# Patient Record
Sex: MALE | Race: WHITE | Employment: OTHER | URBAN - NONMETROPOLITAN AREA
[De-identification: names, ages, dates, MRNs, and addresses within clinical notes are randomized per-mention and may not be internally consistent; named-entity substitution may affect disease eponyms.]

---

## 2017-01-12 ENCOUNTER — OFFICE VISIT (OUTPATIENT)
Dept: FAMILY MEDICINE CLINIC | Age: 52
End: 2017-01-12

## 2017-01-12 VITALS
OXYGEN SATURATION: 98 % | RESPIRATION RATE: 16 BRPM | HEIGHT: 72 IN | SYSTOLIC BLOOD PRESSURE: 118 MMHG | DIASTOLIC BLOOD PRESSURE: 84 MMHG | BODY MASS INDEX: 37.46 KG/M2 | WEIGHT: 276.6 LBS | TEMPERATURE: 98.6 F | HEART RATE: 90 BPM

## 2017-01-12 DIAGNOSIS — K21.00 GASTROESOPHAGEAL REFLUX DISEASE WITH ESOPHAGITIS: ICD-10-CM

## 2017-01-12 DIAGNOSIS — F10.11 HISTORY OF ALCOHOL ABUSE: ICD-10-CM

## 2017-01-12 DIAGNOSIS — Z72.0 TOBACCO ABUSE: ICD-10-CM

## 2017-01-12 DIAGNOSIS — F41.9 ANXIETY: ICD-10-CM

## 2017-01-12 DIAGNOSIS — F33.1 MODERATE EPISODE OF RECURRENT MAJOR DEPRESSIVE DISORDER (HCC): Primary | ICD-10-CM

## 2017-01-12 DIAGNOSIS — Z12.11 COLON CANCER SCREENING: ICD-10-CM

## 2017-01-12 PROCEDURE — 96160 PT-FOCUSED HLTH RISK ASSMT: CPT | Performed by: NURSE PRACTITIONER

## 2017-01-12 PROCEDURE — 99214 OFFICE O/P EST MOD 30 MIN: CPT | Performed by: NURSE PRACTITIONER

## 2017-01-12 RX ORDER — VENLAFAXINE 37.5 MG/1
37.5 TABLET ORAL 2 TIMES DAILY
Qty: 60 TABLET | Refills: 1 | Status: SHIPPED | OUTPATIENT
Start: 2017-01-12 | End: 2017-03-06 | Stop reason: SDUPTHER

## 2017-01-12 RX ORDER — BUSPIRONE HYDROCHLORIDE 5 MG/1
5 TABLET ORAL 3 TIMES DAILY PRN
Qty: 90 TABLET | Refills: 1 | Status: SHIPPED | OUTPATIENT
Start: 2017-01-12 | End: 2017-03-06 | Stop reason: SDUPTHER

## 2017-01-18 ASSESSMENT — PATIENT HEALTH QUESTIONNAIRE - PHQ9
1. LITTLE INTEREST OR PLEASURE IN DOING THINGS: 3
5. POOR APPETITE OR OVEREATING: 1
2. FEELING DOWN, DEPRESSED OR HOPELESS: 3
9. THOUGHTS THAT YOU WOULD BE BETTER OFF DEAD, OR OF HURTING YOURSELF: 3
6. FEELING BAD ABOUT YOURSELF - OR THAT YOU ARE A FAILURE OR HAVE LET YOURSELF OR YOUR FAMILY DOWN: 3
SUM OF ALL RESPONSES TO PHQ9 QUESTIONS 1 & 2: 6
10. IF YOU CHECKED OFF ANY PROBLEMS, HOW DIFFICULT HAVE THESE PROBLEMS MADE IT FOR YOU TO DO YOUR WORK, TAKE CARE OF THINGS AT HOME, OR GET ALONG WITH OTHER PEOPLE: 3
4. FEELING TIRED OR HAVING LITTLE ENERGY: 3
8. MOVING OR SPEAKING SO SLOWLY THAT OTHER PEOPLE COULD HAVE NOTICED. OR THE OPPOSITE, BEING SO FIGETY OR RESTLESS THAT YOU HAVE BEEN MOVING AROUND A LOT MORE THAN USUAL: 2
7. TROUBLE CONCENTRATING ON THINGS, SUCH AS READING THE NEWSPAPER OR WATCHING TELEVISION: 3
SUM OF ALL RESPONSES TO PHQ QUESTIONS 1-9: 24
3. TROUBLE FALLING OR STAYING ASLEEP: 3

## 2017-01-18 ASSESSMENT — ENCOUNTER SYMPTOMS
EYES NEGATIVE: 1
GASTROINTESTINAL NEGATIVE: 1
RESPIRATORY NEGATIVE: 1

## 2017-02-03 RX ORDER — ATORVASTATIN CALCIUM 40 MG/1
TABLET, FILM COATED ORAL
Qty: 30 TABLET | Refills: 3 | Status: SHIPPED | OUTPATIENT
Start: 2017-02-03 | End: 2017-04-10 | Stop reason: SDUPTHER

## 2017-02-28 DIAGNOSIS — F41.9 ANXIETY: ICD-10-CM

## 2017-02-28 RX ORDER — FLUOXETINE HYDROCHLORIDE 20 MG/1
CAPSULE ORAL
Qty: 30 CAPSULE | Refills: 1 | Status: SHIPPED | OUTPATIENT
Start: 2017-02-28 | End: 2017-05-08 | Stop reason: SDUPTHER

## 2017-03-06 DIAGNOSIS — F41.9 ANXIETY: ICD-10-CM

## 2017-03-07 RX ORDER — VENLAFAXINE 37.5 MG/1
TABLET ORAL
Qty: 60 TABLET | Refills: 1 | Status: SHIPPED | OUTPATIENT
Start: 2017-03-07 | End: 2017-04-05 | Stop reason: ALTCHOICE

## 2017-03-07 RX ORDER — BUSPIRONE HYDROCHLORIDE 5 MG/1
TABLET ORAL
Qty: 90 TABLET | Refills: 1 | Status: SHIPPED | OUTPATIENT
Start: 2017-03-07 | End: 2017-05-15 | Stop reason: SDUPTHER

## 2017-03-08 ENCOUNTER — OFFICE VISIT (OUTPATIENT)
Dept: ORTHOPEDIC SURGERY | Age: 52
End: 2017-03-08

## 2017-03-08 VITALS — BODY MASS INDEX: 38.07 KG/M2 | HEIGHT: 72 IN | WEIGHT: 281.09 LBS

## 2017-03-08 DIAGNOSIS — R22.32 MASS OF HAND, LEFT: ICD-10-CM

## 2017-03-08 DIAGNOSIS — S63.602A THUMB SPRAIN, LEFT, INITIAL ENCOUNTER: ICD-10-CM

## 2017-03-08 DIAGNOSIS — M79.642 LEFT HAND PAIN: Primary | ICD-10-CM

## 2017-03-08 PROBLEM — S63.609A THUMB SPRAIN: Status: ACTIVE | Noted: 2017-03-08

## 2017-03-08 PROBLEM — R22.30 MASS OF HAND: Status: ACTIVE | Noted: 2017-03-08

## 2017-03-08 PROCEDURE — L3917 METACARP FX ORTHOSIS PRE CST: HCPCS | Performed by: ORTHOPAEDIC SURGERY

## 2017-03-08 PROCEDURE — 73130 X-RAY EXAM OF HAND: CPT | Performed by: ORTHOPAEDIC SURGERY

## 2017-03-08 PROCEDURE — 99203 OFFICE O/P NEW LOW 30 MIN: CPT | Performed by: ORTHOPAEDIC SURGERY

## 2017-03-19 DIAGNOSIS — G89.29 CHRONIC BILATERAL LOW BACK PAIN WITHOUT SCIATICA: ICD-10-CM

## 2017-03-19 DIAGNOSIS — M54.50 CHRONIC BILATERAL LOW BACK PAIN WITHOUT SCIATICA: ICD-10-CM

## 2017-03-19 DIAGNOSIS — G57.93 NEUROPATHY INVOLVING BOTH LOWER EXTREMITIES: ICD-10-CM

## 2017-03-20 RX ORDER — GABAPENTIN 100 MG/1
CAPSULE ORAL
Qty: 90 CAPSULE | Refills: 3 | Status: SHIPPED | OUTPATIENT
Start: 2017-03-20 | End: 2017-04-05 | Stop reason: ALTCHOICE

## 2017-04-10 ENCOUNTER — OFFICE VISIT (OUTPATIENT)
Dept: FAMILY MEDICINE CLINIC | Age: 52
End: 2017-04-10

## 2017-04-10 VITALS
HEART RATE: 84 BPM | HEIGHT: 72 IN | BODY MASS INDEX: 36.57 KG/M2 | OXYGEN SATURATION: 97 % | DIASTOLIC BLOOD PRESSURE: 80 MMHG | WEIGHT: 270 LBS | SYSTOLIC BLOOD PRESSURE: 124 MMHG

## 2017-04-10 DIAGNOSIS — F41.9 ANXIETY: ICD-10-CM

## 2017-04-10 DIAGNOSIS — E78.00 PURE HYPERCHOLESTEROLEMIA: ICD-10-CM

## 2017-04-10 DIAGNOSIS — I10 ESSENTIAL HYPERTENSION: ICD-10-CM

## 2017-04-10 DIAGNOSIS — R42 DIZZINESS: Primary | ICD-10-CM

## 2017-04-10 DIAGNOSIS — R10.32 LEFT LOWER QUADRANT PAIN: ICD-10-CM

## 2017-04-10 DIAGNOSIS — R63.4 WEIGHT LOSS: ICD-10-CM

## 2017-04-10 DIAGNOSIS — I25.10 CORONARY ARTERY DISEASE INVOLVING NATIVE CORONARY ARTERY OF NATIVE HEART WITHOUT ANGINA PECTORIS: ICD-10-CM

## 2017-04-10 PROCEDURE — 99214 OFFICE O/P EST MOD 30 MIN: CPT | Performed by: NURSE PRACTITIONER

## 2017-04-10 RX ORDER — LISINOPRIL AND HYDROCHLOROTHIAZIDE 20; 12.5 MG/1; MG/1
1 TABLET ORAL DAILY
Qty: 30 TABLET | Refills: 5 | Status: SHIPPED | OUTPATIENT
Start: 2017-04-10 | End: 2018-02-14 | Stop reason: ALTCHOICE

## 2017-04-10 RX ORDER — ASPIRIN 81 MG/1
81 TABLET ORAL DAILY
Qty: 30 TABLET | Refills: 5 | Status: SHIPPED | OUTPATIENT
Start: 2017-04-10 | End: 2021-07-20

## 2017-04-10 RX ORDER — ATORVASTATIN CALCIUM 40 MG/1
40 TABLET, FILM COATED ORAL DAILY
Qty: 30 TABLET | Refills: 5 | Status: SHIPPED | OUTPATIENT
Start: 2017-04-10 | End: 2017-09-29 | Stop reason: SDUPTHER

## 2017-04-10 ASSESSMENT — ENCOUNTER SYMPTOMS
SWOLLEN GLANDS: 0
ABDOMINAL PAIN: 1
NAUSEA: 0
VISUAL CHANGE: 0

## 2017-04-26 DIAGNOSIS — I25.10 CORONARY ARTERY DISEASE INVOLVING NATIVE HEART WITHOUT ANGINA PECTORIS, UNSPECIFIED VESSEL OR LESION TYPE: ICD-10-CM

## 2017-04-26 RX ORDER — SENNOSIDES 8.6 MG
1300 CAPSULE ORAL 2 TIMES DAILY
Qty: 180 TABLET | Refills: 3 | Status: ON HOLD | OUTPATIENT
Start: 2017-04-26 | End: 2018-12-18

## 2017-04-26 RX ORDER — CLOPIDOGREL BISULFATE 75 MG/1
75 TABLET ORAL DAILY
Qty: 90 TABLET | Refills: 3 | Status: SHIPPED | OUTPATIENT
Start: 2017-04-26 | End: 2020-09-29 | Stop reason: SDUPTHER

## 2017-05-04 ENCOUNTER — HOSPITAL ENCOUNTER (OUTPATIENT)
Dept: CT IMAGING | Age: 52
Discharge: HOME | End: 2017-05-04
Attending: NURSE PRACTITIONER | Admitting: NURSE PRACTITIONER

## 2017-05-04 DIAGNOSIS — R10.32 LEFT LOWER QUADRANT PAIN: ICD-10-CM

## 2017-05-04 DIAGNOSIS — R10.32 ABDOMINAL PAIN, LEFT LOWER QUADRANT: ICD-10-CM

## 2017-05-05 ENCOUNTER — OFFICE VISIT (OUTPATIENT)
Dept: CARDIOLOGY CLINIC | Age: 52
End: 2017-05-05

## 2017-05-05 VITALS
DIASTOLIC BLOOD PRESSURE: 70 MMHG | OXYGEN SATURATION: 95 % | WEIGHT: 271 LBS | SYSTOLIC BLOOD PRESSURE: 100 MMHG | HEIGHT: 72 IN | BODY MASS INDEX: 36.7 KG/M2 | HEART RATE: 73 BPM

## 2017-05-05 DIAGNOSIS — I10 ESSENTIAL HYPERTENSION: ICD-10-CM

## 2017-05-05 DIAGNOSIS — I25.110 CORONARY ARTERY DISEASE INVOLVING NATIVE CORONARY ARTERY OF NATIVE HEART WITH UNSTABLE ANGINA PECTORIS (HCC): Primary | ICD-10-CM

## 2017-05-05 PROCEDURE — 99215 OFFICE O/P EST HI 40 MIN: CPT | Performed by: INTERNAL MEDICINE

## 2017-05-15 DIAGNOSIS — F41.9 ANXIETY: ICD-10-CM

## 2017-05-15 RX ORDER — BUSPIRONE HYDROCHLORIDE 5 MG/1
TABLET ORAL
Qty: 90 TABLET | Refills: 1 | Status: SHIPPED | OUTPATIENT
Start: 2017-05-15 | End: 2018-01-29 | Stop reason: ALTCHOICE

## 2017-06-15 ENCOUNTER — TELEPHONE (OUTPATIENT)
Dept: FAMILY MEDICINE CLINIC | Age: 52
End: 2017-06-15

## 2017-06-15 DIAGNOSIS — M25.512 LEFT SHOULDER PAIN, UNSPECIFIED CHRONICITY: Primary | ICD-10-CM

## 2017-06-15 DIAGNOSIS — M25.522 LEFT ELBOW PAIN: ICD-10-CM

## 2017-07-11 ENCOUNTER — TELEPHONE (OUTPATIENT)
Dept: FAMILY MEDICINE CLINIC | Age: 52
End: 2017-07-11

## 2017-07-26 DIAGNOSIS — J44.9 CHRONIC OBSTRUCTIVE PULMONARY DISEASE, UNSPECIFIED COPD TYPE (HCC): ICD-10-CM

## 2017-07-26 RX ORDER — BUDESONIDE AND FORMOTEROL FUMARATE DIHYDRATE 160; 4.5 UG/1; UG/1
2 AEROSOL RESPIRATORY (INHALATION) 2 TIMES DAILY
Qty: 1 INHALER | Refills: 5 | Status: SHIPPED | OUTPATIENT
Start: 2017-07-26 | End: 2017-12-11

## 2017-07-31 DIAGNOSIS — J44.9 CHRONIC OBSTRUCTIVE PULMONARY DISEASE, UNSPECIFIED COPD TYPE (HCC): ICD-10-CM

## 2017-07-31 RX ORDER — ALBUTEROL SULFATE 90 UG/1
2 AEROSOL, METERED RESPIRATORY (INHALATION) EVERY 6 HOURS PRN
Qty: 1 INHALER | Refills: 5 | Status: SHIPPED | OUTPATIENT
Start: 2017-07-31 | End: 2018-03-29

## 2017-08-01 ENCOUNTER — TELEPHONE (OUTPATIENT)
Dept: FAMILY MEDICINE CLINIC | Age: 52
End: 2017-08-01

## 2017-08-16 ENCOUNTER — TELEPHONE (OUTPATIENT)
Dept: FAMILY MEDICINE CLINIC | Age: 52
End: 2017-08-16

## 2017-08-23 ENCOUNTER — TELEPHONE (OUTPATIENT)
Dept: FAMILY MEDICINE CLINIC | Age: 52
End: 2017-08-23

## 2017-09-29 DIAGNOSIS — E78.00 PURE HYPERCHOLESTEROLEMIA: ICD-10-CM

## 2017-09-29 RX ORDER — ATORVASTATIN CALCIUM 40 MG/1
40 TABLET, FILM COATED ORAL DAILY
Qty: 30 TABLET | Refills: 5 | Status: SHIPPED | OUTPATIENT
Start: 2017-09-29 | End: 2022-02-02 | Stop reason: SDUPTHER

## 2017-12-08 ENCOUNTER — TELEPHONE (OUTPATIENT)
Dept: FAMILY MEDICINE CLINIC | Age: 52
End: 2017-12-08

## 2017-12-08 NOTE — TELEPHONE ENCOUNTER
Denial for Symbicort 12/07/17. Please see denial letter attached. There are lower cost drugs such as Breo Elipta or Dulera that would be approved. Please advise.

## 2017-12-11 NOTE — TELEPHONE ENCOUNTER
Rx for [facility] sent to [facility] in [place] for the pt. LM to inform him of the medication change and to let him know that it was sent to the pharm.

## 2018-01-03 ENCOUNTER — OFFICE VISIT (OUTPATIENT)
Dept: PULMONOLOGY | Age: 53
End: 2018-01-03

## 2018-01-03 VITALS
SYSTOLIC BLOOD PRESSURE: 124 MMHG | HEIGHT: 72 IN | WEIGHT: 281 LBS | HEART RATE: 86 BPM | TEMPERATURE: 97.6 F | BODY MASS INDEX: 38.06 KG/M2 | DIASTOLIC BLOOD PRESSURE: 88 MMHG | OXYGEN SATURATION: 92 % | RESPIRATION RATE: 18 BRPM

## 2018-01-03 DIAGNOSIS — G47.00 INSOMNIA, UNSPECIFIED TYPE: ICD-10-CM

## 2018-01-03 DIAGNOSIS — R06.81 WITNESSED EPISODE OF APNEA: ICD-10-CM

## 2018-01-03 DIAGNOSIS — R06.83 SNORING: Primary | ICD-10-CM

## 2018-01-03 DIAGNOSIS — G47.10 HYPERSOMNIA: ICD-10-CM

## 2018-01-03 PROCEDURE — 4004F PT TOBACCO SCREEN RCVD TLK: CPT | Performed by: NURSE PRACTITIONER

## 2018-01-03 PROCEDURE — 3017F COLORECTAL CA SCREEN DOC REV: CPT | Performed by: NURSE PRACTITIONER

## 2018-01-03 PROCEDURE — G8598 ASA/ANTIPLAT THER USED: HCPCS | Performed by: NURSE PRACTITIONER

## 2018-01-03 PROCEDURE — 99203 OFFICE O/P NEW LOW 30 MIN: CPT | Performed by: NURSE PRACTITIONER

## 2018-01-03 PROCEDURE — G8482 FLU IMMUNIZE ORDER/ADMIN: HCPCS | Performed by: NURSE PRACTITIONER

## 2018-01-03 PROCEDURE — G8417 CALC BMI ABV UP PARAM F/U: HCPCS | Performed by: NURSE PRACTITIONER

## 2018-01-03 PROCEDURE — G8427 DOCREV CUR MEDS BY ELIG CLIN: HCPCS | Performed by: NURSE PRACTITIONER

## 2018-01-03 RX ORDER — GABAPENTIN 300 MG/1
CAPSULE ORAL
Refills: 0 | COMMUNITY
Start: 2017-12-14 | End: 2021-03-06

## 2018-01-03 RX ORDER — HYDROCODONE BITARTRATE AND ACETAMINOPHEN 5; 325 MG/1; MG/1
TABLET ORAL
Refills: 0 | COMMUNITY
Start: 2017-12-28 | End: 2018-06-08

## 2018-01-03 ASSESSMENT — SLEEP AND FATIGUE QUESTIONNAIRES
HOW LIKELY ARE YOU TO NOD OFF OR FALL ASLEEP WHILE LYING DOWN TO REST IN THE AFTERNOON WHEN CIRCUMSTANCES PERMIT: 1
HOW LIKELY ARE YOU TO NOD OFF OR FALL ASLEEP WHILE WATCHING TV: 1
HOW LIKELY ARE YOU TO NOD OFF OR FALL ASLEEP WHILE SITTING AND TALKING TO SOMEONE: 1
HOW LIKELY ARE YOU TO NOD OFF OR FALL ASLEEP WHILE SITTING INACTIVE IN A PUBLIC PLACE: 1
ESS TOTAL SCORE: 9
HOW LIKELY ARE YOU TO NOD OFF OR FALL ASLEEP WHILE SITTING AND READING: 1
HOW LIKELY ARE YOU TO NOD OFF OR FALL ASLEEP WHEN YOU ARE A PASSENGER IN A CAR FOR AN HOUR WITHOUT A BREAK: 2
NECK CIRCUMFERENCE (INCHES): 19.5
HOW LIKELY ARE YOU TO NOD OFF OR FALL ASLEEP WHILE SITTING QUIETLY AFTER LUNCH WITHOUT ALCOHOL: 1
HOW LIKELY ARE YOU TO NOD OFF OR FALL ASLEEP IN A CAR, WHILE STOPPED FOR A FEW MINUTES IN TRAFFIC: 1

## 2018-01-03 NOTE — PROGRESS NOTES
(gastroesophageal reflux disease)     Hyperlipidemia     Hypertension     Pancreatitis     Recovering alcoholic ([address]     Tobacco abuse 12/16/2016       Past Surgical History:        Procedure Laterality Date    CORONARY ANGIOPLASTY WITH STENT PLACEMENT  2006, 2012, 2013    [facility]       Allergies:  is allergic to pcn [penicillins]. Social History:    TOBACCO:   reports that he has been smoking Cigarettes. He has a 45.00 pack-year smoking history. He has never used smokeless tobacco.  ETOH:   reports that he does not drink alcohol.       Family History:       Problem Relation Age of Onset    Other Mother     Arthritis Mother     Mental Illness Mother     Depression Mother     Diabetes Father     Heart Disease Father     Substance Abuse Father     Heart Disease Sister     High Blood Pressure Sister     Mental Retardation Brother     Other Brother     Learning Disabilities Brother     Early Death Sister     Cancer Sister     Substance Abuse Sister     Cancer Sister     Diabetes Brother     High Blood Pressure Brother        Current Medications:    Current Outpatient Prescriptions:     atorvastatin (LIPITOR) 40 MG tablet, Take 1 tablet by mouth daily, Disp: 30 tablet, Rfl: 5    albuterol sulfate  (90 Base) MCG/ACT inhaler, Inhale 2 puffs into the lungs every 6 hours as needed for Wheezing, Disp: 1 Inhaler, Rfl: 5    clopidogrel (PLAVIX) 75 MG tablet, Take 1 tablet by mouth daily, Disp: 90 tablet, Rfl: 3    lisinopril-hydrochlorothiazide (PRINZIDE;ZESTORETIC) 20-12.5 MG per tablet, Take 1 tablet by mouth daily, Disp: 30 tablet, Rfl: 5    aspirin 81 MG EC tablet, Take 1 tablet by mouth daily, Disp: 30 tablet, Rfl: 5    pantoprazole (PROTONIX) 20 MG tablet, TAKE 2 TABLETS BY MOUTH DAILY (Patient taking differently: TAKE 1 TABLET BY MOUTH BID), Disp: 30 tablet, Rfl: 0    metoprolol (LOPRESSOR) 50 MG tablet, Take 1 tablet by mouth daily, Disp: 30 tablet, Rfl: 0    nitroGLYCERIN (NITROSTAT) 0.4 MG SL tablet, Place 1 tablet under the tongue every 5 minutes as needed. , Disp: 25 tablet, Rfl: 0    BREO ELLIPTA 200-25 MCG/INH AEPB, INHALE 1 PUFF INTO THE LUNGS AT THE SAME TIME EACH DAY, Disp: , Rfl: 2    gabapentin (NEURONTIN) 300 MG capsule, TAKE ONE CAPSULE BY MOUTH EVERY 8 HOURS, Disp: , Rfl: 0    HYDROcodone-acetaminophen (NORCO) 5-325 MG per tablet, TAKE 1 TABLET EVERY 12 HOURS AS NEEDED FOR PAIN, Disp: , Rfl: 0    mometasone-formoterol (DULERA) 200-5 MCG/ACT inhaler, Inhale 2 puffs into the lungs every 12 hours, Disp: 1 Inhaler, Rfl: 11    Benzocaine-Menthol (CHLORASEPTIC) 6-10 MG LOZG lozenge, Take 1 lozenge by mouth every 2 hours as needed for Sore Throat, Disp: 30 lozenge, Rfl: 0    busPIRone (BUSPAR) 5 MG tablet, TAKE 1 TABLET BY MOUTH 3 TIMES A DAY AS NEEDED FOR ANXIETY, Disp: 90 tablet, Rfl: 1    FLUoxetine (PROZAC) 20 MG capsule, TAKE 1 CAPSULE BY MOUTH DAILY, Disp: 30 capsule, Rfl: 5    acetaminophen (TYLENOL) 650 MG extended release tablet, Take 2 tablets by mouth 2 times daily, Disp: 180 tablet, Rfl: 3    loratadine (CLARITIN) 10 MG tablet, Take 1 tablet by mouth daily, Disp: 30 tablet, Rfl: 11      REVIEW OF SYSTEMS:  Constitutional: Negative for fever  HENT: Negative for sore throat  Eyes: Negative for redness   Respiratory: +for dyspnea, +cough  Cardiovascular: Negative for chest pain  Gastrointestinal: Negative for vomiting, diarrhea   Genitourinary: Negative for hematuria   Musculoskeletal: Negative for arthralgias   Skin: Negative for rash  Neurological: Negative for syncope  Hematological: Negative for adenopathy  Psychiatric/Behavorial: +for anxiety      Objective:   PHYSICAL EXAM:    Blood pressure 124/88, pulse 86, temperature 97.6 °F (36.4 °C), temperature source Oral, resp. rate 18, height 6' (1.829 m), weight 281 lb (127.5 kg), SpO2 92 %.' on RA  Gen: No distress. Obese. BMI of 38.11  Eyes: PERRL. No sclera icterus. No conjunctival injection.    ENT: No discharge. Pharynx clear. Mallampati class III. Poor dentition   Neck: Trachea midline. No obvious mass. Neck circumference 19.5\"  Resp: No accessory muscle use. No crackles. No wheezes. No rhonchi. No dullness on percussion. Good air entry. CV: Regular rate. Regular rhythm. No murmur or rub. No edema. GI: Non-tender. Non-distended. No hernia. Skin: Warm and dry. No nodule on exposed extremities. Lymph: No cervical LAD. No supraclavicular LAD. M/S: No cyanosis. No joint deformity. No clubbing. Neuro: Awake. Alert. Moves all four extremities. Psych: Oriented x 3. No anxiety. DATA reviewed by me:   CXR: 8/13/17: No evidence of acute cardiopulmonary disease. Assessment:       · Snoring  · Witnessed apnea   · Hypersomnia   · Insomnia. Abnormal sleep pattern may persists for years given history of severe alcohol consumption despite abstinence currently. Even after 1-2 years of abstinence, sleep tends to be shortened, shallow, and fragmented. · Psychiatric - Biploar disorder/manic depression, Anxiety   · Comorbid conditions: CAD, COPD, hypertension, GERD  · Tobacco abuse - average 1 ppd for 45 years (up to 5 ppd at age 16)      Plan:       · PSG evaluate for sleep related breathing disorder. · Treatment options were discussed with patient if PSG reveals JULIANN, including CPAP therapy, oral appliances, upper airway surgery and hypoglossal nerve stimulation. · Discussed with patient the pathophysiology of apnea. · Cognitive behavioral therapy was discussed with patient including stimulus control and sleep restriction  · Sleep hygiene discussed and provided written education in AVS  · Avoid sedatives, alcohol and caffeinated drinks at bed time. · No driving motorized vehicles or operating heavy machinery while fatigue, drowsy or sleepy. · Weight loss is also recommended as a long-term intervention.     · Complications of JULIANN if not treated were discussed with patient patient to include systemic hypertension, pulmonary hypertension, cardiovascular morbidities, car accidents and all cause mortality. · Advised tobacco cessation.   Patient plans to discuss trial of Chantix with psychiatrist   · Continue to f/u with psychiatry and counseling

## 2018-01-29 ENCOUNTER — OFFICE VISIT (OUTPATIENT)
Dept: CARDIOLOGY CLINIC | Age: 53
End: 2018-01-29

## 2018-01-29 VITALS
HEART RATE: 86 BPM | OXYGEN SATURATION: 96 % | HEIGHT: 72 IN | BODY MASS INDEX: 37.79 KG/M2 | WEIGHT: 279 LBS | SYSTOLIC BLOOD PRESSURE: 94 MMHG | DIASTOLIC BLOOD PRESSURE: 70 MMHG

## 2018-01-29 DIAGNOSIS — Z72.0 TOBACCO ABUSE: ICD-10-CM

## 2018-01-29 DIAGNOSIS — R07.9 ACUTE CHEST PAIN: ICD-10-CM

## 2018-01-29 DIAGNOSIS — I10 ESSENTIAL HYPERTENSION: ICD-10-CM

## 2018-01-29 DIAGNOSIS — I25.110 CORONARY ARTERY DISEASE INVOLVING NATIVE CORONARY ARTERY OF NATIVE HEART WITH UNSTABLE ANGINA PECTORIS (HCC): Primary | ICD-10-CM

## 2018-01-29 PROBLEM — I20.0 UNSTABLE ANGINA (HCC): Status: ACTIVE | Noted: 2018-01-29

## 2018-01-29 PROCEDURE — 99214 OFFICE O/P EST MOD 30 MIN: CPT | Performed by: INTERNAL MEDICINE

## 2018-01-29 PROCEDURE — 4004F PT TOBACCO SCREEN RCVD TLK: CPT | Performed by: INTERNAL MEDICINE

## 2018-01-29 PROCEDURE — G8427 DOCREV CUR MEDS BY ELIG CLIN: HCPCS | Performed by: INTERNAL MEDICINE

## 2018-01-29 PROCEDURE — G8417 CALC BMI ABV UP PARAM F/U: HCPCS | Performed by: INTERNAL MEDICINE

## 2018-01-29 PROCEDURE — G8482 FLU IMMUNIZE ORDER/ADMIN: HCPCS | Performed by: INTERNAL MEDICINE

## 2018-01-29 PROCEDURE — G8598 ASA/ANTIPLAT THER USED: HCPCS | Performed by: INTERNAL MEDICINE

## 2018-01-29 PROCEDURE — 3017F COLORECTAL CA SCREEN DOC REV: CPT | Performed by: INTERNAL MEDICINE

## 2018-01-29 NOTE — PROGRESS NOTES
procedure, including myocardial infarction, stroke, death, vascular complications, and the possible need for emergent surgery. 2. The patient was seen for >25 minutes. >50% of the time was devoted to giving the patient detailed instructions instructions on addressing diet, regular exercise, weight control, smoking abstention, medication compliance, and stress minimization. The patient was provided written and verbal instructions regarding risk factor modification. 3. I recommend that the patient continue their currently prescribed medications. Their drug modifiable risk factors appear to be well controlled. I will continue to address the need/dosing of medications in future visits. 4. Follow up with me after procedure    All questions and concerns were addressed to the patient/family. Alternatives to my treatment were discussed. The note was completed using EMR. Every effort was made to ensure accuracy; however, inadvertent computerized transcription errors may be present. I, Dr. Renuka Greenfield personally performed the services described in this documentation as scribed by Rasheeda Bernardo RN in my presence, and it is both accurate and complete.       Renuka Greenfield MD, [address], [place], [place]  [phone] [facility] Organ office  [phone] Main central  1/29/2018  8:47 AM

## 2018-01-29 NOTE — LETTER
[facility] Cardiology - [address] [address]  Phone: [phone]  Fax: [phone]    Sid Cavazos MD        January 29, 2018     [address] [address]    Patient: Bienvenido Day  MR Number: [id number]  YOB: 1965  Date of Visit: 1/29/2018    Dear Dr. Sandi Askew: Thank you for allowing me to participate in the care of Bienvenido Day. Below are the relevant portions of my assessment and plan of care. History of present illness on initial date of evaluation:             Bienvenido Day is a 47 y.o. patient who presents for cardiac follow-up with a history of CAD and 3 previous stents. He states he has been having chest pressure and pain. The pain is located on the left side of his chest and has worsened over the past few days. The pain radiates to the upper chest and back area. He states it worsens with exertion. The pain is different from his previous spells. He has some associated SOB. The pain resolves with rest.             He states he is currently living in a shelter and states he is unable to eat healthy. Assessment:  47 y.o. patient with:  1. Chest pain consistent with unstable angina with class 2-3 symptoms  2. Coronary artery disease             ~stents in 2006, 2012, 2013  3. Hypertension             BP: (94)/(70)   4. Current smoker             ~smoking cessation discussed  5. Obesity     Plan:  1. I had the opportunity to review the Bienvenido Day clinical presentation and the available pertinent data. With the patient's clinical risk factors and symptoms, there is a high pretest likelihood for CAD. I have asked Bienvenido Day to undergo cardiac angiography for further diagnostic testing. The procedure was explained in depth. All questions and alternate treatment strategies were discussed.  The patient agrees to proceed. They understand all the risks associated with the procedure, including myocardial infarction, stroke, death, vascular complications, and the possible need for emergent surgery. 2. The patient was seen for >25 minutes. >50% of the time was devoted to giving the patient detailed instructions instructions on addressing diet, regular exercise, weight control, smoking abstention, medication compliance, and stress minimization. The patient was provided written and verbal instructions regarding risk factor modification. 3. I recommend that the patient continue their currently prescribed medications. Their drug modifiable risk factors appear to be well controlled. I will continue to address the need/dosing of medications in future visits. 4. Follow up with me after procedure    If you have questions, please do not hesitate to call me. I look forward to following Kelley Nair along with you.     Sincerely,        Alvarez Okeefe MD

## 2018-01-29 NOTE — COMMUNICATION BODY
History of present illness on initial date of evaluation:             Fred Le is a 47 y.o. patient who presents for cardiac follow-up with a history of CAD and 3 previous stents. He states he has been having chest pressure and pain. The pain is located on the left side of his chest and has worsened over the past few days. The pain radiates to the upper chest and back area. He states it worsens with exertion. The pain is different from his previous spells. He has some associated SOB. The pain resolves with rest.             He states he is currently living in a shelter and states he is unable to eat healthy. Assessment:  47 y.o. patient with:  1. Chest pain consistent with unstable angina with class 2-3 symptoms  2. Coronary artery disease             ~stents in 2006, 2012, 2013  3. Hypertension             BP: (94)/(70)   4. Current smoker             ~smoking cessation discussed  5. Obesity     Plan:  1. I had the opportunity to review the Fred Le clinical presentation and the available pertinent data. With the patient's clinical risk factors and symptoms, there is a high pretest likelihood for CAD. I have asked Fred Le to undergo cardiac angiography for further diagnostic testing. The procedure was explained in depth. All questions and alternate treatment strategies were discussed. The patient agrees to proceed. They understand all the risks associated with the procedure, including myocardial infarction, stroke, death, vascular complications, and the possible need for emergent surgery. 2. The patient was seen for >25 minutes. >50% of the time was devoted to giving the patient detailed instructions instructions on addressing diet, regular exercise, weight control, smoking abstention, medication compliance, and stress minimization. The patient was provided written and verbal instructions regarding risk factor modification.    3. I recommend that the patient continue their currently prescribed medications. Their drug modifiable risk factors appear to be well controlled. I will continue to address the need/dosing of medications in future visits.   4. Follow up with me after procedure

## 2018-01-30 ENCOUNTER — TELEPHONE (OUTPATIENT)
Dept: CARDIOLOGY CLINIC | Age: 53
End: 2018-01-30

## 2018-01-31 ENCOUNTER — TELEPHONE (OUTPATIENT)
Dept: CARDIOLOGY CLINIC | Age: 53
End: 2018-01-31

## 2018-01-31 NOTE — TELEPHONE ENCOUNTER
Spoke to pt. He has had one dose of ranexa. Informed him the symptoms may not be from the Ranexa. Informed him medication side effects typically subside within 5-7 days of initial dose. Suggested if he could not tolerate medication, to stop medication and keep his appt with Kellie Juarez. Pt states he will try to take it today and if the symptoms return, he will stop. Informed him to let Cesar Solis know the outcome at the appt.  ARUNA

## 2018-01-31 NOTE — TELEPHONE ENCOUNTER
Patient called stating since starting Ranexa he has been nauseated, dizzy, dry mouth and stomach discomfort.

## 2018-01-31 NOTE — TELEPHONE ENCOUNTER
Patient called stating he had a cardiac cath through his wrist on Monday. He was inquiring as to when he could use his wrist/hand for self pleasure. Spoke with Dr. Cadence Harvey. He stated patient should wait at least 1 week before engaging in vigorous activity. Patient voiced understanding.

## 2018-02-01 ENCOUNTER — TELEPHONE (OUTPATIENT)
Dept: CARDIOLOGY CLINIC | Age: 53
End: 2018-02-01

## 2018-02-02 ENCOUNTER — TELEPHONE (OUTPATIENT)
Dept: CARDIOLOGY CLINIC | Age: 53
End: 2018-02-02

## 2018-02-14 ENCOUNTER — OFFICE VISIT (OUTPATIENT)
Dept: CARDIOLOGY CLINIC | Age: 53
End: 2018-02-14

## 2018-02-14 VITALS
BODY MASS INDEX: 37.65 KG/M2 | SYSTOLIC BLOOD PRESSURE: 114 MMHG | OXYGEN SATURATION: 97 % | HEIGHT: 72 IN | HEART RATE: 96 BPM | WEIGHT: 278 LBS | DIASTOLIC BLOOD PRESSURE: 64 MMHG

## 2018-02-14 DIAGNOSIS — Z72.0 TOBACCO ABUSE: ICD-10-CM

## 2018-02-14 DIAGNOSIS — I10 ESSENTIAL HYPERTENSION: ICD-10-CM

## 2018-02-14 DIAGNOSIS — I25.118 CORONARY ARTERY DISEASE OF NATIVE ARTERY OF NATIVE HEART WITH STABLE ANGINA PECTORIS (HCC): Primary | ICD-10-CM

## 2018-02-14 DIAGNOSIS — E78.00 PURE HYPERCHOLESTEROLEMIA: ICD-10-CM

## 2018-02-14 PROCEDURE — G8482 FLU IMMUNIZE ORDER/ADMIN: HCPCS | Performed by: NURSE PRACTITIONER

## 2018-02-14 PROCEDURE — 99214 OFFICE O/P EST MOD 30 MIN: CPT | Performed by: NURSE PRACTITIONER

## 2018-02-14 PROCEDURE — G8598 ASA/ANTIPLAT THER USED: HCPCS | Performed by: NURSE PRACTITIONER

## 2018-02-14 PROCEDURE — G8427 DOCREV CUR MEDS BY ELIG CLIN: HCPCS | Performed by: NURSE PRACTITIONER

## 2018-02-14 PROCEDURE — G8417 CALC BMI ABV UP PARAM F/U: HCPCS | Performed by: NURSE PRACTITIONER

## 2018-02-14 PROCEDURE — 4004F PT TOBACCO SCREEN RCVD TLK: CPT | Performed by: NURSE PRACTITIONER

## 2018-02-14 PROCEDURE — 3017F COLORECTAL CA SCREEN DOC REV: CPT | Performed by: NURSE PRACTITIONER

## 2018-02-14 RX ORDER — METOPROLOL SUCCINATE 50 MG/1
50 TABLET, EXTENDED RELEASE ORAL DAILY
Refills: 2 | COMMUNITY
Start: 2018-02-07 | End: 2020-04-29 | Stop reason: SDUPTHER

## 2018-02-14 RX ORDER — LISINOPRIL 20 MG/1
20 TABLET ORAL DAILY
Qty: 30 TABLET | Refills: 3 | Status: SHIPPED | OUTPATIENT
Start: 2018-02-14 | End: 2018-03-21 | Stop reason: DRUGHIGH

## 2018-02-14 RX ORDER — ISOSORBIDE MONONITRATE 30 MG/1
30 TABLET, EXTENDED RELEASE ORAL DAILY
Qty: 30 TABLET | Refills: 3 | Status: SHIPPED | OUTPATIENT
Start: 2018-02-14 | End: 2018-06-15 | Stop reason: SDUPTHER

## 2018-02-14 NOTE — PATIENT INSTRUCTIONS
1. Stop the lisinopril - hct combination  2. Start instead lisinopril 20 mg once daily - take in PM  3. Stop the Ranexa completely  4. Continue the metoprolol (Toprol XL) 50 mg daily in AM  5. Add isosorbide (Imdur) 30 mg daily in AM  6.  Follow up with me in 3-4 weeks

## 2018-02-14 NOTE — PROGRESS NOTES
RCA  2. Normal LVEF  3. Normal hemodynamics   Conclusion/plan of care:  1. Medical management  2. Add Ranexa  3. Referral to pulmonary      CARDIAC CATH APR 2013 (BN):   1. Severe single-vessel coronary artery disease involving the distal right coronary artery. This has been successfully treated with a Promus drug-eluting stent. He has mild to moderate diffuse disease in the LAD approaching moderate to mid LAD;  this will be monitored clinically. 2. Normal LV chamber size and function with normal LVEDP. 3. Hypertension. 4. Hyperlipidemia. RECOMMENDATIONS:  1. If clinically stable post radial access PCI, he may be a candidate for discharge home later today with outpatient followup. 2. Continue dual anti-platelet therapy for no less than one year. 3. Continue aggressive secondary risk factor modification and discontinue smoking if active at this point. 4. Follow up with me in [facility] in one to two weeks post discharge and with Dr. César Freitas as directed. Assessment:    1. Coronary artery disease of native artery of native heart with stable angina pectoris ([address]    2. Essential hypertension    3. Pure hypercholesterolemia    4. Tobacco abuse          Plan:   1. Stop the lisinopril - hct combination  2. Start instead lisinopril 20 mg once daily - take in PM  3. Stop the Ranexa completely  4. Continue the metoprolol (Toprol XL) 50 mg daily in AM  5. Add isosorbide (Imdur) 30 mg daily in AM  6. Follow up with me in 3-4 weeks      I appreciate the opportunity of cooperating in the care of this individual.    Jean Pierre Pierson CNP, 2/14/2018, 9:26 AM    QUALITY MEASURES  1. Tobacco Cessation Counseling: Yes  2. Retake of BP if >140/90:   NA  3. Documentation to PCP/referring for new patient:  Sent to PCP at close of office visit  4. CAD patient on anti-platelet: Yes  5. CAD patient on STATIN therapy:  Yes  6.  Patient with CHF and aFib on anticoagulation:  NA

## 2018-03-01 ENCOUNTER — TELEPHONE (OUTPATIENT)
Dept: PULMONOLOGY | Age: 53
End: 2018-03-01

## 2018-03-01 NOTE — LETTER
[facility] Pulmonary Critical Care and Sleep  [address] [place] [address]  Phone: [phone]  Fax: [phone]    Lyn Rizo MD        March 1, 2018    [place] [address]      Dear Isabelle Cortez:    In reviewing your chart, it has come to my attention that you did not keep your new patient appointment on 3/1/18. Please be advised that [facility] Pulmonary, Sleep and Critical Care has a policy that if a new patient fails to show up for two appointments that the practice does not have to reschedule the patient's appointment. Please contact the office to reschedule this appointment at your earliest convenience. If you have any questions or concerns, please don't hesitate to call.     Sincerely,        Lyn Rizo MD

## 2018-03-01 NOTE — TELEPHONE ENCOUNTER
Patient did not show for New Pt Appointment for chest pain  with Dr. Isela Titus on 3/1/18    Patient was also no show on: N/A    LOV N/A

## 2018-03-05 ENCOUNTER — TELEPHONE (OUTPATIENT)
Dept: CARDIOLOGY CLINIC | Age: 53
End: 2018-03-05

## 2018-03-05 DIAGNOSIS — R06.02 SOB (SHORTNESS OF BREATH): Primary | ICD-10-CM

## 2018-03-05 NOTE — TELEPHONE ENCOUNTER
Pulmonology called regarding referral in system, they think it was an accident that the pt was referred to pulmonology. I do not see that pt was referred per [facility] last office note of telephone encounters. [facility] would you like pt referred to pulmonology? If [facility] wants pt seen by pulmonology place new referral in epic.

## 2018-03-17 DIAGNOSIS — E78.00 PURE HYPERCHOLESTEROLEMIA: ICD-10-CM

## 2018-03-19 RX ORDER — ATORVASTATIN CALCIUM 40 MG/1
40 TABLET, FILM COATED ORAL DAILY
Qty: 30 TABLET | Refills: 0 | OUTPATIENT
Start: 2018-03-19

## 2018-03-21 ENCOUNTER — OFFICE VISIT (OUTPATIENT)
Dept: CARDIOLOGY CLINIC | Age: 53
End: 2018-03-21

## 2018-03-21 VITALS
HEART RATE: 80 BPM | DIASTOLIC BLOOD PRESSURE: 70 MMHG | SYSTOLIC BLOOD PRESSURE: 126 MMHG | HEIGHT: 72 IN | WEIGHT: 282 LBS | BODY MASS INDEX: 38.19 KG/M2 | OXYGEN SATURATION: 96 %

## 2018-03-21 DIAGNOSIS — E78.00 PURE HYPERCHOLESTEROLEMIA: ICD-10-CM

## 2018-03-21 DIAGNOSIS — I25.89 CARDIAC MICROVASCULAR DISEASE: ICD-10-CM

## 2018-03-21 DIAGNOSIS — I25.118 CORONARY ARTERY DISEASE OF NATIVE ARTERY OF NATIVE HEART WITH STABLE ANGINA PECTORIS (HCC): Primary | ICD-10-CM

## 2018-03-21 DIAGNOSIS — I10 ESSENTIAL HYPERTENSION: ICD-10-CM

## 2018-03-21 DIAGNOSIS — Z72.0 TOBACCO ABUSE: ICD-10-CM

## 2018-03-21 PROBLEM — I25.85 CARDIAC MICROVASCULAR DISEASE: Status: ACTIVE | Noted: 2018-01-29

## 2018-03-21 PROCEDURE — 4004F PT TOBACCO SCREEN RCVD TLK: CPT | Performed by: NURSE PRACTITIONER

## 2018-03-21 PROCEDURE — 99214 OFFICE O/P EST MOD 30 MIN: CPT | Performed by: NURSE PRACTITIONER

## 2018-03-21 PROCEDURE — G8598 ASA/ANTIPLAT THER USED: HCPCS | Performed by: NURSE PRACTITIONER

## 2018-03-21 PROCEDURE — G8427 DOCREV CUR MEDS BY ELIG CLIN: HCPCS | Performed by: NURSE PRACTITIONER

## 2018-03-21 PROCEDURE — G8417 CALC BMI ABV UP PARAM F/U: HCPCS | Performed by: NURSE PRACTITIONER

## 2018-03-21 PROCEDURE — 3017F COLORECTAL CA SCREEN DOC REV: CPT | Performed by: NURSE PRACTITIONER

## 2018-03-21 PROCEDURE — G8482 FLU IMMUNIZE ORDER/ADMIN: HCPCS | Performed by: NURSE PRACTITIONER

## 2018-03-21 RX ORDER — TRAZODONE HYDROCHLORIDE 100 MG/1
100 TABLET ORAL NIGHTLY
Refills: 2 | COMMUNITY
Start: 2018-03-02 | End: 2018-03-29

## 2018-03-21 RX ORDER — DESVENLAFAXINE 50 MG/1
TABLET, EXTENDED RELEASE ORAL
Refills: 2 | Status: ON HOLD | COMMUNITY
Start: 2018-03-02 | End: 2018-12-18

## 2018-03-21 RX ORDER — LISINOPRIL 20 MG/1
20 TABLET ORAL NIGHTLY
Qty: 30 TABLET | Refills: 3 | Status: SHIPPED
Start: 2018-03-21 | End: 2018-06-04 | Stop reason: SDUPTHER

## 2018-03-21 NOTE — PROGRESS NOTES
[address]   Cardiac Evaluation    Primary Care Doctor:  Balwinder Tapia    Chief Complaint   Patient presents with    1 Month Follow-Up     ER 03/12/2018 for ch p & ekg    Coronary Artery Disease    Hyperlipidemia    Hypertension    Medication Adjustment    Results     cxr 03/12/2018    Discuss Labs     03/12/2018    Chest Pain     on & off    Shortness of Breath     at times    Dizziness     at times    Fatigue     energy low most of the time        History of Present Illness:   I had the pleasure of seeing Shani Lennon in follow up for CAD with hx of PCI's (2006, 2012, 2013), microvascular angina, HTN, HLD as well as smoker. At last visit we changed the lisinopril/ hct to lisinopril alone (lightheadedness), stopped the Ranexa (he had stopped due to dizziness), and added Imdur. He was seen in the ED on 3/12/18 with chest pain that was atypical for angina. Work up was negative for ischemia including ECG's and troponin X2. There was concern that he was having brief episodes of PAF vs PAT. The ECG's were read as SR with PAC's and wandering atrial pacemaker respectively. Today he continues to have dizziness at times with first rising. He also has chest pain that is right lower chest to RUQ abdomen, and left sided chest.  He makes note that it is not mid-sternal as prior chest pain had been. His breathing is okay but admits to awakening with shortness of breath needing to use inhaler with improvement. He is scheduled for sleep study tomorrow. He has been under increased stress recently with living situation (half way house of addiction). He admits he would almost rather be living under a bridge, homeless. He was started on new medications per psychiatrist (Dr. Isatu Barcenas) at [facility]. He saw his PCP last week after ED visit.       Shani Lennon describes symptoms including chest pain, dyspnea, fatigue, dizziness but denies palpitations, orthopnea, PND, early TAKE ONE CAPSULE BY MOUTH EVERY 8 HOURS 12/14/17  Yes Historical Provider, MD   HYDROcodone-acetaminophen (NORCO) 5-325 MG per tablet TAKE 1 TABLET EVERY 12 HOURS AS NEEDED FOR PAIN 12/28/17  Yes Historical Provider, MD   atorvastatin (LIPITOR) 40 MG tablet Take 1 tablet by mouth daily 9/29/17  Yes Pretty Traore CNP   albuterol sulfate  (90 Base) MCG/ACT inhaler Inhale 2 puffs into the lungs every 6 hours as needed for Wheezing 7/31/17  Yes Suyapa Patrick MD   clopidogrel (PLAVIX) 75 MG tablet Take 1 tablet by mouth daily 4/26/17  Yes Antonia Queen CNP   acetaminophen (TYLENOL) 650 MG extended release tablet Take 2 tablets by mouth 2 times daily 4/26/17  Yes Antonia Queen CNP   aspirin 81 MG EC tablet Take 1 tablet by mouth daily 4/10/17  Yes Antonia Queen CNP   pantoprazole (PROTONIX) 20 MG tablet TAKE 2 TABLETS BY MOUTH DAILY  Patient taking differently: TAKE 1 TABLET BY MOUTH BID 9/29/16  Yes Antonia Queen CNP   nitroGLYCERIN (NITROSTAT) 0.4 MG SL tablet Place 1 tablet under the tongue every 5 minutes as needed. 10/1/14  Yes Coby Castillo MD   FLUoxetine (PROZAC) 20 MG capsule TAKE 1 CAPSULE BY MOUTH DAILY 5/8/17   Antonia Queen CNP        Allergies:  Pcn [penicillins]     Review of Systems:   · Constitutional: there has been no unanticipated weight loss. There's been no change in energy level, sleep pattern, or activity level. · Eyes: No visual changes or diplopia. No scleral icterus. · ENT: No Headaches, hearing loss or vertigo. No mouth sores or sore throat. · Cardiovascular: Reviewed in HPI  · Respiratory: No cough or wheezing, no sputum production. No hematemesis. · Gastrointestinal: No abdominal pain, appetite loss, blood in stools. No change in bowel or bladder habits. · Genitourinary: No dysuria, trouble voiding, or hematuria. · Musculoskeletal:  No gait disturbance, weakness or joint complaints. · Integumentary: No rash or pruritis.   · Neurological: No headache, 16.1 11/24/2017     03/12/2018     01/29/2018     11/24/2017     BMP:  Lab Results   Component Value Date     03/12/2018     01/29/2018     11/24/2017    K 3.6 03/12/2018    K 3.9 01/29/2018    K 3.7 11/24/2017     03/12/2018    CL 93 01/29/2018    CL 97 11/24/2017    CO2 26 03/12/2018    CO2 26 01/29/2018    CO2 26 11/24/2017    BUN 10 03/12/2018    BUN 10 01/29/2018    BUN 13 11/24/2017    CREATININE 0.7 03/12/2018    CREATININE 0.7 01/29/2018    CREATININE 0.8 11/24/2017     BNP:   Lab Results   Component Value Date    PROBNP 44 01/10/2017        Cardiac Imaging:  Procedure Performed: 1/29/18:   1. Coronary angiogram  2. Left heart cath  3. Left ventriculogram     Findings:  1. Mild to moderate diffuse CAD              ~mild in-stent of the distal RCA  2. Normal LVEF  3. Normal hemodynamics     Conclusion/plan of care:  1. Medical management  2. Add Ranexa  3. Referral to pulmonary      Echo Aug 2017:    Left ventricular systolic function is normal with an ejection fraction of 55-60%.    No wall motion abnormalities noted.   Normal left ventricular diastolic filling pressure.   Trivial mitral regurgitation.       Assessment:    1. Coronary artery disease involving native coronary artery of native heart without angina pectoris    2. Cardiac microvascular disease ([address]    3. Essential hypertension    4. Pure hypercholesterolemia      Offered to retrial Ranexa or trial calcium channel blocker but at this time he would like to see how his symptoms progress once his living situation and stress improve. Plan:   1. Stay the course with current medicine regimen (heart medicines)  2. Follow up with me in 3 months (or sooner if needed)  3. Will plan for you to follow up with Dr. Billie Cuevas in 6 moths or so      I appreciate the opportunity of cooperating in the care of this individual.    Ariana Nj, PK, 3/21/2018, 3:08 PM    QUALITY MEASURES  1.  Tobacco Cessation

## 2018-05-14 ENCOUNTER — TELEPHONE (OUTPATIENT)
Dept: PULMONOLOGY | Age: 53
End: 2018-05-14

## 2018-06-04 RX ORDER — LISINOPRIL 20 MG/1
20 TABLET ORAL DAILY
Qty: 90 TABLET | Refills: 3 | Status: SHIPPED | OUTPATIENT
Start: 2018-06-04 | End: 2018-08-15 | Stop reason: SDUPTHER

## 2018-06-04 RX ORDER — RANOLAZINE 500 MG/1
TABLET, FILM COATED, EXTENDED RELEASE ORAL
Qty: 60 TABLET | Refills: 5 | Status: SHIPPED | OUTPATIENT
Start: 2018-06-04 | End: 2018-08-15 | Stop reason: SINTOL

## 2018-06-18 RX ORDER — ISOSORBIDE MONONITRATE 30 MG/1
30 TABLET, EXTENDED RELEASE ORAL DAILY
Qty: 30 TABLET | Refills: 0 | Status: SHIPPED | OUTPATIENT
Start: 2018-06-18 | End: 2018-07-14 | Stop reason: SDUPTHER

## 2018-07-14 ENCOUNTER — HOSPITAL ENCOUNTER (EMERGENCY)
Age: 53
Discharge: HOME | End: 2018-07-14
Attending: EMERGENCY MEDICINE
Payer: COMMERCIAL

## 2018-07-14 ENCOUNTER — HOSPITAL ENCOUNTER (OUTPATIENT)
Dept: GENERAL RADIOLOGY | Age: 53
Discharge: HOME | End: 2018-07-14
Payer: COMMERCIAL

## 2018-07-14 DIAGNOSIS — F41.9 ANXIETY: Primary | ICD-10-CM

## 2018-07-14 LAB
A/G RATIO: 1.3 (ref 1.1–2.2)
ALBUMIN SERPL-MCNC: 4 G/DL (ref 3.4–5)
ALP BLD-CCNC: 88 U/L (ref 40–129)
ALT SERPL-CCNC: 28 U/L (ref 10–40)
AMPHETAMINE SCREEN, URINE: NORMAL
ANION GAP SERPL CALCULATED.3IONS-SCNC: 13 MMOL/L (ref 3–16)
AST SERPL-CCNC: 16 U/L (ref 15–37)
BARBITURATE SCREEN URINE: NORMAL
BASOPHILS ABSOLUTE: 0.1 K/UL (ref 0–0.2)
BASOPHILS RELATIVE PERCENT: 0.5 %
BENZODIAZEPINE SCREEN, URINE: NORMAL
BILIRUB SERPL-MCNC: <0.2 MG/DL (ref 0–1)
BUN BLDV-MCNC: 9 MG/DL (ref 7–20)
CALCIUM SERPL-MCNC: 9.6 MG/DL (ref 8.3–10.6)
CANNABINOID SCREEN URINE: NORMAL
CHLORIDE BLD-SCNC: 96 MMOL/L (ref 99–110)
CO2: 25 MMOL/L (ref 21–32)
COCAINE METABOLITE SCREEN URINE: NORMAL
CREAT SERPL-MCNC: 0.7 MG/DL (ref 0.9–1.3)
EOSINOPHILS ABSOLUTE: 0.3 K/UL (ref 0–0.6)
EOSINOPHILS RELATIVE PERCENT: 2.5 %
GFR AFRICAN AMERICAN: >60
GFR NON-AFRICAN AMERICAN: >60
GLOBULIN: 3.2 G/DL
GLUCOSE BLD-MCNC: 118 MG/DL (ref 70–99)
HCT VFR BLD CALC: 42 % (ref 40.5–52.5)
HEMOGLOBIN: 13.8 G/DL (ref 13.5–17.5)
LYMPHOCYTES ABSOLUTE: 3.3 K/UL (ref 1–5.1)
LYMPHOCYTES RELATIVE PERCENT: 25.6 %
Lab: NORMAL
MCH RBC QN AUTO: 25.3 PG (ref 26–34)
MCHC RBC AUTO-ENTMCNC: 32.8 G/DL (ref 31–36)
MCV RBC AUTO: 77.2 FL (ref 80–100)
METHADONE SCREEN, URINE: NORMAL
MONOCYTES ABSOLUTE: 0.8 K/UL (ref 0–1.3)
MONOCYTES RELATIVE PERCENT: 6 %
NEUTROPHILS ABSOLUTE: 8.5 K/UL (ref 1.7–7.7)
NEUTROPHILS RELATIVE PERCENT: 65.4 %
OPIATE SCREEN URINE: NORMAL
OXYCODONE URINE: NORMAL
PDW BLD-RTO: 17.5 % (ref 12.4–15.4)
PH UA: 5
PHENCYCLIDINE SCREEN URINE: NORMAL
PLATELET # BLD: 172 K/UL (ref 135–450)
PMV BLD AUTO: 8.9 FL (ref 5–10.5)
POTASSIUM SERPL-SCNC: 3.5 MMOL/L (ref 3.5–5.1)
PROPOXYPHENE SCREEN: NORMAL
RBC # BLD: 5.43 M/UL (ref 4.2–5.9)
SODIUM BLD-SCNC: 134 MMOL/L (ref 136–145)
TOTAL PROTEIN: 7.2 G/DL (ref 6.4–8.2)
TROPONIN: <0.01 NG/ML
WBC # BLD: 13 K/UL (ref 4–11)

## 2018-07-14 PROCEDURE — 93005 ELECTROCARDIOGRAM TRACING: CPT | Performed by: EMERGENCY MEDICINE

## 2018-07-14 PROCEDURE — 80307 DRUG TEST PRSMV CHEM ANLYZR: CPT

## 2018-07-14 PROCEDURE — 99284 EMERGENCY DEPT VISIT MOD MDM: CPT

## 2018-07-14 PROCEDURE — 84484 ASSAY OF TROPONIN QUANT: CPT

## 2018-07-14 PROCEDURE — 85025 COMPLETE CBC W/AUTO DIFF WBC: CPT

## 2018-07-14 PROCEDURE — 80053 COMPREHEN METABOLIC PANEL: CPT

## 2018-07-14 PROCEDURE — 36415 COLL VENOUS BLD VENIPUNCTURE: CPT

## 2018-07-14 NOTE — ED PROVIDER NOTES
Emergency Department Encounter  [facility] [address] [facility]    Patient: Gillian Chilel  MRN: [id number]  : 1965  Date of Evaluation: 2018  ED Provider: Raphael Rodriguez MD    Chief Complaint     No chief complaint on file. Ignacio Okeefe is a 47 y.o. male who presents to the emergency department with report that he was at a meeting today and very stressed out about the topic. He said he has anxiety at least this is anxiety. The patient stated he's been Chase increased anxiety and has taken his lips are gray and started to apply it to the bilateral forearms to ease distress and pain. The patient stated he does have a therapist cc weekly and he does not Mrs. time with the therapist.  The patient denies any shortness of breath, nausea vomiting, diaphoresis, or any other symptoms. ROS:     At least 10 systems reviewed and otherwise acutely negative except as in the [address]     Past History     Past Medical History:   Diagnosis Date    Anxiety     Bipolar affective ([address]     CAD (coronary artery disease)     Chronic back pain     COPD (chronic obstructive pulmonary disease) (HCC)     DDD (degenerative disc disease), cervical     Depression     GERD (gastroesophageal reflux disease)     Hyperlipidemia     Hypertension     Pancreatitis     Recovering alcoholic ([address]     Tobacco abuse 2016     Past Surgical History:   Procedure Laterality Date    CORONARY ANGIOPLASTY WITH STENT PLACEMENT  , ,     [place] [facility]     Social History     Social History    Marital status: Single     Spouse name: N/A    Number of children: N/A    Years of education: N/A     Social History Main Topics    Smoking status: Current Every Day Smoker     Packs/day: 1.00     Years: 45.00     Types: Cigarettes    Smokeless tobacco: Never Used    Alcohol use 0.0 oz/week      Comment: not often    Drug use: No    Sexual activity: No     Other Topics Concern    Not on file     Social

## 2018-07-15 LAB
EKG ATRIAL RATE: 84 BPM
EKG DIAGNOSIS: NORMAL
EKG P AXIS: 42 DEGREES
EKG P-R INTERVAL: 164 MS
EKG Q-T INTERVAL: 398 MS
EKG QRS DURATION: 92 MS
EKG QTC CALCULATION (BAZETT): 470 MS
EKG R AXIS: 13 DEGREES
EKG T AXIS: 42 DEGREES
EKG VENTRICULAR RATE: 84 BPM

## 2018-07-16 RX ORDER — ISOSORBIDE MONONITRATE 30 MG/1
30 TABLET, EXTENDED RELEASE ORAL DAILY
Qty: 30 TABLET | Refills: 0 | Status: SHIPPED | OUTPATIENT
Start: 2018-07-16 | End: 2018-08-19 | Stop reason: SDUPTHER

## 2018-08-15 ENCOUNTER — OFFICE VISIT (OUTPATIENT)
Dept: CARDIOLOGY CLINIC | Age: 53
End: 2018-08-15

## 2018-08-15 VITALS
DIASTOLIC BLOOD PRESSURE: 68 MMHG | BODY MASS INDEX: 37.79 KG/M2 | WEIGHT: 279 LBS | OXYGEN SATURATION: 97 % | HEART RATE: 96 BPM | SYSTOLIC BLOOD PRESSURE: 112 MMHG | HEIGHT: 72 IN

## 2018-08-15 DIAGNOSIS — Z72.0 TOBACCO ABUSE: ICD-10-CM

## 2018-08-15 DIAGNOSIS — I10 ESSENTIAL HYPERTENSION: ICD-10-CM

## 2018-08-15 DIAGNOSIS — I25.89 CARDIAC MICROVASCULAR DISEASE: ICD-10-CM

## 2018-08-15 DIAGNOSIS — I25.118 CORONARY ARTERY DISEASE OF NATIVE ARTERY OF NATIVE HEART WITH STABLE ANGINA PECTORIS (HCC): Primary | ICD-10-CM

## 2018-08-15 DIAGNOSIS — E78.00 PURE HYPERCHOLESTEROLEMIA: ICD-10-CM

## 2018-08-15 PROCEDURE — 3017F COLORECTAL CA SCREEN DOC REV: CPT | Performed by: NURSE PRACTITIONER

## 2018-08-15 PROCEDURE — 4004F PT TOBACCO SCREEN RCVD TLK: CPT | Performed by: NURSE PRACTITIONER

## 2018-08-15 PROCEDURE — G8417 CALC BMI ABV UP PARAM F/U: HCPCS | Performed by: NURSE PRACTITIONER

## 2018-08-15 PROCEDURE — G8427 DOCREV CUR MEDS BY ELIG CLIN: HCPCS | Performed by: NURSE PRACTITIONER

## 2018-08-15 PROCEDURE — G8598 ASA/ANTIPLAT THER USED: HCPCS | Performed by: NURSE PRACTITIONER

## 2018-08-15 PROCEDURE — 99214 OFFICE O/P EST MOD 30 MIN: CPT | Performed by: NURSE PRACTITIONER

## 2018-08-15 RX ORDER — AMLODIPINE BESYLATE 2.5 MG/1
2.5 TABLET ORAL DAILY
Qty: 30 TABLET | Refills: 1 | Status: SHIPPED | OUTPATIENT
Start: 2018-08-15 | End: 2018-10-05 | Stop reason: SDUPTHER

## 2018-08-15 RX ORDER — HYDROCODONE BITARTRATE AND ACETAMINOPHEN 5; 325 MG/1; MG/1
1 TABLET ORAL
COMMUNITY
End: 2021-03-06

## 2018-08-15 RX ORDER — LISINOPRIL 20 MG/1
10 TABLET ORAL NIGHTLY
Qty: 90 TABLET | Refills: 3
Start: 2018-08-15 | End: 2018-11-12 | Stop reason: SDUPTHER

## 2018-08-15 RX ORDER — BLOOD PRESSURE TEST KIT-LARGE
1 KIT MISCELLANEOUS ONCE
Qty: 1 DEVICE | Refills: 0 | Status: SHIPPED | OUTPATIENT
Start: 2018-08-15 | End: 2019-05-28

## 2018-08-15 NOTE — PROGRESS NOTES
pruritis. · Neurological: No headache, diplopia, change in muscle strength, numbness or tingling. No change in gait, balance, coordination, mood, affect, memory, mentation, behavior. · Psychiatric: No anxiety, no depression. · Endocrine: No malaise, fatigue or temperature intolerance. No excessive thirst, fluid intake, or urination. No tremor. · Hematologic/Lymphatic: No abnormal bruising or bleeding, blood clots or swollen lymph nodes. · Allergic/Immunologic: No nasal congestion or hives. Physical Examination:    Vitals:    08/15/18 1346   BP: 112/68   Pulse: 96   SpO2: 97%   Weight: 279 lb (126.6 kg)   Height: 6' (1.829 m)        Constitutional and General Appearance: Warm and dry, no apparent distress, normal coloration  HEENT:  Normocephalic, atraumatic  Respiratory:  · Normal excursion and expansion without use of accessory muscles  · Resp Auscultation: Normal breath sounds without dullness  Cardiovascular:  · The apical impulses not displaced  · Heart tones are crisp and normal  · JVP 8 cm H2O  · Regular rate and rhythm, normal S1S2, no m/g/r  · Peripheral pulses are symmetrical and full  · There is no clubbing, cyanosis of the extremities.   · No edema  · Pedal Pulses: 2+ and equal   Abdomen:  · No masses or tenderness  · Liver/Spleen: No Abnormalities Noted  Neurological/Psychiatric:  · Alert and oriented in all spheres  · Moves all extremities well  · Exhibits normal gait balance and coordination  · No abnormalities of mood, affect, memory, mentation, or behavior are noted    Lab Data:    CBC:   Lab Results   Component Value Date    WBC 13.0 07/14/2018    WBC 13.0 07/10/2018    WBC 13.2 07/09/2018    RBC 5.43 07/14/2018    RBC 5.39 07/10/2018    RBC 5.42 07/09/2018    HGB 13.8 07/14/2018    HGB 13.9 07/10/2018    HGB 13.8 07/09/2018    HCT 42.0 07/14/2018    HCT 41.5 07/10/2018    HCT 41.7 07/09/2018    MCV 77.2 07/14/2018    MCV 77.1 07/10/2018    MCV 76.9 07/09/2018    RDW 17.5 07/14/2018    RDW 18.2 07/10/2018    RDW 18.3 07/09/2018     07/14/2018     07/10/2018     07/09/2018     BMP:  Lab Results   Component Value Date     07/14/2018     07/10/2018     07/09/2018    K 3.5 07/14/2018    K 3.7 07/10/2018    K 4.3 07/09/2018    CL 96 07/14/2018    CL 98 07/10/2018    CL 97 07/09/2018    CO2 25 07/14/2018    CO2 24 07/10/2018    CO2 25 07/09/2018    BUN 9 07/14/2018    BUN 9 07/10/2018    BUN 11 07/09/2018    CREATININE 0.7 07/14/2018    CREATININE 0.9 07/10/2018    CREATININE 0.7 07/09/2018     BNP:   Lab Results   Component Value Date    PROBNP 44 01/10/2017        Cardiac Imaging:  Procedure Performed: 1/29/18:   1. Coronary angiogram  2. Left heart cath  3. Left ventriculogram     Findings:  1. Mild to moderate diffuse CAD              ~mild in-stent of the distal RCA  2. Normal LVEF  3. Normal hemodynamics     Conclusion/plan of care:  1. Medical management  2. Add Ranexa  3. Referral to pulmonary      Echo Aug 2017:    Left ventricular systolic function is normal with an ejection fraction of 55-60%. No wall motion abnormalities noted. Normal left ventricular diastolic filling pressure. Trivial mitral regurgitation. Assessment:    1. Coronary artery disease of native artery of native heart with stable angina pectoris ([address]    2. Cardiac microvascular disease    3. Essential hypertension    4. Pure hypercholesterolemia    5. Tobacco abuse          Plan:   1. Decrease the lisinopril 20 mg to half tablet daily at night  2. Start amlodipine 2.5 mg once daily in AM (for chest pain)  3. No change in other heart medicines  4. Follow up with me in 1 month      I appreciate the opportunity of cooperating in the care of this individual.    Mazin Britt CNP, 8/15/2018, 1:52 PM    QUALITY MEASURES  1. Tobacco Cessation Counseling: Yes  2. Retake of BP if >140/90:   NA  3. Documentation to PCP/referring for new patient:  Sent to PCP at close of office visit  4. CAD patient on anti-platelet: Yes  5. CAD patient on STATIN therapy:  Yes  6.  Patient with CHF and aFib on anticoagulation:  NA

## 2018-08-20 ENCOUNTER — TELEPHONE (OUTPATIENT)
Dept: CARDIOLOGY CLINIC | Age: 53
End: 2018-08-20

## 2018-08-20 RX ORDER — ISOSORBIDE MONONITRATE 30 MG/1
30 TABLET, EXTENDED RELEASE ORAL DAILY
Qty: 90 TABLET | Refills: 1 | Status: SHIPPED | OUTPATIENT
Start: 2018-08-20 | End: 2018-08-21 | Stop reason: SDUPTHER

## 2018-08-20 NOTE — TELEPHONE ENCOUNTER
This does not sound like angina to me, more like musculoskeletal.  Continue current medicine. Keep appointment with me next month.    Thanks, [employer]

## 2018-08-20 NOTE — TELEPHONE ENCOUNTER
Last night- sat up in bec  Today-picking up bags of groceries. Had this type of pain a long time ago  Does have some throat issues. Did get over pneumonia over a week ago. No changes in meds. From last week.

## 2018-08-21 RX ORDER — ISOSORBIDE MONONITRATE 30 MG/1
30 TABLET, EXTENDED RELEASE ORAL DAILY
Qty: 30 TABLET | Refills: 3 | Status: SHIPPED | OUTPATIENT
Start: 2018-08-21 | End: 2020-04-06

## 2018-09-08 ENCOUNTER — APPOINTMENT (OUTPATIENT)
Dept: GENERAL RADIOLOGY | Age: 53
End: 2018-09-08
Payer: COMMERCIAL

## 2018-09-08 ENCOUNTER — HOSPITAL ENCOUNTER (EMERGENCY)
Age: 53
Discharge: HOME | End: 2018-09-08
Attending: EMERGENCY MEDICINE
Payer: COMMERCIAL

## 2018-09-08 VITALS
DIASTOLIC BLOOD PRESSURE: 85 MMHG | BODY MASS INDEX: 39.34 KG/M2 | HEART RATE: 94 BPM | RESPIRATION RATE: 20 BRPM | OXYGEN SATURATION: 96 % | WEIGHT: 281 LBS | HEIGHT: 71 IN | TEMPERATURE: 96.7 F | SYSTOLIC BLOOD PRESSURE: 140 MMHG

## 2018-09-08 DIAGNOSIS — J44.1 COPD EXACERBATION (HCC): Primary | ICD-10-CM

## 2018-09-08 DIAGNOSIS — R07.9 CHEST PAIN, UNSPECIFIED TYPE: ICD-10-CM

## 2018-09-08 LAB
A/G RATIO: 1.4 (ref 1.1–2.2)
ALBUMIN SERPL-MCNC: 4.2 G/DL (ref 3.4–5)
ALP BLD-CCNC: 89 U/L (ref 40–129)
ALT SERPL-CCNC: 34 U/L (ref 10–40)
ANION GAP SERPL CALCULATED.3IONS-SCNC: 12 MMOL/L (ref 3–16)
AST SERPL-CCNC: 22 U/L (ref 15–37)
BASOPHILS ABSOLUTE: 0.1 K/UL (ref 0–0.2)
BASOPHILS RELATIVE PERCENT: 1.3 %
BILIRUB SERPL-MCNC: 0.4 MG/DL (ref 0–1)
BUN BLDV-MCNC: 9 MG/DL (ref 7–20)
CALCIUM SERPL-MCNC: 9.3 MG/DL (ref 8.3–10.6)
CHLORIDE BLD-SCNC: 96 MMOL/L (ref 99–110)
CO2: 24 MMOL/L (ref 21–32)
CREAT SERPL-MCNC: 0.7 MG/DL (ref 0.9–1.3)
EOSINOPHILS ABSOLUTE: 0.3 K/UL (ref 0–0.6)
EOSINOPHILS RELATIVE PERCENT: 2.9 %
GFR AFRICAN AMERICAN: >60
GFR NON-AFRICAN AMERICAN: >60
GLOBULIN: 3 G/DL
GLUCOSE BLD-MCNC: 118 MG/DL (ref 70–99)
HCT VFR BLD CALC: 44.4 % (ref 40.5–52.5)
HEMOGLOBIN: 14.8 G/DL (ref 13.5–17.5)
LYMPHOCYTES ABSOLUTE: 2.3 K/UL (ref 1–5.1)
LYMPHOCYTES RELATIVE PERCENT: 21.2 %
MCH RBC QN AUTO: 26.5 PG (ref 26–34)
MCHC RBC AUTO-ENTMCNC: 33.3 G/DL (ref 31–36)
MCV RBC AUTO: 79.7 FL (ref 80–100)
MONOCYTES ABSOLUTE: 0.8 K/UL (ref 0–1.3)
MONOCYTES RELATIVE PERCENT: 7.2 %
NEUTROPHILS ABSOLUTE: 7.4 K/UL (ref 1.7–7.7)
NEUTROPHILS RELATIVE PERCENT: 67.4 %
PDW BLD-RTO: 16.8 % (ref 12.4–15.4)
PLATELET # BLD: 162 K/UL (ref 135–450)
PMV BLD AUTO: 8.8 FL (ref 5–10.5)
POTASSIUM SERPL-SCNC: 4.1 MMOL/L (ref 3.5–5.1)
RBC # BLD: 5.57 M/UL (ref 4.2–5.9)
SODIUM BLD-SCNC: 132 MMOL/L (ref 136–145)
TOTAL PROTEIN: 7.2 G/DL (ref 6.4–8.2)
TROPONIN: <0.01 NG/ML
WBC # BLD: 11 K/UL (ref 4–11)

## 2018-09-08 PROCEDURE — 85025 COMPLETE CBC W/AUTO DIFF WBC: CPT

## 2018-09-08 PROCEDURE — 6370000000 HC RX 637 (ALT 250 FOR IP): Performed by: EMERGENCY MEDICINE

## 2018-09-08 PROCEDURE — 93010 ELECTROCARDIOGRAM REPORT: CPT | Performed by: INTERNAL MEDICINE

## 2018-09-08 PROCEDURE — 80053 COMPREHEN METABOLIC PANEL: CPT

## 2018-09-08 PROCEDURE — 71045 X-RAY EXAM CHEST 1 VIEW: CPT

## 2018-09-08 PROCEDURE — 84484 ASSAY OF TROPONIN QUANT: CPT

## 2018-09-08 PROCEDURE — 93005 ELECTROCARDIOGRAM TRACING: CPT | Performed by: EMERGENCY MEDICINE

## 2018-09-08 PROCEDURE — 99285 EMERGENCY DEPT VISIT HI MDM: CPT

## 2018-09-08 RX ORDER — PREDNISONE 20 MG/1
20 TABLET ORAL ONCE
Status: COMPLETED | OUTPATIENT
Start: 2018-09-08 | End: 2018-09-08

## 2018-09-08 RX ORDER — IPRATROPIUM BROMIDE AND ALBUTEROL SULFATE 2.5; .5 MG/3ML; MG/3ML
1 SOLUTION RESPIRATORY (INHALATION) ONCE
Status: COMPLETED | OUTPATIENT
Start: 2018-09-08 | End: 2018-09-08

## 2018-09-08 RX ORDER — PREDNISONE 20 MG/1
20 TABLET ORAL DAILY
Qty: 5 TABLET | Refills: 0 | Status: SHIPPED | OUTPATIENT
Start: 2018-09-08 | End: 2018-09-13

## 2018-09-08 RX ADMIN — IPRATROPIUM BROMIDE AND ALBUTEROL SULFATE 1 AMPULE: .5; 3 SOLUTION RESPIRATORY (INHALATION) at 14:31

## 2018-09-08 RX ADMIN — PREDNISONE 20 MG: 20 TABLET ORAL at 15:37

## 2018-09-08 ASSESSMENT — ENCOUNTER SYMPTOMS
COUGH: 0
NAUSEA: 0
BACK PAIN: 0
DIARRHEA: 0
ABDOMINAL PAIN: 0
SORE THROAT: 0
EYE DISCHARGE: 0
SHORTNESS OF BREATH: 1
VOMITING: 0

## 2018-09-08 ASSESSMENT — PAIN SCALES - GENERAL: PAINLEVEL_OUTOF10: 0

## 2018-09-08 NOTE — ED PROVIDER NOTES
[address] ED  eMERGENCY dEPARTMENT eNCOUnter        Pt Name: Ariella Rausch  MRN: [id number]  [place] 1965  Date of evaluation: 9/8/2018  Provider: Sixto Hernandez MD  PCP: Richard Le  ED Attending: Sixto Hernandez MD    CHIEF COMPLAINT       Chief Complaint   Patient presents with    Anxiety     Pt presents with c/o anxiety. States he has had increased shortness of breath that has gotten increasingly worse. States the sob has made his anxiety worse. Notes intermittent chest pain. Denies n/v.    Chest Pain       HISTORY OF PRESENT ILLNESS   (Location/Symptom, Timing/Onset, Context/Setting, Quality, Duration, Modifying Factors, Severity)  Note limiting factors. Ariella Raucsh is a 47 y.o. male who presents to the emergency department complaining of anxiety as well as shortness of breath. Patient states that when he uses house he becomes increasingly anxious especially the further away from his house he gets. He states that times it feels as if he cannot catch his breath. His also seems to be worse when it is hot and humid outside. He will use his inhaler with some relief of his symptoms. He has been seeing his counselor more frequently secondary to his increasing anxiety. Patient has had an occasional electrical shock like sensation across his chest.  Nothing seems to bring these on. Nursing Notes were all reviewed and agreed with or any disagreements were addressed  in the HPI. REVIEW OF SYSTEMS    (2-9 systems for level 4, 10 or more for level 5)     Review of Systems   Constitutional: Negative for chills and fever. HENT: Negative for congestion and sore throat. Eyes: Negative for discharge. Respiratory: Positive for shortness of breath. Negative for cough. Cardiovascular: Positive for chest pain. Gastrointestinal: Negative for abdominal pain, diarrhea, nausea and vomiting. Endocrine: Negative for polydipsia.    Genitourinary: Negative CAPSULE    TAKE ONE CAPSULE BY MOUTH EVERY 8 HOURS    HYDROCODONE-ACETAMINOPHEN (NORCO) 5-325 MG PER TABLET    Take 1 tablet by mouth every 8 hours as needed for Pain. Willis Brennan HYDROXYZINE PAMOATE (VISTARIL PO)    Take 40 mg by mouth 4 times daily    ISOSORBIDE MONONITRATE (IMDUR) 30 MG EXTENDED RELEASE TABLET    TAKE 1 TABLET BY MOUTH DAILY    LISINOPRIL (PRINIVIL;ZESTRIL) 20 MG TABLET    Take 0.5 tablets by mouth nightly    METOPROLOL SUCCINATE (TOPROL XL) 50 MG EXTENDED RELEASE TABLET    Take 50 mg by mouth daily    NITROGLYCERIN (NITROSTAT) 0.4 MG SL TABLET    Place 1 tablet under the tongue every 5 minutes as needed.     PANTOPRAZOLE (PROTONIX) 20 MG TABLET    TAKE 2 TABLETS BY MOUTH DAILY    TRAZODONE (DESYREL) 100 MG TABLET    Take 100 mg by mouth 3 times daily         ALLERGIES     Pcn [penicillins]    FAMILY HISTORY       Family History   Problem Relation Age of Onset    Other Mother     Arthritis Mother     Mental Illness Mother     Depression Mother     Diabetes Father     Heart Disease Father     Substance Abuse Father     Heart Disease Sister     High Blood Pressure Sister     Mental Retardation Brother     Other Brother     Learning Disabilities Brother     Early Death Sister    Talia Petersen Cancer Sister     Substance Abuse Sister     Cancer Sister     Diabetes Brother     High Blood Pressure Brother           SOCIAL HISTORY       Social History     Social History    Marital status: Single     Spouse name: N/A    Number of children: N/A    Years of education: N/A     Social History Main Topics    Smoking status: Current Every Day Smoker     Packs/day: 2.00     Years: 45.00     Types: Cigarettes    Smokeless tobacco: Never Used    Alcohol use 0.0 oz/week      Comment: not often    Drug use: No    Sexual activity: No     Other Topics Concern    None     Social History Narrative    None       SCREENINGS             PHYSICAL EXAM    (up to 7 for level 4, 8 or more for level 5)     ED Triage K/uL    MPV 8.8 5.0 - 10.5 fL    Neutrophils % 67.4 %    Lymphocytes % 21.2 %    Monocytes % 7.2 %    Eosinophils % 2.9 %    Basophils % 1.3 %    Neutrophils # 7.4 1.7 - 7.7 K/uL    Lymphocytes # 2.3 1.0 - 5.1 K/uL    Monocytes # 0.8 0.0 - 1.3 K/uL    Eosinophils # 0.3 0.0 - 0.6 K/uL    Basophils # 0.1 0.0 - 0.2 K/uL   Comprehensive Metabolic Panel   Result Value Ref Range    Sodium 132 (L) 136 - 145 mmol/L    Potassium 4.1 3.5 - 5.1 mmol/L    Chloride 96 (L) 99 - 110 mmol/L    CO2 24 21 - 32 mmol/L    Anion Gap 12 3 - 16    Glucose 118 (H) 70 - 99 mg/dL    BUN 9 7 - 20 mg/dL    CREATININE 0.7 (L) 0.9 - 1.3 mg/dL    GFR Non-African American >60 >60    GFR African American >60 >60    Calcium 9.3 8.3 - 10.6 mg/dL    Total Protein 7.2 6.4 - 8.2 g/dL    Alb 4.2 3.4 - 5.0 g/dL    Albumin/Globulin Ratio 1.4 1.1 - 2.2    Total Bilirubin 0.4 0.0 - 1.0 mg/dL    Alkaline Phosphatase 89 40 - 129 U/L    ALT 34 10 - 40 U/L    AST 22 15 - 37 U/L    Globulin 3.0 g/dL   Troponin   Result Value Ref Range    Troponin <0.01 <0.01 ng/mL   EKG 12 lead   Result Value Ref Range    Ventricular Rate 83 BPM    Atrial Rate 83 BPM    P-R Interval 158 ms    QRS Duration 90 ms    Q-T Interval 374 ms    QTc Calculation (Bazett) 439 ms    R Axis 193 degrees    T Axis 171 degrees    Diagnosis       Normal sinus rhythmRight superior axis deviationAbnormal ECGNo previous ECGs available       All other labs were within normal range or not returned as of this dictation. EKG: All EKG's are interpreted by the Emergency Department Physician who either signs or Co-signs this chart in the absence of a cardiologist.  Please see their note for interpretation of EKG.     EKG Interpretation    Interpreted by emergency department physician    Rhythm: normal sinus   Rate: normal  Axis: right  Ectopy: none  Conduction: normal  ST Segments: normal  T Waves: normal  Q Waves: none    Clinical Impression: Normal sinus rhythm, right axis deviation      RADIOLOGY: Non-plain film images such as CT, Ultrasound and MRI are read by the radiologist. Plain radiographic images are visualized and preliminarily interpreted by the  ED Provider with the below findings:    Interpretation per the Radiologist below, if available at the time of this note:    XR CHEST PORTABLE   Final Result   No acute cardiopulmonary disease. PROCEDURES   Unless otherwise noted below, none     Procedures    CRITICAL CARE TIME   N/A    CONSULTS:  None      EMERGENCY DEPARTMENT COURSE and DIFFERENTIAL DIAGNOSIS/MDM:   Vitals:    Vitals:    09/08/18 1457 09/08/18 1500 09/08/18 1501 09/08/18 1514   BP:   120/72    Pulse: 75 80 85 88   Resp: 19 12 8 26   Temp:       TempSrc:       SpO2: (!) 88% 97% 97% 99%   Weight:       Height:           Patient was given the following medications:  Medications   ipratropium-albuterol (DUONEB) nebulizer solution 1 ampule (1 ampule Inhalation Given 9/8/18 1431)   predniSONE (DELTASONE) tablet 20 mg (20 mg Oral Given 9/8/18 1537)     Patient's symptoms sound most consistent with acute COPD exacerbation. Cardiac workup however was still undertaken and does not show acute abnormalities. Patient remains essentially asymptomatic in the emergency department. He does feel better after a breathing treatment. At this point I am going to discharge the patient home with a burst of steroids. I want him to follow up with his normal treatment team including his counselor for further outpatient management. Patient is agreeable with this plan. I estimate there is LOW risk for ACUTE CORONARY SYNDROME, CHRONIC OBSTRUCTIVE PULMONARY DISEASE, CONGESTIVE HEART FAILURE, PERICARDIAL TAMPONADE, PNEUMONIA, PNEUMOTHORAX, PULMONARY EMBOLISM, SEPSIS, and THORACIC DISSECTION,  thus I consider the discharge disposition reasonable. Teresita Ramos and I have discussed the diagnosis and risks, and we agree with discharging home to follow-up with their primary doctor.  We also discussed

## 2018-09-08 NOTE — ED TRIAGE NOTES
Chief Complaint   Patient presents with    Anxiety     Pt presents with c/o anxiety. States he has had increased shortness of breath that has gotten increasingly worse. States the sob has made his anxiety worse. Notes intermittent chest pain.   Denies n/v.    Chest Pain

## 2018-09-11 LAB
EKG ATRIAL RATE: 83 BPM
EKG DIAGNOSIS: NORMAL
EKG P-R INTERVAL: 158 MS
EKG Q-T INTERVAL: 374 MS
EKG QRS DURATION: 90 MS
EKG QTC CALCULATION (BAZETT): 439 MS
EKG R AXIS: 193 DEGREES
EKG T AXIS: 171 DEGREES
EKG VENTRICULAR RATE: 83 BPM

## 2018-09-12 ENCOUNTER — OFFICE VISIT (OUTPATIENT)
Dept: CARDIOLOGY CLINIC | Age: 53
End: 2018-09-12

## 2018-09-12 VITALS
HEART RATE: 82 BPM | OXYGEN SATURATION: 95 % | DIASTOLIC BLOOD PRESSURE: 70 MMHG | BODY MASS INDEX: 37.93 KG/M2 | HEIGHT: 72 IN | WEIGHT: 280 LBS | SYSTOLIC BLOOD PRESSURE: 124 MMHG

## 2018-09-12 DIAGNOSIS — I25.89 CARDIAC MICROVASCULAR DISEASE: ICD-10-CM

## 2018-09-12 DIAGNOSIS — Z72.0 TOBACCO ABUSE: ICD-10-CM

## 2018-09-12 DIAGNOSIS — I10 ESSENTIAL HYPERTENSION: ICD-10-CM

## 2018-09-12 DIAGNOSIS — I25.118 CORONARY ARTERY DISEASE OF NATIVE ARTERY OF NATIVE HEART WITH STABLE ANGINA PECTORIS (HCC): Primary | ICD-10-CM

## 2018-09-12 DIAGNOSIS — E78.00 PURE HYPERCHOLESTEROLEMIA: ICD-10-CM

## 2018-09-12 PROCEDURE — 99213 OFFICE O/P EST LOW 20 MIN: CPT | Performed by: NURSE PRACTITIONER

## 2018-09-12 PROCEDURE — G8417 CALC BMI ABV UP PARAM F/U: HCPCS | Performed by: NURSE PRACTITIONER

## 2018-09-12 PROCEDURE — G8598 ASA/ANTIPLAT THER USED: HCPCS | Performed by: NURSE PRACTITIONER

## 2018-09-12 PROCEDURE — 4004F PT TOBACCO SCREEN RCVD TLK: CPT | Performed by: NURSE PRACTITIONER

## 2018-09-12 PROCEDURE — G8427 DOCREV CUR MEDS BY ELIG CLIN: HCPCS | Performed by: NURSE PRACTITIONER

## 2018-09-12 PROCEDURE — 3017F COLORECTAL CA SCREEN DOC REV: CPT | Performed by: NURSE PRACTITIONER

## 2018-09-12 NOTE — PATIENT INSTRUCTIONS
1. No change in heart medicines  2. Have fasting blood work in about 2 weeks  3. Follow up with me in 3 months  4. Will plan for you to see Dr. Nahum Lyman after that       Ref.  Range 1/29/2018 11:10   Cholesterol, Total Latest Ref Range: 0 - 199 mg/dL 152   HDL Cholesterol Latest Ref Range: 40 - 60 mg/dL 33 (L)   LDL Direct Latest Ref Range: <70 mg/dL 77   Triglycerides Latest Ref Range: 0 - 150 mg/dL 350 (H)

## 2018-09-12 NOTE — PROGRESS NOTES
[address]   Cardiac Evaluation    Primary Care Doctor:  Kadi Russell    Chief Complaint   Patient presents with    1 Month Follow-Up     ER 09/08/2018 SOB    Coronary Artery Disease    Hyperlipidemia    Hypertension    Results     cxr & ekg 09/08/2018    Discuss Labs     trop, cmp & cbc 09/08/2018    Medication Adjustment    Anxiety    Shortness of Breath     same    Dizziness     little worse    Fatigue     same        History of Present Illness:   I had the pleasure of seeing Anne Winkler in follow up for CAD with hx of PCI's (2006, 2012, 2013), microvascular angina, HTN, HLD as well as smoker. We decreased the lisinopril and added low dose amlodipine for chest pain. He was seen in the ED for anxiety and shortness of breath, treated for AE COPD. He feels this was related to anxiety during a walk in the heat and humidity. The chest pain continues on and off, not deep, worse with lying on left side. He thinks the chest pain has improved with medicine changes. His weight has been staying 278-284 lbs at home. He admits to drinking lots of fluids (coffee and water), estimated about 360 oz per day. He continues to smoke, ~ 1.5 ppd. He is still living in group home. Has reconsidered moving to own place due to health concerns. Anne Winkler describes symptoms including chest pain, dyspnea, fatigue but denies palpitations, orthopnea, PND, early saiety, edema, syncope. NYHA:   III  ACC/ AHA Stage:    C    Past Medical History:   has a past medical history of Anxiety; Bipolar affective ([address] CAD (coronary artery disease); Chronic back pain; COPD (chronic obstructive pulmonary disease) (HCC); DDD (degenerative disc disease), cervical; Depression; GERD (gastroesophageal reflux disease); Hyperlipidemia; Hypertension; Pancreatitis; Recovering alcoholic ([address] and Tobacco abuse.   Surgical History:   has a past surgical history that includes Coronary angioplasty with stent (2006, 2012, 2013). Social History:   reports that he has been smoking Cigarettes. He has a 67.50 pack-year smoking history. He has never used smokeless tobacco. He reports that he drinks alcohol. He reports that he does not use drugs. Family History:   Family History   Problem Relation Age of Onset    Other Mother     Arthritis Mother     Mental Illness Mother     Depression Mother     Diabetes Father     Heart Disease Father     Substance Abuse Father     Heart Disease Sister     High Blood Pressure Sister     Mental Retardation Brother     Other Brother     Learning Disabilities Brother     Early Death Sister    [facility] Cancer Sister     Substance Abuse Sister     Cancer Sister     Diabetes Brother     High Blood Pressure Brother        Home Medications:  Prior to Admission medications    Medication Sig Start Date End Date Taking? Authorizing Provider   predniSONE (DELTASONE) 20 MG tablet Take 1 tablet by mouth daily for 5 days 9/8/18 9/13/18 Yes Andrey Arias MD   isosorbide mononitrate (IMDUR) 30 MG extended release tablet TAKE 1 TABLET BY MOUTH DAILY 8/21/18  Yes JOSE Becker CNP   HydrOXYzine Pamoate (VISTARIL PO) Take 40 mg by mouth 4 times daily   Yes Historical Provider, MD   HYDROcodone-acetaminophen (NORCO) 5-325 MG per tablet Take 1 tablet by mouth every 8 hours as needed for Pain. Sonia Andersen Historical Provider, MD   amLODIPine (NORVASC) 2.5 MG tablet Take 1 tablet by mouth daily 8/15/18  Yes JOSE Becker CNP   lisinopril (PRINIVIL;ZESTRIL) 20 MG tablet Take 0.5 tablets by mouth nightly 8/15/18  Yes JOSE Becker CNP   traZODone (DESYREL) 100 MG tablet Take 100 mg by mouth 3 times daily   Yes Historical Provider, MD   dicyclomine (BENTYL) 10 MG capsule Take 1 capsule by mouth 4 times daily (before meals and nightly) 3/29/18 3/29/19 Yes Baldev Greenberg PA-C   desvenlafaxine succinate (PRISTIQ) 50 MG TB24 extended release tablet TAKE 1 TABLET BY MOUTH PM      QUALITY MEASURES  1. Tobacco Cessation Counseling: Yes  2. Retake of BP if >140/90:   NA  3. Documentation to PCP/referring for new patient:  Sent to PCP at close of office visit  4. CAD patient on anti-platelet: Yes  5. CAD patient on STATIN therapy:  Yes  6.  Patient with CHF and aFib on anticoagulation:  NA

## 2018-09-18 ENCOUNTER — TELEPHONE (OUTPATIENT)
Dept: CARDIOLOGY CLINIC | Age: 53
End: 2018-09-18

## 2018-09-18 NOTE — TELEPHONE ENCOUNTER
I spoke with patient. He has no other symptoms. He states he didn't notice this swelling until taking off his shoes and stepping into the shower. He is able to walk. No redness. He is wondering if this needs to be checked?   TY

## 2018-09-19 NOTE — TELEPHONE ENCOUNTER
The swelling could be due to the amlodipine or to his fluid intake. The most important thing for him at this time is to cut back on his fluids as discussed in the office. He does not need to go to ED for this. Follow up as scheduled.    Jade Fields, APRN - CNP

## 2018-09-19 NOTE — TELEPHONE ENCOUNTER
Pt informed of CNP DD's message about fluid intake. Pt will continue to monitor fluid intake as discussed in ov.

## 2018-09-27 ENCOUNTER — HOSPITAL ENCOUNTER (OUTPATIENT)
Age: 53
Discharge: HOME | End: 2018-09-27
Payer: COMMERCIAL

## 2018-09-27 DIAGNOSIS — I10 ESSENTIAL HYPERTENSION: ICD-10-CM

## 2018-09-27 DIAGNOSIS — I25.89 CARDIAC MICROVASCULAR DISEASE: ICD-10-CM

## 2018-09-27 DIAGNOSIS — I25.118 CORONARY ARTERY DISEASE OF NATIVE ARTERY OF NATIVE HEART WITH STABLE ANGINA PECTORIS (HCC): ICD-10-CM

## 2018-09-27 DIAGNOSIS — Z72.0 TOBACCO ABUSE: ICD-10-CM

## 2018-09-27 DIAGNOSIS — E78.00 PURE HYPERCHOLESTEROLEMIA: ICD-10-CM

## 2018-09-27 LAB
A/G RATIO: 1.4 (ref 1.1–2.2)
ALBUMIN SERPL-MCNC: 4.2 G/DL (ref 3.4–5)
ALP BLD-CCNC: 87 U/L (ref 40–129)
ALT SERPL-CCNC: 34 U/L (ref 10–40)
ANION GAP SERPL CALCULATED.3IONS-SCNC: 14 MMOL/L (ref 3–16)
AST SERPL-CCNC: 21 U/L (ref 15–37)
BILIRUB SERPL-MCNC: 0.4 MG/DL (ref 0–1)
BUN BLDV-MCNC: 6 MG/DL (ref 7–20)
CALCIUM SERPL-MCNC: 9.3 MG/DL (ref 8.3–10.6)
CHLORIDE BLD-SCNC: 100 MMOL/L (ref 99–110)
CHOLESTEROL, TOTAL: 130 MG/DL (ref 0–199)
CO2: 24 MMOL/L (ref 21–32)
CREAT SERPL-MCNC: 0.8 MG/DL (ref 0.9–1.3)
GFR AFRICAN AMERICAN: >60
GFR NON-AFRICAN AMERICAN: >60
GLOBULIN: 3.1 G/DL
GLUCOSE BLD-MCNC: 102 MG/DL (ref 70–99)
HDLC SERPL-MCNC: 36 MG/DL (ref 40–60)
LDL CHOLESTEROL CALCULATED: 72 MG/DL
MAGNESIUM: 2 MG/DL (ref 1.8–2.4)
POTASSIUM SERPL-SCNC: 4.5 MMOL/L (ref 3.5–5.1)
SODIUM BLD-SCNC: 138 MMOL/L (ref 136–145)
TOTAL PROTEIN: 7.3 G/DL (ref 6.4–8.2)
TRIGL SERPL-MCNC: 110 MG/DL (ref 0–150)
VLDLC SERPL CALC-MCNC: 22 MG/DL

## 2018-09-27 PROCEDURE — 80053 COMPREHEN METABOLIC PANEL: CPT

## 2018-09-27 PROCEDURE — 83735 ASSAY OF MAGNESIUM: CPT

## 2018-09-27 PROCEDURE — 80061 LIPID PANEL: CPT

## 2018-09-27 PROCEDURE — 36415 COLL VENOUS BLD VENIPUNCTURE: CPT

## 2018-09-28 ENCOUNTER — TELEPHONE (OUTPATIENT)
Dept: CARDIOLOGY CLINIC | Age: 53
End: 2018-09-28

## 2018-10-12 RX ORDER — AMLODIPINE BESYLATE 2.5 MG/1
TABLET ORAL
Qty: 30 TABLET | Refills: 3 | Status: SHIPPED | OUTPATIENT
Start: 2018-10-12 | End: 2019-02-06 | Stop reason: SDUPTHER

## 2018-11-12 RX ORDER — LISINOPRIL 20 MG/1
TABLET ORAL
Qty: 90 TABLET | Refills: 2 | Status: SHIPPED | OUTPATIENT
Start: 2018-11-12 | End: 2019-01-09 | Stop reason: SDUPTHER

## 2018-12-06 ENCOUNTER — HOSPITAL ENCOUNTER (EMERGENCY)
Age: 53
Discharge: HOME | End: 2018-12-06
Attending: EMERGENCY MEDICINE
Payer: COMMERCIAL

## 2018-12-06 ENCOUNTER — APPOINTMENT (OUTPATIENT)
Dept: GENERAL RADIOLOGY | Age: 53
End: 2018-12-06
Payer: COMMERCIAL

## 2018-12-06 VITALS
WEIGHT: 284 LBS | SYSTOLIC BLOOD PRESSURE: 112 MMHG | TEMPERATURE: 97.5 F | DIASTOLIC BLOOD PRESSURE: 76 MMHG | HEIGHT: 72 IN | BODY MASS INDEX: 38.47 KG/M2 | OXYGEN SATURATION: 96 % | RESPIRATION RATE: 19 BRPM | HEART RATE: 81 BPM

## 2018-12-06 DIAGNOSIS — R07.9 CHEST PAIN, UNSPECIFIED TYPE: Primary | ICD-10-CM

## 2018-12-06 DIAGNOSIS — J18.9 PNEUMONIA OF LEFT LOWER LOBE DUE TO INFECTIOUS ORGANISM: ICD-10-CM

## 2018-12-06 LAB
A/G RATIO: 1.1 (ref 1.1–2.2)
ALBUMIN SERPL-MCNC: 3.8 G/DL (ref 3.4–5)
ALP BLD-CCNC: 74 U/L (ref 40–129)
ALT SERPL-CCNC: 27 U/L (ref 10–40)
ANION GAP SERPL CALCULATED.3IONS-SCNC: 11 MMOL/L (ref 3–16)
AST SERPL-CCNC: 19 U/L (ref 15–37)
BASOPHILS ABSOLUTE: 0.2 K/UL (ref 0–0.2)
BASOPHILS RELATIVE PERCENT: 1.4 %
BILIRUB SERPL-MCNC: <0.2 MG/DL (ref 0–1)
BUN BLDV-MCNC: 10 MG/DL (ref 7–20)
CALCIUM SERPL-MCNC: 8.7 MG/DL (ref 8.3–10.6)
CHLORIDE BLD-SCNC: 100 MMOL/L (ref 99–110)
CO2: 24 MMOL/L (ref 21–32)
CREAT SERPL-MCNC: 0.8 MG/DL (ref 0.9–1.3)
EKG ATRIAL RATE: 82 BPM
EKG DIAGNOSIS: NORMAL
EKG P-R INTERVAL: 208 MS
EKG Q-T INTERVAL: 376 MS
EKG QRS DURATION: 88 MS
EKG QTC CALCULATION (BAZETT): 439 MS
EKG R AXIS: 10 DEGREES
EKG T AXIS: 40 DEGREES
EKG VENTRICULAR RATE: 82 BPM
EOSINOPHILS ABSOLUTE: 0.4 K/UL (ref 0–0.6)
EOSINOPHILS RELATIVE PERCENT: 3.4 %
GFR AFRICAN AMERICAN: >60
GFR NON-AFRICAN AMERICAN: >60
GLOBULIN: 3.4 G/DL
GLUCOSE BLD-MCNC: 115 MG/DL (ref 70–99)
HCT VFR BLD CALC: 40.7 % (ref 40.5–52.5)
HEMOGLOBIN: 13.4 G/DL (ref 13.5–17.5)
LYMPHOCYTES ABSOLUTE: 3.9 K/UL (ref 1–5.1)
LYMPHOCYTES RELATIVE PERCENT: 32 %
MCH RBC QN AUTO: 26.7 PG (ref 26–34)
MCHC RBC AUTO-ENTMCNC: 33 G/DL (ref 31–36)
MCV RBC AUTO: 80.9 FL (ref 80–100)
MONOCYTES ABSOLUTE: 1 K/UL (ref 0–1.3)
MONOCYTES RELATIVE PERCENT: 8 %
NEUTROPHILS ABSOLUTE: 6.8 K/UL (ref 1.7–7.7)
NEUTROPHILS RELATIVE PERCENT: 55.2 %
PDW BLD-RTO: 16.6 % (ref 12.4–15.4)
PLATELET # BLD: 145 K/UL (ref 135–450)
PMV BLD AUTO: 8.9 FL (ref 5–10.5)
POTASSIUM SERPL-SCNC: 3.3 MMOL/L (ref 3.5–5.1)
RBC # BLD: 5.02 M/UL (ref 4.2–5.9)
SODIUM BLD-SCNC: 135 MMOL/L (ref 136–145)
TOTAL PROTEIN: 7.2 G/DL (ref 6.4–8.2)
TROPONIN: <0.01 NG/ML
TROPONIN: <0.01 NG/ML
WBC # BLD: 12.2 K/UL (ref 4–11)

## 2018-12-06 PROCEDURE — 93010 ELECTROCARDIOGRAM REPORT: CPT | Performed by: INTERNAL MEDICINE

## 2018-12-06 PROCEDURE — 71045 X-RAY EXAM CHEST 1 VIEW: CPT

## 2018-12-06 PROCEDURE — 80053 COMPREHEN METABOLIC PANEL: CPT

## 2018-12-06 PROCEDURE — 93005 ELECTROCARDIOGRAM TRACING: CPT | Performed by: EMERGENCY MEDICINE

## 2018-12-06 PROCEDURE — 6370000000 HC RX 637 (ALT 250 FOR IP): Performed by: EMERGENCY MEDICINE

## 2018-12-06 PROCEDURE — 99285 EMERGENCY DEPT VISIT HI MDM: CPT

## 2018-12-06 PROCEDURE — 84484 ASSAY OF TROPONIN QUANT: CPT

## 2018-12-06 PROCEDURE — 36415 COLL VENOUS BLD VENIPUNCTURE: CPT

## 2018-12-06 PROCEDURE — 85025 COMPLETE CBC W/AUTO DIFF WBC: CPT

## 2018-12-06 RX ORDER — LEVOFLOXACIN 750 MG/1
750 TABLET ORAL DAILY
Qty: 5 TABLET | Refills: 0 | Status: SHIPPED | OUTPATIENT
Start: 2018-12-06 | End: 2018-12-11

## 2018-12-06 RX ORDER — LEVOFLOXACIN 500 MG/1
750 TABLET, FILM COATED ORAL ONCE
Status: COMPLETED | OUTPATIENT
Start: 2018-12-06 | End: 2018-12-06

## 2018-12-06 RX ADMIN — LEVOFLOXACIN 750 MG: 500 TABLET, FILM COATED ORAL at 06:30

## 2018-12-06 NOTE — ED PROVIDER NOTES
Emergency Physician Note    Chief Complaint  Chest Pain (C/O chest pain and pressure starting at 0330. Pt took 4 81 mg ASA and 1 Nitro 0.4 mg SL at home which relieved chest pain. Pt has been moving)       History of Present Illness  Obdulia López is a 47 y.o. male who presents to the ED for chest pain. Patient reports that starting around 3:30 AM this morning, he began having chest pain. Pain is characterized as a pressure type sensation in the midepigastric area. Pain did not radiate. Pain did not change with p.o. intake, bowel movements, urination, deep inspection, exertion or positional changes. Patient felt otherwise well prior to onset of current symptoms. Patient does have a history of known coronary artery disease. Patient took. This aspirin this morning as well as 1 sublingual nitro at home which completely resolved reported symptoms. Patient denies any weakness, numbness, tingling. No lightheadedness, dizziness, diaphoresis, nausea, vomiting. 10 systems reviewed, pertinent positives per HPI otherwise noted to be negative    I have reviewed the following from the nursing documentation:      Prior to Admission medications    Medication Sig Start Date End Date Taking? Authorizing Provider   lisinopril (PRINIVIL;ZESTRIL) 20 MG tablet TAKE 1 TABLET BY MOUTH EVERY DAY 11/12/18  Yes JOSE Sahu CNP   amLODIPine (NORVASC) 2.5 MG tablet TAKE 1 TABLET BY MOUTH EVERY DAY 10/12/18  Yes JOSE Sahu CNP   isosorbide mononitrate (IMDUR) 30 MG extended release tablet TAKE 1 TABLET BY MOUTH DAILY 8/21/18  Yes JOSE Sahu CNP   HydrOXYzine Pamoate (VISTARIL PO) Take 40 mg by mouth 4 times daily   Yes Historical Provider, MD   HYDROcodone-acetaminophen (NORCO) 5-325 MG per tablet Take 1 tablet by mouth every 8 hours as needed for Pain. .   Yes Historical Provider, MD   traZODone (DESYREL) 100 MG tablet Take 100 mg by mouth 3 times daily   Yes Historical Provider, MD reactive to light, Conjunctiva normal, extraocular movements normal.  NECK:  No meningeal signs, Supple. CHEST:  Regular rate and rhythm, chest wall non-tender. LUNGS:  Clear to auscultation bilaterally, no respiratory distress. ABDOMEN:  Soft, non-tender, non-distended, normal bowel sounds. No costovertebral angle tenderness to palpation. EXTREMITIES:  Normal range of motion, no edema, no tenderness, no deformity, distal pulses present. BACK:  No tenderness. SKIN: Warm, dry and intact. NEUROLOGIC: Normal mental status. Moving all extremities to command. LABS and DIAGNOSTIC RESULTS  EKG  The Ekg interpreted by me shows  normal sinus rhythm with a rate of 82  Axis is   Normal  QTc is  normal  Intervals and Durations are unremarkable. ST Segments: no acute change  Delta waves, Brugada Syndrome, and Short DE  present. No significant change from prior EKG dated 12/6/18    RADIOLOGY  X-RAYS:  I have reviewed radiologic plain film image(s). ALL OTHER NON-PLAIN FILM IMAGES SUCH AS CT, ULTRASOUND AND MRI HAVE BEEN READ BY THE RADIOLOGIST. XR CHEST PORTABLE   Final Result   Left lower lobe atelectasis or pneumonia.               LABS  Results for orders placed or performed during the hospital encounter of 12/06/18   CBC auto differential   Result Value Ref Range    WBC 12.2 (H) 4.0 - 11.0 K/uL    RBC 5.02 4.20 - 5.90 M/uL    Hemoglobin 13.4 (L) 13.5 - 17.5 g/dL    Hematocrit 40.7 40.5 - 52.5 %    MCV 80.9 80.0 - 100.0 fL    MCH 26.7 26.0 - 34.0 pg    MCHC 33.0 31.0 - 36.0 g/dL    RDW 16.6 (H) 12.4 - 15.4 %    Platelets 425 122 - 733 K/uL    MPV 8.9 5.0 - 10.5 fL    Neutrophils % 55.2 %    Lymphocytes % 32.0 %    Monocytes % 8.0 %    Eosinophils % 3.4 %    Basophils % 1.4 %    Neutrophils # 6.8 1.7 - 7.7 K/uL    Lymphocytes # 3.9 1.0 - 5.1 K/uL    Monocytes # 1.0 0.0 - 1.3 K/uL    Eosinophils # 0.4 0.0 - 0.6 K/uL    Basophils # 0.2 0.0 - 0.2 K/uL   Comprehensive metabolic panel   Result Value Ref Range    Sodium 135 (L) 136 - 145 mmol/L    Potassium 3.3 (L) 3.5 - 5.1 mmol/L    Chloride 100 99 - 110 mmol/L    CO2 24 21 - 32 mmol/L    Anion Gap 11 3 - 16    Glucose 115 (H) 70 - 99 mg/dL    BUN 10 7 - 20 mg/dL    CREATININE 0.8 (L) 0.9 - 1.3 mg/dL    GFR Non-African American >60 >60    GFR African American >60 >60    Calcium 8.7 8.3 - 10.6 mg/dL    Total Protein 7.2 6.4 - 8.2 g/dL    Alb 3.8 3.4 - 5.0 g/dL    Albumin/Globulin Ratio 1.1 1.1 - 2.2    Total Bilirubin <0.2 0.0 - 1.0 mg/dL    Alkaline Phosphatase 74 40 - 129 U/L    ALT 27 10 - 40 U/L    AST 19 15 - 37 U/L    Globulin 3.4 g/dL   Troponin   Result Value Ref Range    Troponin <0.01 <0.01 ng/mL   Troponin   Result Value Ref Range    Troponin <0.01 <0.01 ng/mL   EKG 12 Lead   Result Value Ref Range    Ventricular Rate 82 BPM    Atrial Rate 82 BPM    P-R Interval 208 ms    QRS Duration 88 ms    Q-T Interval 376 ms    QTc Calculation (Bazett) 439 ms    R Axis 10 degrees    T Axis 40 degrees    Diagnosis       Normal sinus rhythmNonspecific ST abnormalityAbnormal ECGWhen compared with ECG of 08-SEP-2018 13:28,QRS axis Shifted rightNonspecific T wave abnormality no longer evident in Inferior leadsT wave inversion no longer evident in Lateral leads         PROCEDURES      MEDICAL DECISION MAKING  On arrival to the emergency department, patient afebrile with otherwise normal vital signs. EKG obtained and showing no signs acute ischemic changes. Initial troponin negative. CBC, CMP obtained and showing mild leukocytosis but otherwise no other concerning acute abnormality. Chest x-ray obtained and showing possible pneumonia in the left lower lobe. Patient does report a nonproductive cough at this time. Patient tolerating p.o. intake without any difficulty. Patient chest pain-free upon initial evaluation here in the emergency department. Second troponin negative. Patient will be treated for pneumonia.   Patient given initial dose of Levaquin here in the emergency department and will continue for the next 5 days. Patient will follow up with PCP for reevaluation further management of current symptoms. Low likelihood for ACS, PE, pneumothorax or other concerning acute abnormalities at this time. Final diagnoses:   Chest pain, unspecified type   Pneumonia of left lower lobe due to infectious organism [facility])         Patient was given scripts for the following medications. I counseled patient how to take these medications. New Prescriptions    LEVOFLOXACIN (LEVAQUIN) 750 MG TABLET    Take 1 tablet by mouth daily for 5 days       Disposition  Pt is in stable condition upon Discharge to home. This chart was generated using the [address] [place] [address] dictation system. I created this record but it may contain dictation errors.            Vandana Jaimes MD  12/06/18 3687

## 2018-12-17 ENCOUNTER — HOSPITAL ENCOUNTER (OUTPATIENT)
Age: 53
Setting detail: OBSERVATION
Discharge: HOME | End: 2018-12-18
Attending: INTERNAL MEDICINE | Admitting: INTERNAL MEDICINE
Payer: COMMERCIAL

## 2018-12-17 ENCOUNTER — APPOINTMENT (OUTPATIENT)
Dept: GENERAL RADIOLOGY | Age: 53
End: 2018-12-17
Payer: COMMERCIAL

## 2018-12-17 ENCOUNTER — HOSPITAL ENCOUNTER (EMERGENCY)
Age: 53
Discharge: ANOTHER HOSPITAL | End: 2018-12-17
Attending: EMERGENCY MEDICINE
Payer: COMMERCIAL

## 2018-12-17 VITALS
OXYGEN SATURATION: 94 % | WEIGHT: 288 LBS | DIASTOLIC BLOOD PRESSURE: 78 MMHG | TEMPERATURE: 98.6 F | BODY MASS INDEX: 40.32 KG/M2 | HEIGHT: 71 IN | HEART RATE: 100 BPM | RESPIRATION RATE: 20 BRPM | SYSTOLIC BLOOD PRESSURE: 135 MMHG

## 2018-12-17 DIAGNOSIS — R07.9 CHEST PAIN, UNSPECIFIED TYPE: Primary | ICD-10-CM

## 2018-12-17 LAB
A/G RATIO: 1.4 (ref 1.1–2.2)
ALBUMIN SERPL-MCNC: 4.2 G/DL (ref 3.4–5)
ALP BLD-CCNC: 78 U/L (ref 40–129)
ALT SERPL-CCNC: 33 U/L (ref 10–40)
ANION GAP SERPL CALCULATED.3IONS-SCNC: 13 MMOL/L (ref 3–16)
AST SERPL-CCNC: 24 U/L (ref 15–37)
BASOPHILS ABSOLUTE: 0.1 K/UL (ref 0–0.2)
BASOPHILS RELATIVE PERCENT: 1.1 %
BILIRUB SERPL-MCNC: 0.3 MG/DL (ref 0–1)
BUN BLDV-MCNC: 8 MG/DL (ref 7–20)
CALCIUM SERPL-MCNC: 9.2 MG/DL (ref 8.3–10.6)
CHLORIDE BLD-SCNC: 100 MMOL/L (ref 99–110)
CO2: 24 MMOL/L (ref 21–32)
CREAT SERPL-MCNC: 0.7 MG/DL (ref 0.9–1.3)
EKG ATRIAL RATE: 86 BPM
EKG DIAGNOSIS: NORMAL
EKG P AXIS: 31 DEGREES
EKG P-R INTERVAL: 158 MS
EKG Q-T INTERVAL: 382 MS
EKG QRS DURATION: 90 MS
EKG QTC CALCULATION (BAZETT): 457 MS
EKG R AXIS: 14 DEGREES
EKG T AXIS: 35 DEGREES
EKG VENTRICULAR RATE: 86 BPM
EOSINOPHILS ABSOLUTE: 0.3 K/UL (ref 0–0.6)
EOSINOPHILS RELATIVE PERCENT: 3 %
GFR AFRICAN AMERICAN: >60
GFR NON-AFRICAN AMERICAN: >60
GLOBULIN: 2.9 G/DL
GLUCOSE BLD-MCNC: 91 MG/DL (ref 70–99)
HCT VFR BLD CALC: 43.1 % (ref 40.5–52.5)
HEMOGLOBIN: 14.3 G/DL (ref 13.5–17.5)
LYMPHOCYTES ABSOLUTE: 2.4 K/UL (ref 1–5.1)
LYMPHOCYTES RELATIVE PERCENT: 23.2 %
MCH RBC QN AUTO: 27.2 PG (ref 26–34)
MCHC RBC AUTO-ENTMCNC: 33.3 G/DL (ref 31–36)
MCV RBC AUTO: 81.8 FL (ref 80–100)
MONOCYTES ABSOLUTE: 0.7 K/UL (ref 0–1.3)
MONOCYTES RELATIVE PERCENT: 7.1 %
NEUTROPHILS ABSOLUTE: 6.8 K/UL (ref 1.7–7.7)
NEUTROPHILS RELATIVE PERCENT: 65.6 %
PDW BLD-RTO: 16.3 % (ref 12.4–15.4)
PLATELET # BLD: 158 K/UL (ref 135–450)
PMV BLD AUTO: 9 FL (ref 5–10.5)
POTASSIUM SERPL-SCNC: 3.6 MMOL/L (ref 3.5–5.1)
RBC # BLD: 5.26 M/UL (ref 4.2–5.9)
SODIUM BLD-SCNC: 137 MMOL/L (ref 136–145)
TOTAL PROTEIN: 7.1 G/DL (ref 6.4–8.2)
TROPONIN: <0.01 NG/ML
WBC # BLD: 10.4 K/UL (ref 4–11)

## 2018-12-17 PROCEDURE — 6370000000 HC RX 637 (ALT 250 FOR IP): Performed by: EMERGENCY MEDICINE

## 2018-12-17 PROCEDURE — 36415 COLL VENOUS BLD VENIPUNCTURE: CPT

## 2018-12-17 PROCEDURE — 84484 ASSAY OF TROPONIN QUANT: CPT

## 2018-12-17 PROCEDURE — 71045 X-RAY EXAM CHEST 1 VIEW: CPT

## 2018-12-17 PROCEDURE — 80053 COMPREHEN METABOLIC PANEL: CPT

## 2018-12-17 PROCEDURE — G0378 HOSPITAL OBSERVATION PER HR: HCPCS

## 2018-12-17 PROCEDURE — 85025 COMPLETE CBC W/AUTO DIFF WBC: CPT

## 2018-12-17 PROCEDURE — 93005 ELECTROCARDIOGRAM TRACING: CPT | Performed by: EMERGENCY MEDICINE

## 2018-12-17 PROCEDURE — G0379 DIRECT REFER HOSPITAL OBSERV: HCPCS

## 2018-12-17 PROCEDURE — 93010 ELECTROCARDIOGRAM REPORT: CPT | Performed by: INTERNAL MEDICINE

## 2018-12-17 PROCEDURE — 99285 EMERGENCY DEPT VISIT HI MDM: CPT

## 2018-12-17 RX ORDER — HYDROCODONE BITARTRATE AND ACETAMINOPHEN 5; 325 MG/1; MG/1
1 TABLET ORAL ONCE
Status: COMPLETED | OUTPATIENT
Start: 2018-12-17 | End: 2018-12-17

## 2018-12-17 RX ADMIN — HYDROCODONE BITARTRATE AND ACETAMINOPHEN 1 TABLET: 5; 325 TABLET ORAL at 21:30

## 2018-12-17 ASSESSMENT — PAIN DESCRIPTION - LOCATION
LOCATION: CHEST
LOCATION: NECK

## 2018-12-17 ASSESSMENT — PAIN DESCRIPTION - DESCRIPTORS: DESCRIPTORS: ACHING

## 2018-12-17 ASSESSMENT — PAIN DESCRIPTION - ORIENTATION
ORIENTATION: LEFT
ORIENTATION: LEFT

## 2018-12-17 ASSESSMENT — PAIN SCALES - GENERAL
PAINLEVEL_OUTOF10: 3
PAINLEVEL_OUTOF10: 3
PAINLEVEL_OUTOF10: 2

## 2018-12-17 ASSESSMENT — PAIN DESCRIPTION - PAIN TYPE
TYPE: ACUTE PAIN
TYPE: ACUTE PAIN

## 2018-12-17 ASSESSMENT — PAIN DESCRIPTION - FREQUENCY: FREQUENCY: CONTINUOUS

## 2018-12-17 NOTE — ED NOTES
RN rounded on pt. Pt resting in bed with eyes closed in room, Pt remains on monitor, VSS and updated and pt updated on POC. Pt has no further needs at this time. Pt resting in bed, call light within reach. Pt denies any questions. Will cont to monitor.       Magui Ballesteros RN  12/17/18 3007

## 2018-12-17 NOTE — ED PROVIDER NOTES
palpitations or swelling   GI:  Denies abdominal pain, vomiting, or diarrhea   Musculoskeletal:  Denies back pain   Skin:  Denies rash   Neurologic:  Denies headache, focal weakness or sensory changes   Endocrine:  Denies polyuria or polydypsia   Lymphatic:  Denies swollen glands   Psychiatric:  Denies depression, suicidal ideation or homicidal ideation   All systems negative except as marked. Positives and Pertinent negatives as per HPI. Except as noted above in the ROS, all other systems were reviewed and negative.        PAST MEDICAL HISTORY     Past Medical History:   Diagnosis Date    Anxiety     [facility]ipolar affective [facility]     CAD (coronary artery disease)     Chronic back pain     COPD [facility]ronic obstructive pulmonary disease) [facility]     DDD (degenerative disc disease), cervical     Depression     GERD (gastroesophageal reflux disease)     Hyperlipidemia     Hypertension     Pancreatitis     Recovering alcoholic ([facility]     Tobacco abuse 12/16/2016         SURGICAL HISTORY       Past Surgical History:   Procedure Laterality Date    CORONARY ANGIOPLASTY WITH STENT PLACEMENT  2006, 2012, 2013    [facility]         CURRENT MEDICATIONS       Previous Medications    ACETAMINOPHEN (TYLENOL) 650 MG EXTENDED RELEASE TABLET    Take 2 tablets by mouth 2 times daily    AMLODIPINE (NORVASC) 2.5 MG TABLET    TAKE 1 TABLET BY MOUTH EVERY DAY    ASPIRIN 81 MG EC TABLET    Take 1 tablet by mouth daily    ATORVASTATIN (LIPITOR) 40 MG TABLET    Take 1 tablet by mouth daily    BLOOD PRESSURE MONITORING (CARETOUCH BP ARM MONITOR) SO    1 each by Does not apply route once for 1 dose    BREO ELLIPTA 200-25 MCG/INH AEPB    INHALE 1 PUFF INTO THE LUNGS AT THE SAME TIME EACH DAY    CLOPIDOGREL (PLAVIX) 75 MG TABLET    Take 1 tablet by mouth daily    DESVENLAFAXINE SUCCINATE (PRISTIQ) 50 MG TB24 EXTENDED RELEASE TABLET    TAKE 1 TABLET BY MOUTH EVERY DAY    GABAPENTIN (NEURONTIN) 300 MG CAPSULE    TAKE ONE CAPSULE BY Coma Scale Score: 15         HEART SCORE:    History: +2 for high suspicion  EKG: +0 for normal EKG   Age: +1 for age 44-72 years  Risk factors (includes HLD, HTN, DM, tobacco use, obesity, and +FHx): +2 for known CAD or 3+ risk factors  Initial troponin: +0 for negative troponin    Heart score: 5. This falls under the following category: Score of 4-6, which indicates low/moderate risk for major adverse cardiac event and supports observation with repeated troponins and/or non-invasive testing    PHYSICAL EXAM    (up to 7 for level 4, 8 or more for level 5)     ED Triage Vitals [12/17/18 1551]   BP Temp Temp Source Pulse Resp SpO2 Height Weight   (!) 161/101 98.7 °F (37.1 °C) Oral 92 18 99 % 5' 11\" (1.803 m) (!) 400 lb (181.4 kg)           PHYSICAL EXAM    VITAL SIGNS: BP (!) 132/99   Pulse 90   Temp 98.7 °F (37.1 °C) (Oral)   Resp 18   Ht 5' 11\" (1.803 m)   Wt (!) 400 lb (181.4 kg)   SpO2 96%   BMI 55.79 kg/m²    Constitutional:  Well developed, Well nourished, No acute distress, Non-toxic appearance. obese  HENT:  Normocephalic, Atraumatic, Bilateral external ears normal, Oropharynx moist, No oral exudates, Nose normal.   Neck: Normal range of motion, No tenderness, Supple, No stridor. Eyes:   Conjunctiva normal, No discharge. Respiratory:  Normal breath sounds, No respiratory distress, No wheezing, No chest tenderness. Cardiovascular:  Normal heart rate, Normal rhythm, No murmurs, No rubs, No gallops. GI:  Bowel sounds normal, Soft, No tenderness, No masses, No pulsatile masses. :     Musculoskeletal:  Intact distal pulses, No edema, No tenderness, No cyanosis, No clubbing. Good range of motion in all major joints. No tenderness to palpation or major deformities noted. Back: No tenderness. Integument:  Warm, Dry, No erythema, No rash. Lymphatic:  No lymphadenopathy noted. Neurologic:  Alert & oriented x 3, Normal motor function, Normal sensory function, No focal deficits noted. granulomas are again seen. The costophrenic angles are sharp. The cardiomediastinal silhouette is within normal limits. There is no discernible pneumothorax. Left lower lobe atelectasis or pneumonia. PROCEDURES   Unless otherwise noted below, none     Procedures    CRITICAL CARE TIME   N/A    CONSULTS:  None    EMERGENCY DEPARTMENT COURSE and DIFFERENTIAL DIAGNOSIS/MDM:   Vitals:    Vitals:    12/17/18 1551 12/17/18 1629   BP: (!) 161/101 (!) 132/99   Pulse: 92 90   Resp: 18 18   Temp: 98.7 °F (37.1 °C)    TempSrc: Oral    SpO2: 99% 96%   Weight: (!) 400 lb (181.4 kg)    Height: 5' 11\" (1.803 m)        Michael Shaver is a 47 y.o. male who presented to the Emergency Department with chest pressure and R shoulder discomfort concerning for ACS. Pt has known CAD, no intervenable lesions approx 1 year ago. Heart Score 5. Initial troponin neg, pt chest pain free after nitro. Will transfer for further diagnostics and management and inpatient cardiology eval/testing. Pt took ASA 324mg prior to arrival.    Smoking cessation discussed for 5 min    Patient was given the following medications:  Medications   nitroglycerin (NITRO-BID) 2 % ointment 1 inch (not administered)       The patient tolerated their visit well. The patient and / or the family were informed of the results of any tests, a time was given to answer questions. FINAL IMPRESSION      1. Chest pain, unspecified type          DISPOSITION/PLAN   DISPOSITION Decision To Transfer 12/17/2018 04:38:45 PM      PATIENT REFERRED TO:  No follow-up provider specified.     DISCHARGE MEDICATIONS:  New Prescriptions    No medications on file       DISCONTINUED MEDICATIONS:  Discontinued Medications    DICYCLOMINE (BENTYL) 10 MG CAPSULE    Take 1 capsule by mouth 4 times daily (before meals and nightly)              (Please note that portions of this note were completed with a voice recognition program.  Efforts were made to edit the dictations but occasionally words are mis-transcribed.)    Adele Moritz, DO (electronically signed)            Adele Moritz, DO  12/17/18 8446

## 2018-12-18 ENCOUNTER — APPOINTMENT (OUTPATIENT)
Dept: NUCLEAR MEDICINE | Age: 53
End: 2018-12-18
Attending: INTERNAL MEDICINE
Payer: COMMERCIAL

## 2018-12-18 VITALS
OXYGEN SATURATION: 95 % | TEMPERATURE: 97.9 F | RESPIRATION RATE: 16 BRPM | WEIGHT: 276.3 LBS | BODY MASS INDEX: 38.68 KG/M2 | DIASTOLIC BLOOD PRESSURE: 94 MMHG | HEIGHT: 71 IN | HEART RATE: 88 BPM | SYSTOLIC BLOOD PRESSURE: 170 MMHG

## 2018-12-18 LAB
ANION GAP SERPL CALCULATED.3IONS-SCNC: 5 MMOL/L (ref 3–16)
BUN BLDV-MCNC: 8 MG/DL (ref 7–20)
CALCIUM SERPL-MCNC: 8.9 MG/DL (ref 8.3–10.6)
CHLORIDE BLD-SCNC: 102 MMOL/L (ref 99–110)
CHOLESTEROL, TOTAL: 161 MG/DL (ref 0–199)
CO2: 26 MMOL/L (ref 21–32)
CREAT SERPL-MCNC: 0.7 MG/DL (ref 0.9–1.3)
EKG ATRIAL RATE: 98 BPM
EKG DIAGNOSIS: NORMAL
EKG P AXIS: 41 DEGREES
EKG P-R INTERVAL: 166 MS
EKG Q-T INTERVAL: 366 MS
EKG QRS DURATION: 86 MS
EKG QTC CALCULATION (BAZETT): 467 MS
EKG R AXIS: 5 DEGREES
EKG T AXIS: 41 DEGREES
EKG VENTRICULAR RATE: 98 BPM
GFR AFRICAN AMERICAN: >60
GFR NON-AFRICAN AMERICAN: >60
GLUCOSE BLD-MCNC: 96 MG/DL (ref 70–99)
HCT VFR BLD CALC: 41.3 % (ref 40.5–52.5)
HDLC SERPL-MCNC: 36 MG/DL (ref 40–60)
HEMOGLOBIN: 13.5 G/DL (ref 13.5–17.5)
LDL CHOLESTEROL CALCULATED: 95 MG/DL
LV EF: 67 %
LVEF MODALITY: NORMAL
MCH RBC QN AUTO: 27.1 PG (ref 26–34)
MCHC RBC AUTO-ENTMCNC: 32.7 G/DL (ref 31–36)
MCV RBC AUTO: 82.9 FL (ref 80–100)
PDW BLD-RTO: 16.9 % (ref 12.4–15.4)
PLATELET # BLD: 153 K/UL (ref 135–450)
PMV BLD AUTO: 9.3 FL (ref 5–10.5)
POTASSIUM REFLEX MAGNESIUM: 3.9 MMOL/L (ref 3.5–5.1)
RBC # BLD: 4.99 M/UL (ref 4.2–5.9)
SODIUM BLD-SCNC: 133 MMOL/L (ref 136–145)
TRIGL SERPL-MCNC: 150 MG/DL (ref 0–150)
TROPONIN: <0.01 NG/ML
TROPONIN: <0.01 NG/ML
VLDLC SERPL CALC-MCNC: 30 MG/DL
WBC # BLD: 8.9 K/UL (ref 4–11)

## 2018-12-18 PROCEDURE — 84484 ASSAY OF TROPONIN QUANT: CPT

## 2018-12-18 PROCEDURE — 80061 LIPID PANEL: CPT

## 2018-12-18 PROCEDURE — 80048 BASIC METABOLIC PNL TOTAL CA: CPT

## 2018-12-18 PROCEDURE — A9502 TC99M TETROFOSMIN: HCPCS | Performed by: INTERNAL MEDICINE

## 2018-12-18 PROCEDURE — G0378 HOSPITAL OBSERVATION PER HR: HCPCS

## 2018-12-18 PROCEDURE — 94664 DEMO&/EVAL PT USE INHALER: CPT

## 2018-12-18 PROCEDURE — 3430000000 HC RX DIAGNOSTIC RADIOPHARMACEUTICAL: Performed by: INTERNAL MEDICINE

## 2018-12-18 PROCEDURE — 6370000000 HC RX 637 (ALT 250 FOR IP): Performed by: INTERNAL MEDICINE

## 2018-12-18 PROCEDURE — 85027 COMPLETE CBC AUTOMATED: CPT

## 2018-12-18 PROCEDURE — 2580000003 HC RX 258: Performed by: NURSE PRACTITIONER

## 2018-12-18 PROCEDURE — 99406 BEHAV CHNG SMOKING 3-10 MIN: CPT

## 2018-12-18 PROCEDURE — 93010 ELECTROCARDIOGRAM REPORT: CPT | Performed by: INTERNAL MEDICINE

## 2018-12-18 PROCEDURE — 93017 CV STRESS TEST TRACING ONLY: CPT

## 2018-12-18 PROCEDURE — 36415 COLL VENOUS BLD VENIPUNCTURE: CPT

## 2018-12-18 PROCEDURE — 78452 HT MUSCLE IMAGE SPECT MULT: CPT

## 2018-12-18 PROCEDURE — 6370000000 HC RX 637 (ALT 250 FOR IP): Performed by: NURSE PRACTITIONER

## 2018-12-18 PROCEDURE — 6360000002 HC RX W HCPCS: Performed by: INTERNAL MEDICINE

## 2018-12-18 PROCEDURE — 99244 OFF/OP CNSLTJ NEW/EST MOD 40: CPT | Performed by: INTERNAL MEDICINE

## 2018-12-18 RX ORDER — ATORVASTATIN CALCIUM 40 MG/1
40 TABLET, FILM COATED ORAL DAILY
Status: DISCONTINUED | OUTPATIENT
Start: 2018-12-18 | End: 2018-12-18 | Stop reason: HOSPADM

## 2018-12-18 RX ORDER — ONDANSETRON 2 MG/ML
4 INJECTION INTRAMUSCULAR; INTRAVENOUS EVERY 6 HOURS PRN
Status: DISCONTINUED | OUTPATIENT
Start: 2018-12-18 | End: 2018-12-18 | Stop reason: HOSPADM

## 2018-12-18 RX ORDER — NITROGLYCERIN 0.4 MG/1
0.4 TABLET SUBLINGUAL EVERY 5 MIN PRN
Status: DISCONTINUED | OUTPATIENT
Start: 2018-12-18 | End: 2018-12-18 | Stop reason: HOSPADM

## 2018-12-18 RX ORDER — HYDROXYZINE PAMOATE 25 MG/1
50 CAPSULE ORAL 4 TIMES DAILY
Status: DISCONTINUED | OUTPATIENT
Start: 2018-12-18 | End: 2018-12-18 | Stop reason: HOSPADM

## 2018-12-18 RX ORDER — SODIUM CHLORIDE 0.9 % (FLUSH) 0.9 %
10 SYRINGE (ML) INJECTION EVERY 12 HOURS SCHEDULED
Status: DISCONTINUED | OUTPATIENT
Start: 2018-12-18 | End: 2018-12-18 | Stop reason: HOSPADM

## 2018-12-18 RX ORDER — SODIUM CHLORIDE 0.9 % (FLUSH) 0.9 %
10 SYRINGE (ML) INJECTION PRN
Status: DISCONTINUED | OUTPATIENT
Start: 2018-12-18 | End: 2018-12-18 | Stop reason: HOSPADM

## 2018-12-18 RX ORDER — TRAZODONE HYDROCHLORIDE 50 MG/1
100 TABLET ORAL 3 TIMES DAILY
Status: DISCONTINUED | OUTPATIENT
Start: 2018-12-18 | End: 2018-12-18

## 2018-12-18 RX ORDER — ISOSORBIDE MONONITRATE 30 MG/1
30 TABLET, EXTENDED RELEASE ORAL DAILY
Status: DISCONTINUED | OUTPATIENT
Start: 2018-12-18 | End: 2018-12-18 | Stop reason: HOSPADM

## 2018-12-18 RX ORDER — ASPIRIN 81 MG/1
81 TABLET, CHEWABLE ORAL DAILY
Status: DISCONTINUED | OUTPATIENT
Start: 2018-12-19 | End: 2018-12-18

## 2018-12-18 RX ORDER — GABAPENTIN 300 MG/1
300 CAPSULE ORAL 3 TIMES DAILY
Status: DISCONTINUED | OUTPATIENT
Start: 2018-12-18 | End: 2018-12-18

## 2018-12-18 RX ORDER — HYDROCODONE BITARTRATE AND ACETAMINOPHEN 5; 325 MG/1; MG/1
1 TABLET ORAL EVERY 6 HOURS PRN
Status: DISCONTINUED | OUTPATIENT
Start: 2018-12-18 | End: 2018-12-18 | Stop reason: HOSPADM

## 2018-12-18 RX ORDER — HYDROXYZINE PAMOATE 50 MG/1
40 CAPSULE ORAL 4 TIMES DAILY
Status: DISCONTINUED | OUTPATIENT
Start: 2018-12-18 | End: 2018-12-18

## 2018-12-18 RX ORDER — DIPHENHYDRAMINE HCL 25 MG
25 CAPSULE ORAL NIGHTLY PRN
COMMUNITY
End: 2019-06-29

## 2018-12-18 RX ORDER — GABAPENTIN 300 MG/1
300 CAPSULE ORAL 3 TIMES DAILY
Status: DISCONTINUED | OUTPATIENT
Start: 2018-12-18 | End: 2018-12-18 | Stop reason: HOSPADM

## 2018-12-18 RX ORDER — LISINOPRIL 20 MG/1
20 TABLET ORAL DAILY
Status: DISCONTINUED | OUTPATIENT
Start: 2018-12-18 | End: 2018-12-18 | Stop reason: HOSPADM

## 2018-12-18 RX ORDER — ASPIRIN 81 MG/1
81 TABLET ORAL DAILY
Status: DISCONTINUED | OUTPATIENT
Start: 2018-12-18 | End: 2018-12-18 | Stop reason: HOSPADM

## 2018-12-18 RX ORDER — PANTOPRAZOLE SODIUM 20 MG/1
20 TABLET, DELAYED RELEASE ORAL
Status: DISCONTINUED | OUTPATIENT
Start: 2018-12-18 | End: 2018-12-18 | Stop reason: HOSPADM

## 2018-12-18 RX ORDER — METOPROLOL SUCCINATE 50 MG/1
50 TABLET, EXTENDED RELEASE ORAL DAILY
Status: DISCONTINUED | OUTPATIENT
Start: 2018-12-18 | End: 2018-12-18 | Stop reason: HOSPADM

## 2018-12-18 RX ORDER — AMLODIPINE BESYLATE 2.5 MG/1
2.5 TABLET ORAL DAILY
Status: DISCONTINUED | OUTPATIENT
Start: 2018-12-18 | End: 2018-12-18 | Stop reason: HOSPADM

## 2018-12-18 RX ORDER — CLOPIDOGREL BISULFATE 75 MG/1
75 TABLET ORAL DAILY
Status: DISCONTINUED | OUTPATIENT
Start: 2018-12-18 | End: 2018-12-18 | Stop reason: HOSPADM

## 2018-12-18 RX ORDER — TRAZODONE HYDROCHLORIDE 50 MG/1
100 TABLET ORAL 3 TIMES DAILY
Status: DISCONTINUED | OUTPATIENT
Start: 2018-12-18 | End: 2018-12-18 | Stop reason: HOSPADM

## 2018-12-18 RX ADMIN — LISINOPRIL 20 MG: 20 TABLET ORAL at 13:01

## 2018-12-18 RX ADMIN — TETROFOSMIN 34.6 MILLICURIE: 0.23 INJECTION, POWDER, LYOPHILIZED, FOR SOLUTION INTRAVENOUS at 11:10

## 2018-12-18 RX ADMIN — TRAZODONE HYDROCHLORIDE 100 MG: 50 TABLET ORAL at 02:44

## 2018-12-18 RX ADMIN — CLOPIDOGREL BISULFATE 75 MG: 75 TABLET ORAL at 13:00

## 2018-12-18 RX ADMIN — REGADENOSON 0.4 MG: 0.08 INJECTION, SOLUTION INTRAVENOUS at 11:10

## 2018-12-18 RX ADMIN — ASPIRIN 81 MG: 81 TABLET, COATED ORAL at 12:59

## 2018-12-18 RX ADMIN — GABAPENTIN 300 MG: 300 CAPSULE ORAL at 14:41

## 2018-12-18 RX ADMIN — SODIUM CHLORIDE, PRESERVATIVE FREE 10 ML: 5 INJECTION INTRAVENOUS at 09:31

## 2018-12-18 RX ADMIN — GABAPENTIN 300 MG: 300 CAPSULE ORAL at 02:44

## 2018-12-18 RX ADMIN — AMLODIPINE BESYLATE 2.5 MG: 2.5 TABLET ORAL at 12:58

## 2018-12-18 RX ADMIN — ISOSORBIDE MONONITRATE 30 MG: 30 TABLET, EXTENDED RELEASE ORAL at 09:32

## 2018-12-18 RX ADMIN — TETROFOSMIN 11.2 MILLICURIE: 0.23 INJECTION, POWDER, LYOPHILIZED, FOR SOLUTION INTRAVENOUS at 10:00

## 2018-12-18 RX ADMIN — HYDROCODONE BITARTRATE AND ACETAMINOPHEN 1 TABLET: 5; 325 TABLET ORAL at 13:01

## 2018-12-18 RX ADMIN — ATORVASTATIN CALCIUM 40 MG: 40 TABLET, FILM COATED ORAL at 09:31

## 2018-12-18 RX ADMIN — METOPROLOL SUCCINATE 50 MG: 50 TABLET, EXTENDED RELEASE ORAL at 13:01

## 2018-12-18 RX ADMIN — GABAPENTIN 300 MG: 300 CAPSULE ORAL at 09:31

## 2018-12-18 RX ADMIN — HYDROXYZINE PAMOATE 50 MG: 25 CAPSULE ORAL at 13:00

## 2018-12-18 ASSESSMENT — PAIN SCALES - GENERAL: PAINLEVEL_OUTOF10: 4

## 2018-12-18 NOTE — ED NOTES
RN rounded on pt. VSS and updated and pt remains on monitor. Pt updated on POC. Pt given warm blanket and something to eat and drink. Pt has no further needs at this time. Pt resting in bed, call light within reach. Pt denies any questions. Will cont to monitor.       Sapphire Frederick RN  12/17/18 2039

## 2018-12-18 NOTE — ED NOTES
Verbal order from MD to this RN for 5mg vicodin per pt request.      Sudha Salomon RN  12/17/18 1550

## 2018-12-20 ENCOUNTER — HOSPITAL ENCOUNTER (OUTPATIENT)
Dept: GENERAL RADIOLOGY | Age: 53
Discharge: HOME | End: 2018-12-20
Payer: COMMERCIAL

## 2018-12-20 ENCOUNTER — HOSPITAL ENCOUNTER (OUTPATIENT)
Age: 53
Discharge: HOME | End: 2018-12-20
Payer: COMMERCIAL

## 2018-12-20 DIAGNOSIS — R05.9 COUGH: ICD-10-CM

## 2019-01-06 NOTE — DISCHARGE SUMMARY
Hospital Medicine Discharge Summary    Patient: Alecia Valdez     Age: 47 y.o. Gender: male  : 1965   MRN: [id number]  Code Status: Full     Admit Date: 2018   Discharge Date: 2018    Disposition:  Home     Condition at Discharge: Stable    Primary Care Provider: Sara Spicer    Admitting Physician: Yulissa Soares MD  Discharge Physician: Swapna Amos MD       Discharge Diagnoses: Active Hospital Problems    Diagnosis Date Noted    Chest pain [R07.9] 2018    Tobacco abuse [Z72.0] 2016    CAD (coronary artery disease) [I25.10] 2011    HTN (hypertension) [I10] 2011    Hyperlipidemia [E78.5] 2011       Hospital Course: 46 yo M with hx of CAD/Stent who is followed by Dr. Erik Gorman, presented with atypical chest pain.      Assessment/Plan:     CAD with atypical chest pain: EKG/Troponin were negative for ACS. Given cardiac risk factors, recommended Cardiology evaluation and further risk stratification by Nuclear stress test. Testing was found to be negative for ischemia. Pain resolved. Suspect non-cardiac etiology. Recommend follow-up with PCP for further evaluation if symptoms persist.     Exam:   BP (!) 170/94   Pulse 88   Temp 97.9 °F (36.6 °C) (Oral)   Resp 16   Ht 5' 11\" (1.803 m)   Wt 276 lb 4.8 oz (125.3 kg)   SpO2 95%   BMI 38.54 kg/m²   General appearance: No apparent distress, appears stated age and cooperative. HEENT: Pupils equal, round, and reactive to light. Conjunctivae/corneas clear. Neck: Supple, with full range of motion. No jugular venous distention. Trachea midline. Respiratory:  Normal respiratory effort. Clear to auscultation, bilaterally without Rales/Wheezes/Rhonchi. Cardiovascular: Regular rate and rhythm with normal S1/S2 without murmurs, rubs or gallops. Abdomen: Soft, non-tender, non-distended with normal bowel sounds. Musculoskeletal: No clubbing, cyanosis or edema bilaterally.   Full range of motion without Lexiscan I.V. 0.4 mg.10 sec  Imaging Protocols   - One Day   Rest                          Stress   Isotope:Myoview/Tetrofosmin   Isotope: Myoview/Tetrofosmin  Isotope dose:11.2 mCi         Isotope dose:34.6 mCi  Administration Route:I.V. Administration Route:I.V.  Date:12/18/2018 10:00         Date:12/18/2018 11:20                                 Technique:      Gated  Imaging Results     Study artifacts     1) Bowel  Medical History   Additional Medical History   Anxiety, DDD, ETOH abuse, GERD   Signatures   ------------------------------------------------------------------  Electronically signed by Leta Salas MD, [facility]  (Interpreting physician) on 12/18/2018 at 13:50  ------------------------------------------------------------------          Consults:     None    Labs: For convenience and continuity at follow-up the following most recent labs are provided:    Lab Results   Component Value Date    WBC 8.9 12/18/2018    HGB 13.5 12/18/2018    HCT 41.3 12/18/2018    MCV 82.9 12/18/2018     12/18/2018     12/18/2018    K 3.9 12/18/2018     12/18/2018    CO2 26 12/18/2018    BUN 8 12/18/2018    CREATININE 0.7 12/18/2018    CALCIUM 8.9 12/18/2018    TROPONINI <0.01 12/18/2018    ALKPHOS 78 12/17/2018    ALT 33 12/17/2018    AST 24 12/17/2018    BILITOT 0.3 12/17/2018    BILIDIR <0.2 03/12/2018    LABALBU 4.2 12/17/2018    LDLCALC 95 12/18/2018    TRIG 150 12/18/2018     Lab Results   Component Value Date    INR 1.01 01/29/2018    INR 1.03 01/10/2017    INR 0.97 04/02/2016         The patient was seen and examined on day of discharge and this discharge summary is in conjunction with any daily progress note from day of discharge. Time spent on discharge is more than 30 minutes in the examination, evaluation, counseling and review of medications and discharge plan.       Signed:    Swapna Amos MD   1/6/2019    Thank you Sara Spicer for the opportunity to be involved in this patient's care. If you have any questions or concerns please feel free to contact my office [phone].

## 2019-01-09 ENCOUNTER — OFFICE VISIT (OUTPATIENT)
Dept: CARDIOLOGY CLINIC | Age: 54
End: 2019-01-09
Payer: COMMERCIAL

## 2019-01-09 VITALS
HEART RATE: 78 BPM | BODY MASS INDEX: 38.74 KG/M2 | OXYGEN SATURATION: 94 % | SYSTOLIC BLOOD PRESSURE: 116 MMHG | WEIGHT: 286 LBS | HEIGHT: 72 IN | DIASTOLIC BLOOD PRESSURE: 72 MMHG

## 2019-01-09 DIAGNOSIS — I10 ESSENTIAL HYPERTENSION: ICD-10-CM

## 2019-01-09 DIAGNOSIS — Z72.0 TOBACCO ABUSE: ICD-10-CM

## 2019-01-09 DIAGNOSIS — E78.00 PURE HYPERCHOLESTEROLEMIA: ICD-10-CM

## 2019-01-09 DIAGNOSIS — I25.118 CORONARY ARTERY DISEASE OF NATIVE ARTERY OF NATIVE HEART WITH STABLE ANGINA PECTORIS (HCC): Primary | ICD-10-CM

## 2019-01-09 DIAGNOSIS — I25.89 CARDIAC MICROVASCULAR DISEASE: ICD-10-CM

## 2019-01-09 PROCEDURE — G8599 NO ASA/ANTIPLAT THER USE RNG: HCPCS | Performed by: NURSE PRACTITIONER

## 2019-01-09 PROCEDURE — 99213 OFFICE O/P EST LOW 20 MIN: CPT | Performed by: NURSE PRACTITIONER

## 2019-01-09 PROCEDURE — G8484 FLU IMMUNIZE NO ADMIN: HCPCS | Performed by: NURSE PRACTITIONER

## 2019-01-09 PROCEDURE — G8417 CALC BMI ABV UP PARAM F/U: HCPCS | Performed by: NURSE PRACTITIONER

## 2019-01-09 PROCEDURE — 3017F COLORECTAL CA SCREEN DOC REV: CPT | Performed by: NURSE PRACTITIONER

## 2019-01-09 PROCEDURE — G8427 DOCREV CUR MEDS BY ELIG CLIN: HCPCS | Performed by: NURSE PRACTITIONER

## 2019-01-09 PROCEDURE — 4004F PT TOBACCO SCREEN RCVD TLK: CPT | Performed by: NURSE PRACTITIONER

## 2019-01-09 RX ORDER — LISINOPRIL 10 MG/1
10 TABLET ORAL DAILY
Qty: 30 TABLET | Refills: 5 | Status: SHIPPED | OUTPATIENT
Start: 2019-01-09 | End: 2019-07-11 | Stop reason: SDUPTHER

## 2019-01-09 ASSESSMENT — ENCOUNTER SYMPTOMS
SHORTNESS OF BREATH: 1
WHEEZING: 0

## 2019-02-07 RX ORDER — AMLODIPINE BESYLATE 2.5 MG/1
TABLET ORAL
Qty: 90 TABLET | Refills: 3 | Status: SHIPPED | OUTPATIENT
Start: 2019-02-07 | End: 2020-02-03

## 2019-03-21 ENCOUNTER — TELEPHONE (OUTPATIENT)
Dept: CARDIOLOGY CLINIC | Age: 54
End: 2019-03-21

## 2019-03-27 ENCOUNTER — HOSPITAL ENCOUNTER (EMERGENCY)
Age: 54
Discharge: HOME | End: 2019-03-27
Attending: EMERGENCY MEDICINE
Payer: COMMERCIAL

## 2019-03-27 ENCOUNTER — APPOINTMENT (OUTPATIENT)
Dept: GENERAL RADIOLOGY | Age: 54
End: 2019-03-27
Payer: COMMERCIAL

## 2019-03-27 VITALS
TEMPERATURE: 98.2 F | SYSTOLIC BLOOD PRESSURE: 131 MMHG | OXYGEN SATURATION: 97 % | DIASTOLIC BLOOD PRESSURE: 81 MMHG | RESPIRATION RATE: 16 BRPM | HEART RATE: 78 BPM

## 2019-03-27 DIAGNOSIS — Z86.79 HX OF CORONARY ARTERY DISEASE: ICD-10-CM

## 2019-03-27 DIAGNOSIS — F41.1 ANXIETY STATE: ICD-10-CM

## 2019-03-27 DIAGNOSIS — R07.9 CHEST PAIN, UNSPECIFIED TYPE: Primary | ICD-10-CM

## 2019-03-27 LAB
A/G RATIO: 1.3 (ref 1.1–2.2)
ALBUMIN SERPL-MCNC: 4 G/DL (ref 3.4–5)
ALP BLD-CCNC: 81 U/L (ref 40–129)
ALT SERPL-CCNC: 29 U/L (ref 10–40)
ANION GAP SERPL CALCULATED.3IONS-SCNC: 12 MMOL/L (ref 3–16)
APTT: 30.8 SEC (ref 26–36)
AST SERPL-CCNC: 15 U/L (ref 15–37)
BASOPHILS ABSOLUTE: 0.2 K/UL (ref 0–0.2)
BASOPHILS RELATIVE PERCENT: 1.2 %
BILIRUB SERPL-MCNC: <0.2 MG/DL (ref 0–1)
BUN BLDV-MCNC: 13 MG/DL (ref 7–20)
CALCIUM SERPL-MCNC: 9.5 MG/DL (ref 8.3–10.6)
CHLORIDE BLD-SCNC: 99 MMOL/L (ref 99–110)
CO2: 25 MMOL/L (ref 21–32)
CREAT SERPL-MCNC: 0.8 MG/DL (ref 0.9–1.3)
EOSINOPHILS ABSOLUTE: 0.4 K/UL (ref 0–0.6)
EOSINOPHILS RELATIVE PERCENT: 2.7 %
GFR AFRICAN AMERICAN: >60
GFR NON-AFRICAN AMERICAN: >60
GLOBULIN: 3 G/DL
GLUCOSE BLD-MCNC: 123 MG/DL (ref 70–99)
HCT VFR BLD CALC: 43.5 % (ref 40.5–52.5)
HEMOGLOBIN: 14.1 G/DL (ref 13.5–17.5)
INR BLD: 1.07 (ref 0.86–1.14)
LYMPHOCYTES ABSOLUTE: 3.6 K/UL (ref 1–5.1)
LYMPHOCYTES RELATIVE PERCENT: 26.8 %
MCH RBC QN AUTO: 26.6 PG (ref 26–34)
MCHC RBC AUTO-ENTMCNC: 32.4 G/DL (ref 31–36)
MCV RBC AUTO: 82.2 FL (ref 80–100)
MONOCYTES ABSOLUTE: 1.1 K/UL (ref 0–1.3)
MONOCYTES RELATIVE PERCENT: 8.1 %
NEUTROPHILS ABSOLUTE: 8.2 K/UL (ref 1.7–7.7)
NEUTROPHILS RELATIVE PERCENT: 61.2 %
PDW BLD-RTO: 15.9 % (ref 12.4–15.4)
PLATELET # BLD: 176 K/UL (ref 135–450)
PMV BLD AUTO: 8.9 FL (ref 5–10.5)
POTASSIUM REFLEX MAGNESIUM: 3.8 MMOL/L (ref 3.5–5.1)
PROTHROMBIN TIME: 12.2 SEC (ref 9.8–13)
RBC # BLD: 5.29 M/UL (ref 4.2–5.9)
SODIUM BLD-SCNC: 136 MMOL/L (ref 136–145)
TOTAL PROTEIN: 7 G/DL (ref 6.4–8.2)
TROPONIN: <0.01 NG/ML
TROPONIN: <0.01 NG/ML
WBC # BLD: 13.3 K/UL (ref 4–11)

## 2019-03-27 PROCEDURE — 99285 EMERGENCY DEPT VISIT HI MDM: CPT

## 2019-03-27 PROCEDURE — 80053 COMPREHEN METABOLIC PANEL: CPT

## 2019-03-27 PROCEDURE — 84484 ASSAY OF TROPONIN QUANT: CPT

## 2019-03-27 PROCEDURE — 85025 COMPLETE CBC W/AUTO DIFF WBC: CPT

## 2019-03-27 PROCEDURE — 36415 COLL VENOUS BLD VENIPUNCTURE: CPT

## 2019-03-27 PROCEDURE — 85730 THROMBOPLASTIN TIME PARTIAL: CPT

## 2019-03-27 PROCEDURE — 71045 X-RAY EXAM CHEST 1 VIEW: CPT

## 2019-03-27 PROCEDURE — 93005 ELECTROCARDIOGRAM TRACING: CPT | Performed by: PHYSICIAN ASSISTANT

## 2019-03-27 PROCEDURE — 85610 PROTHROMBIN TIME: CPT

## 2019-03-27 PROCEDURE — 6370000000 HC RX 637 (ALT 250 FOR IP): Performed by: EMERGENCY MEDICINE

## 2019-03-27 RX ORDER — ALPRAZOLAM 0.25 MG/1
0.25 TABLET ORAL ONCE
Status: COMPLETED | OUTPATIENT
Start: 2019-03-27 | End: 2019-03-27

## 2019-03-27 RX ORDER — LORAZEPAM 1 MG/1
1 TABLET ORAL ONCE
Status: DISCONTINUED | OUTPATIENT
Start: 2019-03-27 | End: 2019-03-27

## 2019-03-27 RX ADMIN — ALPRAZOLAM 0.25 MG: 0.25 TABLET ORAL at 20:37

## 2019-03-27 ASSESSMENT — PAIN DESCRIPTION - DESCRIPTORS: DESCRIPTORS: HEAVINESS

## 2019-03-27 ASSESSMENT — PAIN SCALES - GENERAL: PAINLEVEL_OUTOF10: 3

## 2019-03-27 ASSESSMENT — PAIN DESCRIPTION - PAIN TYPE: TYPE: ACUTE PAIN

## 2019-03-27 ASSESSMENT — PAIN DESCRIPTION - ORIENTATION: ORIENTATION: MID

## 2019-03-27 ASSESSMENT — PAIN DESCRIPTION - LOCATION: LOCATION: CHEST

## 2019-03-28 LAB
EKG ATRIAL RATE: 82 BPM
EKG DIAGNOSIS: NORMAL
EKG P AXIS: 22 DEGREES
EKG P-R INTERVAL: 168 MS
EKG Q-T INTERVAL: 362 MS
EKG QRS DURATION: 92 MS
EKG QTC CALCULATION (BAZETT): 422 MS
EKG R AXIS: 9 DEGREES
EKG T AXIS: 33 DEGREES
EKG VENTRICULAR RATE: 82 BPM

## 2019-03-28 PROCEDURE — 93010 ELECTROCARDIOGRAM REPORT: CPT | Performed by: INTERNAL MEDICINE

## 2019-04-01 ENCOUNTER — APPOINTMENT (OUTPATIENT)
Dept: GENERAL RADIOLOGY | Age: 54
End: 2019-04-01
Payer: COMMERCIAL

## 2019-04-01 ENCOUNTER — HOSPITAL ENCOUNTER (EMERGENCY)
Age: 54
Discharge: HOME | End: 2019-04-01
Attending: EMERGENCY MEDICINE
Payer: COMMERCIAL

## 2019-04-01 VITALS
HEART RATE: 91 BPM | OXYGEN SATURATION: 96 % | RESPIRATION RATE: 15 BRPM | WEIGHT: 284 LBS | BODY MASS INDEX: 39.76 KG/M2 | HEIGHT: 71 IN | SYSTOLIC BLOOD PRESSURE: 141 MMHG | DIASTOLIC BLOOD PRESSURE: 90 MMHG | TEMPERATURE: 98.2 F

## 2019-04-01 DIAGNOSIS — J02.9 ACUTE PHARYNGITIS, UNSPECIFIED ETIOLOGY: Primary | ICD-10-CM

## 2019-04-01 DIAGNOSIS — R09.1 PLEURISY: ICD-10-CM

## 2019-04-01 LAB
A/G RATIO: 1.4 (ref 1.1–2.2)
ALBUMIN SERPL-MCNC: 4.2 G/DL (ref 3.4–5)
ALP BLD-CCNC: 88 U/L (ref 40–129)
ALT SERPL-CCNC: 27 U/L (ref 10–40)
ANION GAP SERPL CALCULATED.3IONS-SCNC: 12 MMOL/L (ref 3–16)
AST SERPL-CCNC: 17 U/L (ref 15–37)
BASOPHILS ABSOLUTE: 0.1 K/UL (ref 0–0.2)
BASOPHILS RELATIVE PERCENT: 1.2 %
BILIRUB SERPL-MCNC: 0.3 MG/DL (ref 0–1)
BUN BLDV-MCNC: 12 MG/DL (ref 7–20)
CALCIUM SERPL-MCNC: 9.4 MG/DL (ref 8.3–10.6)
CHLORIDE BLD-SCNC: 99 MMOL/L (ref 99–110)
CO2: 24 MMOL/L (ref 21–32)
CREAT SERPL-MCNC: 0.8 MG/DL (ref 0.9–1.3)
EKG ATRIAL RATE: 83 BPM
EKG DIAGNOSIS: NORMAL
EKG P AXIS: 33 DEGREES
EKG P-R INTERVAL: 158 MS
EKG Q-T INTERVAL: 394 MS
EKG QRS DURATION: 88 MS
EKG QTC CALCULATION (BAZETT): 462 MS
EKG R AXIS: 23 DEGREES
EKG T AXIS: 44 DEGREES
EKG VENTRICULAR RATE: 83 BPM
EOSINOPHILS ABSOLUTE: 0.4 K/UL (ref 0–0.6)
EOSINOPHILS RELATIVE PERCENT: 3.9 %
GFR AFRICAN AMERICAN: >60
GFR NON-AFRICAN AMERICAN: >60
GLOBULIN: 3.1 G/DL
GLUCOSE BLD-MCNC: 118 MG/DL (ref 70–99)
HCT VFR BLD CALC: 43.1 % (ref 40.5–52.5)
HEMOGLOBIN: 14.6 G/DL (ref 13.5–17.5)
LYMPHOCYTES ABSOLUTE: 2.3 K/UL (ref 1–5.1)
LYMPHOCYTES RELATIVE PERCENT: 20.3 %
MCH RBC QN AUTO: 27.5 PG (ref 26–34)
MCHC RBC AUTO-ENTMCNC: 33.9 G/DL (ref 31–36)
MCV RBC AUTO: 81.1 FL (ref 80–100)
MONOCYTES ABSOLUTE: 0.9 K/UL (ref 0–1.3)
MONOCYTES RELATIVE PERCENT: 7.9 %
NEUTROPHILS ABSOLUTE: 7.7 K/UL (ref 1.7–7.7)
NEUTROPHILS RELATIVE PERCENT: 66.7 %
PDW BLD-RTO: 15.9 % (ref 12.4–15.4)
PLATELET # BLD: 146 K/UL (ref 135–450)
PMV BLD AUTO: 9.3 FL (ref 5–10.5)
POTASSIUM SERPL-SCNC: 3.8 MMOL/L (ref 3.5–5.1)
RAPID INFLUENZA  B AGN: NEGATIVE
RAPID INFLUENZA A AGN: NEGATIVE
RBC # BLD: 5.32 M/UL (ref 4.2–5.9)
SODIUM BLD-SCNC: 135 MMOL/L (ref 136–145)
TOTAL PROTEIN: 7.3 G/DL (ref 6.4–8.2)
TROPONIN: <0.01 NG/ML
WBC # BLD: 11.6 K/UL (ref 4–11)

## 2019-04-01 PROCEDURE — 84484 ASSAY OF TROPONIN QUANT: CPT

## 2019-04-01 PROCEDURE — 71046 X-RAY EXAM CHEST 2 VIEWS: CPT

## 2019-04-01 PROCEDURE — 99284 EMERGENCY DEPT VISIT MOD MDM: CPT

## 2019-04-01 PROCEDURE — 36415 COLL VENOUS BLD VENIPUNCTURE: CPT

## 2019-04-01 PROCEDURE — 80053 COMPREHEN METABOLIC PANEL: CPT

## 2019-04-01 PROCEDURE — 85025 COMPLETE CBC W/AUTO DIFF WBC: CPT

## 2019-04-01 PROCEDURE — 93010 ELECTROCARDIOGRAM REPORT: CPT | Performed by: INTERNAL MEDICINE

## 2019-04-01 PROCEDURE — 87804 INFLUENZA ASSAY W/OPTIC: CPT

## 2019-04-01 PROCEDURE — 93005 ELECTROCARDIOGRAM TRACING: CPT | Performed by: EMERGENCY MEDICINE

## 2019-04-01 ASSESSMENT — ENCOUNTER SYMPTOMS
DIARRHEA: 0
EYE DISCHARGE: 0
SORE THROAT: 1
COUGH: 1
VOMITING: 0
BACK PAIN: 1
RHINORRHEA: 0
ABDOMINAL PAIN: 0
NAUSEA: 0
SHORTNESS OF BREATH: 0

## 2019-04-01 ASSESSMENT — PAIN SCALES - GENERAL: PAINLEVEL_OUTOF10: 5

## 2019-04-01 NOTE — ED NOTES
Pt ambulated out with steady gait. VSS. Discharge instructions reviewed. No further needs at this time.         Jayant Lee, RN  04/01/19 3748

## 2019-04-01 NOTE — ED NOTES
Pt to ER via EMS; pt states he has had constant back pain and sore throat that started when he woke up this morning. Pt states he believes the back pain could be \"cardiac related\" and wanted to be checked out. Pt denies SOB, nausea. Pt alert and oriented, NSR on monitor, EKG complete, IV started and blood in lab. Pt without current distress, resp even/unlabored. Call light within reach, will continue to monitor.       Fletcher De Anda, ARLEY  04/01/19 3055

## 2019-04-01 NOTE — ED PROVIDER NOTES
[facility]  eMERGENCY dEPARTMENT eNCOUnter        Pt Name: Lyudmila Parkinson  MRN: [id number]  [place] 1965  Date of evaluation: 4/1/2019  Provider: Simone Hsieh MD  PCP: Júnior Dutta  ED Attending: Simone Hsieh MD    [address] [place] [address]       Chief Complaint   Patient presents with    Back Pain     Pt brought in via EMS for back pain x1 day; states he thought it could be cardiac related.  Pharyngitis     Pt c/o sore throat x1 day. HISTORY OF PRESENT ILLNESS   (Location/Symptom, Timing/Onset, Context/Setting, Quality, Duration, Modifying Factors, Severity)  Note limiting factors. Lyudmila Parkinson is a 47 y.o. male who presents with sore throat, cough, as well as back pain for the last several hours. Patient states it began in the morning. The sore throat feels scratchy at the area underneath his Pascual's apple. Swallowing makes the pain slightly worse. He is also had a bit of a dry cough. Patient also complains of pain to his left mid back area that is a sharp and stabbing pain, moderate, worse with deep breathing and movement. Patient states he checked his blood pressure several times today and it was elevated so he got scared and called 911. EMS subsequently brought the patient to the ED. History is obtained from the patient. REVIEW OF SYSTEMS    (2-9 systems for level 4, 10 or more for level 5)     Review of Systems   Constitutional: Negative for chills and fever. HENT: Positive for sore throat. Negative for congestion and rhinorrhea. Eyes: Negative for discharge. Respiratory: Positive for cough. Negative for shortness of breath. Cardiovascular: Negative for chest pain. Gastrointestinal: Negative for abdominal pain, diarrhea, nausea and vomiting. Endocrine: Negative for polydipsia. Genitourinary: Negative for dysuria and urgency. Musculoskeletal: Positive for back pain. Negative for myalgias. Skin: Negative for rash. Neurological: Negative for weakness and headaches. Hematological: Does not bruise/bleed easily. Psychiatric/Behavioral: Negative for confusion. Positives and Pertinent negatives as per HPI. Except as noted abovein the ROS, all other systems were reviewed and negative. PAST MEDICAL HISTORY     Past Medical History:   Diagnosis Date    Anxiety     Bipolar affective [facility]     CAD (coronary artery disease)     Chronic back pain     COPD (chronic obstructive pulmonary disease) ([facility]     DDD (degenerative disc disease), cervical     Depression     GERD (gastroesophageal reflux disease)     Hyperlipidemia     Hypertension     Pancreatitis     Recovering alcoholic ([email or web address]     Tobacco abuse 12/16/2016         SURGICAL HISTORY     Past Surgical History:   Procedure Laterality Date    CORONARY ANGIOPLASTY WITH STENT PLACEMENT  2006, 2012, 2013    [address]       Discharge Medication List as of 4/1/2019  2:32 AM      CONTINUE these medications which have NOT CHANGED    Details   amLODIPine (NORVASC) 2.5 MG tablet TAKE 1 TABLET BY MOUTH EVERY DAY, Disp-90 tablet, R-3Normal      lisinopril (PRINIVIL;ZESTRIL) 10 MG tablet Take 1 tablet by mouth daily, Disp-30 tablet, R-5Normal      diphenhydrAMINE (BENADRYL) 25 MG capsule Take 25 mg by mouth nightly as needed for Allergies or SleepHistorical Med      isosorbide mononitrate (IMDUR) 30 MG extended release tablet TAKE 1 TABLET BY MOUTH DAILY, Disp-30 tablet, R-3Normal      HydrOXYzine Pamoate (VISTARIL PO) Take 50 mg by mouth 4 times daily Historical Med      HYDROcodone-acetaminophen (NORCO) 5-325 MG per tablet Take 1 tablet by mouth every 6-8 hours as needed for Pain. Tone Clark Historical Med      Blood Pressure Monitoring (CARETOUCH BP ARM MONITOR) SO 1 each by Does not apply route once for 1 dose, Disp-1 Device, R-0Normal      traZODone (DESYREL) 100 MG tablet Take 100 mg by mouth 3 times dailyHistorical Med      metoprolol succinate (TOPROL XL) 50 MG extended release tablet Take 50 mg by mouth daily, R-2Historical Med      BREO ELLIPTA 200-25 MCG/INH AEPB INHALE 1 PUFF INTO THE LUNGS AT THE SAME TIME EACH DAY, R-2, DAWHistorical Med      gabapentin (NEURONTIN) 300 MG capsule TAKE ONE CAPSULE BY MOUTH EVERY 8 HOURS, R-0Historical Med      atorvastatin (LIPITOR) 40 MG tablet Take 1 tablet by mouth daily, Disp-30 tablet, R-5Normal      clopidogrel (PLAVIX) 75 MG tablet Take 1 tablet by mouth daily, Disp-90 tablet, R-3Normal      aspirin 81 MG EC tablet Take 1 tablet by mouth daily, Disp-30 tablet, R-5Normal      pantoprazole (PROTONIX) 20 MG tablet TAKE 2 TABLETS BY MOUTH DAILY, Disp-30 tablet, R-0      nitroGLYCERIN (NITROSTAT) 0.4 MG SL tablet Place 1 tablet under the tongue every 5 minutes as needed. , Disp-25 tablet, R-0Pt needs ov               ALLERGIES     Pcn [penicillins]    FAMILYHISTORY       Family History   Problem Relation Age of Onset   [facility] Other Mother     Arthritis Mother     Mental Illness Mother     Depression Mother     Diabetes Father     Heart Disease Father     Substance Abuse Father     Heart Disease Sister     High Blood Pressure Sister     Mental Retardation Brother     Other Brother     Learning Disabilities Brother     Early Death Sister    [facility] Cancer Sister     Substance Abuse Sister     Cancer Sister     Diabetes Brother     High Blood Pressure Brother           SOCIAL HISTORY       Social History     Socioeconomic History    Marital status: Single     Spouse name: None    Number of children: None    Years of education: None    Highest education level: None   Occupational History    None   Social Needs    Financial resource strain: None    Food insecurity:     Worry: None     Inability: None    Transportation needs:     Medical: None     Non-medical: None   Tobacco Use    Smoking status: Current Every Day Smoker     Packs/day: 1.50     Years: 45.00     Pack years: 67.50     Types: Cigarettes    Smokeless tobacco: Never Used   Substance and Sexual Activity    Alcohol use: Yes     Alcohol/week: 0.0 oz     Comment: not often    Drug use: No    Sexual activity: Never   Lifestyle    Physical activity:     Days per week: None     Minutes per session: None    Stress: None   Relationships    Social connections:     Talks on phone: None     Gets together: None     Attends Yarsani service: None     Active member of club or organization: None     Attends meetings of clubs or organizations: None     Relationship status: None    Intimate partner violence:     Fear of current or ex partner: None     Emotionally abused: None     Physically abused: None     Forced sexual activity: None   Other Topics Concern    None   Social History Narrative    None       SCREENINGS    [place] Coma Scale  Eye Opening: Spontaneous  Best Verbal Response: Oriented  Best Motor Response: Obeys commands  Jannet Coma Scale Score: 15        PHYSICAL EXAM    (up to 7 for level 4, 8 or more for level 5)     ED Triage Vitals [04/01/19 0034]   BP Temp Temp Source Pulse Resp SpO2 Height Weight   (!) 141/90 98.2 °F (36.8 °C) Oral 91 15 96 % 5' 11\" (1.803 m) 284 lb (128.8 kg)       Physical Exam   Constitutional: He is oriented to person, place, and time. He appears well-developed and well-nourished. No distress. HENT:   Head: Normocephalic and atraumatic. Right Ear: External ear normal.   Left Ear: External ear normal.   Nose: Nose normal.   Mouth/Throat: Mucous membranes are normal. No oral lesions. No trismus in the jaw. No dental abscesses or uvula swelling. Posterior oropharyngeal erythema present. No oropharyngeal exudate, posterior oropharyngeal edema or tonsillar abscesses. Tonsils are 1+ on the right. Tonsils are 1+ on the left. No tonsillar exudate. Hard and soft palate erythema. Eyes: Pupils are equal, round, and reactive to light. Conjunctivae are normal.   Neck: Normal range of motion. Neck supple. Cardiovascular: Normal rate, regular rhythm, normal heart sounds and intact distal pulses. Exam reveals no gallop and no friction rub. No murmur heard. Pulmonary/Chest: Effort normal. No respiratory distress. He has no wheezes. He has rhonchi in the left middle field and the left lower field. Abdominal: Soft. Bowel sounds are normal. He exhibits no distension. There is no tenderness. There is no rebound and no guarding. Obese. Musculoskeletal: Normal range of motion. He exhibits no edema or tenderness. Lymphadenopathy:     He has no cervical adenopathy. Neurological: He is alert and oriented to person, place, and time. No cranial nerve deficit. Skin: Skin is warm and dry. No rash noted. Psychiatric: He has a normal mood and affect.        DIAGNOSTIC RESULTS   LABS:    Results for orders placed or performed during the hospital encounter of 04/01/19   Rapid influenza A/B antigens   Result Value Ref Range    Rapid Influenza A Ag Negative Negative    Rapid Influenza B Ag Negative Negative   CBC auto differential   Result Value Ref Range    WBC 11.6 (H) 4.0 - 11.0 K/uL    RBC 5.32 4.20 - 5.90 M/uL    Hemoglobin 14.6 13.5 - 17.5 g/dL    Hematocrit 43.1 40.5 - 52.5 %    MCV 81.1 80.0 - 100.0 fL    MCH 27.5 26.0 - 34.0 pg    MCHC 33.9 31.0 - 36.0 g/dL    RDW 15.9 (H) 12.4 - 15.4 %    Platelets 646 195 - 143 K/uL    MPV 9.3 5.0 - 10.5 fL    Neutrophils % 66.7 %    Lymphocytes % 20.3 %    Monocytes % 7.9 %    Eosinophils % 3.9 %    Basophils % 1.2 %    Neutrophils # 7.7 1.7 - 7.7 K/uL    Lymphocytes # 2.3 1.0 - 5.1 K/uL    Monocytes # 0.9 0.0 - 1.3 K/uL    Eosinophils # 0.4 0.0 - 0.6 K/uL    Basophils # 0.1 0.0 - 0.2 K/uL   Comprehensive metabolic panel   Result Value Ref Range    Sodium 135 (L) 136 - 145 mmol/L    Potassium 3.8 3.5 - 5.1 mmol/L    Chloride 99 99 - 110 mmol/L    CO2 24 21 - 32 mmol/L    Anion Gap 12 3 - 16    Glucose 118 (H) 70 - 99 mg/dL    BUN 12 7 - 20 mg/dL    CREATININE 0.8 (L) 0.9 - 1.3 mg/dL    GFR Non-African American >60 >60    GFR African American >60 >60    Calcium 9.4 8.3 - 10.6 mg/dL    Total Protein 7.3 6.4 - 8.2 g/dL    Alb 4.2 3.4 - 5.0 g/dL    Albumin/Globulin Ratio 1.4 1.1 - 2.2    Total Bilirubin 0.3 0.0 - 1.0 mg/dL    Alkaline Phosphatase 88 40 - 129 U/L    ALT 27 10 - 40 U/L    AST 17 15 - 37 U/L    Globulin 3.1 g/dL   Troponin   Result Value Ref Range    Troponin <0.01 <0.01 ng/mL   EKG 12 Lead   Result Value Ref Range    Ventricular Rate 83 BPM    Atrial Rate 83 BPM    P-R Interval 158 ms    QRS Duration 88 ms    Q-T Interval 394 ms    QTc Calculation (Bazett) 462 ms    P Axis 33 degrees    R Axis 23 degrees    T Axis 44 degrees    Diagnosis       Normal sinus rhythmNormal ECGWhen compared with ECG of 27-MAR-2019 18:23,No significant change was foundConfirmed by MARY CAO MD ([id number]) on 4/1/2019 7:46:09 AM       All other labs were within normal range ornot returned as of this dictation. EKG: All EKG's are interpreted by the Emergency Department Physician who either signs or Co-signs this chart in the absence of a cardiologist.  Please see their note for interpretation of EKG. EKG Interpretation    Interpreted by emergency department physician    Rhythm: normal sinus   Rate: normal  Axis: normal  Ectopy: none  Conduction: normal  ST Segments: normal  T Waves: normal  Q Waves: none    Clinical Impression: normal sinus rhythm      RADIOLOGY:   Non-plain film images such as CT, Ultrasound and MRI are read by the radiologist.Plain radiographic images are visualized and preliminarily interpreted by the  ED Provider with the belowfindings:    Interpretation per the Radiologist below, if available at the time of this note:    XR CHEST STANDARD (2 VW)   Final Result   No acute process.                PROCEDURES   Unless otherwise noted below, none     Procedures    CRITICAL CARE TIME   N/A    CONSULTS:  None      EMERGENCY DEPARTMENT COURSE and DIFFERENTIAL DIAGNOSIS/MDM:   Vitals:    Vitals:    04/01/19 0034   BP: (!) 141/90   Pulse: 91   Resp: 15   Temp: 98.2 °F (36.8 °C)   TempSrc: Oral   SpO2: 96%   Weight: 284 lb (128.8 kg)   Height: 5' 11\" (1.803 m)       Patient was given the following medications:  Medications - No data to display    Patient's pain is atypical for ACS. Patient has known CAD however. Cardiac workup was initiated and at this point does not show any acute abnormality. Patient's pain may be related to an acute viral infection/pleurisy. I had a discussion regarding inpatient testing however the patient would prefer to follow up with his cardiologist.  He also does not believe it is his heart. Patient instructed to return immediately if he changes his mind or feels worse. Patient is agreeable with this plan. I estimate there is LOW risk for EPIGLOTTITIS, acute coronary syndrome, pulmonary embolism, COPD/asthma, pneumonia, musculoskeletal, reflux/PUD/gastritis, pneumothorax, CHF, thoracic aortic dissection, anxiety, PNEUMONIA, MENINGITIS, OR URINARY TRACT INFECTION, thus I consider the discharge disposition reasonable. Also, there is no evidence or peritonitis, sepsis, or toxicity. Emir Alas and I have discussed the diagnosis and risks, and we agree with discharging home to follow-up with their primary doctor. We also discussed returning to the Emergency Department immediately if new or worsening symptoms occur. We have discussed the symptoms which are most concerning (e.g., changing or worsening pain, trouble swallowing or breating, neck stiffness, fever) that necessitate immediate return. The patient understands the importance of follow up and reasons to return. FINAL IMPRESSION      1. Acute pharyngitis, unspecified etiology    2.  Pleurisy          DISPOSITION/PLAN   DISPOSITION Decision To Discharge 04/01/2019 02:32:02 AM      PATIENT REFERRED TO:  Umer Lea  [address] [address]  [phone]    Schedule an appointment as soon as possible for a visit in 3 days      [facility] [place].  [facility]  [address] [address]  [phone]  Go to   If symptoms worsen      DISCHARGE MEDICATIONS:  Discharge Medication List as of 4/1/2019  2:32 AM          DISCONTINUED MEDICATIONS:  Discharge Medication List as of 4/1/2019  2:32 AM                 (Please note that portions of this note were completed with a voice recognition program.  Efforts were [facility] edit the dictations but occasionally words are mis-transcribed.)    Alphonse Mcarthur MD(electronically signed)              Alphonse Mcarthur MD  04/04/19 5403

## 2019-04-09 ENCOUNTER — OFFICE VISIT (OUTPATIENT)
Dept: CARDIOLOGY CLINIC | Age: 54
End: 2019-04-09
Payer: COMMERCIAL

## 2019-04-09 VITALS
HEART RATE: 75 BPM | WEIGHT: 279.6 LBS | BODY MASS INDEX: 39.14 KG/M2 | OXYGEN SATURATION: 96 % | SYSTOLIC BLOOD PRESSURE: 110 MMHG | DIASTOLIC BLOOD PRESSURE: 60 MMHG | HEIGHT: 71 IN

## 2019-04-09 DIAGNOSIS — I25.118 CORONARY ARTERY DISEASE OF NATIVE ARTERY OF NATIVE HEART WITH STABLE ANGINA PECTORIS (HCC): Primary | ICD-10-CM

## 2019-04-09 PROCEDURE — G8417 CALC BMI ABV UP PARAM F/U: HCPCS | Performed by: INTERNAL MEDICINE

## 2019-04-09 PROCEDURE — 4004F PT TOBACCO SCREEN RCVD TLK: CPT | Performed by: INTERNAL MEDICINE

## 2019-04-09 PROCEDURE — G8427 DOCREV CUR MEDS BY ELIG CLIN: HCPCS | Performed by: INTERNAL MEDICINE

## 2019-04-09 PROCEDURE — 99214 OFFICE O/P EST MOD 30 MIN: CPT | Performed by: INTERNAL MEDICINE

## 2019-04-09 PROCEDURE — G8599 NO ASA/ANTIPLAT THER USE RNG: HCPCS | Performed by: INTERNAL MEDICINE

## 2019-04-09 PROCEDURE — 3017F COLORECTAL CA SCREEN DOC REV: CPT | Performed by: INTERNAL MEDICINE

## 2019-04-09 NOTE — PROGRESS NOTES
[facility]   Cardiovascular Evaluation    PATIENT: Bry Song  DATE: 2019  MRN: [id number]  CSN: [id number]  : 1965    Primary Care Doctor: Rafael Hector    Reason for evaluation:   3 Month Follow-Up (no new cardiac complaints)      Subjective:    History of present illness on initial date of evaluation:   Bry Song is a 47 y.o. patient who presents for cardiac follow-up with a history of CAD and 3 previous stents. Today he reports feeling well. He was seen in the ED in 2018 for chest pain,  had a stress test at that time which showed normal LV function with uniform wall motion, EF of 67%, low risk study. He was seen again in the ED 19 for chest pain, EKG showed NSR, Troponin's were negative. He was found to have pleurisy. He continues to smoke, but is trying to cut back. Patient Active Problem List   Diagnosis    Claudication ([address]    HTN (hypertension)    Hyperlipidemia    CAD (coronary artery disease)    Acute chest pain    Coronary artery disease involving native coronary artery of native heart    Tobacco abuse    Thumb sprain    Mass of hand    Cardiac microvascular disease    Chest pain         Cardiac Testing: I have reviewed the findings below. EKG:  ECHO:   STRESS TEST:  CATH:  BYPASS:  VASCULAR:    Past Medical History:   has a past medical history of Anxiety, Bipolar affective ([address] CAD (coronary artery disease), Chronic back pain, COPD (chronic obstructive pulmonary disease) ([address] DDD (degenerative disc disease), cervical, Depression, GERD (gastroesophageal reflux disease), Hyperlipidemia, Hypertension, Pancreatitis, Recovering alcoholic ([address] and Tobacco abuse. Surgical History:   has a past surgical history that includes Coronary angioplasty with stent (, , ). Social History:   reports that he has been smoking cigarettes. He has a 22.50 pack-year smoking history.  He has never used smokeless tobacco. He reports that he drinks alcohol. He reports that he does not use drugs. Family History:  No evidence for sudden cardiac death or premature CAD    Medications:  Reviewed and are listed in nursing record. and/or listed below  Outpatient Medications:  Prior to Admission medications    Medication Sig Start Date End Date Taking? Authorizing Provider   amLODIPine (NORVASC) 2.5 MG tablet TAKE 1 TABLET BY MOUTH EVERY DAY 2/7/19  Yes JOSE Starr CNP   lisinopril (PRINIVIL;ZESTRIL) 10 MG tablet Take 1 tablet by mouth daily 1/9/19  Yes JOSE Taylor CNP   diphenhydrAMINE (BENADRYL) 25 MG capsule Take 25 mg by mouth nightly as needed for Allergies or Sleep   Yes Historical Provider, MD   isosorbide mononitrate (IMDUR) 30 MG extended release tablet TAKE 1 TABLET BY MOUTH DAILY 8/21/18  Yes JOSE Starr CNP   HydrOXYzine Pamoate (VISTARIL PO) Take 50 mg by mouth 4 times daily    Yes Historical Provider, MD   HYDROcodone-acetaminophen (NORCO) 5-325 MG per tablet Take 1 tablet by mouth every 6-8 hours as needed for Pain.  .   Yes Historical Provider, MD   metoprolol succinate (TOPROL XL) 50 MG extended release tablet Take 50 mg by mouth daily 2/7/18  Yes Historical Provider, MD Parth Fan 200-25 MCG/INH AEPB INHALE 1 PUFF INTO THE LUNGS AT [place] [address] 11/9/17  Yes Historical Provider, MD   gabapentin (NEURONTIN) 300 MG capsule TAKE ONE CAPSULE BY MOUTH EVERY 8 HOURS 12/14/17  Yes Historical Provider, MD   atorvastatin (LIPITOR) 40 MG tablet Take 1 tablet by mouth daily 9/29/17  Yes JOSE Campo CNP   clopidogrel (PLAVIX) 75 MG tablet Take 1 tablet by mouth daily 4/26/17  Yes JOSE Pickering CNP   aspirin 81 MG EC tablet Take 1 tablet by mouth daily 4/10/17  Yes JOSE Pickering CNP   pantoprazole (PROTONIX) 20 MG tablet TAKE 2 TABLETS BY MOUTH DAILY  Patient taking differently: TAKE 1 TABLET BY MOUTH BID 9/29/16  Yes JOSE Avendano CNP nitroGLYCERIN (NITROSTAT) 0.4 MG SL tablet Place 1 tablet under the tongue every 5 minutes as needed. 10/1/14  Yes Lev Kingsley MD   Blood Pressure Monitoring (CARETOUCH BP ARM MONITOR) SO 1 each by Does not apply route once for 1 dose 8/15/18 12/17/18  JOSE Laguna - CNP       In-patient schedule medications:        Infusion Medications: Allergies:  Pcn [penicillins]     Review of Systems:   All 14 point review of symptoms completed. Pertinent positives identified in the HPI, all other review of symptoms negative as below.     Review of Systems - History obtained from the patient  General ROS: negative for - chills, fever or night sweats  Psychological ROS: negative for - disorientation or hallucinations  Ophthalmic ROS: negative for - dry eyes, eye pain or loss of vision  ENT ROS: negative for - nasal discharge or sore throat  Allergy and Immunology ROS: negative for - hives or itchy/watery eyes  Hematological and Lymphatic ROS: negative for - jaundice or night sweats  Endocrine ROS: negative for - mood swings or temperature intolerance  Breast ROS: deferred  Respiratory ROS: negative for - hemoptysis or stridor  Cardiovascular ROS: negative for - chest pain, dyspnea on exertion or palpitations  Gastrointestinal ROS: no abdominal pain, change in bowel habits, or black or bloody stools  Genito-Urinary ROS: no dysuria, trouble voiding, or hematuria  Musculoskeletal ROS: negative for - gait disturbance, joint pain or joint stiffness  Neurological ROS: negative for - seizures or speech problems  Dermatological ROS: negative for - rash or skin lesion changes        Physical Examination:    Vitals:    04/09/19 1223   BP: 110/60   Pulse: 75   SpO2: 96%    Weight: 279 lb 9.6 oz (126.8 kg)     Wt Readings from Last 3 Encounters:   04/09/19 279 lb 9.6 oz (126.8 kg)   04/01/19 284 lb (128.8 kg)   01/09/19 286 lb (129.7 kg)     No intake or output data in the 24 hours ending 04/09/19 1235    General Appearance:  Alert, cooperative, no distress, appears stated age   Head:  Normocephalic, without obvious abnormality, atraumatic   Eyes:  PERRL, conjunctiva/corneas clear       Nose: Nares normal, no drainage or sinus tenderness   Throat: Lips, mucosa, and tongue normal   Neck: Supple, symmetrical, trachea midline, no adenopathy, thyroid: not enlarged, symmetric, no tenderness/mass/nodules, no carotid bruit or JVD       Lungs:   Clear to auscultation bilaterally, respirations unlabored   Chest Wall:  No tenderness or deformity   Heart:  Regular rhythm and normal rate; S1, S2 are normal; no murmur noted; no rub or gallop   Abdomen:   Obese, soft, non-tender, bowel sounds active all four quadrants,  no masses, no organomegaly           Extremities: Extremities normal, atraumatic, no cyanosis or edema   Pulses: 2+ and symmetric   Skin: Skin color, texture, turgor normal, no rashes or lesions   Pysch: Normal mood and affect   Neurologic: Normal gross motor and sensory exam.         Labs  No results for input(s): WBC, HGB, HCT, MCV, PLT in the last 72 hours. No results for input(s): CREATININE, BUN, NA, K, CL, CO2 in the last 72 hours. No results for input(s): INR, PROTIME in the last 72 hours. No results for input(s): TROPONINI in the last 72 hours. Invalid input(s): PRO-BNP  No results for input(s): CHOL, HDL in the last 72 hours. Invalid input(s): LDL, TG      Imaging:  I have reviewed the below testing personally and my interpretation is below. EKG:  CXR:      Assessment:  47 y.o. patient with:  1. Coronary artery disease   ~stents in 2006, 2012, 2013  2. Hypertension   BP: (110)/(60)   3. Current smoker   ~smoking cessation discussed  4. Obesity    Plan:  1. The patient was seen for >25 minutes. >50% of the time was devoted to giving the patient detailed instructions instructions on addressing diet, regular exercise, weight control, smoking abstention, medication compliance, and stress minimization.   The patient was provided written and verbal instructions regarding risk factor modification. 2. I recommend that the patient continue their currently prescribed medications. Their drug modifiable risk factors appear to be well controlled. I will continue to address the need/dosing of medications in future visits. 3. Follow up in 1 year      This note was scribed in the presence of Dr. Nico Kramer MD by Nancie Saavedra RN.     I, Dr. Chas Weems, personally performed the services described in this documentation, as scribed by the above signed scribe in my presence. It is both accurate and complete to my knowledge. I agree with the details independently gathered by the clinical support staff, while the remaining scribed note accurately describes my personal service to the patient. All questions and concerns were addressed to the patient/family. Alternatives to my treatment were discussed. The note was completed using EMR. Every effort was made to ensure accuracy; however, inadvertent computerized transcription errors may be present.           Chas Weems MD, [address], [facility] [place], [place]  [phone] [facility]  [phone] [facility]  4/9/2019  12:35 PM

## 2019-04-27 ENCOUNTER — HOSPITAL ENCOUNTER (EMERGENCY)
Age: 54
Discharge: HOME | End: 2019-04-27
Attending: EMERGENCY MEDICINE
Payer: COMMERCIAL

## 2019-04-27 VITALS
RESPIRATION RATE: 20 BRPM | DIASTOLIC BLOOD PRESSURE: 83 MMHG | SYSTOLIC BLOOD PRESSURE: 130 MMHG | HEIGHT: 71 IN | TEMPERATURE: 97.8 F | BODY MASS INDEX: 39.2 KG/M2 | HEART RATE: 68 BPM | OXYGEN SATURATION: 96 % | WEIGHT: 280 LBS

## 2019-04-27 DIAGNOSIS — Z01.30 BLOOD PRESSURE CHECK: Primary | ICD-10-CM

## 2019-04-27 PROCEDURE — 99282 EMERGENCY DEPT VISIT SF MDM: CPT

## 2019-04-27 ASSESSMENT — ENCOUNTER SYMPTOMS
SHORTNESS OF BREATH: 0
VOMITING: 0
EYE REDNESS: 0
RHINORRHEA: 0
COUGH: 0
ABDOMINAL PAIN: 0
BACK PAIN: 0
EYE DISCHARGE: 0
DIARRHEA: 0

## 2019-04-27 NOTE — ED PROVIDER NOTES
disease), cervical, Depression, GERD (gastroesophageal reflux disease), Hyperlipidemia, Hypertension, Pancreatitis, Recovering alcoholic ([facility] [address] and Tobacco abuse (12/16/2016). PAST SURGICAL HISTORY   has a past surgical history that includes Coronary angioplasty with stent (2006, 2012, 2013). FAMILY HISTORY  family history includes Arthritis in his mother; Cancer in his sister and sister; Depression in his mother; Diabetes in his brother and father; Early Death in his sister; Heart Disease in his father and sister; High Blood Pressure in his brother and sister; Learning Disabilities in his brother; Mental Illness in his mother; Mental Retardation in his brother; Other in his brother and mother; Substance Abuse in his father and sister. SOCIAL HISTORY   reports that he has been smoking cigarettes. He has a 22.50 pack-year smoking history. He has never used smokeless tobacco. He reports that he drinks alcohol. He reports that he does not use drugs. ALLERGIES  Allergies   Allergen Reactions    Pcn [Penicillins] Other (See Comments) and Hives     Other reaction(s): Light Headed  Patient passed out. syncope       HOME MEDICATIONS  Prior to Admission medications    Medication Sig Start Date End Date Taking? Authorizing Provider   amLODIPine (NORVASC) 2.5 MG tablet TAKE 1 TABLET BY MOUTH EVERY DAY 2/7/19  Yes JOSE Milan CNP   lisinopril (PRINIVIL;ZESTRIL) 10 MG tablet Take 1 tablet by mouth daily 1/9/19  Yes JOSE Garcia CNP   isosorbide mononitrate (IMDUR) 30 MG extended release tablet TAKE 1 TABLET BY MOUTH DAILY 8/21/18  Yes JOSE Milan CNP   HydrOXYzine Pamoate (VISTARIL PO) Take 50 mg by mouth 4 times daily    Yes Historical Provider, MD   HYDROcodone-acetaminophen (NORCO) 5-325 MG per tablet Take 1 tablet by mouth every 6-8 hours as needed for Pain.  .   Yes Historical Provider, MD   metoprolol succinate (TOPROL XL) 50 MG extended release tablet Take 50 mg by mouth daily 2/7/18  Yes Historical Provider, MD Radford Passer 200-25 MCG/INH AEPB INHALE 1 PUFF INTO THE LUNGS AT [facility] 38 11/9/17  Yes Historical Provider, MD   atorvastatin (LIPITOR) 40 MG tablet Take 1 tablet by mouth daily 9/29/17  Yes JOSE Berger CNP   clopidogrel (PLAVIX) 75 MG tablet Take 1 tablet by mouth daily 4/26/17  Yes JOSE Rees CNP   aspirin 81 MG EC tablet Take 1 tablet by mouth daily 4/10/17  Yes JOSE Pickering CNP   pantoprazole (PROTONIX) 20 MG tablet TAKE 2 TABLETS BY MOUTH DAILY  Patient taking differently: TAKE 1 TABLET BY MOUTH BID 9/29/16  Yes JOSE Pickering CNP   diphenhydrAMINE (BENADRYL) 25 MG capsule Take 25 mg by mouth nightly as needed for Allergies or Sleep    Historical Provider, MD   Blood Pressure Monitoring (CARETOUCH BP ARM MONITOR) SO 1 each by Does not apply route once for 1 dose 8/15/18 12/17/18  JOSE Dowling CNP   gabapentin (NEURONTIN) 300 MG capsule TAKE ONE CAPSULE BY MOUTH EVERY 8 HOURS 12/14/17   Historical Provider, MD   nitroGLYCERIN (NITROSTAT) 0.4 MG SL tablet Place 1 tablet under the tongue every 5 minutes as needed. 10/1/14   Bart Putnam MD        IMMUNIZATIONS  Immunization History   Administered Date(s) Administered    Influenza Vaccine, unspecified formulation 09/28/2018    Influenza Virus Vaccine 11/01/2017    Influenza, Miriam Bass, 3 Years and older, IM (Fluzone 3 yrs and older or Afluria 5 yrs and older) 09/16/2016         PHYSICAL EXAMINATION  ED Triage Vitals [04/27/19 0945]   Enc Vitals Group      /83      Pulse 68      Resp 20      Temp 97.8 °F (36.6 °C)      Temp Source Oral      SpO2 96 %      Weight 280 lb (127 kg)      Height 5' 11\" (1.803 m)      Head Circumference       Peak Flow       Pain Score       Pain Loc       Pain Edu? Excl. in [address]? Physical Exam   Constitutional: He is oriented to person, place, and time. He appears well-developed. No distress.    HENT: Head: Normocephalic and atraumatic. Right Ear: External ear normal.   Left Ear: External ear normal.   Mouth/Throat: Oropharynx is clear and moist. No oropharyngeal exudate. Eyes: Pupils are equal, round, and reactive to light. Conjunctivae and EOM are normal. Right eye exhibits no discharge. Left eye exhibits no discharge. Neck: Normal range of motion. Neck supple. Cardiovascular: Normal rate, regular rhythm, normal heart sounds and intact distal pulses. Exam reveals no gallop and no friction rub. No murmur heard. Pulmonary/Chest: Effort normal and breath sounds normal. No stridor. No respiratory distress. He has no wheezes. He has no rales. He exhibits no tenderness. Abdominal: Soft. Bowel sounds are normal. There is no tenderness. There is no rigidity, no rebound and no guarding. Musculoskeletal: Normal range of motion. He exhibits no edema, tenderness or deformity. Neurological: He is alert and oriented to person, place, and time. He has normal strength. No cranial nerve deficit or sensory deficit. He exhibits normal muscle tone. Coordination normal. GCS eye subscore is 4. GCS verbal subscore is 5. GCS motor subscore is 6. Skin: Skin is warm and dry. Capillary refill takes less than 2 seconds. No rash noted. He is not diaphoretic. No erythema. No pallor. Psychiatric: He has a normal mood and affect. His speech is normal and behavior is normal. Thought content normal. Cognition and memory are normal.       Procedures    MDM              Emergency Department Course:  10:18 AM: Patient is offered workup including laboratory tests EKG and chest x-ray. He states that he doesn't wish that to be done. He states he's been evaluated many times before and he has no new symptoms and he just wanted to have his blood pressure rechecked.   It is emphasized that should he change his mind should he have additional symptoms or chest pain shortness breath or any other problems I would ask him to come to the ER. He knows that he is always able to call squad. At this time he states he was falling asleep in the bed. He again declines any workup is felt to be reasonable this time and is encouraged to return for any problems or changes and is discharged to follow-up with his PCP on Monday. Discussed results, diagnosis and plan with patient and/or family. Questions addressed. Disposition and follow-up agreed upon. Specific discharge instructions explained. The patient and/or family and I have discussed the diagnosis and risks, and we agree with discharging home to follow-up with their primary care, specialist or referral doctor. We also discussed returning to the Emergency Department immediately if new or worsening symptoms occur. We have discussed the symptoms which are most concerning that necessitate immediate return. It is not felt that any other laboratory tests or radiological studies would be beneficial at this time. The Clinical Impression is BP check. This document serves as a record of the services and decisions personally performed by myself, Jayme Conn MD. It was created on my behalf by Lizzy Kaiser, 4/27/19   a trained medical scribe. The creation of this document is based on my statements to the medical scribe. This dictation was generated by voice recognition computer software. Although all attempts are made to edit the dictation for accuracy, there may be errors in the transcription that are not intended.          Jayme Conn MD  04/27/19 4492

## 2019-04-27 NOTE — ED NOTES
Ambulatory from department without difficulty. No distress noted. Pt instructed to follow up with family doctor or doctor referred by ED physician. Pt also given number to the Pt advocate. Pt reports no further needs or questions prior to discharge.      Fred Block RN  04/27/19 2586

## 2019-05-28 ENCOUNTER — APPOINTMENT (OUTPATIENT)
Dept: CT IMAGING | Age: 54
End: 2019-05-28
Payer: COMMERCIAL

## 2019-05-28 ENCOUNTER — HOSPITAL ENCOUNTER (EMERGENCY)
Age: 54
Discharge: HOME | End: 2019-05-28
Attending: EMERGENCY MEDICINE
Payer: COMMERCIAL

## 2019-05-28 ENCOUNTER — APPOINTMENT (OUTPATIENT)
Dept: GENERAL RADIOLOGY | Age: 54
End: 2019-05-28
Payer: COMMERCIAL

## 2019-05-28 VITALS
DIASTOLIC BLOOD PRESSURE: 76 MMHG | OXYGEN SATURATION: 99 % | SYSTOLIC BLOOD PRESSURE: 131 MMHG | HEIGHT: 72 IN | TEMPERATURE: 98.8 F | HEART RATE: 66 BPM | BODY MASS INDEX: 38.47 KG/M2 | WEIGHT: 284 LBS | RESPIRATION RATE: 18 BRPM

## 2019-05-28 DIAGNOSIS — R07.9 CHEST PAIN, UNSPECIFIED TYPE: Primary | ICD-10-CM

## 2019-05-28 LAB
A/G RATIO: 1.4 (ref 1.1–2.2)
ALBUMIN SERPL-MCNC: 3.9 G/DL (ref 3.4–5)
ALP BLD-CCNC: 87 U/L (ref 40–129)
ALT SERPL-CCNC: 26 U/L (ref 10–40)
ANION GAP SERPL CALCULATED.3IONS-SCNC: 14 MMOL/L (ref 3–16)
APTT: 28.9 SEC (ref 26–36)
AST SERPL-CCNC: 19 U/L (ref 15–37)
BASOPHILS ABSOLUTE: 0.1 K/UL (ref 0–0.2)
BASOPHILS RELATIVE PERCENT: 1.1 %
BILIRUB SERPL-MCNC: <0.2 MG/DL (ref 0–1)
BILIRUBIN URINE: NEGATIVE
BLOOD, URINE: NEGATIVE
BUN BLDV-MCNC: 11 MG/DL (ref 7–20)
CALCIUM SERPL-MCNC: 8.5 MG/DL (ref 8.3–10.6)
CHLORIDE BLD-SCNC: 99 MMOL/L (ref 99–110)
CLARITY: CLEAR
CO2: 23 MMOL/L (ref 21–32)
COLOR: YELLOW
CREAT SERPL-MCNC: 0.8 MG/DL (ref 0.9–1.3)
EOSINOPHILS ABSOLUTE: 0.4 K/UL (ref 0–0.6)
EOSINOPHILS RELATIVE PERCENT: 3.6 %
GFR AFRICAN AMERICAN: >60
GFR NON-AFRICAN AMERICAN: >60
GLOBULIN: 2.8 G/DL
GLUCOSE BLD-MCNC: 126 MG/DL (ref 70–99)
GLUCOSE URINE: NEGATIVE MG/DL
HCT VFR BLD CALC: 43.1 % (ref 40.5–52.5)
HEMOGLOBIN: 14.1 G/DL (ref 13.5–17.5)
INR BLD: 0.96 (ref 0.86–1.14)
KETONES, URINE: NEGATIVE MG/DL
LEUKOCYTE ESTERASE, URINE: NEGATIVE
LIPASE: 26 U/L (ref 13–60)
LYMPHOCYTES ABSOLUTE: 3 K/UL (ref 1–5.1)
LYMPHOCYTES RELATIVE PERCENT: 26.8 %
MCH RBC QN AUTO: 26.6 PG (ref 26–34)
MCHC RBC AUTO-ENTMCNC: 32.8 G/DL (ref 31–36)
MCV RBC AUTO: 81.3 FL (ref 80–100)
MICROSCOPIC EXAMINATION: NORMAL
MONOCYTES ABSOLUTE: 0.7 K/UL (ref 0–1.3)
MONOCYTES RELATIVE PERCENT: 6 %
NEUTROPHILS ABSOLUTE: 7.1 K/UL (ref 1.7–7.7)
NEUTROPHILS RELATIVE PERCENT: 62.5 %
NITRITE, URINE: NEGATIVE
PDW BLD-RTO: 15.7 % (ref 12.4–15.4)
PH UA: 7 (ref 5–8)
PLATELET # BLD: 146 K/UL (ref 135–450)
PMV BLD AUTO: 9 FL (ref 5–10.5)
POTASSIUM REFLEX MAGNESIUM: 3.7 MMOL/L (ref 3.5–5.1)
PROTEIN UA: NEGATIVE MG/DL
PROTHROMBIN TIME: 10.9 SEC (ref 9.8–13)
RBC # BLD: 5.3 M/UL (ref 4.2–5.9)
SODIUM BLD-SCNC: 136 MMOL/L (ref 136–145)
SPECIFIC GRAVITY UA: <=1.005 (ref 1–1.03)
TOTAL PROTEIN: 6.7 G/DL (ref 6.4–8.2)
TROPONIN: <0.01 NG/ML
TROPONIN: <0.01 NG/ML
URINE TYPE: NORMAL
UROBILINOGEN, URINE: 0.2 E.U./DL
WBC # BLD: 11.3 K/UL (ref 4–11)

## 2019-05-28 PROCEDURE — 83690 ASSAY OF LIPASE: CPT

## 2019-05-28 PROCEDURE — 93005 ELECTROCARDIOGRAM TRACING: CPT | Performed by: EMERGENCY MEDICINE

## 2019-05-28 PROCEDURE — 85730 THROMBOPLASTIN TIME PARTIAL: CPT

## 2019-05-28 PROCEDURE — 81003 URINALYSIS AUTO W/O SCOPE: CPT

## 2019-05-28 PROCEDURE — 85025 COMPLETE CBC W/AUTO DIFF WBC: CPT

## 2019-05-28 PROCEDURE — 84484 ASSAY OF TROPONIN QUANT: CPT

## 2019-05-28 PROCEDURE — 80053 COMPREHEN METABOLIC PANEL: CPT

## 2019-05-28 PROCEDURE — 71046 X-RAY EXAM CHEST 2 VIEWS: CPT

## 2019-05-28 PROCEDURE — 85610 PROTHROMBIN TIME: CPT

## 2019-05-28 PROCEDURE — 6360000004 HC RX CONTRAST MEDICATION: Performed by: EMERGENCY MEDICINE

## 2019-05-28 PROCEDURE — 99285 EMERGENCY DEPT VISIT HI MDM: CPT

## 2019-05-28 PROCEDURE — 74177 CT ABD & PELVIS W/CONTRAST: CPT

## 2019-05-28 PROCEDURE — 36415 COLL VENOUS BLD VENIPUNCTURE: CPT

## 2019-05-28 RX ADMIN — IOPAMIDOL 75 ML: 755 INJECTION, SOLUTION INTRAVENOUS at 18:56

## 2019-05-28 ASSESSMENT — PAIN DESCRIPTION - ORIENTATION: ORIENTATION: MID;UPPER

## 2019-05-28 ASSESSMENT — ENCOUNTER SYMPTOMS
ABDOMINAL PAIN: 1
FACIAL SWELLING: 0
BACK PAIN: 0
WHEEZING: 0
ANAL BLEEDING: 0
BLOOD IN STOOL: 0
COLOR CHANGE: 0
TROUBLE SWALLOWING: 0
STRIDOR: 0
SHORTNESS OF BREATH: 0
VOICE CHANGE: 0
ABDOMINAL DISTENTION: 0
VOMITING: 0
PHOTOPHOBIA: 0
NAUSEA: 0

## 2019-05-28 ASSESSMENT — PAIN DESCRIPTION - LOCATION: LOCATION: ABDOMEN

## 2019-05-28 ASSESSMENT — PAIN SCALES - GENERAL: PAINLEVEL_OUTOF10: 3

## 2019-05-28 ASSESSMENT — PAIN DESCRIPTION - DESCRIPTORS: DESCRIPTORS: CONSTANT;CRAMPING;PRESSURE

## 2019-05-28 NOTE — ED PROVIDER NOTES
Patient left to me by Dr. Kevyn Silva pending a repeat troponin and CT scan to evaluate his gallbladder. Dr. Kevyn Silva had spoken with cardiology who did not feel the patient needed to be admitted unless his troponin is abnormal.  Patient states that he is now pain free and his pain was predominantly in the right upper quadrant. Is not vomiting or febrile and to my exam his heart and lung sounds were normal and his abdomen was soft and nontender. This EKG was normal.  Troponin was initially normal and his repeat was also normal.  Patient's pain never recurred. He has abdominal CT scan did not show any acute process and he'll be treated as an outpatient and followed up with his cardiologist and we've encouraged him to return to the emergency department in the interim if problems develop. At this point in time no evidence of any dangerous or emergent process has been identified as a cause of his pain.     EKG demonstrates a normal sinus rhythm with normal axis normal intervals normal ST-T wave segments and no evidence of ischemia infarction or arrhythmia heart rate of 84 and much like his previous EKG     Jorge Monique MD  05/28/19 2100

## 2019-05-28 NOTE — ED PROVIDER NOTES
[facility]  eMERGENCY dEPARTMENT eNCOUnter      Pt Name: Adam Kendrick  MRN: [id number]  [place] 1965  Date of evaluation: 5/28/2019  Provider: Alice Patel MD    [address] [place] [address]       Chief Complaint   Patient presents with    Chest Pain     C/o L sided chest pressure    Abdominal Pain     C/o midepigastric abd pain that radiates into his L chest         HISTORY OF PRESENT ILLNESS   (Location/Symptom, Timing/Onset, Context/Setting, Quality, Duration, Modifying Factors, Severity)  Note limiting factors. Adam Kendrick is a 47 y.o. male with a past medical history including remote history of coronary artery disease with stent placed several years ago at [facility] with multiple more recent negative cardiac workups including a cardiac catheterization in January 2018 which showed mild to moderate coronary artery disease of all of the coronary arteries as well as a patent stent in the RCA with minimal relief in stent stenosis. The patient presents with 1 day of left-sided chest pressure pain as well as mid epigastric and right upper quadrant abdominal pain. He reports the pain started same time. He reports that eating worsens his pain and nothing improves it. He denies any hemoptysis, syncope, fever, or infectious symptoms. He denies any nausea vomiting or diarrhea. He reports that he received aspirin by EMS which significantly helped his pain. HPI    Nursing Notes were reviewed. REVIEW OFSYSTEMS    (2-9 systems for level 4, 10 or more for level 5)     Review of Systems   Constitutional: Negative for appetite change, fever and unexpected weight change. HENT: Negative for facial swelling, trouble swallowing and voice change. Eyes: Negative for photophobia and visual disturbance. Respiratory: Negative for shortness of breath, wheezing and stridor. Cardiovascular: Positive for chest pain. Negative for palpitations.    Gastrointestinal: Positive for abdominal pain. Negative for abdominal distention, anal bleeding, blood in stool, nausea and vomiting. Genitourinary: Negative for difficulty urinating and dysuria. Musculoskeletal: Negative for back pain, gait problem and neck pain. Skin: Negative for color change and wound. Neurological: Negative for seizures, syncope and speech difficulty. Psychiatric/Behavioral: Negative for self-injury and suicidal ideas. Except as noted above the remainder of the review of systems was reviewed and negative. PAST MEDICAL HISTORY     Past Medical History:   Diagnosis Date    Anxiety     Bipolar affective ([address]     CAD (coronary artery disease)     Chronic back pain     COPD ([facility] obstructive pulmonary [facility]) [facility] [address]     DDD (degenerative disc disease), cervical     Depression     GERD (gastroesophageal reflux disease)     Hyperlipidemia     Hypertension     Pancreatitis     Recovering alcoholic ([facility] [address]     Tobacco abuse 12/16/2016         SURGICAL HISTORY       Past Surgical History:   Procedure Laterality Date    CORONARY ANGIOPLASTY WITH STENT PLACEMENT  2006, 2012, 2013    [facility]       Discharge Medication List as of 5/28/2019  9:01 PM      CONTINUE these medications which have NOT CHANGED    Details   amLODIPine (NORVASC) 2.5 MG tablet TAKE 1 TABLET BY MOUTH EVERY DAY, Disp-90 tablet, R-3Normal      lisinopril (PRINIVIL;ZESTRIL) 10 MG tablet Take 1 tablet by mouth daily, Disp-30 tablet, R-5Normal      diphenhydrAMINE (BENADRYL) 25 MG capsule Take 25 mg by mouth nightly as needed for Allergies or SleepHistorical Med      isosorbide mononitrate (IMDUR) 30 MG extended release tablet TAKE 1 TABLET BY MOUTH DAILY, Disp-30 tablet, R-3Normal      HydrOXYzine Pamoate (VISTARIL PO) Take 50 mg by mouth 4 times daily Historical Med      HYDROcodone-acetaminophen (NORCO) 5-325 MG per tablet Take 1 tablet by mouth every 6-8 hours as needed for Pain. Velora Sis Historical Med metoprolol succinate (TOPROL XL) 50 MG extended release tablet Take 50 mg by mouth daily, R-2Historical Med      BREO ELLIPTA 200-25 MCG/INH AEPB INHALE 1 PUFF INTO THE LUNGS AT THE SAME TIME EACH DAY, R-2, DAWHistorical Med      gabapentin (NEURONTIN) 300 MG capsule TAKE ONE CAPSULE BY MOUTH EVERY 8 HOURS, R-0Historical Med      atorvastatin (LIPITOR) 40 MG tablet Take 1 tablet by mouth daily, Disp-30 tablet, R-5Normal      clopidogrel (PLAVIX) 75 MG tablet Take 1 tablet by mouth daily, Disp-90 tablet, R-3Normal      aspirin 81 MG EC tablet Take 1 tablet by mouth daily, Disp-30 tablet, R-5Normal      pantoprazole (PROTONIX) 20 MG tablet TAKE 2 TABLETS BY MOUTH DAILY, Disp-30 tablet, R-0      nitroGLYCERIN (NITROSTAT) 0.4 MG SL tablet Place 1 tablet under the tongue every 5 minutes as needed. , Disp-25 tablet, R-0Pt needs ov             ALLERGIES     Pcn [penicillins]    FAMILY HISTORY       Family History   Problem Relation Age of Onset   [facility] Other Mother     Arthritis Mother     Mental Illness Mother     Depression Mother     Diabetes Father     Heart Disease Father     Substance Abuse Father     Heart Disease Sister     High Blood Pressure Sister     Mental Retardation Brother     Other Brother     Learning Disabilities Brother     Early Death Sister    [facility] Cancer Sister     Substance Abuse Sister     Cancer Sister     Diabetes Brother     High Blood Pressure Brother           SOCIAL HISTORY       Social History     Socioeconomic History    Marital status: Single     Spouse name: None    Number of children: None    Years of education: None    Highest education level: None   Occupational History    None   Social Needs    Financial resource strain: None    Food insecurity:     Worry: None     Inability: None    Transportation needs:     Medical: None     Non-medical: None   Tobacco Use    Smoking status: Current Every Day Smoker     Packs/day: 1.00     Years: 45.00     Pack years: 45.00 Types: Cigarettes    Smokeless tobacco: Never Used   Substance and Sexual Activity    Alcohol use: Yes     Alcohol/week: 0.0 oz     Comment: occassionally    Drug use: No    Sexual activity: Never   Lifestyle    Physical activity:     Days per week: None     Minutes per session: None    Stress: None   Relationships    Social connections:     Talks on phone: None     Gets together: None     Attends Gnosticism service: None     Active member of club or organization: None     Attends meetings of clubs or organizations: None     Relationship status: None    Intimate partner violence:     Fear of current or ex partner: None     Emotionally abused: None     Physically abused: None     Forced sexual activity: None   Other Topics Concern    None   Social History Narrative    None         PHYSICAL EXAM    (up to 7 for level 4, 8 or more for level 5)     ED Triage Vitals [05/28/19 1749]   BP Temp Temp Source Pulse Resp SpO2 Height Weight   125/70 98.8 °F (37.1 °C) Oral 87 20 95 % 6' (1.829 m) 284 lb (128.8 kg)       Physical Exam   Constitutional: He is oriented to person, place, and time. He appears well-developed and well-nourished. No distress. HENT:   Head: Normocephalic and atraumatic. Right Ear: External ear normal.   Left Ear: External ear normal.   Eyes: Conjunctivae and EOM are normal.   Neck: Neck supple. No JVD present. No tracheal deviation present. Cardiovascular: Normal rate and intact distal pulses. Pulmonary/Chest: Effort normal and breath sounds normal. No respiratory distress. He has no wheezes. Abdominal: Soft. He exhibits no distension. There is no tenderness. There is no rebound and no guarding. Musculoskeletal: Normal range of motion. He exhibits no tenderness. Neurological: He is alert and oriented to person, place, and time. No cranial nerve deficit. Skin: Skin is warm and dry. Nursing note and vitals reviewed.       DIAGNOSTIC RESULTS     EKG:All EKG's are interpreted by the Emergency Department Physician who either signs or Co-signs this chart in the absence of a cardiologist.    The Ekg interpreted by me shows  normal sinus rhythm with a rate of 84  Axis is   Normal  QTc is  446ms  Intervals and Durations are unremarkable. ST Segments: no acute change  No significant change from prior EKG dated 5/28/19      RADIOLOGY:     Interpretation per the Radiologist below, if available at the time of this note:    CT ABDOMEN PELVIS W IV CONTRAST Additional Contrast? None   Final Result   No acute abnormality identified. Suggestion of urinary bladder mural thickening. Correlate with any clinical   evidence of urinary bladder outlet obstruction or cystitis. XR CHEST STANDARD (2 VW)   Final Result   No acute process. LABS:  Labs Reviewed   CBC WITH AUTO DIFFERENTIAL - Abnormal; Notable for the following components:       Result Value    WBC 11.3 (*)     RDW 15.7 (*)     All other components within normal limits    Narrative:     Performed at:  [facility]  [address] [place] [address]  [facility]. [employer]   Phone [phone]   COMPREHENSIVE METABOLIC PANEL W/ REFLEX TO MG FOR LOW K - Abnormal; Notable for the following components:    Glucose 126 (*)     CREATININE 0.8 (*)     All other components within normal limits    Narrative:     Performed at:  [facility]. [facility]  [address] [place], [place] [address]. [employer]   Phone [phone]    Narrative:     Performed at:  [facility]. [facility]  [address] [place], [place] [address]  [facility]. [employer]   Phone [phone]   APTT    Narrative:     Performed at:  [facility]. [facility]  [address] [place], [place] [address]. [employer]   Phone [phone]   URINALYSIS    Narrative:     Performed at:  [facility]. [facility]  [address] [place] [address]  [facility].  Orab, and delta troponin and final disposition. CONSULTS:  IP CONSULT TO CARDIOLOGY       Procedures    FINAL IMPRESSION      1. Chest pain, unspecified type          DISPOSITION/PLAN   DISPOSITION  Discharge      PATIENT REFERRED TO:  Cardiology    Call in 1 day        (Please note that portions of this note were completed with a voice recognition program.  Efforts were made to edit the dictations but occasionally words aremis-transcribed. )    Huey Lynn MD (electronically signed)  Attending Emergency Physician           Huey Lynn MD  05/29/19 1059

## 2019-05-28 NOTE — ED NOTES
Pt states he is unable to void for ordered UA, MD made aware.  Remains without c/o's pain @ present     79 Yaw Reid RN  05/28/19 1932

## 2019-05-29 LAB
EKG ATRIAL RATE: 84 BPM
EKG DIAGNOSIS: NORMAL
EKG P AXIS: 45 DEGREES
EKG P-R INTERVAL: 164 MS
EKG Q-T INTERVAL: 378 MS
EKG QRS DURATION: 92 MS
EKG QTC CALCULATION (BAZETT): 446 MS
EKG R AXIS: 26 DEGREES
EKG T AXIS: 26 DEGREES
EKG VENTRICULAR RATE: 84 BPM

## 2019-05-29 PROCEDURE — 93010 ELECTROCARDIOGRAM REPORT: CPT | Performed by: INTERNAL MEDICINE

## 2019-05-31 RX ORDER — ISOSORBIDE MONONITRATE 30 MG/1
TABLET, EXTENDED RELEASE ORAL
Qty: 90 TABLET | Refills: 3 | Status: SHIPPED | OUTPATIENT
Start: 2019-05-31 | End: 2020-06-15

## 2019-06-17 ENCOUNTER — TELEPHONE (OUTPATIENT)
Dept: PULMONOLOGY | Age: 54
End: 2019-06-17

## 2019-06-29 ENCOUNTER — HOSPITAL ENCOUNTER (EMERGENCY)
Age: 54
Discharge: HOME | End: 2019-06-29
Attending: EMERGENCY MEDICINE
Payer: COMMERCIAL

## 2019-06-29 ENCOUNTER — APPOINTMENT (OUTPATIENT)
Dept: GENERAL RADIOLOGY | Age: 54
End: 2019-06-29
Payer: COMMERCIAL

## 2019-06-29 VITALS
OXYGEN SATURATION: 97 % | WEIGHT: 284 LBS | SYSTOLIC BLOOD PRESSURE: 149 MMHG | BODY MASS INDEX: 38.47 KG/M2 | RESPIRATION RATE: 16 BRPM | HEART RATE: 64 BPM | DIASTOLIC BLOOD PRESSURE: 68 MMHG | HEIGHT: 72 IN | TEMPERATURE: 97.9 F

## 2019-06-29 DIAGNOSIS — J44.1 COPD EXACERBATION (HCC): Primary | ICD-10-CM

## 2019-06-29 PROCEDURE — 99285 EMERGENCY DEPT VISIT HI MDM: CPT

## 2019-06-29 PROCEDURE — 6370000000 HC RX 637 (ALT 250 FOR IP)

## 2019-06-29 PROCEDURE — 71045 X-RAY EXAM CHEST 1 VIEW: CPT

## 2019-06-29 RX ORDER — PREDNISONE 20 MG/1
60 TABLET ORAL ONCE
Status: COMPLETED | OUTPATIENT
Start: 2019-06-29 | End: 2019-06-29

## 2019-06-29 RX ORDER — IPRATROPIUM BROMIDE AND ALBUTEROL SULFATE 2.5; .5 MG/3ML; MG/3ML
1 SOLUTION RESPIRATORY (INHALATION) ONCE
Status: COMPLETED | OUTPATIENT
Start: 2019-06-29 | End: 2019-06-29

## 2019-06-29 RX ORDER — IPRATROPIUM BROMIDE AND ALBUTEROL SULFATE 2.5; .5 MG/3ML; MG/3ML
SOLUTION RESPIRATORY (INHALATION)
Status: COMPLETED
Start: 2019-06-29 | End: 2019-06-29

## 2019-06-29 RX ORDER — PREDNISONE 10 MG/1
TABLET ORAL
Status: COMPLETED
Start: 2019-06-29 | End: 2019-06-29

## 2019-06-29 RX ORDER — PREDNISONE 50 MG/1
50 TABLET ORAL DAILY
Qty: 5 TABLET | Refills: 0 | Status: SHIPPED | OUTPATIENT
Start: 2019-06-29 | End: 2019-07-04

## 2019-06-29 RX ADMIN — PREDNISONE 60 MG: 10 TABLET ORAL at 05:25

## 2019-06-29 RX ADMIN — IPRATROPIUM BROMIDE AND ALBUTEROL SULFATE 1 AMPULE: .5; 3 SOLUTION RESPIRATORY (INHALATION) at 04:21

## 2019-06-29 RX ADMIN — IPRATROPIUM BROMIDE AND ALBUTEROL SULFATE 1 AMPULE: 2.5; .5 SOLUTION RESPIRATORY (INHALATION) at 04:21

## 2019-06-29 RX ADMIN — PREDNISONE 60 MG: 20 TABLET ORAL at 05:25

## 2019-06-29 NOTE — ED PROVIDER NOTES
Pancreatitis     Recovering alcoholic ([facility] [address]     Tobacco abuse 12/16/2016     Past Surgical History:   Procedure Laterality Date    CORONARY ANGIOPLASTY WITH STENT PLACEMENT  2006, 2012, 2013    [facility]       I have reviewed the following from the nursing documentation. Family History   Problem Relation Age of Onset   Chela Herrera Other Mother     Arthritis Mother     Mental Illness Mother     Depression Mother     Diabetes Father     Heart Disease Father     Substance Abuse Father     Heart Disease Sister     High Blood Pressure Sister     Mental Retardation Brother     Other Brother     Learning Disabilities Brother     Early Death Sister    Chela Motaer Cancer Sister     Substance Abuse Sister     Cancer Sister     Diabetes Brother     High Blood Pressure Brother      Social History     Socioeconomic History    Marital status: Single     Spouse name: Not on file    Number of children: Not on file    Years of education: Not on file    Highest education level: Not on file   Occupational History    Not on file   Social Needs    Financial resource strain: Not on file    Food insecurity:     Worry: Not on file     Inability: Not on file    Transportation needs:     Medical: Not on file     Non-medical: Not on file   Tobacco Use    Smoking status: Current Every Day Smoker     Packs/day: 1.00     Years: 45.00     Pack years: 45.00     Types: Cigarettes    Smokeless tobacco: Never Used   Substance and Sexual Activity    Alcohol use:  Yes     Alcohol/week: 0.0 oz     Comment: occassionally    Drug use: No    Sexual activity: Never   Lifestyle    Physical activity:     Days per week: Not on file     Minutes per session: Not on file    Stress: Not on file   Relationships    Social connections:     Talks on phone: Not on file     Gets together: Not on file     Attends Evangelical service: Not on file     Active member of club or organization: Not on file     Attends meetings of clubs or organizations: Not

## 2019-07-12 RX ORDER — LISINOPRIL 10 MG/1
TABLET ORAL
Qty: 30 TABLET | Refills: 5 | Status: SHIPPED | OUTPATIENT
Start: 2019-07-12 | End: 2020-02-03

## 2019-09-23 ENCOUNTER — HOSPITAL ENCOUNTER (OUTPATIENT)
Dept: VASCULAR LAB | Age: 54
Discharge: HOME | End: 2019-09-23
Payer: COMMERCIAL

## 2019-09-23 PROCEDURE — 93880 EXTRACRANIAL BILAT STUDY: CPT

## 2019-10-02 ENCOUNTER — HOSPITAL ENCOUNTER (OUTPATIENT)
Dept: CT IMAGING | Age: 54
Discharge: HOME | End: 2019-10-02
Payer: COMMERCIAL

## 2019-10-02 DIAGNOSIS — R55 SYNCOPE AND COLLAPSE: ICD-10-CM

## 2019-10-02 PROCEDURE — 70496 CT ANGIOGRAPHY HEAD: CPT

## 2019-10-02 PROCEDURE — 6360000004 HC RX CONTRAST MEDICATION: Performed by: NURSE PRACTITIONER

## 2019-10-02 RX ADMIN — IOPAMIDOL 85 ML: 755 INJECTION, SOLUTION INTRAVENOUS at 14:44

## 2019-11-19 ENCOUNTER — OFFICE VISIT (OUTPATIENT)
Dept: PULMONOLOGY | Age: 54
End: 2019-11-19
Payer: COMMERCIAL

## 2019-11-19 VITALS
DIASTOLIC BLOOD PRESSURE: 64 MMHG | SYSTOLIC BLOOD PRESSURE: 129 MMHG | HEIGHT: 72 IN | OXYGEN SATURATION: 95 % | WEIGHT: 282 LBS | BODY MASS INDEX: 38.19 KG/M2 | HEART RATE: 88 BPM | RESPIRATION RATE: 16 BRPM | TEMPERATURE: 98.2 F

## 2019-11-19 DIAGNOSIS — Z78.9 EXCESSIVE CAFFEINE INTAKE: ICD-10-CM

## 2019-11-19 DIAGNOSIS — G47.30 OBSERVED SLEEP APNEA: ICD-10-CM

## 2019-11-19 DIAGNOSIS — F51.04 PSYCHOPHYSIOLOGICAL INSOMNIA: ICD-10-CM

## 2019-11-19 DIAGNOSIS — R06.83 SNORING: Primary | ICD-10-CM

## 2019-11-19 DIAGNOSIS — G47.21 DELAYED SLEEP PHASE SYNDROME: ICD-10-CM

## 2019-11-19 DIAGNOSIS — R53.83 OTHER FATIGUE: ICD-10-CM

## 2019-11-19 DIAGNOSIS — Z72.821 POOR SLEEP HYGIENE: ICD-10-CM

## 2019-11-19 PROCEDURE — 99244 OFF/OP CNSLTJ NEW/EST MOD 40: CPT | Performed by: NURSE PRACTITIONER

## 2019-11-19 PROCEDURE — G8417 CALC BMI ABV UP PARAM F/U: HCPCS | Performed by: NURSE PRACTITIONER

## 2019-11-19 PROCEDURE — G8427 DOCREV CUR MEDS BY ELIG CLIN: HCPCS | Performed by: NURSE PRACTITIONER

## 2019-11-19 PROCEDURE — G8484 FLU IMMUNIZE NO ADMIN: HCPCS | Performed by: NURSE PRACTITIONER

## 2019-11-19 ASSESSMENT — SLEEP AND FATIGUE QUESTIONNAIRES
HOW LIKELY ARE YOU TO NOD OFF OR FALL ASLEEP IN A CAR, WHILE STOPPED FOR A FEW MINUTES IN TRAFFIC: 0
HOW LIKELY ARE YOU TO NOD OFF OR FALL ASLEEP WHILE LYING DOWN TO REST IN THE AFTERNOON WHEN CIRCUMSTANCES PERMIT: 2
NECK CIRCUMFERENCE (INCHES): 19.5
ESS TOTAL SCORE: 7
HOW LIKELY ARE YOU TO NOD OFF OR FALL ASLEEP WHILE WATCHING TV: 1
HOW LIKELY ARE YOU TO NOD OFF OR FALL ASLEEP WHILE SITTING AND READING: 0
HOW LIKELY ARE YOU TO NOD OFF OR FALL ASLEEP WHILE SITTING QUIETLY AFTER LUNCH WITHOUT ALCOHOL: 0
HOW LIKELY ARE YOU TO NOD OFF OR FALL ASLEEP WHILE SITTING AND TALKING TO SOMEONE: 0
HOW LIKELY ARE YOU TO NOD OFF OR FALL ASLEEP WHILE SITTING INACTIVE IN A PUBLIC PLACE: 1
HOW LIKELY ARE YOU TO NOD OFF OR FALL ASLEEP WHEN YOU ARE A PASSENGER IN A CAR FOR AN HOUR WITHOUT A BREAK: 3

## 2020-01-07 NOTE — PROGRESS NOTES
[facility]   Cardiovascular Evaluation    PATIENT: Almas Woodward  DATE: 2020  MRN: <[id number]>  CSN: [id number]  : 1965    Primary Care Doctor: Flako Monge    Reason for evaluation:   Chest Pain (tightness); Shortness of Breath; Fatigue; Dizziness; and Edema      Subjective:    History of present illness on initial date of evaluation:   Almas Woodward is a 49 y.o. patient who presents for cardiac follow-up with a history of CAD and 3 previous stents. He was seen in the ED in 2018 for chest pain,  had a stress test at that time which showed normal LV function with uniform wall motion, EF of 67%, low risk study. He was seen again in the ED 19 for chest pain, EKG showed NSR, Troponin's were negative. He was found to have pleurisy. Today he reports intermittent chest pain. He has been under a lot of stress with daily life. His sister is clinically degrading. She is currently at  home with home hospice. He continues to smoke daily. Patient Active Problem List   Diagnosis    Claudication ([address]    HTN (hypertension)    Hyperlipidemia    CAD (coronary artery disease)    Acute chest pain    Coronary artery disease involving native coronary artery of native heart    Tobacco abuse    Thumb sprain    Mass of hand    Cardiac microvascular disease    Chest pain         Cardiac Testing: I have reviewed the findings below. EKG:  ECHO:   STRESS TEST:  CATH:  BYPASS:  VASCULAR:    Past Medical History:   has a past medical history of Anxiety, Bipolar affective ([address] CAD (coronary artery disease), Chronic back pain, COPD (chronic obstructive pulmonary disease) ([address] DDD (degenerative disc disease), cervical, Depression, GERD (gastroesophageal reflux disease), Hyperlipidemia, Hypertension, Pancreatitis, Recovering alcoholic ([address] and Tobacco abuse.     Surgical History:   has a past surgical history that includes Coronary angioplasty with stent (, 2012, 2013). Social History:   reports that he has been smoking cigarettes. He has been smoking about 0.00 packs per day for the past 45.00 years. He has never used smokeless tobacco. He reports current alcohol use. He reports that he does not use drugs. Family History:  No evidence for sudden cardiac death or premature CAD    Medications:  Reviewed and are listed in nursing record. and/or listed below  Outpatient Medications:  Prior to Admission medications    Medication Sig Start Date End Date Taking? Authorizing Provider   lisinopril (PRINIVIL;ZESTRIL) 10 MG tablet TAKE 1 TABLET BY MOUTH EVERY DAY 7/12/19  Yes JOSE Mena CNP   isosorbide mononitrate (IMDUR) 30 MG extended release tablet TAKE 1 TABLET BY MOUTH EVERY DAY 5/31/19  Yes Jean Duke MD   amLODIPine (NORVASC) 2.5 MG tablet TAKE 1 TABLET BY MOUTH EVERY DAY 2/7/19  Yes JOSE Jung CNP   HydrOXYzine Pamoate (VISTARIL PO) Take 50 mg by mouth 4 times daily    Yes Historical Provider, MD   HYDROcodone-acetaminophen (NORCO) 5-325 MG per tablet Take 1 tablet by mouth every 6-8 hours as needed for Pain.  .   Yes Historical Provider, MD   metoprolol succinate (TOPROL XL) 50 MG extended release tablet Take 50 mg by mouth daily 2/7/18  Yes Historical Provider, MD LEON GABRIEL 200-25 MCG/INH AEPB INHALE 1 PUFF INTO THE LUNGS AT [place] [id number] 11/9/17  Yes Historical Provider, MD   gabapentin (NEURONTIN) 300 MG capsule TAKE ONE CAPSULE BY MOUTH EVERY 8 HOURS 12/14/17  Yes Historical Provider, MD   atorvastatin (LIPITOR) 40 MG tablet Take 1 tablet by mouth daily 9/29/17  Yes JOSE Sandoval CNP   clopidogrel (PLAVIX) 75 MG tablet Take 1 tablet by mouth daily 4/26/17  Yes JOSE Pickering CNP   aspirin 81 MG EC tablet Take 1 tablet by mouth daily 4/10/17  Yes JOSE Pickering CNP   pantoprazole (PROTONIX) 20 MG tablet TAKE 2 TABLETS BY MOUTH DAILY  Patient taking differently: TAKE 1 TABLET BY MOUTH BID 9/29/16  Yes JOSE Pickering CNP   nitroGLYCERIN (NITROSTAT) 0.4 MG SL tablet Place 1 tablet under the tongue every 5 minutes as needed. 10/1/14  Yes Elizabeth Baxter MD   isosorbide mononitrate (IMDUR) 30 MG extended release tablet TAKE 1 TABLET BY MOUTH DAILY  Patient not taking: Reported on 1/9/2020 8/21/18   JOSE Davis CNP       In-patient schedule medications:        Infusion Medications: Allergies:  Pcn [penicillins]     Review of Systems:   All 14 point review of symptoms completed. Pertinent positives identified in the HPI, all other review of symptoms negative as below.     Review of Systems - History obtained from the patient  General ROS: negative for - chills, fever or night sweats  Psychological ROS: negative for - disorientation or hallucinations  Ophthalmic ROS: negative for - dry eyes, eye pain or loss of vision  ENT ROS: negative for - nasal discharge or sore throat  Allergy and Immunology ROS: negative for - hives or itchy/watery eyes  Hematological and Lymphatic ROS: negative for - jaundice or night sweats  Endocrine ROS: negative for - mood swings or temperature intolerance  Breast ROS: deferred  Respiratory ROS: negative for - hemoptysis or stridor  Cardiovascular ROS: negative for - chest pain, dyspnea on exertion or palpitations  Gastrointestinal ROS: no abdominal pain, change in bowel habits, or black or bloody stools  Genito-Urinary ROS: no dysuria, trouble voiding, or hematuria  Musculoskeletal ROS: negative for - gait disturbance, joint pain or joint stiffness  Neurological ROS: negative for - seizures or speech problems  Dermatological ROS: negative for - rash or skin lesion changes        Physical Examination:    Vitals:    01/09/20 1047   BP: 133/79   Pulse: 75   SpO2: 97%    Weight: 282 lb (127.9 kg)     Wt Readings from Last 3 Encounters:   01/09/20 282 lb (127.9 kg)   11/19/19 282 lb (127.9 kg)   06/29/19 284 lb (128.8 kg)     No intake or output data in the 24 hours ending 01/09/20 1104    General Appearance:  Alert, cooperative, no distress, appears stated age   Head:  Normocephalic, without obvious abnormality, atraumatic   Eyes:  PERRL, conjunctiva/corneas clear       Nose: Nares normal, no drainage or sinus tenderness   Throat: Lips, mucosa, and tongue normal   Neck: Supple, symmetrical, trachea midline, no adenopathy, thyroid: not enlarged, symmetric, no tenderness/mass/nodules, no carotid bruit or JVD       Lungs:   Clear to auscultation bilaterally, respirations unlabored   Chest Wall:  No tenderness or deformity   Heart:  Regular rhythm and normal rate; S1, S2 are normal; no murmur noted; no rub or gallop   Abdomen:   Obese, soft, non-tender, bowel sounds active all four quadrants,  no masses, no organomegaly           Extremities: Extremities normal, atraumatic, no cyanosis or edema   Pulses: 2+ and symmetric   Skin: Skin color, texture, turgor normal, no rashes or lesions   Pysch: Normal mood and affect   Neurologic: Normal gross motor and sensory exam.         Labs  No results for input(s): WBC, HGB, HCT, MCV, PLT in the last 72 hours. No results for input(s): CREATININE, BUN, NA, K, CL, CO2 in the last 72 hours. No results for input(s): INR, PROTIME in the last 72 hours. No results for input(s): TROPONINI in the last 72 hours. Invalid input(s): PRO-BNP  No results for input(s): CHOL, HDL in the last 72 hours. Invalid input(s): LDL, TG      Imaging:  I have reviewed the below testing personally and my interpretation is below. EKG:  CXR:      Assessment:  49 y.o. patient with:  1. Coronary artery disease   ~stents in 2006, 2012, 2013   ~chronic stable angina  2. Hypertension   BP: (133)/(79)   3. Current smoker   ~smoking cessation discussed  4. Obesity    Plan:  1. Smoking cessation education and counseling discussed and encouraged.    2.  Risk factor modification was discussed including the importance and management of lipids, BP, diet, exercise,

## 2020-01-09 ENCOUNTER — OFFICE VISIT (OUTPATIENT)
Dept: CARDIOLOGY CLINIC | Age: 55
End: 2020-01-09
Payer: COMMERCIAL

## 2020-01-09 VITALS
SYSTOLIC BLOOD PRESSURE: 133 MMHG | BODY MASS INDEX: 38.19 KG/M2 | HEART RATE: 75 BPM | OXYGEN SATURATION: 97 % | WEIGHT: 282 LBS | HEIGHT: 72 IN | DIASTOLIC BLOOD PRESSURE: 79 MMHG

## 2020-01-09 PROCEDURE — 3017F COLORECTAL CA SCREEN DOC REV: CPT | Performed by: INTERNAL MEDICINE

## 2020-01-09 PROCEDURE — 4004F PT TOBACCO SCREEN RCVD TLK: CPT | Performed by: INTERNAL MEDICINE

## 2020-01-09 PROCEDURE — G8417 CALC BMI ABV UP PARAM F/U: HCPCS | Performed by: INTERNAL MEDICINE

## 2020-01-09 PROCEDURE — 99214 OFFICE O/P EST MOD 30 MIN: CPT | Performed by: INTERNAL MEDICINE

## 2020-01-09 PROCEDURE — G8427 DOCREV CUR MEDS BY ELIG CLIN: HCPCS | Performed by: INTERNAL MEDICINE

## 2020-01-09 PROCEDURE — G8484 FLU IMMUNIZE NO ADMIN: HCPCS | Performed by: INTERNAL MEDICINE

## 2020-01-09 NOTE — LETTER
(2006, 2012, 2013). Social History:   reports that he has been smoking cigarettes. He has been smoking about 0.00 packs per day for the past 45.00 years. He has never used smokeless tobacco. He reports current alcohol use. He reports that he does not use drugs. Family History:  No evidence for sudden cardiac death or premature CAD    Medications:  Reviewed and are listed in nursing record. and/or listed below  Outpatient Medications:  Prior to Admission medications    Medication Sig Start Date End Date Taking? Authorizing Provider   lisinopril (PRINIVIL;ZESTRIL) 10 MG tablet TAKE 1 TABLET BY MOUTH EVERY DAY 7/12/19  Yes JOSE Rowan CNP   isosorbide mononitrate (IMDUR) 30 MG extended release tablet TAKE 1 TABLET BY MOUTH EVERY DAY 5/31/19  Yes Donn Leyden, MD   amLODIPine (NORVASC) 2.5 MG tablet TAKE 1 TABLET BY MOUTH EVERY DAY 2/7/19  Yes JOSE Dwyer CNP   HydrOXYzine Pamoate (VISTARIL PO) Take 50 mg by mouth 4 times daily    Yes Historical Provider, MD   HYDROcodone-acetaminophen (NORCO) 5-325 MG per tablet Take 1 tablet by mouth every 6-8 hours as needed for Pain.  .   Yes Historical Provider, MD   metoprolol succinate (TOPROL XL) 50 MG extended release tablet Take 50 mg by mouth daily 2/7/18  Yes Historical Provider, MD Zackary Bonds 200-25 MCG/INH AEPB INHALE 1 PUFF INTO THE LUNGS AT [place] [address] 11/9/17  Yes Historical Provider, MD   gabapentin (NEURONTIN) 300 MG capsule TAKE ONE CAPSULE BY MOUTH EVERY 8 HOURS 12/14/17  Yes Historical Provider, MD   atorvastatin (LIPITOR) 40 MG tablet Take 1 tablet by mouth daily 9/29/17  Yes JOSE Giang CNP   clopidogrel (PLAVIX) 75 MG tablet Take 1 tablet by mouth daily 4/26/17  Yes JOSE Pickering CNP   aspirin 81 MG EC tablet Take 1 tablet by mouth daily 4/10/17  Yes JOSE Pickering CNP   pantoprazole (PROTONIX) 20 MG tablet TAKE 2 TABLETS BY MOUTH DAILY Patient taking differently: TAKE 1 TABLET BY MOUTH BID 9/29/16  Yes JOSE Pickering CNP   nitroGLYCERIN (NITROSTAT) 0.4 MG SL tablet Place 1 tablet under the tongue every 5 minutes as needed. 10/1/14  Yes Roxanne Garza MD   isosorbide mononitrate (IMDUR) 30 MG extended release tablet TAKE 1 TABLET BY MOUTH DAILY  Patient not taking: Reported on 1/9/2020 8/21/18   JOSE Douglas CNP       In-patient schedule medications:        Infusion Medications: Allergies:  Pcn [penicillins]     Review of Systems:   All 14 point review of symptoms completed. Pertinent positives identified in the HPI, all other review of symptoms negative as below.     Review of Systems - History obtained from the patient  General ROS: negative for - chills, fever or night sweats  Psychological ROS: negative for - disorientation or hallucinations  Ophthalmic ROS: negative for - dry eyes, eye pain or loss of vision  ENT ROS: negative for - nasal discharge or sore throat  Allergy and Immunology ROS: negative for - hives or itchy/watery eyes  Hematological and Lymphatic ROS: negative for - jaundice or night sweats  Endocrine ROS: negative for - mood swings or temperature intolerance  Breast ROS: deferred  Respiratory ROS: negative for - hemoptysis or stridor  Cardiovascular ROS: negative for - chest pain, dyspnea on exertion or palpitations  Gastrointestinal ROS: no abdominal pain, change in bowel habits, or black or bloody stools  Genito-Urinary ROS: no dysuria, trouble voiding, or hematuria  Musculoskeletal ROS: negative for - gait disturbance, joint pain or joint stiffness  Neurological ROS: negative for - seizures or speech problems  Dermatological ROS: negative for - rash or skin lesion changes        Physical Examination:    Vitals:    01/09/20 1047   BP: 133/79   Pulse: 75   SpO2: 97%    Weight: 282 lb (127.9 kg)     Wt Readings from Last 3 Encounters:   01/09/20 282 lb (127.9 kg)   11/19/19 282 lb (127.9 kg) 2.  Risk factor modification was discussed including the importance and management of lipids, BP, diet, exercise, and weight loss if possible. 3. PRN nitroglycerin use  4. Follow up with Janis Mcdonald in 6 months at [facility] office. This note was scribed in the presence of Dr. Shagufta Ramírez MD by Shalini Aden RN.     I, Dr. Lanie Jose, personally performed the services described in this documentation, as scribed by the above signed scribe in my presence. It is both accurate and complete to my knowledge. I agree with the details independently gathered by the clinical support staff, while the remaining scribed note accurately describes my personal service to the patient. All questions and concerns were addressed to the patient/family. Alternatives to my treatment were discussed. The note was completed using EMR. Every effort was made to ensure accuracy; however, inadvertent computerized transcription errors may be present.           Lanie Jose MD, [place], [place]  [phone] [facility]  [phone] [facility] central  1/9/2020  11:04 AM

## 2020-02-03 RX ORDER — AMLODIPINE BESYLATE 2.5 MG/1
TABLET ORAL
Qty: 30 TABLET | Refills: 10 | Status: ON HOLD | OUTPATIENT
Start: 2020-02-03 | End: 2020-04-07

## 2020-02-03 RX ORDER — LISINOPRIL 10 MG/1
TABLET ORAL
Qty: 30 TABLET | Refills: 5 | Status: SHIPPED | OUTPATIENT
Start: 2020-02-03 | End: 2021-02-08 | Stop reason: ALTCHOICE

## 2020-02-03 NOTE — TELEPHONE ENCOUNTER
Sorry for sending 2 different scripts for same patient. For some reason they came over separate and to [facility].

## 2020-03-02 ENCOUNTER — TELEPHONE (OUTPATIENT)
Dept: PULMONOLOGY | Age: 55
End: 2020-03-02

## 2020-03-02 NOTE — TELEPHONE ENCOUNTER
Patient did not show for 31-90 day follow up appointment  with Sapphire Cochran on 3/2/20    Same Day Cancellation: No    Patient rescheduled:  No    New appointment:     Patient was also no show on: 6/17/2019    LOV 11/19/2019    Assessment:       · Snoring  · Observed sleep apnea   · Fatigue  · Obesity  · Delayed sleep phase  · Sleep onset insomnia-poor sleep hygiene, comorbid conditions, probable JULIANN likely contributing  · CAD and HTN per cardiology  · Chronic pain taking Vicodin and gabapentin per pain management  · Mood disorders  · Excessive caffeine intake         Plan:      · Split-night PSG to evaluate forsleep related breathing disorder. · Treatment options were discussed with patient if PSG reveals JULIANN, including CPAP therapy, oral appliances and upper airway surgery. · Sleep hygiene  · Cognitive behavioral therapy was discussed with patient including stimulus control and sleep restriction  · Discussed at length recommend have set bed and wake times, start shifting back times in 30 minute increments, when get routine at earlier time, again shift times back 30 minutes until desired sleep time achieved. No naps in daytime  Also discussed avoid light in evenings and increase light exposure in mornings. Recommend avoid stimulating activity at least 2 hours prior to desired sleep time. Minimize or eliminate caffeine. · Avoid sedatives, alcohol and caffeinated drinks at bed time. · No driving motorized vehicles or operating heavy machinery while fatigue, drowsy or sleepy. · Weight loss is also recommended as a long-term intervention. · Complications of JULIANN if not treated were discussed with patient patient to include systemic hypertension, pulmonary hypertension, cardiovascular morbidities, car accidents and all cause mortality. · Discussed pathophysiology of JULIANN with patient today- video shown in office.   · Patient education handout provided regarding sleep tips and CPAP cleaning recommendations   · Follow

## 2020-03-04 NOTE — TELEPHONE ENCOUNTER
order    Pt was notified of Dr Shelton Herd response. He is going to call sleep center to schedule.  F/u is 5/28/2020

## 2020-04-06 ENCOUNTER — HOSPITAL ENCOUNTER (OUTPATIENT)
Age: 55
Setting detail: OBSERVATION
Discharge: HOME | End: 2020-04-07
Attending: EMERGENCY MEDICINE | Admitting: HOSPITALIST
Payer: COMMERCIAL

## 2020-04-06 ENCOUNTER — APPOINTMENT (OUTPATIENT)
Dept: GENERAL RADIOLOGY | Age: 55
End: 2020-04-06
Payer: COMMERCIAL

## 2020-04-06 LAB
A/G RATIO: 1.3 (ref 1.1–2.2)
ALBUMIN SERPL-MCNC: 4 G/DL (ref 3.4–5)
ALP BLD-CCNC: 82 U/L (ref 40–129)
ALT SERPL-CCNC: 26 U/L (ref 10–40)
ANION GAP SERPL CALCULATED.3IONS-SCNC: 12 MMOL/L (ref 3–16)
AST SERPL-CCNC: 18 U/L (ref 15–37)
BASOPHILS ABSOLUTE: 0.1 K/UL (ref 0–0.2)
BASOPHILS RELATIVE PERCENT: 0.8 %
BILIRUB SERPL-MCNC: 0.3 MG/DL (ref 0–1)
BUN BLDV-MCNC: 7 MG/DL (ref 7–20)
CALCIUM SERPL-MCNC: 8.7 MG/DL (ref 8.3–10.6)
CHLORIDE BLD-SCNC: 101 MMOL/L (ref 99–110)
CO2: 25 MMOL/L (ref 21–32)
CREAT SERPL-MCNC: 0.7 MG/DL (ref 0.9–1.3)
EOSINOPHILS ABSOLUTE: 0.3 K/UL (ref 0–0.6)
EOSINOPHILS RELATIVE PERCENT: 2.6 %
GFR AFRICAN AMERICAN: >60
GFR NON-AFRICAN AMERICAN: >60
GLOBULIN: 3 G/DL
GLUCOSE BLD-MCNC: 100 MG/DL (ref 70–99)
HCT VFR BLD CALC: 42.6 % (ref 40.5–52.5)
HEMOGLOBIN: 14.1 G/DL (ref 13.5–17.5)
INR BLD: 1.04 (ref 0.86–1.14)
LYMPHOCYTES ABSOLUTE: 2 K/UL (ref 1–5.1)
LYMPHOCYTES RELATIVE PERCENT: 17.9 %
MCH RBC QN AUTO: 27.4 PG (ref 26–34)
MCHC RBC AUTO-ENTMCNC: 33 G/DL (ref 31–36)
MCV RBC AUTO: 83.2 FL (ref 80–100)
MONOCYTES ABSOLUTE: 1 K/UL (ref 0–1.3)
MONOCYTES RELATIVE PERCENT: 8.9 %
NEUTROPHILS ABSOLUTE: 7.8 K/UL (ref 1.7–7.7)
NEUTROPHILS RELATIVE PERCENT: 69.8 %
PDW BLD-RTO: 15.5 % (ref 12.4–15.4)
PLATELET # BLD: 138 K/UL (ref 135–450)
PMV BLD AUTO: 9.1 FL (ref 5–10.5)
POTASSIUM REFLEX MAGNESIUM: 3.9 MMOL/L (ref 3.5–5.1)
PROTHROMBIN TIME: 12 SEC (ref 10–13.2)
RBC # BLD: 5.13 M/UL (ref 4.2–5.9)
SODIUM BLD-SCNC: 138 MMOL/L (ref 136–145)
TOTAL PROTEIN: 7 G/DL (ref 6.4–8.2)
TROPONIN: <0.01 NG/ML
WBC # BLD: 11.1 K/UL (ref 4–11)

## 2020-04-06 PROCEDURE — 84484 ASSAY OF TROPONIN QUANT: CPT

## 2020-04-06 PROCEDURE — 99285 EMERGENCY DEPT VISIT HI MDM: CPT

## 2020-04-06 PROCEDURE — 36415 COLL VENOUS BLD VENIPUNCTURE: CPT

## 2020-04-06 PROCEDURE — 85025 COMPLETE CBC W/AUTO DIFF WBC: CPT

## 2020-04-06 PROCEDURE — 6370000000 HC RX 637 (ALT 250 FOR IP): Performed by: EMERGENCY MEDICINE

## 2020-04-06 PROCEDURE — 93005 ELECTROCARDIOGRAM TRACING: CPT | Performed by: PHYSICIAN ASSISTANT

## 2020-04-06 PROCEDURE — G0378 HOSPITAL OBSERVATION PER HR: HCPCS

## 2020-04-06 PROCEDURE — 71045 X-RAY EXAM CHEST 1 VIEW: CPT

## 2020-04-06 PROCEDURE — 80053 COMPREHEN METABOLIC PANEL: CPT

## 2020-04-06 PROCEDURE — 85610 PROTHROMBIN TIME: CPT

## 2020-04-06 RX ADMIN — NITROGLYCERIN 1 INCH: 20 OINTMENT TOPICAL at 21:00

## 2020-04-06 ASSESSMENT — HEART SCORE: ECG: 0

## 2020-04-06 ASSESSMENT — PAIN DESCRIPTION - DESCRIPTORS: DESCRIPTORS: DISCOMFORT

## 2020-04-06 ASSESSMENT — PAIN DESCRIPTION - PAIN TYPE: TYPE: ACUTE PAIN

## 2020-04-06 ASSESSMENT — PAIN DESCRIPTION - PROGRESSION: CLINICAL_PROGRESSION: NOT CHANGED

## 2020-04-06 ASSESSMENT — PAIN DESCRIPTION - LOCATION: LOCATION: CHEST

## 2020-04-06 ASSESSMENT — PAIN SCALES - GENERAL: PAINLEVEL_OUTOF10: 1

## 2020-04-06 ASSESSMENT — PAIN DESCRIPTION - ORIENTATION: ORIENTATION: MID

## 2020-04-07 ENCOUNTER — APPOINTMENT (OUTPATIENT)
Dept: NUCLEAR MEDICINE | Age: 55
End: 2020-04-07
Payer: COMMERCIAL

## 2020-04-07 VITALS
RESPIRATION RATE: 20 BRPM | WEIGHT: 275 LBS | BODY MASS INDEX: 37.25 KG/M2 | DIASTOLIC BLOOD PRESSURE: 72 MMHG | TEMPERATURE: 97.5 F | OXYGEN SATURATION: 94 % | SYSTOLIC BLOOD PRESSURE: 120 MMHG | HEART RATE: 69 BPM | HEIGHT: 72 IN

## 2020-04-07 LAB
ANION GAP SERPL CALCULATED.3IONS-SCNC: 11 MMOL/L (ref 3–16)
BASOPHILS ABSOLUTE: 0.1 K/UL (ref 0–0.2)
BASOPHILS RELATIVE PERCENT: 1.1 %
BUN BLDV-MCNC: 7 MG/DL (ref 7–20)
CALCIUM SERPL-MCNC: 8.3 MG/DL (ref 8.3–10.6)
CHLORIDE BLD-SCNC: 101 MMOL/L (ref 99–110)
CO2: 25 MMOL/L (ref 21–32)
CREAT SERPL-MCNC: 0.6 MG/DL (ref 0.9–1.3)
EKG ATRIAL RATE: 72 BPM
EKG DIAGNOSIS: NORMAL
EKG P AXIS: 50 DEGREES
EKG P-R INTERVAL: 172 MS
EKG Q-T INTERVAL: 414 MS
EKG QRS DURATION: 86 MS
EKG QTC CALCULATION (BAZETT): 453 MS
EKG R AXIS: 24 DEGREES
EKG T AXIS: 49 DEGREES
EKG VENTRICULAR RATE: 72 BPM
EOSINOPHILS ABSOLUTE: 0.4 K/UL (ref 0–0.6)
EOSINOPHILS RELATIVE PERCENT: 3.9 %
GFR AFRICAN AMERICAN: >60
GFR NON-AFRICAN AMERICAN: >60
GLUCOSE BLD-MCNC: 109 MG/DL (ref 70–99)
HCT VFR BLD CALC: 42 % (ref 40.5–52.5)
HEMOGLOBIN: 14.1 G/DL (ref 13.5–17.5)
LV EF: 64 %
LVEF MODALITY: NORMAL
LYMPHOCYTES ABSOLUTE: 2.7 K/UL (ref 1–5.1)
LYMPHOCYTES RELATIVE PERCENT: 30.3 %
MCH RBC QN AUTO: 28.3 PG (ref 26–34)
MCHC RBC AUTO-ENTMCNC: 33.7 G/DL (ref 31–36)
MCV RBC AUTO: 83.9 FL (ref 80–100)
MONOCYTES ABSOLUTE: 0.9 K/UL (ref 0–1.3)
MONOCYTES RELATIVE PERCENT: 10 %
NEUTROPHILS ABSOLUTE: 4.9 K/UL (ref 1.7–7.7)
NEUTROPHILS RELATIVE PERCENT: 54.7 %
PDW BLD-RTO: 15.6 % (ref 12.4–15.4)
PLATELET # BLD: 132 K/UL (ref 135–450)
PMV BLD AUTO: 9 FL (ref 5–10.5)
POTASSIUM REFLEX MAGNESIUM: 3.7 MMOL/L (ref 3.5–5.1)
RBC # BLD: 5 M/UL (ref 4.2–5.9)
SODIUM BLD-SCNC: 137 MMOL/L (ref 136–145)
TROPONIN: <0.01 NG/ML
TROPONIN: <0.01 NG/ML
WBC # BLD: 9 K/UL (ref 4–11)

## 2020-04-07 PROCEDURE — 94640 AIRWAY INHALATION TREATMENT: CPT

## 2020-04-07 PROCEDURE — 6360000002 HC RX W HCPCS: Performed by: HOSPITALIST

## 2020-04-07 PROCEDURE — 36415 COLL VENOUS BLD VENIPUNCTURE: CPT

## 2020-04-07 PROCEDURE — G0378 HOSPITAL OBSERVATION PER HR: HCPCS

## 2020-04-07 PROCEDURE — 78452 HT MUSCLE IMAGE SPECT MULT: CPT

## 2020-04-07 PROCEDURE — 6370000000 HC RX 637 (ALT 250 FOR IP): Performed by: HOSPITALIST

## 2020-04-07 PROCEDURE — 96372 THER/PROPH/DIAG INJ SC/IM: CPT

## 2020-04-07 PROCEDURE — 80048 BASIC METABOLIC PNL TOTAL CA: CPT

## 2020-04-07 PROCEDURE — A9502 TC99M TETROFOSMIN: HCPCS | Performed by: INTERNAL MEDICINE

## 2020-04-07 PROCEDURE — 2580000003 HC RX 258: Performed by: HOSPITALIST

## 2020-04-07 PROCEDURE — 84484 ASSAY OF TROPONIN QUANT: CPT

## 2020-04-07 PROCEDURE — 3430000000 HC RX DIAGNOSTIC RADIOPHARMACEUTICAL: Performed by: INTERNAL MEDICINE

## 2020-04-07 PROCEDURE — 99220 PR INITIAL OBSERVATION CARE/DAY 70 MINUTES: CPT | Performed by: INTERNAL MEDICINE

## 2020-04-07 PROCEDURE — 6360000002 HC RX W HCPCS: Performed by: INTERNAL MEDICINE

## 2020-04-07 PROCEDURE — 99244 OFF/OP CNSLTJ NEW/EST MOD 40: CPT | Performed by: INTERNAL MEDICINE

## 2020-04-07 PROCEDURE — 93017 CV STRESS TEST TRACING ONLY: CPT

## 2020-04-07 PROCEDURE — 85025 COMPLETE CBC W/AUTO DIFF WBC: CPT

## 2020-04-07 PROCEDURE — 93010 ELECTROCARDIOGRAM REPORT: CPT | Performed by: INTERNAL MEDICINE

## 2020-04-07 RX ORDER — PANTOPRAZOLE SODIUM 40 MG/1
40 TABLET, DELAYED RELEASE ORAL
Status: DISCONTINUED | OUTPATIENT
Start: 2020-04-07 | End: 2020-04-07 | Stop reason: HOSPADM

## 2020-04-07 RX ORDER — SODIUM CHLORIDE 0.9 % (FLUSH) 0.9 %
10 SYRINGE (ML) INJECTION EVERY 12 HOURS SCHEDULED
Status: DISCONTINUED | OUTPATIENT
Start: 2020-04-07 | End: 2020-04-07 | Stop reason: HOSPADM

## 2020-04-07 RX ORDER — HYDROCODONE BITARTRATE AND ACETAMINOPHEN 5; 325 MG/1; MG/1
1 TABLET ORAL EVERY 6 HOURS PRN
Status: DISCONTINUED | OUTPATIENT
Start: 2020-04-07 | End: 2020-04-07 | Stop reason: HOSPADM

## 2020-04-07 RX ORDER — CLOPIDOGREL BISULFATE 75 MG/1
75 TABLET ORAL DAILY
Status: DISCONTINUED | OUTPATIENT
Start: 2020-04-07 | End: 2020-04-07 | Stop reason: HOSPADM

## 2020-04-07 RX ORDER — GABAPENTIN 300 MG/1
300 CAPSULE ORAL 3 TIMES DAILY
Status: DISCONTINUED | OUTPATIENT
Start: 2020-04-07 | End: 2020-04-07 | Stop reason: HOSPADM

## 2020-04-07 RX ORDER — LISINOPRIL 10 MG/1
10 TABLET ORAL DAILY
Status: DISCONTINUED | OUTPATIENT
Start: 2020-04-07 | End: 2020-04-07 | Stop reason: HOSPADM

## 2020-04-07 RX ORDER — ASPIRIN 81 MG/1
81 TABLET ORAL DAILY
Status: DISCONTINUED | OUTPATIENT
Start: 2020-04-07 | End: 2020-04-07 | Stop reason: HOSPADM

## 2020-04-07 RX ORDER — AMLODIPINE BESYLATE 2.5 MG/1
2.5 TABLET ORAL DAILY
Status: DISCONTINUED | OUTPATIENT
Start: 2020-04-07 | End: 2020-04-07 | Stop reason: HOSPADM

## 2020-04-07 RX ORDER — ONDANSETRON 2 MG/ML
4 INJECTION INTRAMUSCULAR; INTRAVENOUS EVERY 6 HOURS PRN
Status: DISCONTINUED | OUTPATIENT
Start: 2020-04-07 | End: 2020-04-07 | Stop reason: HOSPADM

## 2020-04-07 RX ORDER — METOPROLOL SUCCINATE 50 MG/1
50 TABLET, EXTENDED RELEASE ORAL DAILY
Status: DISCONTINUED | OUTPATIENT
Start: 2020-04-07 | End: 2020-04-07 | Stop reason: HOSPADM

## 2020-04-07 RX ORDER — ACETAMINOPHEN 650 MG/1
650 SUPPOSITORY RECTAL EVERY 6 HOURS PRN
Status: DISCONTINUED | OUTPATIENT
Start: 2020-04-07 | End: 2020-04-07 | Stop reason: HOSPADM

## 2020-04-07 RX ORDER — ATORVASTATIN CALCIUM 40 MG/1
40 TABLET, FILM COATED ORAL DAILY
Status: DISCONTINUED | OUTPATIENT
Start: 2020-04-07 | End: 2020-04-07 | Stop reason: HOSPADM

## 2020-04-07 RX ORDER — SODIUM CHLORIDE 0.9 % (FLUSH) 0.9 %
10 SYRINGE (ML) INJECTION PRN
Status: DISCONTINUED | OUTPATIENT
Start: 2020-04-07 | End: 2020-04-07 | Stop reason: HOSPADM

## 2020-04-07 RX ORDER — BUDESONIDE AND FORMOTEROL FUMARATE DIHYDRATE 160; 4.5 UG/1; UG/1
2 AEROSOL RESPIRATORY (INHALATION) 2 TIMES DAILY
Status: DISCONTINUED | OUTPATIENT
Start: 2020-04-07 | End: 2020-04-07 | Stop reason: HOSPADM

## 2020-04-07 RX ORDER — POLYETHYLENE GLYCOL 3350 17 G/17G
17 POWDER, FOR SOLUTION ORAL DAILY PRN
Status: DISCONTINUED | OUTPATIENT
Start: 2020-04-07 | End: 2020-04-07 | Stop reason: HOSPADM

## 2020-04-07 RX ORDER — PROMETHAZINE HYDROCHLORIDE 25 MG/1
12.5 TABLET ORAL EVERY 6 HOURS PRN
Status: DISCONTINUED | OUTPATIENT
Start: 2020-04-07 | End: 2020-04-07 | Stop reason: HOSPADM

## 2020-04-07 RX ORDER — HYDROXYZINE PAMOATE 50 MG/1
50 CAPSULE ORAL 4 TIMES DAILY
Status: DISCONTINUED | OUTPATIENT
Start: 2020-04-07 | End: 2020-04-07 | Stop reason: HOSPADM

## 2020-04-07 RX ORDER — ACETAMINOPHEN 325 MG/1
650 TABLET ORAL EVERY 6 HOURS PRN
Status: DISCONTINUED | OUTPATIENT
Start: 2020-04-07 | End: 2020-04-07 | Stop reason: HOSPADM

## 2020-04-07 RX ADMIN — ENOXAPARIN SODIUM 40 MG: 40 INJECTION SUBCUTANEOUS at 10:13

## 2020-04-07 RX ADMIN — ATORVASTATIN CALCIUM 40 MG: 40 TABLET, FILM COATED ORAL at 10:13

## 2020-04-07 RX ADMIN — GABAPENTIN 300 MG: 300 CAPSULE ORAL at 10:12

## 2020-04-07 RX ADMIN — TETROFOSMIN 31.2 MILLICURIE: 1.38 INJECTION, POWDER, LYOPHILIZED, FOR SOLUTION INTRAVENOUS at 11:45

## 2020-04-07 RX ADMIN — HYDROXYZINE PAMOATE 50 MG: 50 CAPSULE ORAL at 10:13

## 2020-04-07 RX ADMIN — AMLODIPINE BESYLATE 2.5 MG: 2.5 TABLET ORAL at 10:13

## 2020-04-07 RX ADMIN — HYDROCODONE BITARTRATE AND ACETAMINOPHEN 1 TABLET: 5; 325 TABLET ORAL at 10:17

## 2020-04-07 RX ADMIN — REGADENOSON 0.4 MG: 0.08 INJECTION, SOLUTION INTRAVENOUS at 11:45

## 2020-04-07 RX ADMIN — LISINOPRIL 10 MG: 10 TABLET ORAL at 10:13

## 2020-04-07 RX ADMIN — ASPIRIN 81 MG: 81 TABLET, COATED ORAL at 10:13

## 2020-04-07 RX ADMIN — CLOPIDOGREL BISULFATE 75 MG: 75 TABLET ORAL at 10:13

## 2020-04-07 RX ADMIN — HYDROXYZINE PAMOATE 50 MG: 50 CAPSULE ORAL at 01:26

## 2020-04-07 RX ADMIN — GABAPENTIN 300 MG: 300 CAPSULE ORAL at 01:26

## 2020-04-07 RX ADMIN — Medication 2 PUFF: at 06:57

## 2020-04-07 RX ADMIN — HYDROXYZINE PAMOATE 50 MG: 50 CAPSULE ORAL at 14:05

## 2020-04-07 RX ADMIN — NITROGLYCERIN 0.5 INCH: 20 OINTMENT TOPICAL at 01:26

## 2020-04-07 RX ADMIN — Medication 10 ML: at 10:14

## 2020-04-07 RX ADMIN — NITROGLYCERIN 0.5 INCH: 20 OINTMENT TOPICAL at 06:46

## 2020-04-07 RX ADMIN — GABAPENTIN 300 MG: 300 CAPSULE ORAL at 14:08

## 2020-04-07 RX ADMIN — TETROFOSMIN 10.7 MILLICURIE: 1.38 INJECTION, POWDER, LYOPHILIZED, FOR SOLUTION INTRAVENOUS at 10:30

## 2020-04-07 ASSESSMENT — PAIN DESCRIPTION - PAIN TYPE: TYPE: ACUTE PAIN

## 2020-04-07 ASSESSMENT — PAIN DESCRIPTION - ORIENTATION: ORIENTATION: LOWER

## 2020-04-07 ASSESSMENT — PAIN - FUNCTIONAL ASSESSMENT: PAIN_FUNCTIONAL_ASSESSMENT: ACTIVITIES ARE NOT PREVENTED

## 2020-04-07 ASSESSMENT — PAIN DESCRIPTION - LOCATION: LOCATION: BACK

## 2020-04-07 ASSESSMENT — PAIN DESCRIPTION - FREQUENCY: FREQUENCY: INTERMITTENT

## 2020-04-07 ASSESSMENT — PAIN SCALES - GENERAL: PAINLEVEL_OUTOF10: 2

## 2020-04-07 ASSESSMENT — PAIN DESCRIPTION - PROGRESSION: CLINICAL_PROGRESSION: GRADUALLY WORSENING

## 2020-04-07 ASSESSMENT — PAIN DESCRIPTION - DESCRIPTORS: DESCRIPTORS: ACHING

## 2020-04-07 ASSESSMENT — PAIN DESCRIPTION - ONSET: ONSET: ON-GOING

## 2020-04-07 NOTE — FLOWSHEET NOTE
04/07/20 0838   Vital Signs   Temp 97.5 °F (36.4 °C)   Temp Source Oral   Pulse 69   Heart Rate Source Monitor   Resp 20   /72   BP Location Right Arm   BP Upper/Lower Upper   MAP (mmHg) 88   Level of Consciousness 0   MEWS Score 1   Patient Currently in Pain Yes   Pain Assessment   Pain Assessment 0-10   Pain Level 2   Pain Type Acute pain   Pain Location Back   Pain Orientation Lower   Pain Descriptors Aching   Clinical Progression Gradually worsening   Patient's Stated Pain Goal No pain   Pain Frequency Intermittent   Pain Onset On-going   Functional Pain Assessment Activities are not prevented   Oxygen Therapy   SpO2 94 %   O2 Device None (Room air)   Patient resting quietly in bed. No s/s of distress noted. Shift assessment complete, see flow sheet. Denies needs at this time. Call light within reach. Will continue to monitor.

## 2020-04-07 NOTE — PROGRESS NOTES
Pt is lying in bed with their eyes closed. Respirations are easy and even. Call light within reach bed in lowest position with the wheels locked. Will continue to monitor.  John Nj

## 2020-04-07 NOTE — FLOWSHEET NOTE
04/07/20 0006   Vital Signs   Temp 97.7 °F (36.5 °C)   Temp Source Oral   Pulse 65   Heart Rate Source Monitor   Resp 16   /79   MAP (mmHg) 92   Level of Consciousness 0   MEWS Score 1   Height and Weight   Height 6' (1.829 m)   Oxygen Therapy   SpO2 93 %   O2 Device None (Room air)   Pt admitted to room [facility] from [place]. [place]. Vital signs stable. Pt is alert and oriented and denies any worsening chest pain at the moment. Nothing new noted on head to toe assessment. Pt is NSR on the monitor. Evening medication administration completed. Pt denies any further assistance at the moment. Pt is aware he is NPO at midnight. Will continue to monitor.

## 2020-04-07 NOTE — ED PROVIDER NOTES
Attends meetings of clubs or organizations: Not on file     Relationship status: Not on file    Intimate partner violence     Fear of current or ex partner: Not on file     Emotionally abused: Not on file     Physically abused: Not on file     Forced sexual activity: Not on file   Other Topics Concern    Not on file   Social History Narrative    Not on file     No current facility-administered medications for this encounter. Current Outpatient Medications   Medication Sig Dispense Refill    lisinopril (PRINIVIL;ZESTRIL) 10 MG tablet TAKE 1 TABLET BY MOUTH EVERY DAY 30 tablet 5    amLODIPine (NORVASC) 2.5 MG tablet TAKE 1 TABLET BY MOUTH EVERY DAY 30 tablet 10    isosorbide mononitrate (IMDUR) 30 MG extended release tablet TAKE 1 TABLET BY MOUTH EVERY DAY 90 tablet 3    HydrOXYzine Pamoate (VISTARIL PO) Take 50 mg by mouth 4 times daily       HYDROcodone-acetaminophen (NORCO) 5-325 MG per tablet Take 1 tablet by mouth every 6-8 hours as needed for Pain. Prosper Nicole metoprolol succinate (TOPROL XL) 50 MG extended release tablet Take 50 mg by mouth daily  2    BREO ELLIPTA 200-25 MCG/INH AEPB INHALE 1 PUFF INTO THE LUNGS AT THE SAME TIME EACH DAY  2    gabapentin (NEURONTIN) 300 MG capsule TAKE ONE CAPSULE BY MOUTH EVERY 8 HOURS  0    atorvastatin (LIPITOR) 40 MG tablet Take 1 tablet by mouth daily 30 tablet 5    clopidogrel (PLAVIX) 75 MG tablet Take 1 tablet by mouth daily 90 tablet 3    aspirin 81 MG EC tablet Take 1 tablet by mouth daily 30 tablet 5    pantoprazole (PROTONIX) 20 MG tablet TAKE 2 TABLETS BY MOUTH DAILY (Patient taking differently: TAKE 1 TABLET BY MOUTH BID) 30 tablet 0    nitroGLYCERIN (NITROSTAT) 0.4 MG SL tablet Place 1 tablet under the tongue every 5 minutes as needed. 25 tablet 0     Allergies   Allergen Reactions    Pcn [Penicillins] Other (See Comments) and Hives     Other reaction(s): Light Headed  Patient passed out.   syncope     Nursing Notes Reviewed    Physical Exam:  ED Triage Vitals [04/06/20 2022]   Enc Vitals Group      /78      Pulse 75      Resp 16      Temp 98.1 °F (36.7 °C)      Temp Source Oral      SpO2 96 %      Weight 275 lb (124.7 kg)      Height 6' (1.829 m)      Head Circumference       Peak Flow       Pain Score       Pain Loc       Pain Edu? Excl. in [address]? GENERAL APPEARANCE: A well-developed well-nourished pleasant anxious 81-year-old male in moderate distress  HEAD: Normocephalic, atraumatic  EYES: Sclera anicteric.no conjunctival injection, PERRL EOM's  Intact  ENT: Mucous membranes moist, no nasal discharge, pharynx clear, no stridor,  NECK: Supple, no meningismus, no JVD  HEART: RRR without rubs murmurs or gallops  PULSES: 2+ and equal carotid, radial, femoral, dorsalis pedis  CHEST: No chest wall pain to palpation  LUNGS:  Clear good air movement no wheezing no retraction or accessory muscle use,  ABDOMEN: Soft, non-tender to palpation, no guarding or rebound. , no mass or distention and no hepatosplenomegaly. No peritoneal signs, focal findings, or evidence of an acute abdomen at time of exam      EXTREMITIES: No acute deformities, no peripheral edema  SKIN: Warm and dry.  Normal color, no rash,  capillary refill less than 2 seconds  MENTAL STATUS: Alert, oriented, interactive,   NEUROLOGICAL:  No facial drooping. moves all 4 extremities, sensation intact, no focal findings     Nursing note and vital signs reviewed     I have reviewed and interpreted all of the currently available lab results from this visit (if applicable):  Results for orders placed or performed during the hospital encounter of 04/06/20   CBC Auto Differential   Result Value Ref Range    WBC 11.1 (H) 4.0 - 11.0 K/uL    RBC 5.13 4.20 - 5.90 M/uL    Hemoglobin 14.1 13.5 - 17.5 g/dL    Hematocrit 42.6 40.5 - 52.5 %    MCV 83.2 80.0 - 100.0 fL    MCH 27.4 26.0 - 34.0 pg    MCHC 33.0 31.0 - 36.0 g/dL    RDW 15.5 (H) 12.4 - 15.4 %    Platelets 840 565 - 368 K/uL    MPV 9.1 5.0 - 10.5 fL    Neutrophils % 69.8 %    Lymphocytes % 17.9 %    Monocytes % 8.9 %    Eosinophils % 2.6 %    Basophils % 0.8 %    Neutrophils Absolute 7.8 (H) 1.7 - 7.7 K/uL    Lymphocytes Absolute 2.0 1.0 - 5.1 K/uL    Monocytes Absolute 1.0 0.0 - 1.3 K/uL    Eosinophils Absolute 0.3 0.0 - 0.6 K/uL    Basophils Absolute 0.1 0.0 - 0.2 K/uL   Comprehensive Metabolic Panel w/ Reflex to MG   Result Value Ref Range    Sodium 138 136 - 145 mmol/L    Potassium reflex Magnesium 3.9 3.5 - 5.1 mmol/L    Chloride 101 99 - 110 mmol/L    CO2 25 21 - 32 mmol/L    Anion Gap 12 3 - 16    Glucose 100 (H) 70 - 99 mg/dL    BUN 7 7 - 20 mg/dL    CREATININE 0.7 (L) 0.9 - 1.3 mg/dL    GFR Non-African American >60 >60    GFR African American >60 >60    Calcium 8.7 8.3 - 10.6 mg/dL    Total Protein 7.0 6.4 - 8.2 g/dL    Alb 4.0 3.4 - 5.0 g/dL    Albumin/Globulin Ratio 1.3 1.1 - 2.2    Total Bilirubin 0.3 0.0 - 1.0 mg/dL    Alkaline Phosphatase 82 40 - 129 U/L    ALT 26 10 - 40 U/L    AST 18 15 - 37 U/L    Globulin 3.0 g/dL   Troponin   Result Value Ref Range    Troponin <0.01 <0.01 ng/mL   Protime-INR   Result Value Ref Range    Protime 12.0 10.0 - 13.2 sec    INR 1.04 0.86 - 1.14   EKG 12 Lead   Result Value Ref Range    Ventricular Rate 72 BPM    Atrial Rate 72 BPM    P-R Interval 172 ms    QRS Duration 86 ms    Q-T Interval 414 ms    QTc Calculation (Bazett) 453 ms    P Axis 50 degrees    R Axis 24 degrees    T Axis 49 degrees    Diagnosis       Normal sinus rhythmNormal ECGNo previous ECGs available        Radiographs (if obtained):  [] Radiologist's Report Reviewed:  XR CHEST PORTABLE   Final Result   No acute disease             [] Discussed with Radiologist:     [] The following radiograph was interpreted by myself in the absence of a radiologist:     EKG (if obtained): (All EKG's are interpreted by myself in the absence of a cardiologist)  EKG demonstrates normal sinus rhythm with normal axis intervals and ST-T wave segments and no evidence of ischemia infarction or arrhythmia. Heart rate is 72 and much like his previous EKG    MDM:   Patient with 2 days of substernal chest pain and aggravated by exertion and relieved by nitroglycerin presents for evaluation. He has a history of coronary artery disease with angioplasties done in 2006, 2012, and 2013. He was last studied with a stress test in April 2019. Heart score of 5 and I will arrange to get him admitted to [facility] for continued evaluation and to rule out ACS    Final Impression:  1. Chest pain, unspecified type        Critical Care:     Patient had a critical care time of 40 minutes including time to bedside time evaluating lab studies and in discussion with consultants. This does not include any billable procedures. The concern for this patient is ACS and the treatment was nitroglycerin aspirin and transfer for admission  Disposition referral (if applicable):  No follow-up provider specified. Disposition medications (if applicable):  New Prescriptions    No medications on file       Comment: Please note this report has been produced using speech recognition software and may contain errors related to that system including errors in grammar, punctuation, and spelling, as well as words and phrases that may be inappropriate. If there are any questions or concerns please feel free to contact the dictating provider for clarification.       MD Yannick Espinoza MD  04/06/20 [id number]       Yannick Cisneros MD  04/06/20 2432

## 2020-04-07 NOTE — CONSULTS
mouth every 6-8 hours as needed for Pain. Aruna Shepard     Historical Provider, MD   gabapentin (NEURONTIN) 300 MG capsule TAKE ONE CAPSULE BY MOUTH EVERY 8 HOURS 12/14/17   Historical Provider, MD        Allergies:  Pcn [penicillins]     Review of Systems: 12 point ROS negative in all areas as listed below except as in [place]  Constitutional, EENT, Cardiovascular, pulmonary, GI, , Musculoskeletal, skin, neurological, hematological, endocrine, Psychiatric    Physical Examination:    Vitals:    04/07/20 0246   BP: 110/63   Pulse: 67   Resp: 16   Temp: 97.6 °F (36.4 °C)   SpO2: 92%    Weight: 275 lb (124.7 kg)         General Appearance:  Alert, cooperative, no distress, appears stated age   Head:  Normocephalic, without obvious abnormality, atraumatic   Eyes:  PERRL, conjunctiva/corneas clear       Nose: Nares normal, no drainage or sinus tenderness   Throat: Lips, mucosa, and tongue normal   Neck: Supple, symmetrical, trachea midline, no adenopathy, thyroid: not enlarged, symmetric, no tenderness/mass/nodules, no carotid bruit or JVD       Lungs:   Clear to auscultation bilaterally, respirations unlabored   Chest Wall:  No tenderness or deformity   Heart:  Regular rate and rhythm, S1, S2 normal, no murmur, rub or gallop   Abdomen:   Soft, non-tender, bowel sounds active all four quadrants,  no masses, no organomegaly           Extremities: Extremities normal, atraumatic, no cyanosis or edema   Pulses: 2+ and symmetric   Skin: Skin color, texture, turgor normal, no rashes or lesions   Pysch: Normal mood and affect   Neurologic: Normal gross motor and sensory exam.         Labs  CBC:   Lab Results   Component Value Date    WBC 9.0 04/07/2020    RBC 5.00 04/07/2020    HGB 14.1 04/07/2020    HCT 42.0 04/07/2020    MCV 83.9 04/07/2020    RDW 15.6 04/07/2020     04/07/2020     CMP:    Lab Results   Component Value Date     04/07/2020    K 3.7 04/07/2020     04/07/2020    CO2 25 04/07/2020    BUN 7 04/07/2020 CREATININE 0.6 2020    GFRAA >60 2020    AGRATIO 1.3 2020    LABGLOM >60 2020    GLUCOSE 109 2020    PROT 7.0 2020    CALCIUM 8.3 2020    BILITOT 0.3 2020    ALKPHOS 82 2020    AST 18 2020    ALT 26 2020     PT/INR:  No results found for: PTINR  Lab Results   Component Value Date    EQSYDOV 000 2018    TROPONINI <0.01 2020   troponin x3 < 0.01  I have reviewed the following tests and documented in this encounter as follows:     EK20 I have reviewed EKG with the following interpretation:  Impression:  Normal sinus rhythmNormal ECGNo previous ECGs availableConfirmed by Bridgette Oden MD, [address] () on 2020 6:45:34       CXR 20  FINDINGS: Heart size is stable. Mediastinal contours are stable. De pendant opacity is seen at the lung bases, likely atelectasis. There is mild eventration of the right hemidiaphragm. No pulmonary edema. Remote rib fracture is seen on the left. Carotid US 19  Summary  1. There is minimal plaque seen in the right internal carotid artery with an  estimated diameter reduction of <50%. 2. There is minimal plaque seen in the left internal carotid artery with an  estimated diameter reduction of <50%. 3. The vertebral arteries are patent with antegrade flow bilaterally     Babita MPI 18:  Virl Lace is normal isotope uptake at stress and rest. There is no evidence of  myocardial ischemia or scar. LV function is normal with uniform wall motion  and ejection fraction of 67 %. Low risk study.        Procedure Performed: 18:   1. Coronary angiogram  2. Left heart cath  3. Left ventriculogram   Findings:  1. Mild to moderate diffuse CAD              ~mild in-stent of the distal RCA  2. Normal LVEF 60%  3. Normal hemodynamics   Conclusion/plan of care:  1. Medical management  2. Add Ranexa  3.  Referral to pulmonary         Echo 2017:    Left ventricular systolic function is normal with an ejection fraction of 55-60%.    No wall motion abnormalities noted.   Normal left ventricular diastolic filling pressure.   Trivial mitral regurgitation. Stress test 8/14/17  Summary Abnormal low risk myocardial perfusion study. There is a small and mildly fixed defect within the inferior wall and  inferior-apex consistent with prior subendocardial infarction vs attenuation  artifact. There is no ischemia noted. The left ventricular size is mildly dilated. The estimated left ventricular function is 57% with normal wall motion. CATH 2013  1. Severe single-vessel coronary artery disease involving the distal right   coronary  artery. This has been successfully treated with a Promus drug-eluting   stent. He has mild  to moderate diffuse disease in the LAD approaching moderate to mid LAD;   this will be  monitored clinically. 2. Normal LV chamber size and function with normal LVEDP. Assessment  Chest pain atypical of angina  Known CAD ruled out MI  COPD active tobacco abuse    Patient Active Problem List   Diagnosis    Claudication [address] [address]    HTN (hypertension)    Hyperlipidemia    CAD (coronary artery disease)    Acute chest pain    Coronary artery disease involving native coronary artery of native heart    Tobacco abuse    Thumb sprain    Mass of hand    Cardiac microvascular disease    Chest pain         Plan:  lexiscan myoview today  Asa plavix  Beta blockers  Statin  nitrates  Tobacco use was discussed with the patient and educated on the negative effects. I have asked the patient to not utilize these agents. If stress test is neg he may be discharged home and follow with Dr Ana Lilia Avila  Thank you for allowing to us to participate in the [facility] or [facility]. Further evaluation will be based upon the patient's clinical course and testing results.      Marielena Beauchamp MD

## 2020-04-07 NOTE — H&P
Combined Hospital Medicine History & Physical      PCP: Halley Lujan    Date of Admission: 4/6/2020    Date of Service: Pt seen/examined on 4/7/2020    Chief Complaint:    Chief Complaint   Patient presents with    Chest Pain     RL chest pain starting last night at 0100. STS started back up this evening, mid-sternal.  Per squad, 1 Nitro relieved pain 6/10 - 1/10.  4 tabs 81 mg ASA PO adm         History Of Present Illness: The patient is a 49 y.o. male with a PMH of CAD s/p stents x 3, HTN, HLD, COPD, Tobacco abuse, GERD, chronic low back pain who presented to the ED with complaint of central chest pressure that radiates to his shoulders, neck and back. Pt states symptoms started yesterday. He notices the pressure sensation at both rest and with exertion. No SOB, nausea, indigestion or diaphoresis. Denies getting chest pain with exertion. No improvement with rest. Pressure sensation lasted for seconds before resolving on its own. He does have a hx of CAD s/p 3 stents. Follows with Dr. Karlie Orosco. Denies that this sensation feels similar to prior episodes in the past. No hx of VTE, heavy lifting, strenuous exercise, panic attacks, recent bronchitis, PNA or excessive coughing. No hx of DM. + Hx of HTN, tobacco abuse, HLD, hx of CAD, + family hx.      Past Medical History:        Diagnosis Date    Anxiety     Bipolar affective ([address]     CAD (coronary artery disease)     Chronic back pain     COPD (chronic obstructive pulmonary disease) ([address]     DDD (degenerative disc disease), cervical     Depression     GERD (gastroesophageal reflux disease)     Hyperlipidemia     Hypertension     Pancreatitis     Recovering alcoholic ([address]     Tobacco abuse 12/16/2016       Past Surgical History:        Procedure Laterality Date    CORONARY ANGIOPLASTY WITH STENT PLACEMENT  2006, 2012, 2013    [facility]       Medications Prior to Admission:    Prior to Admission medications    Medication Sig Start Date End Depression Mother     Diabetes Father     Heart Disease Father     Substance Abuse Father     Heart Disease Sister     High Blood Pressure Sister     Mental Retardation Brother     Other Brother     Learning Disabilities Brother     Early Death Sister    Everett Murry Cancer Sister     Substance Abuse Sister     Cancer Sister     Diabetes Brother     High Blood Pressure Brother        REVIEW OF SYSTEMS:     Constitutional: Negative for fever   HENT: Negative for sore throat   Eyes: Negative for redness   Respiratory: Negative  for dyspnea, cough   Cardiovascular: + chest pain   Gastrointestinal: Negative for vomiting, diarrhea   Genitourinary: Negative for hematuria   Musculoskeletal: Negative for arthralgias   Skin: Negative for rash   Neurological: Negative for syncope   Hematological: Negative for adenopathy   Psychiatric/Behavorial: Negative for anxiety    PHYSICAL EXAM:    /63   Pulse 67   Temp 97.6 °F (36.4 °C) (Oral)   Resp 18   Ht 6' (1.829 m)   Wt 275 lb (124.7 kg)   SpO2 91%   BMI 37.30 kg/m²   Gen: No distress. Alert. Middle aged  male  Eyes: No sclera icterus. No conjunctival injection. Neck: Trachea midline. Resp: No accessory muscle use. No crackles. No wheezes. No rhonchi. On RA  CV: Regular rate. Regular rhythm. No murmur. No rub. No edema. GI: Mild epigastric tenderness with deep palpation. Non-distended. No masses. No organomegaly. Normal bowel sounds. No hernia. Skin: Warm and dry. No rash on exposed extremities. M/S: No cyanosis. No joint deformity. No clubbing. Anterior chest wall non-TTP  Neuro: Awake. Grossly nonfocal    Psych: Oriented x 3. No anxiety or agitation.      CBC:   Recent Labs     04/06/20 2028 04/07/20  0426   WBC 11.1* 9.0   HGB 14.1 14.1   HCT 42.6 42.0   MCV 83.2 83.9    132*     BMP:   Recent Labs     04/06/20 2028 04/07/20  0426    137   K 3.9 3.7    101   CO2 25 25   BUN 7 7   CREATININE 0.7* 0.6*     LIVER PROFILE:

## 2020-04-08 ENCOUNTER — CARE COORDINATION (OUTPATIENT)
Dept: CARE COORDINATION | Age: 55
End: 2020-04-08

## 2020-04-22 ENCOUNTER — CARE COORDINATION (OUTPATIENT)
Dept: CARE COORDINATION | Age: 55
End: 2020-04-22

## 2020-04-22 NOTE — CARE COORDINATION
Second attempt to reach Pt/ EC for ed/covid 19 follow up unsuccessful, [employer] compliant message left on vm, no further attempts  Will be made

## 2020-04-24 ENCOUNTER — TELEPHONE (OUTPATIENT)
Dept: CARDIOLOGY CLINIC | Age: 55
End: 2020-04-24

## 2020-04-24 NOTE — TELEPHONE ENCOUNTER
Spoke with pt. Medications/allergeis reviewed. He will check BP/HR. Unable to check weight. Will wear a mask.

## 2020-04-28 NOTE — PROGRESS NOTES
[address] [facility]   Cardiovascular Evaluation    PATIENT: Basil Musa  DATE: 2020  MRN: [id number]  CSN: [id number]  : 1965    Primary Care Doctor: Jewel Thompson    Reason for evaluation:   Follow-Up from Hospital; Chest Pain; and Hypertension      Subjective:    History of present illness on initial date of evaluation:   Basil Musa is a 49 y.o. patient who presents for follow up. He presented to the emergency room on 20 with chest pain. His stress test at that time was negative. Today he reports that he has had epigastric pain for the last few days. He also has had off and on chest pain for the last few years. This is mid sternal to left sided. He attributes his symptoms to stress. He has been taking his medications as prescribed. He continues to smoke daily. He lives alone and cooks his own meals. He does not exercise regularly though tolerates normal activity well. He does report that he has lost weight with diet modification. Patient Active Problem List   Diagnosis    Claudication ([address]    HTN (hypertension)    Hyperlipidemia    CAD (coronary artery disease)    Acute chest pain    Coronary artery disease involving native coronary artery of native heart    Tobacco abuse    Thumb sprain    Mass of hand    Cardiac microvascular disease    Chest pain         Cardiac Testing: I have reviewed the findings below. EKG:  ECHO:   STRESS TEST:  CATH:  BYPASS:  VASCULAR:    Past Medical History:   has a past medical history of Anxiety, Bipolar affective ([address] CAD (coronary artery disease), Chronic back pain, COPD (chronic obstructive pulmonary disease) ([address] DDD (degenerative disc disease), cervical, Depression, GERD (gastroesophageal reflux disease), Hyperlipidemia, Hypertension, Pancreatitis, Recovering alcoholic ([address] and Tobacco abuse. Surgical History:   has a past surgical history that includes Coronary angioplasty with stent (, , ). Social History:   reports that he has been smoking cigarettes. He has a 11.25 pack-year smoking history. He has never used smokeless tobacco. He reports current alcohol use. He reports current drug use. Drug: Marijuana. Family History:  No evidence for sudden cardiac death or premature CAD    Medications:  Reviewed and are listed in nursing record. and/or listed below  Outpatient Medications:  Prior to Admission medications    Medication Sig Start Date End Date Taking? Authorizing Provider   lisinopril (PRINIVIL;ZESTRIL) 10 MG tablet TAKE 1 TABLET BY MOUTH EVERY DAY 2/3/20  Yes Shu Sexton MD   isosorbide mononitrate (IMDUR) 30 MG extended release tablet TAKE 1 TABLET BY MOUTH EVERY DAY 5/31/19  Yes Shu Sexton MD   HydrOXYzine Pamoate (VISTARIL PO) Take 50 mg by mouth 4 times daily    Yes Historical Provider, MD   HYDROcodone-acetaminophen (NORCO) 5-325 MG per tablet Take 1 tablet by mouth every 6-8 hours as needed for Pain. .   Yes Historical Provider, MD   metoprolol succinate (TOPROL XL) 50 MG extended release tablet Take 50 mg by mouth daily 2/7/18  Yes Historical Provider, MD   [place] 200-25 MCG/INH AEPB INHALE 1 PUFF INTO THE LUNGS AT [place] [id number] 11/9/17  Yes Historical Provider, MD   gabapentin (NEURONTIN) 300 MG capsule TAKE ONE CAPSULE BY MOUTH EVERY 8 HOURS 12/14/17  Yes Historical Provider, MD   atorvastatin (LIPITOR) 40 MG tablet Take 1 tablet by mouth daily 9/29/17  Yes JOSE Ledezma CNP   clopidogrel (PLAVIX) 75 MG tablet Take 1 tablet by mouth daily 4/26/17  Yes JOSE Pickering CNP   aspirin 81 MG EC tablet Take 1 tablet by mouth daily 4/10/17  Yes JOSE Pickering CNP   pantoprazole (PROTONIX) 20 MG tablet TAKE 2 TABLETS BY MOUTH DAILY  Patient taking differently: TAKE 1 TABLET BY MOUTH BID 9/29/16  Yes JOSE Pickering CNP   nitroGLYCERIN (NITROSTAT) 0.4 MG SL tablet Place 1 tablet under the tongue every 5 minutes as needed.  10/1/14  Yes scribed in the presence of Dr. German Tamayo MD by Svetlana Keene RN.      I, Dr. German Tamayo, personally performed the services described in this documentation, as scribed by the above signed scribe in my presence. It is both accurate and complete to my knowledge. I agree with the details independently gathered by the clinical support staff, while the remaining scribed note accurately describes my personal service to the patient. All questions and concerns were addressed to the patient/family. Alternatives to my treatment were discussed. The note was completed using EMR. Every effort was made to ensure accuracy; however, inadvertent computerized transcription errors may be present.           German Tamayo MD, [facility]  [phone] [facility] office  [phone] Main central  4/29/2020  1:04 PM

## 2020-04-29 ENCOUNTER — OFFICE VISIT (OUTPATIENT)
Dept: CARDIOLOGY CLINIC | Age: 55
End: 2020-04-29
Payer: COMMERCIAL

## 2020-04-29 VITALS
HEIGHT: 73 IN | HEART RATE: 88 BPM | SYSTOLIC BLOOD PRESSURE: 152 MMHG | BODY MASS INDEX: 35.65 KG/M2 | DIASTOLIC BLOOD PRESSURE: 90 MMHG | WEIGHT: 269 LBS | OXYGEN SATURATION: 98 %

## 2020-04-29 PROCEDURE — 99214 OFFICE O/P EST MOD 30 MIN: CPT | Performed by: INTERNAL MEDICINE

## 2020-04-29 PROCEDURE — 3017F COLORECTAL CA SCREEN DOC REV: CPT | Performed by: INTERNAL MEDICINE

## 2020-04-29 PROCEDURE — G8417 CALC BMI ABV UP PARAM F/U: HCPCS | Performed by: INTERNAL MEDICINE

## 2020-04-29 PROCEDURE — G8427 DOCREV CUR MEDS BY ELIG CLIN: HCPCS | Performed by: INTERNAL MEDICINE

## 2020-04-29 PROCEDURE — 4004F PT TOBACCO SCREEN RCVD TLK: CPT | Performed by: INTERNAL MEDICINE

## 2020-04-29 RX ORDER — METOPROLOL SUCCINATE 100 MG/1
100 TABLET, EXTENDED RELEASE ORAL DAILY
Qty: 90 TABLET | Refills: 3 | Status: SHIPPED | OUTPATIENT
Start: 2020-04-29 | End: 2020-09-04 | Stop reason: SDUPTHER

## 2020-04-29 NOTE — PATIENT INSTRUCTIONS
Plan:  1. Increase Metoprolol 100 mg daily for better blood pressure control. 2. The patient was seen for >25 minutes. >50% of the time was devoted to giving the patient detailed instructions instructions on addressing diet, regular exercise, weight control, smoking abstention, medication compliance, and stress minimization. The patient was provided written and verbal instructions regarding risk factor modification. 3. Follow up with me in 6 months.

## 2020-04-29 NOTE — LETTER
Throat: Lips, mucosa, and tongue normal   Neck: Supple, symmetrical, trachea midline, no adenopathy, thyroid: not enlarged, symmetric, no tenderness/mass/nodules, no carotid bruit or JVD       Lungs:   Clear to auscultation bilaterally, respirations unlabored   Chest Wall:  No tenderness or deformity   Heart:  Regular rhythm and normal rate; S1, S2 are normal; no murmur noted; no rub or gallop   Abdomen:   Obese, soft, non-tender, bowel sounds active all four quadrants,  no masses, no organomegaly           Extremities: Extremities normal, atraumatic, no cyanosis or edema   Pulses: 2+ and symmetric   Skin: Skin color, texture, turgor normal, no rashes or lesions   Pysch: Normal mood and affect   Neurologic: Normal gross motor and sensory exam.         Labs  No results for input(s): WBC, HGB, HCT, MCV, PLT in the last 72 hours. No results for input(s): CREATININE, BUN, NA, K, CL, CO2 in the last 72 hours. No results for input(s): INR, PROTIME in the last 72 hours. No results for input(s): TROPONINI in the last 72 hours. Invalid input(s): PRO-BNP  No results for input(s): CHOL, HDL in the last 72 hours. Invalid input(s): LDL, TG      Imaging:  I have reviewed the below testing personally and my interpretation is below. EKG:  CXR:      Assessment:  49 y.o. patient with:  1. Coronary artery disease   ~stents in 2006, 2012, 2013   ~chronic stable angina: ongoing   2. Hypertension   BP: (152)/(90)   3. Current smoker   ~smoking cessation discussed  4. Obesity   ~Body mass index is 35.49 kg/m². Plan:  1. Increase Metoprolol 100 mg daily for better blood pressure control. 2. The patient was seen for >25 minutes. >50% of the time was devoted to giving the patient detailed instructions instructions on addressing diet, regular exercise, weight control, smoking abstention, medication compliance, and stress minimization. The patient was provided written and verbal instructions regarding risk factor modification. 3. Follow up with me in 6 months. This note was scribed in the presence of Dr. Ashley Mosley MD by Kelvin Mendiola RN.      I, Dr. Ashley Mosley, personally performed the services described in this documentation, as scribed by the above signed scribe in my presence. It is both accurate and complete to my knowledge. I agree with the details independently gathered by the clinical support staff, while the remaining scribed note accurately describes my personal service to the patient. All questions and concerns were addressed to the patient/family. Alternatives to my treatment were discussed. The note was completed using EMR. Every effort was made to ensure accuracy; however, inadvertent computerized transcription errors may be present.           Ashley Mosley MD, [facility]  [phone] [facility] office  [phone] Main central  4/29/2020  1:04 PM

## 2020-05-28 ENCOUNTER — TELEPHONE (OUTPATIENT)
Dept: PULMONOLOGY | Age: 55
End: 2020-05-28

## 2020-05-28 NOTE — TELEPHONE ENCOUNTER
Patient has cancelled appointment based on the CDC recommended COVID-19 pandemic precautions. Pt was scheduled for 31-90 sleep f/u on 5/28/2020. Attempted to reach pt to change to a VV. Pt did not call back. Pt did not complete sleep test either    11/19/2019    Assessment:       · Snoring  · Observed sleep apnea   · Fatigue  · Obesity  · Delayed sleep phase  · Sleep onset insomnia-poor sleep hygiene, comorbid conditions, probable JULIANN likely contributing  · CAD and HTN per cardiology  · Chronic pain taking Vicodin and gabapentin per pain management  · Mood disorders  · Excessive caffeine intake         Plan:      · Split-night PSG to evaluate forsleep related breathing disorder. · Treatment options were discussed with patient if PSG reveals JULIANN, including CPAP therapy, oral appliances and upper airway surgery. · Sleep hygiene  · Cognitive behavioral therapy was discussed with patient including stimulus control and sleep restriction  · Discussed at length recommend have set bed and wake times, start shifting back times in 30 minute increments, when get routine at earlier time, again shift times back 30 minutes until desired sleep time achieved. No naps in daytime  Also discussed avoid light in evenings and increase light exposure in mornings. Recommend avoid stimulating activity at least 2 hours prior to desired sleep time. Minimize or eliminate caffeine. · Avoid sedatives, alcohol and caffeinated drinks at bed time. · No driving motorized vehicles or operating heavy machinery while fatigue, drowsy or sleepy. · Weight loss is also recommended as a long-term intervention. · Complications of JULIANN if not treated were discussed with patient patient to include systemic hypertension, pulmonary hypertension, cardiovascular morbidities, car accidents and all cause mortality. · Discussed pathophysiology of JULIANN with patient today- video shown in office.   · Patient education handout provided regarding sleep tips and CPAP cleaning recommendations   · Follow up with PCP for LE edema,  states will call for appointment

## 2020-06-15 RX ORDER — ISOSORBIDE MONONITRATE 30 MG/1
TABLET, EXTENDED RELEASE ORAL
Qty: 90 TABLET | Refills: 3 | Status: SHIPPED | OUTPATIENT
Start: 2020-06-15 | End: 2020-06-24

## 2020-06-24 RX ORDER — ISOSORBIDE MONONITRATE 30 MG/1
TABLET, EXTENDED RELEASE ORAL
Qty: 90 TABLET | Refills: 2 | Status: SHIPPED | OUTPATIENT
Start: 2020-06-24 | End: 2021-06-17 | Stop reason: SDUPTHER

## 2020-09-04 RX ORDER — METOPROLOL SUCCINATE 100 MG/1
100 TABLET, EXTENDED RELEASE ORAL DAILY
Qty: 90 TABLET | Refills: 3 | Status: SHIPPED | OUTPATIENT
Start: 2020-09-04 | End: 2022-02-02 | Stop reason: SDUPTHER

## 2020-09-04 NOTE — TELEPHONE ENCOUNTER
Pt pharmacy sts they need order for refills on metoprolol succinate (TOPROL XL) 100 MG extended release tablet . Pt med list shows metoprolol succinate (TOPROL XL) 100 MG extended release tablet  90 day supply with 3 refill written 4/29/2020. Please advise.     [address] [place], [place] [address]

## 2020-09-05 ENCOUNTER — HOSPITAL ENCOUNTER (OUTPATIENT)
Age: 55
Discharge: HOME | End: 2020-09-05
Payer: COMMERCIAL

## 2020-09-05 ENCOUNTER — HOSPITAL ENCOUNTER (OUTPATIENT)
Dept: ULTRASOUND IMAGING | Age: 55
Discharge: HOME | End: 2020-09-05
Payer: COMMERCIAL

## 2020-09-05 ENCOUNTER — HOSPITAL ENCOUNTER (OUTPATIENT)
Dept: GENERAL RADIOLOGY | Age: 55
Discharge: HOME | End: 2020-09-05
Payer: COMMERCIAL

## 2020-09-05 PROCEDURE — 76700 US EXAM ABDOM COMPLETE: CPT

## 2020-09-05 PROCEDURE — 72040 X-RAY EXAM NECK SPINE 2-3 VW: CPT

## 2020-09-24 ENCOUNTER — TELEPHONE (OUTPATIENT)
Dept: PULMONOLOGY | Age: 55
End: 2020-09-24

## 2020-09-24 NOTE — TELEPHONE ENCOUNTER
Patient did not show for 31-90 day sleep follow-up appointment  with  on 9/24/20      pt did not complete HST no answer to r/s 9/22/2020 [facility]    Patient was also no show on: 3/2/20,6/17/19 and 5/14/18    LOV   Assessment: 11/19/19      · Snoring  · Observed sleep apnea   · Fatigue  · Obesity  · Delayed sleep phase  · Sleep onset insomnia-poor sleep hygiene, comorbid conditions, probable JULIANN likely contributing  · CAD and HTN per cardiology  · Chronic pain taking Vicodin and gabapentin per pain management  · Mood disorders  · Excessive caffeine intake         Plan:      · Split-night PSG to evaluate forsleep related breathing disorder. · Treatment options were discussed with patient if PSG reveals JULIANN, including CPAP therapy, oral appliances and upper airway surgery. · Sleep hygiene  · Cognitive behavioral therapy was discussed with patient including stimulus control and sleep restriction  · Discussed at length recommend have set bed and wake times, start shifting back times in 30 minute increments, when get routine at earlier time, again shift times back 30 minutes until desired sleep time achieved. No naps in daytime  Also discussed avoid light in evenings and increase light exposure in mornings. Recommend avoid stimulating activity at least 2 hours prior to desired sleep time. Minimize or eliminate caffeine. · Avoid sedatives, alcohol and caffeinated drinks at bed time. · No driving motorized vehicles or operating heavy machinery while fatigue, drowsy or sleepy. · Weight loss is also recommended as a long-term intervention. · Complications of JULIANN if not treated were discussed with patient patient to include systemic hypertension, pulmonary hypertension, cardiovascular morbidities, car accidents and all cause mortality. · Discussed pathophysiology of JULIANN with patient today- video shown in office.   · Patient education handout provided regarding sleep tips and CPAP cleaning recommendations · Follow up with PCP for LE edema,  states will call for appointment

## 2020-09-29 RX ORDER — CLOPIDOGREL BISULFATE 75 MG/1
75 TABLET ORAL DAILY
Qty: 90 TABLET | Refills: 3 | Status: SHIPPED | OUTPATIENT
Start: 2020-09-29 | End: 2021-10-25

## 2020-09-29 RX ORDER — CLOPIDOGREL BISULFATE 75 MG/1
75 TABLET ORAL DAILY
Qty: 90 TABLET | Refills: 3 | Status: SHIPPED | OUTPATIENT
Start: 2020-09-29 | End: 2020-09-29 | Stop reason: SDUPTHER

## 2020-09-29 RX ORDER — ATORVASTATIN CALCIUM 40 MG/1
40 TABLET, FILM COATED ORAL DAILY
Qty: 30 TABLET | Refills: 5 | Status: CANCELLED | OUTPATIENT
Start: 2020-09-29

## 2020-11-03 PROBLEM — R07.9 CHEST PAIN: Status: RESOLVED | Noted: 2018-12-17 | Resolved: 2020-11-03

## 2020-12-02 NOTE — PROGRESS NOTES
[address]   Cardiac Evaluation    Primary Care Doctor:  Katherine Ledesma    Chief Complaint   Patient presents with   [facility] Chest Pain     feels like squeezing sensation lasting a few seconds     Follow-up    Abdominal Pain     pain and pressure x 1 year     Medication Refill     Nitroglycerin         History of Present Illness:   I had the pleasure of seeing Zana Boo as an urgent visit for chest pain. He has a known history of CAD with prior PCI in 2006, 2012, 2013, microvascular angina, HTN, hyperlipidemia, as well as smoker, obesity and COPD. He describes a squeezing pain in left lower lateral chest, occurs with activity, improves with rest.  Occurred again yesterday at a meeting. Usually brief, comes and goes. More frequent recently, particularly in past couple of days. He has chronic shortness of breath due to COPD, but worse with the chest discomfort. He is under increased stress recently with health of family member. He has been having stomach problems, chronic but worsening recently. Has been told has fatty liver. He was recently told he is borderline diabetic. Had injection in back that has \"messed up left arm\", 2-3 months ago. His weight is down 8 lbs over past 6 months, intentional with increased activity. He is walking outside and now using stationary bike. Does not have chest discomfort with this activity. He had previously been on Ranexa but stopped due to side effects of nausea and lightheadedness. He however feels that this was related to interactions with other medications he was on at the time. PCP did blood work within in past 2 weeks ([facility]). Zana Boo describes symptoms including chest pain, dyspnea, fatigue but denies palpitations, orthopnea, PND, early saiety, edema, syncope.      NYHA:   III  ACC/ AHA Stage:    C    Past Medical History:   has a past medical history of Anxiety, Bipolar affective ([address] CAD (coronary artery disease), Chronic back pain, COPD (chronic obstructive pulmonary disease) ([facility]), DDD (degenerative disc disease), cervical, Depression, GERD (gastroesophageal reflux disease), Hyperlipidemia, Hypertension, Pancreatitis, Recovering alcoholic ([address] and Tobacco abuse. Surgical History:   has a past surgical history that includes Coronary angioplasty with stent (2006, 2012, 2013). Social History:   reports that he has been smoking cigarettes. He has a 11.25 pack-year smoking history. He has never used smokeless tobacco. He reports current alcohol use. He reports current drug use. Drug: Marijuana. Family History:   Family History   Problem Relation Age of Onset    Other Mother     Arthritis Mother     Mental Illness Mother     Depression Mother     Diabetes Father     Heart Disease Father     Substance Abuse Father     Heart Disease Sister     High Blood Pressure Sister     Mental Retardation Brother     Other Brother     Learning Disabilities Brother     Early Death Sister    Lashonda Lanza Cancer Sister     Substance Abuse Sister     Cancer Sister     Diabetes Brother     High Blood Pressure Brother        Home Medications:  Prior to Admission medications    Medication Sig Start Date End Date Taking? Authorizing Provider   clopidogrel (PLAVIX) 75 MG tablet Take 1 tablet by mouth daily 9/29/20  Yes Thalia Bnun MD   metoprolol succinate (TOPROL XL) 100 MG extended release tablet Take 1 tablet by mouth daily 9/4/20  Yes Thalia Bunn MD   isosorbide mononitrate (IMDUR) 30 MG extended release tablet TAKE (1) TABLET DAILY 6/24/20  Yes Thalia Bunn MD   lisinopril (PRINIVIL;ZESTRIL) 10 MG tablet TAKE 1 TABLET BY MOUTH EVERY DAY 2/3/20  Yes Thalia Bunn MD   HydrOXYzine Pamoate (VISTARIL PO) Take 50 mg by mouth 4 times daily    Yes Historical Provider, MD   HYDROcodone-acetaminophen (NORCO) 5-325 MG per tablet Take 1 tablet by mouth every 6-8 hours as needed for Pain.  Tamela Hernandez Historical Provider, MD Maribeth Esposito hemodynamics   Conclusion/plan of care:  1. Medical management  2. Add Ranexa  3. Referral to pulmonary        Echo Aug 2017:    Left ventricular systolic function is normal with an ejection fraction of 55-60%. No wall motion abnormalities noted. Normal left ventricular diastolic filling pressure. Trivial mitral regurgitation. Assessment:    1. Coronary artery disease involving native coronary artery of native heart with unstable angina pectoris ([address]    2. Essential hypertension    3. Mixed hyperlipidemia    4. Tobacco abuse          Plan:   1. Add back Ranexa 500 mg twice daily  2. Start L-arginine 500 mg twice daily  3. Continue current other heart medicines  4. Will call and get blood test results from PCP  5. Follow up with me in 1 month (1/11/20 at [facility])      I appreciate the opportunity of cooperating in the care of this individual.    Ale Frost, JOSE - CNP, 12/3/2020, 9:24 AM       QUALITY MEASURES  1. Tobacco Cessation Counseling: Yes  2. Retake of BP if >140/90:   NA  3. Documentation to PCP/referring for new patient:  Sent to PCP at close of office visit  4. CAD patient on anti-platelet: Yes  5. CAD patient on STATIN therapy:  Yes  6.  Patient with CHF and aFib on anticoagulation:  NA

## 2020-12-03 ENCOUNTER — OFFICE VISIT (OUTPATIENT)
Dept: CARDIOLOGY CLINIC | Age: 55
End: 2020-12-03
Payer: COMMERCIAL

## 2020-12-03 ENCOUNTER — TELEPHONE (OUTPATIENT)
Dept: CARDIOLOGY CLINIC | Age: 55
End: 2020-12-03

## 2020-12-03 VITALS
SYSTOLIC BLOOD PRESSURE: 120 MMHG | BODY MASS INDEX: 35.35 KG/M2 | HEIGHT: 72 IN | DIASTOLIC BLOOD PRESSURE: 80 MMHG | OXYGEN SATURATION: 96 % | HEART RATE: 77 BPM | WEIGHT: 261 LBS

## 2020-12-03 PROCEDURE — 4004F PT TOBACCO SCREEN RCVD TLK: CPT | Performed by: NURSE PRACTITIONER

## 2020-12-03 PROCEDURE — 99214 OFFICE O/P EST MOD 30 MIN: CPT | Performed by: NURSE PRACTITIONER

## 2020-12-03 PROCEDURE — 3017F COLORECTAL CA SCREEN DOC REV: CPT | Performed by: NURSE PRACTITIONER

## 2020-12-03 PROCEDURE — G8484 FLU IMMUNIZE NO ADMIN: HCPCS | Performed by: NURSE PRACTITIONER

## 2020-12-03 PROCEDURE — G8417 CALC BMI ABV UP PARAM F/U: HCPCS | Performed by: NURSE PRACTITIONER

## 2020-12-03 PROCEDURE — G8427 DOCREV CUR MEDS BY ELIG CLIN: HCPCS | Performed by: NURSE PRACTITIONER

## 2020-12-03 RX ORDER — RANOLAZINE 500 MG/1
500 TABLET, EXTENDED RELEASE ORAL 2 TIMES DAILY
Qty: 60 TABLET | Refills: 3 | Status: SHIPPED | OUTPATIENT
Start: 2020-12-03 | End: 2021-06-17

## 2020-12-03 RX ORDER — SOY ISOFLAVONE 40 MG
500 TABLET ORAL 2 TIMES DAILY
Qty: 60 CAPSULE | Refills: 3 | Status: SHIPPED | OUTPATIENT
Start: 2020-12-03 | End: 2021-09-02

## 2020-12-03 RX ORDER — NITROGLYCERIN 0.4 MG/1
0.4 TABLET SUBLINGUAL EVERY 5 MIN PRN
Qty: 25 TABLET | Refills: 3 | Status: SHIPPED | OUTPATIENT
Start: 2020-12-03

## 2020-12-03 NOTE — LETTER
(coronary artery disease), Chronic back pain, COPD (chronic obstructive pulmonary disease) ([facility] DDD (degenerative disc disease), cervical, Depression, GERD (gastroesophageal reflux disease), Hyperlipidemia, Hypertension, Pancreatitis, Recovering alcoholic ([address] and Tobacco abuse. Surgical History:   has a past surgical history that includes Coronary angioplasty with stent (2006, 2012, 2013). Social History:   reports that he has been smoking cigarettes. He has a 11.25 pack-year smoking history. He has never used smokeless tobacco. He reports current alcohol use. He reports current drug use. Drug: Marijuana. Family History:   Family History   Problem Relation Age of Onset    Other Mother     Arthritis Mother     Mental Illness Mother     Depression Mother     Diabetes Father     Heart Disease Father     Substance Abuse Father     Heart Disease Sister     High Blood Pressure Sister     Mental Retardation Brother     Other Brother     Learning Disabilities Brother     Early Death Sister    Waunita Favorite Cancer Sister     Substance Abuse Sister     Cancer Sister     Diabetes Brother     High Blood Pressure Brother        Home Medications:  Prior to Admission medications    Medication Sig Start Date End Date Taking?  Authorizing Provider   clopidogrel (PLAVIX) 75 MG tablet Take 1 tablet by mouth daily 9/29/20  Yes Misti Hobbs MD   metoprolol succinate (TOPROL XL) 100 MG extended release tablet Take 1 tablet by mouth daily 9/4/20  Yes Misti Hobbs MD   isosorbide mononitrate (IMDUR) 30 MG extended release tablet TAKE (1) TABLET DAILY 6/24/20  Yes Misti Hobbs MD   lisinopril (PRINIVIL;ZESTRIL) 10 MG tablet TAKE 1 TABLET BY MOUTH EVERY DAY 2/3/20  Yes Misti Hobbs MD   HydrOXYzine Pamoate (VISTARIL PO) Take 50 mg by mouth 4 times daily    Yes Historical Provider, MD   HYDROcodone-acetaminophen (NORCO) 5-325 MG per tablet Take 1 tablet by mouth every 6-8 hours as needed for Pain. .   Yes Historical Provider, MD   BREO ELLIPTA 200-25 MCG/INH AEPB INHALE 1 PUFF INTO THE LUNGS AT THE SAME TIME EACH DAY 11/9/17  Yes Historical Provider, MD   gabapentin (NEURONTIN) 300 MG capsule TAKE ONE CAPSULE BY MOUTH EVERY 8 HOURS 12/14/17  Yes Historical Provider, MD   atorvastatin (LIPITOR) 40 MG tablet Take 1 tablet by mouth daily 9/29/17  Yes JOSE Patricia CNP   aspirin 81 MG EC tablet Take 1 tablet by mouth daily 4/10/17  Yes JOSE Pickering CNP   pantoprazole (PROTONIX) 20 MG tablet TAKE 2 TABLETS BY MOUTH DAILY  Patient taking differently: TAKE 1 TABLET BY MOUTH BID 9/29/16  Yes JOSE Pickering CNP   nitroGLYCERIN (NITROSTAT) 0.4 MG SL tablet Place 1 tablet under the tongue every 5 minutes as needed. 10/1/14  Yes Ruben Paz MD        Allergies:  Pcn [penicillins]     Review of Systems:   Review of Systems     Physical Examination:    Vitals:    12/03/20 0921   BP: 120/80   Pulse: 77   SpO2: 96%   Weight: 261 lb (118.4 kg)   Height: 6' (1.829 m)        Constitutional and General Appearance: Warm and dry, no apparent distress, normal coloration  HEENT:  Normocephalic, atraumatic  Respiratory:  · Normal excursion and expansion without use of accessory muscles  · Resp Auscultation: course breath sounds on inspiration and expiration  Cardiovascular:  · The apical impulses not displaced  · Heart tones are crisp and normal  · JVP 8 cm H2O  · Regular rate and rhythm, normal S1S2, no m/g/r  · Peripheral pulses are symmetrical and full  · There is no clubbing, cyanosis of the extremities.   · Trace BLE edema  · Pedal Pulses: 2+ and equal   Abdomen:  · No masses or tenderness  · Liver/Spleen: No Abnormalities Noted  Neurological/Psychiatric:  · Alert and oriented in all spheres  · Moves all extremities well  · Exhibits normal gait balance and coordination  · No abnormalities of mood, affect, memory, mentation, or behavior are noted Lab Data:  Most recent lab results below reviewed in office    CBC:   Lab Results   Component Value Date    WBC 9.0 04/07/2020    WBC 11.1 04/06/2020    WBC 11.3 05/28/2019    RBC 5.00 04/07/2020    RBC 5.13 04/06/2020    RBC 5.30 05/28/2019    HGB 14.1 04/07/2020    HGB 14.1 04/06/2020    HGB 14.1 05/28/2019    HCT 42.0 04/07/2020    HCT 42.6 04/06/2020    HCT 43.1 05/28/2019    MCV 83.9 04/07/2020    MCV 83.2 04/06/2020    MCV 81.3 05/28/2019    RDW 15.6 04/07/2020    RDW 15.5 04/06/2020    RDW 15.7 05/28/2019     04/07/2020     04/06/2020     05/28/2019     BMP:  Lab Results   Component Value Date     04/07/2020     04/06/2020     05/28/2019    K 3.7 04/07/2020    K 3.9 04/06/2020    K 3.7 05/28/2019     04/07/2020     04/06/2020    CL 99 05/28/2019    CO2 25 04/07/2020    CO2 25 04/06/2020    CO2 23 05/28/2019    BUN 7 04/07/2020    BUN 7 04/06/2020    BUN 11 05/28/2019    CREATININE 0.6 04/07/2020    CREATININE 0.7 04/06/2020    CREATININE 0.8 05/28/2019     BNP:   Lab Results   Component Value Date    PROBNP 44 01/10/2017     LIPID:   Lab Results   Component Value Date    TRIG 150 12/18/2018    TRIG 110 09/27/2018    HDL 36 12/18/2018    HDL 36 09/27/2018    LDLCALC 95 12/18/2018    LDLCALC 72 09/27/2018    LDLDIRECT 77 01/29/2018       Cardiac Imaging:  Babita MPI 4/7/20:    Normal LV size and systolic function. Left ventricular ejection fraction of     64%. Normal wall motion. There is normal isotope uptake at stress and rest.    Brandan Patrice is no evidence of myocardial ischemia or scar.           [facility]ita MPI 12/18/18:   Summary  There is normal isotope uptake at stress and rest. There is no evidence of  myocardial ischemia or scar. LV function is normal with uniform wall motion  and ejection fraction of 67 %. Low risk study. Procedure Performed: 1/29/18:   1. Coronary angiogram  2. Left heart cath  3.  Left ventriculogram   Findings: 1. Mild to moderate diffuse CAD              ~mild in-stent of the distal RCA  2. Normal LVEF  3. Normal hemodynamics   Conclusion/plan of care:  1. Medical management  2. Add Ranexa  3. Referral to pulmonary        Echo Aug 2017:    Left ventricular systolic function is normal with an ejection fraction of 55-60%. No wall motion abnormalities noted. Normal left ventricular diastolic filling pressure. Trivial mitral regurgitation. Assessment:    1. Coronary artery disease involving native coronary artery of native heart with unstable angina pectoris ([address]    2. Essential hypertension    3. Mixed hyperlipidemia    4. Tobacco abuse          Plan:   1. Add back Ranexa 500 mg twice daily  2. Start L-arginine 500 mg twice daily  3. Continue current other heart medicines  4. Will call and get blood test results from PCP  5. Follow up with me in 1 month (1/11/20 at [facility])      I appreciate the opportunity of cooperating in the care of this individual.    Allie Cisneros, JOSE - CNP, 12/3/2020, 9:24 AM       QUALITY MEASURES  1. Tobacco Cessation Counseling: Yes  2. Retake of BP if >140/90:   NA  3. Documentation to PCP/referring for new patient:  Sent to PCP at close of office visit  4. CAD patient on anti-platelet: Yes  5. CAD patient on STATIN therapy:  Yes  6.  Patient with CHF and aFib on anticoagulation:  NA

## 2020-12-03 NOTE — TELEPHONE ENCOUNTER
Pt was informed by pharmacy today that his L-arginine and Ranexa require Prior Authorizations. Please advise and then please contact Pt and pharmacy to inform.

## 2020-12-03 NOTE — PATIENT INSTRUCTIONS
1. Add back Ranexa 500 mg twice daily  2. Start L-arginine 500 mg twice daily  3. Continue current other heart medicines  4. Will call and get blood test results from PCP  5.  Follow up with me in 1 month

## 2020-12-07 NOTE — TELEPHONE ENCOUNTER
Cover my meds would not allow me to submit the request. Will fax requests once [employer] signs the PA requests.

## 2020-12-31 RX ORDER — AMLODIPINE BESYLATE 2.5 MG/1
TABLET ORAL
Qty: 30 TABLET | Refills: 5 | Status: SHIPPED | OUTPATIENT
Start: 2020-12-31 | End: 2021-03-15

## 2021-01-07 RX ORDER — HYDROXYZINE PAMOATE 50 MG/1
CAPSULE ORAL
Qty: 120 CAPSULE | Refills: 0 | OUTPATIENT
Start: 2021-01-07

## 2021-01-07 NOTE — TELEPHONE ENCOUNTER
Pt/pharmacy requesting hydroxyzine 50 mg cap. Pending script sent to [address] [place] [address].     Last OV 12/3/2020  Last Labs 4/7/2020  To follow up with DD 1/11/2021

## 2021-01-07 NOTE — TELEPHONE ENCOUNTER
Patient called stating the pharmacy cannot refill his medication without a PA.  Patient did not know the name of the medication

## 2021-02-08 ENCOUNTER — OFFICE VISIT (OUTPATIENT)
Dept: CARDIOLOGY CLINIC | Age: 56
End: 2021-02-08
Payer: COMMERCIAL

## 2021-02-08 VITALS
OXYGEN SATURATION: 95 % | SYSTOLIC BLOOD PRESSURE: 114 MMHG | HEIGHT: 72 IN | DIASTOLIC BLOOD PRESSURE: 60 MMHG | HEART RATE: 66 BPM | WEIGHT: 259.12 LBS | TEMPERATURE: 96.2 F | BODY MASS INDEX: 35.1 KG/M2

## 2021-02-08 DIAGNOSIS — I25.110 CORONARY ARTERY DISEASE INVOLVING NATIVE CORONARY ARTERY OF NATIVE HEART WITH UNSTABLE ANGINA PECTORIS (HCC): Primary | ICD-10-CM

## 2021-02-08 DIAGNOSIS — E78.2 MIXED HYPERLIPIDEMIA: ICD-10-CM

## 2021-02-08 DIAGNOSIS — I10 ESSENTIAL HYPERTENSION: ICD-10-CM

## 2021-02-08 DIAGNOSIS — Z72.0 TOBACCO ABUSE: ICD-10-CM

## 2021-02-08 DIAGNOSIS — I25.89 CARDIAC MICROVASCULAR DISEASE: ICD-10-CM

## 2021-02-08 PROCEDURE — 4004F PT TOBACCO SCREEN RCVD TLK: CPT | Performed by: NURSE PRACTITIONER

## 2021-02-08 PROCEDURE — G8427 DOCREV CUR MEDS BY ELIG CLIN: HCPCS | Performed by: NURSE PRACTITIONER

## 2021-02-08 PROCEDURE — G8417 CALC BMI ABV UP PARAM F/U: HCPCS | Performed by: NURSE PRACTITIONER

## 2021-02-08 PROCEDURE — G8484 FLU IMMUNIZE NO ADMIN: HCPCS | Performed by: NURSE PRACTITIONER

## 2021-02-08 PROCEDURE — 3017F COLORECTAL CA SCREEN DOC REV: CPT | Performed by: NURSE PRACTITIONER

## 2021-02-08 PROCEDURE — 99214 OFFICE O/P EST MOD 30 MIN: CPT | Performed by: NURSE PRACTITIONER

## 2021-02-08 RX ORDER — ALBUTEROL SULFATE 90 UG/1
2 AEROSOL, METERED RESPIRATORY (INHALATION) EVERY 4 HOURS PRN
COMMUNITY
Start: 2021-02-02

## 2021-02-08 RX ORDER — BUDESONIDE AND FORMOTEROL FUMARATE DIHYDRATE 160; 4.5 UG/1; UG/1
AEROSOL RESPIRATORY (INHALATION)
COMMUNITY
Start: 2021-02-02

## 2021-02-08 NOTE — PROGRESS NOTES
[address]   Cardiac Evaluation    Primary Care Doctor:  Phyllistine Goltz    Chief Complaint   Patient presents with    1 Month Follow-Up     No new symptoms or complaints    Coronary Artery Disease        History of Present Illness:   I had the pleasure of seeing Jaqui Trimble in follow up for chest pain (exertional, associated with shortness of breath), started back on Ranexa and added L-Arginine for anti-anginals. He has hx of CAD with hx of PCI's (2006, 2012, 2013), microvascular angina, HTN, HLD as well as smoker, obesity and COPD. He just saw Dr. Jyotsna Starks as new PCP last week. He has had lots of anxiety and stress over the past month. Got so bad he called his therapist on urgent basis. Now tolerating better but now avoiding social situations. No further chest pain. Weight down 2 more pounds. He was walking 2-3 times per day, now not walking much at all (cold weather). He does admit to some lightheadedness/ dizziness with a woosy  feeling. He continues to smoke ~ 1 ppd. Feels smoking more due to being stuck inside. Jaqui Trimble describes symptoms including fatigue, lightheadedness but denies chest pain, dyspnea, palpitations, orthopnea, PND, early saiety, edema, syncope. NYHA:   II  ACC/ AHA Stage:    C    Past Medical History:   has a past medical history of Anxiety, Bipolar affective ([address] CAD (coronary artery disease), Chronic back pain, COPD (chronic obstructive pulmonary disease) ([address] DDD (degenerative disc disease), cervical, Depression, GERD (gastroesophageal reflux disease), Hyperlipidemia, Hypertension, Pancreatitis, Recovering alcoholic ([address] and Tobacco abuse. Surgical History:   has a past surgical history that includes Coronary angioplasty with stent (2006, 2012, 2013).    Social History: reports that he has been smoking cigarettes. He has a 11.25 pack-year smoking history. He has never used smokeless tobacco. He reports current alcohol use. He reports current drug use. Drug: Marijuana. Family History:   Family History   Problem Relation Age of Onset    Other Mother     Arthritis Mother     Mental Illness Mother     Depression Mother     Diabetes Father     Heart Disease Father     Substance Abuse Father     Heart Disease Sister     High Blood Pressure Sister     Mental Retardation Brother     Other Brother     Learning Disabilities Brother     Early Death Sister    [place] Cancer Sister     Substance Abuse Sister     Cancer Sister     Diabetes Brother     High Blood Pressure Brother        Home Medications:  Prior to Admission medications    Medication Sig Start Date End Date Taking?  Authorizing Provider   albuterol sulfate  (90 Base) MCG/ACT inhaler Inhale 2 puffs into the lungs every 4 hours as needed 2/2/21  Yes Historical Provider, MD   SYMBICORT 160-4.5 MCG/ACT AERO  2/2/21  Yes Historical Provider, MD   amLODIPine (NORVASC) 2.5 MG tablet TAKE (1) TABLET DAILY 12/31/20  Yes Israel Cabrera MD   l-arginine 500 MG capsule Take 1 capsule by mouth 2 times daily 12/3/20  Yes JOSE Hector CNP   nitroGLYCERIN (NITROSTAT) 0.4 MG SL tablet Place 1 tablet under the tongue every 5 minutes as needed for Chest pain 12/3/20  Yes JOSE Hector CNP   clopidogrel (PLAVIX) 75 MG tablet Take 1 tablet by mouth daily 9/29/20  Yes Israel Cabrera MD   metoprolol succinate (TOPROL XL) 100 MG extended release tablet Take 1 tablet by mouth daily 9/4/20  Yes Israel Cabrera MD   isosorbide mononitrate (IMDUR) 30 MG extended release tablet TAKE (1) TABLET DAILY 6/24/20  Yes Israel Cabrera MD   lisinopril (PRINIVIL;ZESTRIL) 10 MG tablet TAKE 1 TABLET BY MOUTH EVERY DAY 2/3/20  Yes Israel Cabrera MD HydrOXYzine Pamoate (VISTARIL PO) Take 50 mg by mouth 4 times daily    Yes Historical Provider, MD   atorvastatin (LIPITOR) 40 MG tablet Take 1 tablet by mouth daily 9/29/17  Yes JOSE West CNP   aspirin 81 MG EC tablet Take 1 tablet by mouth daily 4/10/17  Yes JOSE Pickering CNP   pantoprazole (PROTONIX) 20 MG tablet TAKE 2 TABLETS BY MOUTH DAILY  Patient taking differently: TAKE 1 TABLET BY MOUTH BID 9/29/16  Yes JOSE Pickering CNP   ranolazine (RANEXA) 500 MG extended release tablet Take 1 tablet by mouth 2 times daily 12/3/20   JOSE Murphy CNP   HYDROcodone-acetaminophen (NORCO) 5-325 MG per tablet Take 1 tablet by mouth every 6-8 hours as needed for Pain. Ursula Pedro Historical Provider, MD   BREIBETH ELLIPTA 200-25 MCG/INH AEPB INHALE 1 PUFF INTO THE LUNGS AT THE SAME TIME EACH DAY 11/9/17   Historical Provider, MD   gabapentin (NEURONTIN) 300 MG capsule TAKE ONE CAPSULE BY MOUTH EVERY 8 HOURS 12/14/17   Historical Provider, MD        Allergies:  Pcn [penicillins]     Physical Examination:    Vitals:    02/08/21 1016   BP: 114/60   Pulse: 66   Temp: 96.2 °F (35.7 °C)   SpO2: 95%   Weight: 259 lb 1.9 oz (117.5 kg)   Height: 6' (1.829 m)        Constitutional and General Appearance: Warm and dry, no apparent distress, normal coloration  HEENT:  Normocephalic, atraumatic  Respiratory:  · Normal excursion and expansion without use of accessory muscles  · Resp Auscultation: Clear to auscultation   Cardiovascular:  · The apical impulses not displaced  · Heart tones are crisp and normal  · JVP 8 cm H2O  · Regular rate and rhythm, normal S1S2, no m/g/r  · Peripheral pulses are symmetrical and full  · There is no clubbing, cyanosis of the extremities.   · No BLE edema  · Pedal Pulses: 2+ and equal   Abdomen:  · No masses or tenderness  · Liver/Spleen: No Abnormalities Noted  Neurological/Psychiatric:  · Alert and oriented in all spheres  · Moves all extremities well · Exhibits normal gait balance and coordination  · No abnormalities of mood, affect, memory, mentation, or behavior are noted    Lab Data:  Most recent lab results below reviewed in office    CBC:   Lab Results   Component Value Date    WBC 9.0 04/07/2020    WBC 11.1 04/06/2020    WBC 11.3 05/28/2019    RBC 5.00 04/07/2020    RBC 5.13 04/06/2020    RBC 5.30 05/28/2019    HGB 14.1 04/07/2020    HGB 14.1 04/06/2020    HGB 14.1 05/28/2019    HCT 42.0 04/07/2020    HCT 42.6 04/06/2020    HCT 43.1 05/28/2019    MCV 83.9 04/07/2020    MCV 83.2 04/06/2020    MCV 81.3 05/28/2019    RDW 15.6 04/07/2020    RDW 15.5 04/06/2020    RDW 15.7 05/28/2019     04/07/2020     04/06/2020     05/28/2019     BMP:  Lab Results   Component Value Date     04/07/2020     04/06/2020     05/28/2019    K 3.7 04/07/2020    K 3.9 04/06/2020    K 3.7 05/28/2019     04/07/2020     04/06/2020    CL 99 05/28/2019    CO2 25 04/07/2020    CO2 25 04/06/2020    CO2 23 05/28/2019    BUN 7 04/07/2020    BUN 7 04/06/2020    BUN 11 05/28/2019    CREATININE 0.6 04/07/2020    CREATININE 0.7 04/06/2020    CREATININE 0.8 05/28/2019     BNP:   Lab Results   Component Value Date    PROBNP 44 01/10/2017     LIPID:   Lab Results   Component Value Date    TRIG 150 12/18/2018    TRIG 110 09/27/2018    HDL 36 12/18/2018    HDL 36 09/27/2018    LDLCALC 95 12/18/2018    LDLCALC 72 09/27/2018    LDLDIRECT 77 01/29/2018       Cardiac Imaging:  [facility] 12/18/18:   Summary  There is normal isotope uptake at stress and rest. There is no evidence of  myocardial ischemia or scar. LV function is normal with uniform wall motion  and ejection fraction of 67 %. Low risk study. Procedure Performed: 1/29/18:   1. Coronary angiogram  2. Left heart cath  3. Left ventriculogram   Findings:  1. Mild to moderate diffuse CAD              ~mild in-stent of the distal RCA  2. Normal LVEF  3.  Normal hemodynamics Conclusion/plan of care:  1. Medical management  2. Add Ranexa  3. Referral to pulmonary        Echo Aug 2017:    Left ventricular systolic function is normal with an ejection fraction of 55-60%. No wall motion abnormalities noted. Normal left ventricular diastolic filling pressure. Trivial mitral regurgitation. Assessment:    1. Coronary artery disease involving native coronary artery of native heart with unstable angina pectoris ([address]    2. Cardiac microvascular disease    3. Essential hypertension    4. Mixed hyperlipidemia    5. Tobacco abuse      Labs from 2/2/21 reviewed through [facility] [address]:   1. Stop the lisinopril completely (BP low)  2. Continue the Ranexa 500 mg twice daily and L-Arginine for \"angina\"  3. Also continue the metoprolol, amlodipine and isosorbide which also helps treat angina  4. No change in the aspirin, Plavix and atorvastatin  5. Continue to try to cut back on smoking to quit  6.  Follow up with me in 3 months       I appreciate the opportunity of cooperating in the care of this individual.    Fiorella Villalba, JOSE - CNP, 2/8/2021, 10:23 AM

## 2021-02-08 NOTE — PATIENT INSTRUCTIONS
1. Stop the lisinopril completely (BP low)  2. Continue the Ranexa 500 mg twice daily and L-Arginine for \"angina\"  3. Also continue the metoprolol, amlodipine and isosorbide which also helps treat angina  4. No change in the aspirin, Plavix and atorvastatin  5. Continue to try to cut back on smoking to quit  6.  Follow up with me in 3 months

## 2021-03-06 ENCOUNTER — APPOINTMENT (OUTPATIENT)
Dept: CT IMAGING | Age: 56
End: 2021-03-06
Payer: COMMERCIAL

## 2021-03-06 ENCOUNTER — APPOINTMENT (OUTPATIENT)
Dept: GENERAL RADIOLOGY | Age: 56
End: 2021-03-06
Payer: COMMERCIAL

## 2021-03-06 ENCOUNTER — HOSPITAL ENCOUNTER (EMERGENCY)
Age: 56
Discharge: HOME | End: 2021-03-06
Attending: EMERGENCY MEDICINE
Payer: COMMERCIAL

## 2021-03-06 VITALS
HEIGHT: 72 IN | RESPIRATION RATE: 16 BRPM | TEMPERATURE: 97.9 F | BODY MASS INDEX: 35.76 KG/M2 | OXYGEN SATURATION: 97 % | WEIGHT: 264 LBS | DIASTOLIC BLOOD PRESSURE: 60 MMHG | SYSTOLIC BLOOD PRESSURE: 131 MMHG | HEART RATE: 72 BPM

## 2021-03-06 DIAGNOSIS — T88.7XXA DRUG SIDE EFFECTS: Primary | ICD-10-CM

## 2021-03-06 LAB
A/G RATIO: 1.3 (ref 1.1–2.2)
ALBUMIN SERPL-MCNC: 3.6 G/DL (ref 3.4–5)
ALP BLD-CCNC: 75 U/L (ref 40–129)
ALT SERPL-CCNC: 19 U/L (ref 10–40)
ANION GAP SERPL CALCULATED.3IONS-SCNC: 9 MMOL/L (ref 3–16)
AST SERPL-CCNC: 14 U/L (ref 15–37)
BASOPHILS ABSOLUTE: 0.1 K/UL (ref 0–0.2)
BASOPHILS RELATIVE PERCENT: 1.2 %
BILIRUB SERPL-MCNC: <0.2 MG/DL (ref 0–1)
BUN BLDV-MCNC: 10 MG/DL (ref 7–20)
CALCIUM SERPL-MCNC: 8.5 MG/DL (ref 8.3–10.6)
CHLORIDE BLD-SCNC: 97 MMOL/L (ref 99–110)
CO2: 26 MMOL/L (ref 21–32)
CREAT SERPL-MCNC: 0.8 MG/DL (ref 0.9–1.3)
EKG ATRIAL RATE: 74 BPM
EKG DIAGNOSIS: NORMAL
EKG P AXIS: 15 DEGREES
EKG P-R INTERVAL: 168 MS
EKG Q-T INTERVAL: 418 MS
EKG QRS DURATION: 94 MS
EKG QTC CALCULATION (BAZETT): 463 MS
EKG R AXIS: 25 DEGREES
EKG T AXIS: 38 DEGREES
EKG VENTRICULAR RATE: 74 BPM
EOSINOPHILS ABSOLUTE: 0.5 K/UL (ref 0–0.6)
EOSINOPHILS RELATIVE PERCENT: 4 %
GFR AFRICAN AMERICAN: >60
GFR NON-AFRICAN AMERICAN: >60
GLOBULIN: 2.8 G/DL
GLUCOSE BLD-MCNC: 124 MG/DL (ref 70–99)
HCT VFR BLD CALC: 42.4 % (ref 40.5–52.5)
HEMOGLOBIN: 13.9 G/DL (ref 13.5–17.5)
INR BLD: 1.03 (ref 0.86–1.14)
LACTIC ACID: 1.3 MMOL/L (ref 0.4–2)
LIPASE: 21 U/L (ref 13–60)
LYMPHOCYTES ABSOLUTE: 2.7 K/UL (ref 1–5.1)
LYMPHOCYTES RELATIVE PERCENT: 23.5 %
MCH RBC QN AUTO: 28.5 PG (ref 26–34)
MCHC RBC AUTO-ENTMCNC: 32.9 G/DL (ref 31–36)
MCV RBC AUTO: 86.6 FL (ref 80–100)
MONOCYTES ABSOLUTE: 0.9 K/UL (ref 0–1.3)
MONOCYTES RELATIVE PERCENT: 7.4 %
NEUTROPHILS ABSOLUTE: 7.4 K/UL (ref 1.7–7.7)
NEUTROPHILS RELATIVE PERCENT: 63.9 %
PDW BLD-RTO: 15 % (ref 12.4–15.4)
PLATELET # BLD: 165 K/UL (ref 135–450)
PMV BLD AUTO: 9.1 FL (ref 5–10.5)
POTASSIUM SERPL-SCNC: 4 MMOL/L (ref 3.5–5.1)
PROTHROMBIN TIME: 11.9 SEC (ref 10–13.2)
RBC # BLD: 4.89 M/UL (ref 4.2–5.9)
SODIUM BLD-SCNC: 132 MMOL/L (ref 136–145)
TOTAL PROTEIN: 6.4 G/DL (ref 6.4–8.2)
TROPONIN: <0.01 NG/ML
WBC # BLD: 11.6 K/UL (ref 4–11)

## 2021-03-06 PROCEDURE — 84484 ASSAY OF TROPONIN QUANT: CPT

## 2021-03-06 PROCEDURE — 85610 PROTHROMBIN TIME: CPT

## 2021-03-06 PROCEDURE — 93010 ELECTROCARDIOGRAM REPORT: CPT | Performed by: INTERNAL MEDICINE

## 2021-03-06 PROCEDURE — 83605 ASSAY OF LACTIC ACID: CPT

## 2021-03-06 PROCEDURE — 85025 COMPLETE CBC W/AUTO DIFF WBC: CPT

## 2021-03-06 PROCEDURE — 93005 ELECTROCARDIOGRAM TRACING: CPT | Performed by: EMERGENCY MEDICINE

## 2021-03-06 PROCEDURE — 83690 ASSAY OF LIPASE: CPT

## 2021-03-06 PROCEDURE — 36415 COLL VENOUS BLD VENIPUNCTURE: CPT

## 2021-03-06 PROCEDURE — 80053 COMPREHEN METABOLIC PANEL: CPT

## 2021-03-06 PROCEDURE — 71045 X-RAY EXAM CHEST 1 VIEW: CPT

## 2021-03-06 PROCEDURE — 70450 CT HEAD/BRAIN W/O DYE: CPT

## 2021-03-06 PROCEDURE — 99284 EMERGENCY DEPT VISIT MOD MDM: CPT

## 2021-03-06 RX ORDER — DESVENLAFAXINE 25 MG/1
TABLET, EXTENDED RELEASE ORAL
COMMUNITY
Start: 2020-12-29 | End: 2021-07-20

## 2021-03-06 RX ORDER — MIRTAZAPINE 15 MG/1
15 TABLET, FILM COATED ORAL NIGHTLY
COMMUNITY
Start: 2021-03-04 | End: 2021-07-22

## 2021-03-06 RX ORDER — SODIUM CHLORIDE 9 MG/ML
INJECTION, SOLUTION INTRAVENOUS CONTINUOUS
Status: DISCONTINUED | OUTPATIENT
Start: 2021-03-06 | End: 2021-03-06 | Stop reason: HOSPADM

## 2021-03-06 RX ORDER — CHLORHEXIDINE GLUCONATE 4 G/100ML
SOLUTION TOPICAL
COMMUNITY
Start: 2021-03-04 | End: 2021-06-02

## 2021-03-06 RX ORDER — DOXYCYCLINE HYCLATE 100 MG
100 TABLET ORAL 2 TIMES DAILY
COMMUNITY
Start: 2021-03-04 | End: 2021-07-20

## 2021-03-06 ASSESSMENT — ENCOUNTER SYMPTOMS
STRIDOR: 0
SINUS PAIN: 0
PHOTOPHOBIA: 0
EYE DISCHARGE: 0
NAUSEA: 0
COUGH: 0
VOMITING: 0
SHORTNESS OF BREATH: 0
ABDOMINAL DISTENTION: 0
DIARRHEA: 0
BACK PAIN: 0
SINUS PRESSURE: 0
EYE PAIN: 0
WHEEZING: 0
EYE REDNESS: 0
CHEST TIGHTNESS: 0
SORE THROAT: 0
ABDOMINAL PAIN: 0

## 2021-03-06 NOTE — ED NOTES
Reviewed patient discharge instructions at this time, copy given to patient. No questions or concerns. Patient voiced understanding.         Jaci Briggs RN  03/06/21 3515

## 2021-03-15 ENCOUNTER — APPOINTMENT (OUTPATIENT)
Dept: GENERAL RADIOLOGY | Age: 56
End: 2021-03-15
Payer: COMMERCIAL

## 2021-03-15 ENCOUNTER — HOSPITAL ENCOUNTER (EMERGENCY)
Age: 56
Discharge: HOME | End: 2021-03-15
Attending: EMERGENCY MEDICINE
Payer: COMMERCIAL

## 2021-03-15 VITALS
TEMPERATURE: 98.2 F | RESPIRATION RATE: 16 BRPM | BODY MASS INDEX: 35.76 KG/M2 | OXYGEN SATURATION: 99 % | HEIGHT: 72 IN | HEART RATE: 65 BPM | SYSTOLIC BLOOD PRESSURE: 118 MMHG | DIASTOLIC BLOOD PRESSURE: 70 MMHG | WEIGHT: 264 LBS

## 2021-03-15 DIAGNOSIS — R07.9 CHEST PAIN, UNSPECIFIED TYPE: Primary | ICD-10-CM

## 2021-03-15 LAB
A/G RATIO: 1.4 (ref 1.1–2.2)
ALBUMIN SERPL-MCNC: 3.9 G/DL (ref 3.4–5)
ALP BLD-CCNC: 72 U/L (ref 40–129)
ALT SERPL-CCNC: 20 U/L (ref 10–40)
ANION GAP SERPL CALCULATED.3IONS-SCNC: 7 MMOL/L (ref 3–16)
AST SERPL-CCNC: 15 U/L (ref 15–37)
BASOPHILS ABSOLUTE: 0.1 K/UL (ref 0–0.2)
BASOPHILS RELATIVE PERCENT: 1.1 %
BILIRUB SERPL-MCNC: 0.3 MG/DL (ref 0–1)
BUN BLDV-MCNC: 12 MG/DL (ref 7–20)
CALCIUM SERPL-MCNC: 9 MG/DL (ref 8.3–10.6)
CHLORIDE BLD-SCNC: 98 MMOL/L (ref 99–110)
CO2: 28 MMOL/L (ref 21–32)
CREAT SERPL-MCNC: 0.8 MG/DL (ref 0.9–1.3)
EOSINOPHILS ABSOLUTE: 0.4 K/UL (ref 0–0.6)
EOSINOPHILS RELATIVE PERCENT: 3.9 %
GFR AFRICAN AMERICAN: >60
GFR NON-AFRICAN AMERICAN: >60
GLOBULIN: 2.7 G/DL
GLUCOSE BLD-MCNC: 94 MG/DL (ref 70–99)
HCT VFR BLD CALC: 41.9 % (ref 40.5–52.5)
HEMOGLOBIN: 13.8 G/DL (ref 13.5–17.5)
LYMPHOCYTES ABSOLUTE: 3.2 K/UL (ref 1–5.1)
LYMPHOCYTES RELATIVE PERCENT: 27.7 %
MCH RBC QN AUTO: 28.7 PG (ref 26–34)
MCHC RBC AUTO-ENTMCNC: 33 G/DL (ref 31–36)
MCV RBC AUTO: 87.1 FL (ref 80–100)
MONOCYTES ABSOLUTE: 0.8 K/UL (ref 0–1.3)
MONOCYTES RELATIVE PERCENT: 7.1 %
NEUTROPHILS ABSOLUTE: 6.9 K/UL (ref 1.7–7.7)
NEUTROPHILS RELATIVE PERCENT: 60.2 %
PDW BLD-RTO: 15.1 % (ref 12.4–15.4)
PLATELET # BLD: 165 K/UL (ref 135–450)
PMV BLD AUTO: 9.1 FL (ref 5–10.5)
POTASSIUM REFLEX MAGNESIUM: 4.2 MMOL/L (ref 3.5–5.1)
RBC # BLD: 4.81 M/UL (ref 4.2–5.9)
SODIUM BLD-SCNC: 133 MMOL/L (ref 136–145)
TOTAL PROTEIN: 6.6 G/DL (ref 6.4–8.2)
TROPONIN: <0.01 NG/ML
TROPONIN: <0.01 NG/ML
WBC # BLD: 11.4 K/UL (ref 4–11)

## 2021-03-15 PROCEDURE — 99285 EMERGENCY DEPT VISIT HI MDM: CPT

## 2021-03-15 PROCEDURE — 80053 COMPREHEN METABOLIC PANEL: CPT

## 2021-03-15 PROCEDURE — 71046 X-RAY EXAM CHEST 2 VIEWS: CPT

## 2021-03-15 PROCEDURE — 85025 COMPLETE CBC W/AUTO DIFF WBC: CPT

## 2021-03-15 PROCEDURE — 84484 ASSAY OF TROPONIN QUANT: CPT

## 2021-03-15 PROCEDURE — 93005 ELECTROCARDIOGRAM TRACING: CPT | Performed by: EMERGENCY MEDICINE

## 2021-03-15 PROCEDURE — 36415 COLL VENOUS BLD VENIPUNCTURE: CPT

## 2021-03-15 ASSESSMENT — ENCOUNTER SYMPTOMS
ABDOMINAL PAIN: 0
NAUSEA: 0
EYE DISCHARGE: 0
COUGH: 0
SHORTNESS OF BREATH: 0
DIARRHEA: 0
SORE THROAT: 0
VOMITING: 0
BACK PAIN: 0

## 2021-03-15 ASSESSMENT — HEART SCORE: ECG: 0

## 2021-03-16 LAB
EKG ATRIAL RATE: 63 BPM
EKG ATRIAL RATE: 64 BPM
EKG DIAGNOSIS: NORMAL
EKG DIAGNOSIS: NORMAL
EKG P AXIS: 15 DEGREES
EKG P AXIS: 44 DEGREES
EKG P-R INTERVAL: 168 MS
EKG P-R INTERVAL: 170 MS
EKG Q-T INTERVAL: 424 MS
EKG Q-T INTERVAL: 434 MS
EKG QRS DURATION: 88 MS
EKG QRS DURATION: 94 MS
EKG QTC CALCULATION (BAZETT): 437 MS
EKG QTC CALCULATION (BAZETT): 444 MS
EKG R AXIS: 31 DEGREES
EKG R AXIS: 34 DEGREES
EKG T AXIS: 46 DEGREES
EKG T AXIS: 49 DEGREES
EKG VENTRICULAR RATE: 63 BPM
EKG VENTRICULAR RATE: 64 BPM

## 2021-03-16 PROCEDURE — 93010 ELECTROCARDIOGRAM REPORT: CPT | Performed by: INTERNAL MEDICINE

## 2021-03-16 NOTE — ED NOTES
Pt discharge instructions reviewed with pt. Pt verbalized understanding. No further needs. Pt discharged at this time.         Kadie Herman RN  03/15/21 2122

## 2021-03-16 NOTE — ED PROVIDER NOTES
[address] [facility] Name: Marie Perez  MRN: [id number]  [place] 1965  Date of evaluation: 3/15/2021  Provider: Aristeo Ward MD  PCP: Morteza Guadarrama  ED Attending: Aristeo Ward MD    CHIEF COMPLAINT       Chief Complaint   Patient presents with    Chest Pain     brought in by squad for complaints for L chest pain and increased BP. Denies any chest pain at this time. has long cardiac hx       HISTORY OF PRESENT ILLNESS   (Location/Symptom, Timing/Onset, Context/Setting, Quality, Duration, Modifying Factors, Severity)  Note limiting factors. Marie Perez is a 48 y.o. male who presents to the emergency department after experiencing 1 minute of sharp stabbing pain that was mild to moderate in intensity at his sternoclavicular joint. The pain occurred after he had gotten the message from someone that was extremely frustrating. Patient denied any shortness of breath, diaphoresis, nausea, vomiting, lightheadedness when the chest pain occurred. He has had previous heart attacks that felt differently however was concerned and so came to the emergency department via EMS. Patient did note that his blood pressure had also gone up during this timeframe and was concerned about that as well. His most recent heart procedure was in 2013 which apparently resulted in 1 stent, and he had had a prior stent the year prior. He follows with cardiology and has had no further issues. Patient states he has been compliant with all of his medications. History is obtained from the patient. REVIEW OF SYSTEMS    (2-9 systems for level 4, 10 or more for level 5)     Review of Systems   Constitutional: Negative for chills, diaphoresis and fever. HENT: Negative for congestion and sore throat. Eyes: Negative for discharge. Respiratory: Negative for cough and shortness of breath. Cardiovascular: Positive for chest pain.    Gastrointestinal: Negative for abdominal pain, diarrhea, nausea and vomiting. Endocrine: Negative for polydipsia. Genitourinary: Negative for dysuria and urgency. Musculoskeletal: Negative for back pain and myalgias. Skin: Negative for rash. Neurological: Negative for dizziness, weakness, light-headedness and headaches. Hematological: Does not bruise/bleed easily. Psychiatric/Behavioral: Negative for confusion. Positives and Pertinent negatives as per HPI. Except as noted above in the ROS, all other systems were reviewed and negative.        PAST MEDICAL HISTORY     Past Medical History:   Diagnosis Date    Anxiety     Bipolar affective ([address]     CAD (coronary artery disease)     Chronic back pain     [facility]     DDD (degenerative disc disease), cervical     Depression     GERD (gastroesophageal reflux disease)     Hyperlipidemia     Hypertension     [facility]ocardial infarction) [facility]     Pancreatitis     Recovering alcoholic ([facility]     Tobacco abuse 12/16/2016         SURGICAL HISTORY     Past Surgical History:   Procedure Laterality Date    CORONARY ANGIOPLASTY WITH STENT PLACEMENT  2006, 2012, 2013    [address]       Discharge Medication List as of 3/15/2021 10:43 PM      CONTINUE these medications which have NOT CHANGED    Details   chlorhexidine (HIBICLENS) 4 % external liquid Use twice daily left axilla, Historical Med      doxycycline hyclate (VIBRA-TABS) 100 MG tablet Take 100 mg by mouth 2 times dailyHistorical Med      mirtazapine (REMERON) 15 MG tablet Take 15 mg by mouth nightlyHistorical Med      desvenlafaxine succinate (PRISTIQ) 25 MG TB24 extended release tablet Historical Med      albuterol sulfate  (90 Base) MCG/ACT inhaler Inhale 2 puffs into the lungs every 4 hours as neededHistorical Med      SYMBICORT 160-4.5 MCG/ACT AERO DAWHistorical Med      l-arginine 500 MG capsule Take 1 capsule by mouth 2 times daily, Disp-60 capsule,R-3Normal      ranolazine (RANEXA) 500 MG extended release tablet Take 1 tablet by mouth 2 times daily, Disp-60 tablet,R-3Normal      nitroGLYCERIN (NITROSTAT) 0.4 MG SL tablet Place 1 tablet under the tongue every 5 minutes as needed for Chest pain, Disp-25 tablet,R-3Pt needs ovNormal      clopidogrel (PLAVIX) 75 MG tablet Take 1 tablet by mouth daily, Disp-90 tablet,R-3Normal      metoprolol succinate (TOPROL XL) 100 MG extended release tablet Take 1 tablet by mouth daily, Disp-90 tablet,R-3Normal      isosorbide mononitrate (IMDUR) 30 MG extended release tablet TAKE (1) TABLET DAILY, Disp-90 tablet,R-2Normal      HydrOXYzine Pamoate (VISTARIL PO) Take 50 mg by mouth daily Historical Med      atorvastatin (LIPITOR) 40 MG tablet Take 1 tablet by mouth daily, Disp-30 tablet, R-5Normal      aspirin 81 MG EC tablet Take 1 tablet by mouth daily, Disp-30 tablet, R-5Normal      pantoprazole (PROTONIX) 20 MG tablet TAKE 2 TABLETS BY MOUTH DAILY, Disp-30 tablet, R-0               ALLERGIES     Pcn [penicillins]    FAMILYHISTORY       Family History   Problem Relation Age of Onset    Other Mother     Arthritis Mother     Mental Illness Mother     Depression Mother     Diabetes Father     Heart Disease Father     Substance Abuse Father     Heart Disease Sister     High Blood Pressure Sister     Mental Retardation Brother     Other Brother     Learning Disabilities Brother     Early Death Sister     Cancer Sister     Substance Abuse Sister     Cancer Sister     Diabetes Brother     High Blood Pressure Brother           SOCIAL HISTORY       Social History     Socioeconomic History    Marital status: Single     Spouse name: None    Number of children: None    Years of education: None    Highest education level: None   Occupational History    None   Social Needs    Financial resource strain: None    Food insecurity     Worry: None     Inability: None    Transportation needs Medical: None     Non-medical: None   Tobacco Use    Smoking status: Current Every Day Smoker     Packs/day: 1.00     Years: 45.00     Pack years: 45.00     Types: Cigarettes    Smokeless tobacco: Never Used   Substance and Sexual Activity    Alcohol use: Yes     Alcohol/week: 0.0 standard drinks     Comment:  a few a day     Drug use: Yes     Types: Marijuana     Comment: took friend's suboxone     Sexual activity: Never   Lifestyle    Physical activity     Days per week: None     Minutes per session: None    Stress: None   Relationships    Social connections     Talks on phone: None     Gets together: None     Attends Denominational service: None     Active member of club or organization: None     Attends meetings of clubs or organizations: None     Relationship status: None    Intimate partner violence     Fear of current or ex partner: None     Emotionally abused: None     Physically abused: None     Forced sexual activity: None   Other Topics Concern    None   Social History Narrative    None       SCREENINGS    Jannet Coma Scale  Eye Opening: Spontaneous  Best Verbal Response: Oriented  Best Motor Response: Obeys commands  Franklin Coma Scale Score: 15 Heart Score for chest pain patients  History: Slightly Suspicious  ECG: Normal  Patient Age: > 39 and < 65 years  *Risk factors for Atherosclerotic disease: Coronary Artery Disease  Risk Factors: > 3 Risk factors or history of atherosclerotic disease*  Troponin: < 1X normal limit  Heart Score Total: 3      PHYSICAL EXAM    (up to 7 for level 4, 8 or more for level 5)     ED Triage Vitals [03/15/21 1854]   BP Temp Temp Source Pulse Resp SpO2 Height Weight   (!) 146/85 98.2 °F (36.8 °C) Oral 67 18 99 % 6' (1.829 m) 264 lb (119.7 kg)       Physical Exam  Vitals signs and nursing note reviewed. Constitutional:       General: He is not in acute distress. Appearance: Normal appearance. He is well-developed. He is obese.    HENT:      Head: Normocephalic and atraumatic. Right Ear: External ear normal.      Left Ear: External ear normal.   Eyes:      Conjunctiva/sclera: Conjunctivae normal.   Neck:      Musculoskeletal: Normal range of motion and neck supple. No edema, neck rigidity, injury, pain with movement, spinous process tenderness or muscular tenderness. Trachea: Trachea and phonation normal.   Cardiovascular:      Rate and Rhythm: Normal rate and regular rhythm. Heart sounds: Normal heart sounds. No murmur. No friction rub. No gallop. Pulmonary:      Effort: Pulmonary effort is normal. No respiratory distress. Breath sounds: Normal breath sounds. No wheezing. Chest:      Chest wall: No mass, lacerations, deformity, swelling, tenderness, crepitus or edema. Comments: No pain on palpation of the area where the pain was occurring. Skin overlying appears normal.  No adenopathy. Abdominal:      General: Bowel sounds are normal. There is no distension. Palpations: Abdomen is soft. Tenderness: There is no abdominal tenderness. There is no guarding or rebound. Musculoskeletal: Normal range of motion. General: No tenderness. Right lower leg: No edema. Left lower leg: No edema. Lymphadenopathy:      Cervical: No cervical adenopathy. Skin:     General: Skin is warm and dry. Capillary Refill: Capillary refill takes less than 2 seconds. Findings: No rash. Neurological:      Mental Status: He is alert and oriented to person, place, and time. Cranial Nerves: No cranial nerve deficit.          DIAGNOSTIC RESULTS   LABS:    Results for orders placed or performed during the hospital encounter of 03/15/21   CBC Auto Differential   Result Value Ref Range    WBC 11.4 (H) 4.0 - 11.0 K/uL    RBC 4.81 4.20 - 5.90 M/uL    Hemoglobin 13.8 13.5 - 17.5 g/dL    Hematocrit 41.9 40.5 - 52.5 %    MCV 87.1 80.0 - 100.0 fL    MCH 28.7 26.0 - 34.0 pg    MCHC 33.0 31.0 - 36.0 g/dL    RDW 15.1 12.4 - 15.4 % Platelets 378 045 - 045 K/uL    MPV 9.1 5.0 - 10.5 fL    Neutrophils % 60.2 %    Lymphocytes % 27.7 %    Monocytes % 7.1 %    Eosinophils % 3.9 %    Basophils % 1.1 %    Neutrophils Absolute 6.9 1.7 - 7.7 K/uL    Lymphocytes Absolute 3.2 1.0 - 5.1 K/uL    Monocytes Absolute 0.8 0.0 - 1.3 K/uL    Eosinophils Absolute 0.4 0.0 - 0.6 K/uL    Basophils Absolute 0.1 0.0 - 0.2 K/uL   Comprehensive Metabolic Panel w/ Reflex to MG   Result Value Ref Range    Sodium 133 (L) 136 - 145 mmol/L    Potassium reflex Magnesium 4.2 3.5 - 5.1 mmol/L    Chloride 98 (L) 99 - 110 mmol/L    CO2 28 21 - 32 mmol/L    Anion Gap 7 3 - 16    Glucose 94 70 - 99 mg/dL    BUN 12 7 - 20 mg/dL    CREATININE 0.8 (L) 0.9 - 1.3 mg/dL    GFR Non-African American >60 >60    GFR African American >60 >60    Calcium 9.0 8.3 - 10.6 mg/dL    Total Protein 6.6 6.4 - 8.2 g/dL    Albumin 3.9 3.4 - 5.0 g/dL    Albumin/Globulin Ratio 1.4 1.1 - 2.2    Total Bilirubin 0.3 0.0 - 1.0 mg/dL    Alkaline Phosphatase 72 40 - 129 U/L    ALT 20 10 - 40 U/L    AST 15 15 - 37 U/L    Globulin 2.7 g/dL   Troponin   Result Value Ref Range    Troponin <0.01 <0.01 ng/mL   Troponin   Result Value Ref Range    Troponin <0.01 <0.01 ng/mL   EKG 12 Lead   Result Value Ref Range    Ventricular Rate 64 BPM    Atrial Rate 64 BPM    P-R Interval 170 ms    QRS Duration 94 ms    Q-T Interval 424 ms    QTc Calculation (Bazett) 437 ms    P Axis 44 degrees    R Axis 31 degrees    T Axis 49 degrees    Diagnosis       Poor data quality, interpretation may be adversely affectedNormal sinus rhythmNormal ECGNo previous ECGs available   EKG 12 Lead   Result Value Ref Range    Ventricular Rate 63 BPM    Atrial Rate 63 BPM    P-R Interval 168 ms    QRS Duration 88 ms    Q-T Interval 434 ms    QTc Calculation (Bazett) 444 ms    P Axis 15 degrees    R Axis 34 degrees    T Axis 46 degrees    Diagnosis       Normal sinus rhythmNormal ECGNo previous ECGs available       All other labs were within normal delmer dominguez returned as of this dictation. EKG: All EKG's are interpreted by the Emergency Department Physician who either signs or Co-signs this chart in the absence of a cardiologist.  Please see their note for interpretation of EKG. EKG Interpretation at [facility]    Interpreted by emergency department physician    Rhythm: normal sinus   Rate: normal  Axis: normal  Ectopy: none  Conduction: normal  ST Segments: normal  T Waves: normal  Q Waves: none    Clinical Impression: normal sinus rhythm    EKG Interpretation at 2153    Interpreted by emergency department physician    Rhythm: normal sinus   Rate: normal  Axis: normal  Ectopy: none  Conduction: normal  ST Segments: normal  T Waves: normal  Q Waves: none    Clinical Impression: normal sinus rhythm, unchanged from prior EKG this same date. RADIOLOGY:   Non-plain film images such as CT, Ultrasound and MRI are read by the radiologist.  Plain radiographic images are visualized and preliminarily interpreted by the ED Provider with the belowfindings:    Interpretation per the Radiologist below, if available at the time of this note:    XR CHEST (2 VW)   Final Result   No acute cardiopulmonary disease. PROCEDURES   Unless otherwise noted below, none     Procedures    CRITICAL CARE TIME   N/A    CONSULTS:  None      EMERGENCY DEPARTMENT COURSE and DIFFERENTIAL DIAGNOSIS/MDM:   Vitals:    Vitals:    03/15/21 1854 03/15/21 2009 03/15/21 2101 03/15/21 2204   BP: (!) 146/85 111/68 119/78 118/70   Pulse: 67 63 68 65   Resp: 18 18 18 16   Temp: 98.2 °F (36.8 °C)      TempSrc: Oral      SpO2: 99% 99% 97% 99%   Weight: 264 lb (119.7 kg)      Height: 6' (1.829 m)          Patient was given the following medications:  Medications - No data to display    ED Course as of Mar 15 2309   Mon Mar 15, 2021   2239 Patient is still not having any chest pain. His second troponin is negative. I think given the story this is very atypical for acute coronary syndrome. He has an appointment in 2 days with his cardiologist.  I have asked that he keep this appointment. He is agreeable with the plan for discharge home. [TC]      ED Course User Index  [TC] Harper Hollis MD     Patient is a 78-year-old male with a previous history of coronary disease who presents with sharp left sided superior chest pain that lasted for approximately 1 minute after getting a frustrating message. Patient has been asymptomatic during his stay in the emergency department. The symptoms as described are somewhat atypical of acute coronary syndrome. Given his history, cardiac work-up was initiated including a 3-hour troponin and EKG. The patient has not had any further episodes of chest pain during his stay. He feels normal.  His work-up thus far is unremarkable for an acute pathology. He has an appointment in 48 hours previously scheduled with cardiology for checkup. I have encouraged him to keep this appointment. His heart score is 3 and does not require admission to the hospital.  Patient is agreeable with the plan for discharge and close outpatient follow-up with cardiology. Differential diagnosis included but was not limited to: acute coronary syndrome, pulmonary embolism, COPD/asthma, pneumonia, musculoskeletal, reflux/PUD/gastritis, pneumothorax, CHF, thoracic aortic dissection, anxiety. The patient understands the importance of follow up and reasons to return. FINAL IMPRESSION      1. Chest pain, unspecified type          DISPOSITION/PLAN   DISPOSITION Decision To Discharge 03/15/2021 10:40:34 PM      PATIENT REFERRED TO:  Francisco Virgen  [phone]    Schedule an appointment as soon as possible for a visit in 1 week      Maryam Muñoz MD  [address], [address] [facility]  [place] [address]  [phone]    Go on 3/17/2021  as previously scheduled.     [facility]  [address] [address] [phone]  Go to   If symptoms worsen      DISCHARGE MEDICATIONS:  Discharge Medication List as of 3/15/2021 10:43 PM          DISCONTINUED MEDICATIONS:  Discharge Medication List as of 3/15/2021 10:43 PM      STOP taking these medications       amLODIPine (NORVASC) 2.5 MG tablet Comments:   Reason for Stopping:         BREO ELLIPTA 200-25 MCG/INH AEPB Comments:   Reason for Stopping:                      (Please note that portions of this note were completed with a voice recognition program.  Efforts were made to edit the dictations but occasionally words are mis-transcribed.)    Juliocesar Ascencio MD(electronically signed)              Juliocesar Ascencio MD  03/15/21 7854

## 2021-06-17 ENCOUNTER — OFFICE VISIT (OUTPATIENT)
Dept: CARDIOLOGY CLINIC | Age: 56
End: 2021-06-17
Payer: COMMERCIAL

## 2021-06-17 VITALS
WEIGHT: 276 LBS | BODY MASS INDEX: 38.64 KG/M2 | TEMPERATURE: 97 F | HEIGHT: 71 IN | SYSTOLIC BLOOD PRESSURE: 110 MMHG | OXYGEN SATURATION: 96 % | DIASTOLIC BLOOD PRESSURE: 60 MMHG | HEART RATE: 76 BPM

## 2021-06-17 DIAGNOSIS — I25.89 CARDIAC MICROVASCULAR DISEASE: ICD-10-CM

## 2021-06-17 DIAGNOSIS — Z72.0 TOBACCO ABUSE: ICD-10-CM

## 2021-06-17 DIAGNOSIS — E78.2 MIXED HYPERLIPIDEMIA: ICD-10-CM

## 2021-06-17 DIAGNOSIS — I25.10 CORONARY ARTERY DISEASE INVOLVING NATIVE CORONARY ARTERY OF NATIVE HEART WITHOUT ANGINA PECTORIS: Primary | ICD-10-CM

## 2021-06-17 DIAGNOSIS — I10 ESSENTIAL HYPERTENSION: ICD-10-CM

## 2021-06-17 PROCEDURE — 3017F COLORECTAL CA SCREEN DOC REV: CPT | Performed by: NURSE PRACTITIONER

## 2021-06-17 PROCEDURE — 99214 OFFICE O/P EST MOD 30 MIN: CPT | Performed by: NURSE PRACTITIONER

## 2021-06-17 PROCEDURE — G8427 DOCREV CUR MEDS BY ELIG CLIN: HCPCS | Performed by: NURSE PRACTITIONER

## 2021-06-17 PROCEDURE — 4004F PT TOBACCO SCREEN RCVD TLK: CPT | Performed by: NURSE PRACTITIONER

## 2021-06-17 PROCEDURE — G8417 CALC BMI ABV UP PARAM F/U: HCPCS | Performed by: NURSE PRACTITIONER

## 2021-06-17 RX ORDER — RANOLAZINE 1000 MG/1
1000 TABLET, EXTENDED RELEASE ORAL 2 TIMES DAILY
Qty: 180 TABLET | Refills: 3 | Status: SHIPPED | OUTPATIENT
Start: 2021-06-17 | End: 2022-07-13

## 2021-06-17 RX ORDER — ISOSORBIDE MONONITRATE 30 MG/1
TABLET, EXTENDED RELEASE ORAL
Qty: 90 TABLET | Refills: 3 | Status: SHIPPED | OUTPATIENT
Start: 2021-06-17

## 2021-06-17 NOTE — PATIENT INSTRUCTIONS
1. Increase the Ranexa from 500 mg to 1000 mg twice daily  2. No change in other heart medicines  3. No need for repeat blood work at this time  4.  Follow up with Dr. Carmen Harvey in 6 months

## 2021-06-17 NOTE — PROGRESS NOTES
[facility]   Cardiac Evaluation    Primary Care Doctor:  Nicki Perez    Chief Complaint   Patient presents with    3 Month Follow-Up    Coronary Artery Disease    Other     trouble getting full breath at times         History of Present Illness:   I had the pleasure of seeing Maximo Dhillon in follow up for CAD with hx of PCI's (2006, 2012, 2013), microvascular angina, HTN, HLD as well as smoker, obesity and COPD. February discontinued lisinopril due to hypotension. He was seen in the ER and March with symptoms of chest pain that were atypical for angina. Troponins and EKGs were negative x2. His lost his little brother a couple of weeks ago who was mentally and physically disabled. He was found dead at his home. He is tearful here today. He is not sleeping well. His BP was high this am but decreased on way here. His breathing also improved with getting in car and driving here. He also has lots of back pain. He has had more chest pain that lasts a few seconds, lasting longest of 5 minutes. Resolved with rest and sl nitro. Had chest pain last night too when notified they had his brothers ashes. This chest pain is similar to pain he was having last year when had stress test (normal). His weight is up 18 lbs since February. He is aware of this and starting to cut out sweets. He admits to some leg swelling with being up on them that improves with elevation. Maximo Dhillon describes symptoms including chest pain, dyspnea, fatigue, edema but denies palpitations, orthopnea, PND, early saiety, syncope.      NYHA:   IIb  ACC/ AHA Stage:    C    Past Medical History:   has a past medical history of Anxiety, Bipolar affective ([address] CAD (coronary artery disease), Chronic back pain, COPD (chronic obstructive pulmonary disease) ([address] DDD (degenerative disc disease), cervical, Depression, GERD (gastroesophageal reflux disease), Hyperlipidemia, Hypertension, MI (myocardial infarction) pain 12/3/20  Yes JOSE Rodriguez CNP   clopidogrel (PLAVIX) 75 MG tablet Take 1 tablet by mouth daily 9/29/20  Yes Sunita Patino MD   metoprolol succinate (TOPROL XL) 100 MG extended release tablet Take 1 tablet by mouth daily 9/4/20  Yes Sunita Patino MD   isosorbide mononitrate (IMDUR) 30 MG extended release tablet TAKE (1) TABLET DAILY 6/24/20  Yes Sunita Patino MD   HydrOXYzine Pamoate (VISTARIL PO) Take 50 mg by mouth daily    Yes Historical Provider, MD   atorvastatin (LIPITOR) 40 MG tablet Take 1 tablet by mouth daily 9/29/17  Yes JOSE Willis CNP   aspirin 81 MG EC tablet Take 1 tablet by mouth daily 4/10/17  Yes JOSE Pickering CNP   pantoprazole (PROTONIX) 20 MG tablet TAKE 2 TABLETS BY MOUTH DAILY  Patient taking differently: TAKE 1 TABLET BY MOUTH BID 9/29/16  Yes JOSE Sandoval CNP        Allergies:  Pcn [penicillins]     Physical Examination:    Vitals:    06/17/21 1059   BP: 110/60   Pulse: 76   Temp: 97 °F (36.1 °C)   SpO2: 96%   Weight: 276 lb (125.2 kg)   Height: 5' 11\" (1.803 m)      Constitutional and General Appearance: Warm and dry, no apparent distress, normal coloration  HEENT:  Normocephalic, atraumatic  Respiratory:  · Normal excursion and expansion without use of accessory muscles  · Resp Auscultation: Clear to auscultation with isolated expiratory wheeze  Cardiovascular:  · The apical impulses not displaced  · Heart tones are crisp and normal  · JVP 8 cm H2O  · Regular rate and rhythm, normal S1S2, no m/g/r  · Peripheral pulses are symmetrical and full  · There is no clubbing, cyanosis of the extremities.   · No BLE edema  · Pedal Pulses: 2+ and equal   Abdomen:  · No masses or tenderness  · Liver/Spleen: No Abnormalities Noted  Neurological/Psychiatric:  · Alert and oriented in all spheres  · Moves all extremities well  · Exhibits normal gait balance and coordination  · No abnormalities of mood, affect, memory, mentation, or behavior are noted    Lab Data:  Most recent lab results below reviewed in office    CBC:   Lab Results   Component Value Date    WBC 11.4 03/15/2021    WBC 11.6 03/06/2021    WBC 9.0 04/07/2020    RBC 4.81 03/15/2021    RBC 4.89 03/06/2021    RBC 5.00 04/07/2020    HGB 13.8 03/15/2021    HGB 13.9 03/06/2021    HGB 14.1 04/07/2020    HCT 41.9 03/15/2021    HCT 42.4 03/06/2021    HCT 42.0 04/07/2020    MCV 87.1 03/15/2021    MCV 86.6 03/06/2021    MCV 83.9 04/07/2020    RDW 15.1 03/15/2021    RDW 15.0 03/06/2021    RDW 15.6 04/07/2020     03/15/2021     03/06/2021     04/07/2020     BMP:  Lab Results   Component Value Date     03/15/2021     03/06/2021     04/07/2020    K 4.2 03/15/2021    K 4.0 03/06/2021    K 3.7 04/07/2020    K 3.9 04/06/2020    CL 98 03/15/2021    CL 97 03/06/2021     04/07/2020    CO2 28 03/15/2021    CO2 26 03/06/2021    CO2 25 04/07/2020    BUN 12 03/15/2021    BUN 10 03/06/2021    BUN 7 04/07/2020    CREATININE 0.8 03/15/2021    CREATININE 0.8 03/06/2021    CREATININE 0.6 04/07/2020     BNP:   Lab Results   Component Value Date    PROBNP 44 01/10/2017     LIPID:   Lab Results   Component Value Date    TRIG 150 12/18/2018    TRIG 110 09/27/2018    HDL 36 12/18/2018    HDL 36 09/27/2018    LDLCALC 95 12/18/2018    LDLCALC 72 09/27/2018    LDLDIRECT 77 01/29/2018       Cardiac Imaging:  [facility] STRESS 4/7/2020:       Summary    Normal LV size and systolic function. Left ventricular ejection fraction of    64%. Normal wall motion. There is normal isotope uptake at stress and rest.    There is no evidence of myocardial ischemia or scar. [facility] MPI 12/18/18:   Summary  There is normal isotope uptake at stress and rest. There is no evidence of  myocardial ischemia or scar. LV function is normal with uniform wall motion  and ejection fraction of 67 %. Low risk study. Procedure Performed: 1/29/18:   1. Coronary angiogram  2. Left heart cath  3.  Left ventriculogram

## 2021-06-23 ENCOUNTER — HOSPITAL ENCOUNTER (OUTPATIENT)
Dept: GENERAL RADIOLOGY | Age: 56
Discharge: HOME | End: 2021-06-23
Payer: COMMERCIAL

## 2021-06-23 ENCOUNTER — HOSPITAL ENCOUNTER (OUTPATIENT)
Age: 56
Discharge: HOME | End: 2021-06-23
Payer: COMMERCIAL

## 2021-06-23 DIAGNOSIS — J43.8 OTHER EMPHYSEMA (HCC): ICD-10-CM

## 2021-06-23 PROCEDURE — 71046 X-RAY EXAM CHEST 2 VIEWS: CPT

## 2021-07-20 ENCOUNTER — APPOINTMENT (OUTPATIENT)
Dept: GENERAL RADIOLOGY | Age: 56
End: 2021-07-20
Payer: COMMERCIAL

## 2021-07-20 ENCOUNTER — HOSPITAL ENCOUNTER (EMERGENCY)
Age: 56
Discharge: LEFT BEFORE DISCHARGE | End: 2021-07-20
Attending: STUDENT IN AN ORGANIZED HEALTH CARE EDUCATION/TRAINING PROGRAM
Payer: COMMERCIAL

## 2021-07-20 VITALS
RESPIRATION RATE: 16 BRPM | BODY MASS INDEX: 38.36 KG/M2 | HEIGHT: 71 IN | OXYGEN SATURATION: 94 % | HEART RATE: 65 BPM | TEMPERATURE: 98.2 F | WEIGHT: 274 LBS | SYSTOLIC BLOOD PRESSURE: 109 MMHG | DIASTOLIC BLOOD PRESSURE: 61 MMHG

## 2021-07-20 DIAGNOSIS — R07.9 CHEST PAIN, UNSPECIFIED TYPE: Primary | ICD-10-CM

## 2021-07-20 LAB
ANION GAP SERPL CALCULATED.3IONS-SCNC: 9 MMOL/L (ref 3–16)
BASOPHILS ABSOLUTE: 0.1 K/UL (ref 0–0.2)
BASOPHILS RELATIVE PERCENT: 0.8 %
BUN BLDV-MCNC: 16 MG/DL (ref 7–20)
CALCIUM SERPL-MCNC: 8.9 MG/DL (ref 8.3–10.6)
CHLORIDE BLD-SCNC: 99 MMOL/L (ref 99–110)
CO2: 26 MMOL/L (ref 21–32)
CREAT SERPL-MCNC: 1.1 MG/DL (ref 0.9–1.3)
D DIMER: <200 NG/ML DDU (ref 0–229)
EOSINOPHILS ABSOLUTE: 0.3 K/UL (ref 0–0.6)
EOSINOPHILS RELATIVE PERCENT: 3.2 %
GFR AFRICAN AMERICAN: >60
GFR NON-AFRICAN AMERICAN: >60
GLUCOSE BLD-MCNC: 119 MG/DL (ref 70–99)
HCT VFR BLD CALC: 40.1 % (ref 40.5–52.5)
HEMOGLOBIN: 13.3 G/DL (ref 13.5–17.5)
LYMPHOCYTES ABSOLUTE: 2.5 K/UL (ref 1–5.1)
LYMPHOCYTES RELATIVE PERCENT: 26.4 %
MCH RBC QN AUTO: 28.9 PG (ref 26–34)
MCHC RBC AUTO-ENTMCNC: 33.2 G/DL (ref 31–36)
MCV RBC AUTO: 87.1 FL (ref 80–100)
MONOCYTES ABSOLUTE: 0.8 K/UL (ref 0–1.3)
MONOCYTES RELATIVE PERCENT: 8.3 %
NEUTROPHILS ABSOLUTE: 5.8 K/UL (ref 1.7–7.7)
NEUTROPHILS RELATIVE PERCENT: 61.3 %
PDW BLD-RTO: 15.6 % (ref 12.4–15.4)
PLATELET # BLD: 142 K/UL (ref 135–450)
PMV BLD AUTO: 9.5 FL (ref 5–10.5)
POTASSIUM SERPL-SCNC: 4.1 MMOL/L (ref 3.5–5.1)
PRO-BNP: 224 PG/ML (ref 0–124)
RBC # BLD: 4.6 M/UL (ref 4.2–5.9)
SODIUM BLD-SCNC: 134 MMOL/L (ref 136–145)
TROPONIN: <0.01 NG/ML
TROPONIN: <0.01 NG/ML
WBC # BLD: 9.4 K/UL (ref 4–11)

## 2021-07-20 PROCEDURE — 83880 ASSAY OF NATRIURETIC PEPTIDE: CPT

## 2021-07-20 PROCEDURE — 80048 BASIC METABOLIC PNL TOTAL CA: CPT

## 2021-07-20 PROCEDURE — 85379 FIBRIN DEGRADATION QUANT: CPT

## 2021-07-20 PROCEDURE — 71046 X-RAY EXAM CHEST 2 VIEWS: CPT

## 2021-07-20 PROCEDURE — 84484 ASSAY OF TROPONIN QUANT: CPT

## 2021-07-20 PROCEDURE — 99285 EMERGENCY DEPT VISIT HI MDM: CPT

## 2021-07-20 PROCEDURE — 36415 COLL VENOUS BLD VENIPUNCTURE: CPT

## 2021-07-20 PROCEDURE — 96374 THER/PROPH/DIAG INJ IV PUSH: CPT

## 2021-07-20 PROCEDURE — 85025 COMPLETE CBC W/AUTO DIFF WBC: CPT

## 2021-07-20 PROCEDURE — 93005 ELECTROCARDIOGRAM TRACING: CPT | Performed by: STUDENT IN AN ORGANIZED HEALTH CARE EDUCATION/TRAINING PROGRAM

## 2021-07-20 PROCEDURE — 2500000003 HC RX 250 WO HCPCS: Performed by: STUDENT IN AN ORGANIZED HEALTH CARE EDUCATION/TRAINING PROGRAM

## 2021-07-20 RX ORDER — CLINDAMYCIN HYDROCHLORIDE 300 MG/1
300 CAPSULE ORAL 3 TIMES DAILY
Status: ON HOLD | COMMUNITY
End: 2021-07-28 | Stop reason: HOSPADM

## 2021-07-20 RX ORDER — QUETIAPINE FUMARATE 100 MG/1
100 TABLET, FILM COATED ORAL 2 TIMES DAILY
COMMUNITY
Start: 2021-06-21

## 2021-07-20 RX ORDER — ASPIRIN 325 MG
325 TABLET ORAL DAILY
COMMUNITY

## 2021-07-20 RX ORDER — GABAPENTIN 300 MG/1
300 CAPSULE ORAL 3 TIMES DAILY
COMMUNITY
Start: 2021-06-21

## 2021-07-20 RX ADMIN — FAMOTIDINE 20 MG: 10 INJECTION, SOLUTION INTRAVENOUS at 19:48

## 2021-07-20 ASSESSMENT — HEART SCORE: ECG: 0

## 2021-07-20 NOTE — ED PROVIDER NOTES
[facility] EMERGENCY DEPARTMENT      CHIEF COMPLAINT  Chest Pain (3 episodes of sweating and feelings of chest discomfort moving into his throat today. denies any complaints upon ER arrival. EMS admin 324mg of ASA)     [facility]  Zana Boo is a 48 y.o. male  who presents to the ED complaining of chest pain, diaphoresis. Patient states that earlier today around 2 PM, he developed substernal chest pressure and discomfort the last approximately 10 minutes, with associated diaphoresis. States that symptoms then resolved, and a few hours later he had another episode lasted about a minute. States that his chest pain has since resolved, however he is having the burning sensation in his chest which is new. She does have a history of hypertension, hyperlipidemia, coronary artery disease and is a smoker. He denies any other current complaints or concerns. Denies fevers, change in cough but does have history of COPD and states that he has a chronic smoker's cough from not. He denies any abdominal pain, nausea vomiting or diarrhea. Denies fevers or chills. Denies other complaints or concerns. No other complaints, modifying factors or associated symptoms. I have reviewed the following from the nursing documentation.     Past Medical History:   Diagnosis Date    Anxiety     Bipolar affective ([address]     CAD (coronary artery disease)     Chronic back pain     COPD (chronic obstructive pulmonary disease) (HCC)     DDD (degenerative disc disease), cervical     Depression     GERD (gastroesophageal reflux disease)     Hyperlipidemia     Hypertension     MI (myocardial infarction) ([address]     Pancreatitis     Recovering alcoholic ([address]     Tobacco abuse 12/16/2016     Past Surgical History:   Procedure Laterality Date    CORONARY ANGIOPLASTY WITH STENT PLACEMENT  2006, 2012, 2013    Tsering Zeng     Family History   Problem Relation Age of Onset    Other Mother     Arthritis Mother  Mental Illness Mother     Depression Mother     Diabetes Father     Heart Disease Father     Substance Abuse Father     Heart Disease Sister     High Blood Pressure Sister     Mental Retardation Brother     Other Brother     Learning Disabilities Brother     Early Death Sister    [facility] Cancer Sister     Substance Abuse Sister     Cancer Sister     Diabetes Brother     High Blood Pressure Brother      Social History     Socioeconomic History    Marital status: Single     Spouse name: Not on file    Number of children: Not on file    Years of education: Not on file    Highest education level: Not on file   Occupational History    Not on file   Tobacco Use    Smoking status: Current Every Day Smoker     Packs/day: 1.00     Years: 45.00     Pack years: 45.00     Types: Cigarettes    Smokeless tobacco: Never Used   Vaping Use    Vaping Use: Never used   Substance and Sexual Activity    Alcohol use: Yes     Alcohol/week: 0.0 standard drinks     Comment:  a few a day     Drug use: Yes     Types: Marijuana     Comment: took friend's suboxone     Sexual activity: Never   Other Topics Concern    Not on file   Social History Narrative    Not on file     Social Determinants of Health     Financial Resource Strain:     Difficulty of Paying Living Expenses:    Food Insecurity:     Worried About Running Out of Food in the Last Year:     [address] in the Last Year:    Transportation Needs:     Lack of Transportation (Medical):      Lack of Transportation (Non-Medical):    Physical Activity:     Days of Exercise per Week:     Minutes of Exercise per Session:    Stress:     Feeling of Stress :    Social Connections:     Frequency of Communication with Friends and Family:     Frequency of Social Gatherings with Friends and Family:     Attends L[employer] Services:     Active Member of Clubs or Organizations:     Attends Club or Organization Meetings:     Marital Status:    Intimate Partner Violence:     Fear of Current or Ex-Partner:     Emotionally Abused:     Physically Abused:     Sexually Abused:      No current facility-administered medications for this encounter. Current Outpatient Medications   Medication Sig Dispense Refill    QUEtiapine (SEROQUEL) 100 MG tablet       gabapentin (NEURONTIN) 300 MG capsule       aspirin 325 MG tablet Take 325 mg by mouth daily      clindamycin (CLEOCIN) 300 MG capsule Take 300 mg by mouth 3 times daily      ranolazine (RANEXA) 1000 MG extended release tablet Take 1 tablet by mouth 2 times daily 180 tablet 3    isosorbide mononitrate (IMDUR) 30 MG extended release tablet TAKE (1) TABLET DAILY 90 tablet 3    albuterol sulfate  (90 Base) MCG/ACT inhaler Inhale 2 puffs into the lungs every 4 hours as needed      SYMBICORT 160-4.5 MCG/ACT AERO       nitroGLYCERIN (NITROSTAT) 0.4 MG SL tablet Place 1 tablet under the tongue every 5 minutes as needed for Chest pain 25 tablet 3    clopidogrel (PLAVIX) 75 MG tablet Take 1 tablet by mouth daily 90 tablet 3    metoprolol succinate (TOPROL XL) 100 MG extended release tablet Take 1 tablet by mouth daily 90 tablet 3    HydrOXYzine Pamoate (VISTARIL PO) Take 50 mg by mouth daily       atorvastatin (LIPITOR) 40 MG tablet Take 1 tablet by mouth daily 30 tablet 5    pantoprazole (PROTONIX) 20 MG tablet TAKE 2 TABLETS BY MOUTH DAILY (Patient taking differently: TAKE 1 TABLET BY MOUTH BID) 30 tablet 0    mirtazapine (REMERON) 15 MG tablet Take 15 mg by mouth nightly      l-arginine 500 MG capsule Take 1 capsule by mouth 2 times daily 60 capsule 3     Allergies   Allergen Reactions    Pcn [Penicillins] Other (See Comments) and Hives     Other reaction(s): Light Headed  Patient passed out. syncope       REVIEW OF SYSTEMS  10 systems reviewed, pertinent positives per HPI otherwise noted to be negative.     PHYSICAL EXAM  /61   Pulse 65   Temp 98.2 °F (36.8 °C) (Oral)   Resp 16   Ht 5' 11\" (1.803 m)   Wt 274 lb (124.3 kg)   SpO2 94%   BMI 38.22 kg/m²    GENERAL APPEARANCE: Awake and alert. Cooperative. No acute distress. HENT: Normocephalic. Atraumatic. Mucous membranes are moist.  NECK: Supple. EYES: PERRL. EOM's grossly intact. HEART/CHEST: RRR. No murmurs. LUNGS: Respirations unlabored. CTAB. Good air exchange. Speaking comfortably in full sentences. ABDOMEN: No tenderness. Soft. Non-distended. No masses. No organomegaly. No guarding or rebound. MUSCULOSKELETAL: No extremity edema. Compartments soft. No deformity. No tenderness in the extremities. All extremities neurovascularly intact. SKIN: Warm and dry. No acute rashes. NEUROLOGICAL: Alert and oriented. CN's 2-12 intact. No gross facial drooping. Strength 5/5, sensation intact. PSYCHIATRIC: Normal mood and affect. LABS  I have reviewed all labs for this visit.    Results for orders placed or performed during the hospital encounter of 07/83/90   Basic Metabolic Panel   Result Value Ref Range    Sodium 134 (L) 136 - 145 mmol/L    Potassium 4.1 3.5 - 5.1 mmol/L    Chloride 99 99 - 110 mmol/L    CO2 26 21 - 32 mmol/L    Anion Gap 9 3 - 16    Glucose 119 (H) 70 - 99 mg/dL    BUN 16 7 - 20 mg/dL    CREATININE 1.1 0.9 - 1.3 mg/dL    GFR Non-African American >60 >60    GFR African American >60 >60    Calcium 8.9 8.3 - 10.6 mg/dL   Troponin   Result Value Ref Range    Troponin <0.01 <0.01 ng/mL   CBC Auto Differential   Result Value Ref Range    WBC 9.4 4.0 - 11.0 K/uL    RBC 4.60 4.20 - 5.90 M/uL    Hemoglobin 13.3 (L) 13.5 - 17.5 g/dL    Hematocrit 40.1 (L) 40.5 - 52.5 %    MCV 87.1 80.0 - 100.0 fL    MCH 28.9 26.0 - 34.0 pg    MCHC 33.2 31.0 - 36.0 g/dL    RDW 15.6 (H) 12.4 - 15.4 %    Platelets 836 280 - 493 K/uL    MPV 9.5 5.0 - 10.5 fL    Neutrophils % 61.3 %    Lymphocytes % 26.4 %    Monocytes % 8.3 %    Eosinophils % 3.2 %    Basophils % 0.8 %    Neutrophils Absolute 5.8 1.7 - 7.7 K/uL    Lymphocytes Absolute 2.5 1.0 - was initially comfortable with plan of care for hospitalization. However prior to his repeat troponin being drawn and acceptance by hospitalist, patient stated that he would like to leave [address] He is alert and oriented, has capacity to make this decision. I discussed the benefits of hospitalization, and the risks of leaving [address] with the patient extensively including but not limited to worsening his condition, heart attack, stroke, permanent disability, and even death and he would still like to leave [address] We will send paperwork to this effect. Was advised that he may return at any time should he desire further evaluation or hospitalization for his condition. During the patient's ED course, the patient was given:  Medications   famotidine (PEPCID) injection 20 mg (20 mg Intravenous Given 7/20/21 1948)        CLINICAL IMPRESSION  1. Chest pain, unspecified type        Blood pressure 109/61, pulse 65, temperature 98.2 °F (36.8 °C), temperature source Oral, resp. rate 16, height 5' 11\" (1.803 m), weight 274 lb (124.3 kg), SpO2 94 %. [address] left AGAINST MEDICAL ADVICE in stable condition. Patient was given scripts for the following medications. I counseled patient how to take these medications. New Prescriptions    No medications on file       Follow-up with:  Anna Whiteside  [address] [place] [address]   [phone]    Schedule an appointment as soon as possible for a visit       [place]  [address] [place] [address],[address]  [phone]  Go to   If symptoms worsen      DISCLAIMER: This chart was created using Dragon dictation software. Efforts were made by me to ensure accuracy, however some errors may be present due to limitations of this technology and occasionally words are not transcribed correctly.        Timoteo Avina MD  07/20/21 0603       Timoteo Avina MD  07/20/21 [address]

## 2021-07-20 NOTE — LETTER
[facility] Emergency Department  [address] [facility] [address]  Phone: [phone]    Patient: Viktoria Wall  YOB: 1965  Date: 7/20/2021 Time: 10:55 PM    Leaving the [facility] Advice    Chart #:[id number]    This will certify that I, the undersigned,    ______________________________________________________________________    A patient in the above named medical center, having requested discharge and removal from the medical center against the advice of my attending physician(s), hereby release the Emergency Department, its physicians, officers and employees, severally and individually, from any and all liability of any nature whatsoever for any injury or harm or complication of any kind that may result directly or indirectly, by reason of my terminating my stay as a patient from [facility], and hereby waive any and all rights of action I may now have or later acquire as a result of my voluntary departure from [facility] and the termination of my stay as a patient therein. This release is made with the full knowledge of the danger that may result from the action which I am taking.       Date:_______________________                         ___________________________                                                                                    Patient/Legal Representative    Witness:        ____________________________                          ___________________________  Nurse                                                                        Physician

## 2021-07-21 ENCOUNTER — TELEPHONE (OUTPATIENT)
Dept: CARDIOLOGY CLINIC | Age: 56
End: 2021-07-21

## 2021-07-21 LAB
EKG ATRIAL RATE: 67 BPM
EKG DIAGNOSIS: NORMAL
EKG P AXIS: 38 DEGREES
EKG P-R INTERVAL: 172 MS
EKG Q-T INTERVAL: 428 MS
EKG QRS DURATION: 98 MS
EKG QTC CALCULATION (BAZETT): 452 MS
EKG R AXIS: 41 DEGREES
EKG T AXIS: 66 DEGREES
EKG VENTRICULAR RATE: 67 BPM

## 2021-07-21 PROCEDURE — 93010 ELECTROCARDIOGRAM REPORT: CPT | Performed by: INTERNAL MEDICINE

## 2021-07-21 NOTE — PROGRESS NOTES
Pt is alert and oriented, stable for discharge, 2nd troponin was neg, pt does not have a ride home, will help  Iv out, cath intact, pressure and dressing applied, pt tolerated well

## 2021-07-22 ENCOUNTER — APPOINTMENT (OUTPATIENT)
Dept: GENERAL RADIOLOGY | Age: 56
End: 2021-07-22
Payer: COMMERCIAL

## 2021-07-22 ENCOUNTER — HOSPITAL ENCOUNTER (EMERGENCY)
Age: 56
Discharge: HOME | End: 2021-07-22
Attending: EMERGENCY MEDICINE
Payer: COMMERCIAL

## 2021-07-22 ENCOUNTER — TELEPHONE (OUTPATIENT)
Dept: CARDIOLOGY CLINIC | Age: 56
End: 2021-07-22

## 2021-07-22 VITALS
DIASTOLIC BLOOD PRESSURE: 74 MMHG | HEIGHT: 72 IN | BODY MASS INDEX: 37.11 KG/M2 | HEART RATE: 78 BPM | TEMPERATURE: 98.3 F | RESPIRATION RATE: 18 BRPM | SYSTOLIC BLOOD PRESSURE: 128 MMHG | OXYGEN SATURATION: 95 % | WEIGHT: 274 LBS

## 2021-07-22 DIAGNOSIS — J44.1 COPD EXACERBATION (HCC): Primary | ICD-10-CM

## 2021-07-22 LAB
A/G RATIO: 1.5 (ref 1.1–2.2)
ALBUMIN SERPL-MCNC: 4.1 G/DL (ref 3.4–5)
ALP BLD-CCNC: 66 U/L (ref 40–129)
ALT SERPL-CCNC: 25 U/L (ref 10–40)
ANION GAP SERPL CALCULATED.3IONS-SCNC: 8 MMOL/L (ref 3–16)
AST SERPL-CCNC: 13 U/L (ref 15–37)
BASOPHILS ABSOLUTE: 0.1 K/UL (ref 0–0.2)
BASOPHILS RELATIVE PERCENT: 1.1 %
BILIRUB SERPL-MCNC: 0.4 MG/DL (ref 0–1)
BUN BLDV-MCNC: 12 MG/DL (ref 7–20)
CALCIUM SERPL-MCNC: 9.4 MG/DL (ref 8.3–10.6)
CHLORIDE BLD-SCNC: 100 MMOL/L (ref 99–110)
CO2: 25 MMOL/L (ref 21–32)
CREAT SERPL-MCNC: 0.8 MG/DL (ref 0.9–1.3)
EKG ATRIAL RATE: 74 BPM
EKG DIAGNOSIS: NORMAL
EKG P AXIS: 23 DEGREES
EKG P-R INTERVAL: 172 MS
EKG Q-T INTERVAL: 408 MS
EKG QRS DURATION: 92 MS
EKG QTC CALCULATION (BAZETT): 452 MS
EKG R AXIS: 32 DEGREES
EKG T AXIS: 52 DEGREES
EKG VENTRICULAR RATE: 74 BPM
EOSINOPHILS ABSOLUTE: 0.2 K/UL (ref 0–0.6)
EOSINOPHILS RELATIVE PERCENT: 2.6 %
GFR AFRICAN AMERICAN: >60
GFR NON-AFRICAN AMERICAN: >60
GLOBULIN: 2.7 G/DL
GLUCOSE BLD-MCNC: 128 MG/DL (ref 70–99)
HCT VFR BLD CALC: 41 % (ref 40.5–52.5)
HEMOGLOBIN: 13.8 G/DL (ref 13.5–17.5)
LYMPHOCYTES ABSOLUTE: 1.6 K/UL (ref 1–5.1)
LYMPHOCYTES RELATIVE PERCENT: 17.5 %
MCH RBC QN AUTO: 28.9 PG (ref 26–34)
MCHC RBC AUTO-ENTMCNC: 33.6 G/DL (ref 31–36)
MCV RBC AUTO: 85.9 FL (ref 80–100)
MONOCYTES ABSOLUTE: 1 K/UL (ref 0–1.3)
MONOCYTES RELATIVE PERCENT: 10.8 %
NEUTROPHILS ABSOLUTE: 6.1 K/UL (ref 1.7–7.7)
NEUTROPHILS RELATIVE PERCENT: 68 %
PDW BLD-RTO: 15.5 % (ref 12.4–15.4)
PLATELET # BLD: 146 K/UL (ref 135–450)
PMV BLD AUTO: 9.3 FL (ref 5–10.5)
POTASSIUM SERPL-SCNC: 4.4 MMOL/L (ref 3.5–5.1)
PRO-BNP: 136 PG/ML (ref 0–124)
RBC # BLD: 4.77 M/UL (ref 4.2–5.9)
SODIUM BLD-SCNC: 133 MMOL/L (ref 136–145)
TOTAL PROTEIN: 6.8 G/DL (ref 6.4–8.2)
TROPONIN: <0.01 NG/ML
WBC # BLD: 8.9 K/UL (ref 4–11)

## 2021-07-22 PROCEDURE — 83880 ASSAY OF NATRIURETIC PEPTIDE: CPT

## 2021-07-22 PROCEDURE — 99283 EMERGENCY DEPT VISIT LOW MDM: CPT

## 2021-07-22 PROCEDURE — 80053 COMPREHEN METABOLIC PANEL: CPT

## 2021-07-22 PROCEDURE — 71046 X-RAY EXAM CHEST 2 VIEWS: CPT

## 2021-07-22 PROCEDURE — 84484 ASSAY OF TROPONIN QUANT: CPT

## 2021-07-22 PROCEDURE — 96374 THER/PROPH/DIAG INJ IV PUSH: CPT

## 2021-07-22 PROCEDURE — 6360000002 HC RX W HCPCS: Performed by: EMERGENCY MEDICINE

## 2021-07-22 PROCEDURE — 93010 ELECTROCARDIOGRAM REPORT: CPT | Performed by: INTERNAL MEDICINE

## 2021-07-22 PROCEDURE — 6370000000 HC RX 637 (ALT 250 FOR IP): Performed by: EMERGENCY MEDICINE

## 2021-07-22 PROCEDURE — 93005 ELECTROCARDIOGRAM TRACING: CPT | Performed by: EMERGENCY MEDICINE

## 2021-07-22 PROCEDURE — 36415 COLL VENOUS BLD VENIPUNCTURE: CPT

## 2021-07-22 PROCEDURE — 85025 COMPLETE CBC W/AUTO DIFF WBC: CPT

## 2021-07-22 RX ORDER — DOXYCYCLINE HYCLATE 100 MG/1
100 CAPSULE ORAL 2 TIMES DAILY
Qty: 20 CAPSULE | Refills: 0 | Status: ON HOLD | OUTPATIENT
Start: 2021-07-22 | End: 2021-07-28 | Stop reason: HOSPADM

## 2021-07-22 RX ORDER — PREDNISONE 10 MG/1
10 TABLET ORAL SEE ADMIN INSTRUCTIONS
Qty: 30 TABLET | Refills: 0 | Status: ON HOLD | OUTPATIENT
Start: 2021-07-23 | End: 2021-07-28 | Stop reason: HOSPADM

## 2021-07-22 RX ORDER — IPRATROPIUM BROMIDE AND ALBUTEROL SULFATE 2.5; .5 MG/3ML; MG/3ML
1 SOLUTION RESPIRATORY (INHALATION) EVERY 4 HOURS
Qty: 360 ML | Refills: 0 | Status: SHIPPED | OUTPATIENT
Start: 2021-07-22

## 2021-07-22 RX ORDER — IPRATROPIUM BROMIDE AND ALBUTEROL SULFATE 2.5; .5 MG/3ML; MG/3ML
1 SOLUTION RESPIRATORY (INHALATION) ONCE
Status: COMPLETED | OUTPATIENT
Start: 2021-07-22 | End: 2021-07-22

## 2021-07-22 RX ORDER — METHYLPREDNISOLONE SODIUM SUCCINATE 125 MG/2ML
125 INJECTION, POWDER, LYOPHILIZED, FOR SOLUTION INTRAMUSCULAR; INTRAVENOUS ONCE
Status: COMPLETED | OUTPATIENT
Start: 2021-07-22 | End: 2021-07-22

## 2021-07-22 RX ADMIN — IPRATROPIUM BROMIDE AND ALBUTEROL SULFATE 1 AMPULE: .5; 3 SOLUTION RESPIRATORY (INHALATION) at 14:08

## 2021-07-22 RX ADMIN — METHYLPREDNISOLONE SODIUM SUCCINATE 125 MG: 125 INJECTION, POWDER, FOR SOLUTION INTRAMUSCULAR; INTRAVENOUS at 14:08

## 2021-07-22 ASSESSMENT — ENCOUNTER SYMPTOMS
CHOKING: 0
CHEST TIGHTNESS: 0
WHEEZING: 1
COUGH: 1
SHORTNESS OF BREATH: 1
ABDOMINAL DISTENTION: 0
SINUS PAIN: 0
RHINORRHEA: 1
ABDOMINAL PAIN: 0

## 2021-07-22 NOTE — TELEPHONE ENCOUNTER
Covering for Zaria  Vyvanse can increase BP, HR and risk of MI in patients in heart disease. He needs to be aware of these risks if he chooses to take.

## 2021-07-22 NOTE — TELEPHONE ENCOUNTER
7-22-21  at [phone] informing pt to call back to schedule next available with VSP at [facility] or [facility]). As of now VSP has availibility on 8-18-21 at [facility] ([facility]).

## 2021-07-22 NOTE — TELEPHONE ENCOUNTER
Pt called on answering service he wants to know if he can take Vyvanse to help him lose weight, if this is ok with the medications he takes?  Pls call to advise Thank you

## 2021-07-22 NOTE — ED PROVIDER NOTES
[address] [facility]      Pt Name: Candice Belcher  MRN: [id number]  [place] 1965  Date of evaluation: 7/22/2021  Provider: Charmaine Yeager MD    [address] [place] [address]       Chief Complaint   Patient presents with    Shortness of Breath     States dyspneal exertion since this AM         HISTORY OF PRESENT ILLNESS   (Location/Symptom, Timing/Onset, Context/Setting, Quality, Duration, Modifying Factors, Severity)  Note limiting factors. Candice Belcher is a 48 y.o. male who presents to the emergency department     Patient has a long history of smoking. Effect he continues to smoke the patient apparently has a  he apparently suffers from depression apparently  went to just check on him and he was wheezing she therefore brought him out here he denies any other new or worsening depression denies being suicidal homicidal he is coughing he says he is bringing up clear sputum he was on clindamycin for recent leg infection but he says he has been on it for about 10 days now  He has used duo nebs or albuterol solution and a nebulizer before  He denies any high fever chills he is bringing up a little bit of some yellow sputum. He is not on any other antibiotic  He does use Symbicort twice a day which advised him to keep using  He does have a history of previous MI also x3  No diabetes but he does have high blood pressure has had no fever fortunately is followed closely by Dr. Leslie Huffman said that he had quite a bit of exertional dyspnea this morning he has no new calf tenderness or swelling. Patient has had the Covid vaccine      The history is provided by the patient. Nursing Notes were reviewed. REVIEW OF SYSTEMS    (2-9 systems for level 4, 10 or more for level 5)     Review of Systems   Constitutional: Positive for activity change and appetite change. Negative for chills, diaphoresis, fatigue and fever.    HENT: Positive for congestion and rhinorrhea. Negative for sinus pain and sneezing. Eyes: Negative for visual disturbance. Respiratory: Positive for cough, shortness of breath and wheezing. Negative for choking and chest tightness. Cardiovascular: Negative for chest pain, palpitations and leg swelling. Gastrointestinal: Negative for abdominal distention and abdominal pain. Musculoskeletal: Negative for arthralgias. Allergic/Immunologic: Negative for immunocompromised state. Neurological: Positive for weakness. Negative for dizziness, seizures, facial asymmetry, light-headedness, numbness and headaches. Psychiatric/Behavioral: Negative for behavioral problems. All other systems reviewed and are negative. Except as noted above the remainder of the review of systems was reviewed and negative.        PAST MEDICAL HISTORY     Past Medical History:   Diagnosis Date    Anxiety     Bipolar affective ([facility]     CAD (coronary artery disease)     Chronic back pain     COPD (chronic obstructive pulmonary disease) ([facility]     DDD (degenerative disc disease), cervical     Depression     GERD (gastroesophageal reflux disease)     Hyperlipidemia     Hypertension     MI (myocardial infarction) ([facility]     Pancreatitis     Recovering alcoholic ([facility]     Tobacco abuse 12/16/2016         SURGICAL HISTORY       Past Surgical History:   Procedure Laterality Date    CORONARY ANGIOPLASTY WITH STENT PLACEMENT  2006, 2012, 2013    [facility]         CURRENT MEDICATIONS       Previous Medications    ALBUTEROL SULFATE  (90 BASE) MCG/ACT INHALER    Inhale 2 puffs into the lungs every 4 hours as needed    ASPIRIN 325 MG TABLET    Take 325 mg by mouth daily    ATORVASTATIN (LIPITOR) 40 MG TABLET    Take 1 tablet by mouth daily    CLINDAMYCIN (CLEOCIN) 300 MG CAPSULE    Take 300 mg by mouth 3 times daily    CLOPIDOGREL (PLAVIX) 75 MG TABLET    Take 1 tablet by mouth daily    GABAPENTIN (NEURONTIN) 300 MG CAPSULE    Take 300 mg by mouth 3 times daily. HYDROXYZINE PAMOATE (VISTARIL PO)    Take 50 mg by mouth daily     ISOSORBIDE MONONITRATE (IMDUR) 30 MG EXTENDED RELEASE TABLET    TAKE (1) TABLET DAILY    L-ARGININE 500 MG CAPSULE    Take 1 capsule by mouth 2 times daily    METOPROLOL SUCCINATE (TOPROL XL) 100 MG EXTENDED RELEASE TABLET    Take 1 tablet by mouth daily    NITROGLYCERIN (NITROSTAT) 0.4 MG SL TABLET    Place 1 tablet under the tongue every 5 minutes as needed for Chest pain    PANTOPRAZOLE (PROTONIX) 20 MG TABLET    TAKE 2 TABLETS BY MOUTH DAILY    QUETIAPINE (SEROQUEL) 100 MG TABLET        RANOLAZINE (RANEXA) 1000 MG EXTENDED RELEASE TABLET    Take 1 tablet by mouth 2 times daily    SYMBICORT 160-4.5 MCG/ACT AERO           ALLERGIES     Pcn [penicillins]    FAMILY HISTORY       Family History   Problem Relation Age of Onset    Other Mother     Arthritis Mother     Mental Illness Mother     Depression Mother     Diabetes Father     Heart Disease Father     Substance Abuse Father     Heart Disease Sister     High Blood Pressure Sister     Mental Retardation Brother     Other Brother     Learning Disabilities Brother     Early Death Sister    [facility] Cancer Sister     Substance Abuse Sister     Cancer Sister     Diabetes Brother     High Blood Pressure Brother           SOCIAL HISTORY       Social History     Socioeconomic History    Marital status: Single     Spouse name: None    Number of children: None    Years of education: None    Highest education level: None   Occupational History    None   Tobacco Use    Smoking status: Current Every Day Smoker     Packs/day: 1.00     Years: 45.00     Pack years: 45.00     Types: Cigarettes    Smokeless tobacco: Never Used   Vaping Use    Vaping Use: Never used   Substance and Sexual Activity    Alcohol use:  Yes     Alcohol/week: 0.0 standard drinks     Comment: states \"a few beers monthly\"    Drug use: Yes     Types: Marijuana     Comment: took friend's suboxone     Sexual activity: Never   Other Topics Concern    None   Social History Narrative    None     Social Determinants of Health     Financial Resource Strain:     Difficulty of Paying Living Expenses:    Food Insecurity:     Worried About Running Out of Food in the Last Year:     [address] in the Last Year:    Transportation Needs:     Lack of Transportation (Medical):  Lack of Transportation (Non-Medical):    Physical Activity:     Days of Exercise per Week:     Minutes of Exercise per Session:    Stress:     Feeling of Stress :    Social Connections:     Frequency of Communication with Friends and Family:     Frequency of Social Gatherings with Friends and Family:     Attends Sikh Services:     Active Member of Clubs or Organizations:     Attends Club or Organization Meetings:     Marital Status:    Intimate Partner Violence:     Fear of Current or Ex-Partner:     Emotionally Abused:     Physically Abused:     Sexually Abused:        SCREENINGS               PHYSICAL EXAM    (up to 7 for level 4, 8 or more for level 5)     ED Triage Vitals [07/22/21 1307]   BP Temp Temp Source Pulse Resp SpO2 Height Weight   108/64 98.3 °F (36.8 °C) Oral 80 18 92 % 6' (1.829 m) 274 lb (124.3 kg)       Physical Exam  Vitals and nursing note reviewed. Constitutional:       General: He is not in acute distress. Appearance: He is well-developed. He is not ill-appearing, toxic-appearing or diaphoretic. HENT:      Head: Normocephalic. Eyes:      Extraocular Movements: Extraocular movements intact. Conjunctiva/sclera: Conjunctivae normal.      Pupils: Pupils are equal, round, and reactive to light. Neck:      Thyroid: No thyromegaly. Cardiovascular:      Rate and Rhythm: Normal rate and regular rhythm. Heart sounds: Normal heart sounds. No murmur heard. No friction rub. No gallop. Pulmonary:      Effort: Pulmonary effort is normal. No respiratory distress.       Breath sounds: Examination of the right-upper field reveals wheezing. Examination of the left-upper field reveals wheezing. Examination of the right-middle field reveals wheezing. Examination of the left-middle field reveals wheezing. Examination of the right-lower field reveals wheezing. Examination of the left-lower field reveals wheezing. Wheezing and rhonchi present. No decreased breath sounds or rales. Abdominal:      General: Bowel sounds are normal. There is no distension. Palpations: Abdomen is soft. Tenderness: There is no abdominal tenderness. Musculoskeletal:         General: Normal range of motion. Cervical back: Normal range of motion and neck supple. Skin:     General: Skin is warm. Neurological:      Mental Status: He is alert and oriented to person, place, and time. GCS: GCS eye subscore is 4. GCS verbal subscore is 5. GCS motor subscore is 6. Cranial Nerves: No cranial nerve deficit. Sensory: No sensory deficit. Motor: No abnormal muscle tone.       Coordination: Coordination normal.      Deep Tendon Reflexes: Reflexes normal.   Psychiatric:         Behavior: Behavior normal.         DIAGNOSTIC RESULTS     EKG: All EKG's are interpreted by the Emergency Department Physician who either signs or Co-signs this chart in the absence of a cardiologist.  Rate 74  Rhythm normal sinus  Axis normal  Intervals are normal  No acute ST-T wave changes  No ectopy  Normal sinus rhythm      RADIOLOGY:   Non-plain film images such as CT, Ultrasound and MRI are read by the radiologist. Plain radiographic images are visualized and preliminarily interpreted by the emergency physician with the below findings:        Interpretation per the Radiologist below, if available at the time of this note:    XR CHEST (2 VW)   Preliminary Result   No acute cardiopulmonary process                 LABS:  Results for orders placed or performed during the hospital encounter of 07/22/21   CBC Auto Differential Result Value Ref Range    WBC 8.9 4.0 - 11.0 K/uL    RBC 4.77 4.20 - 5.90 M/uL    Hemoglobin 13.8 13.5 - 17.5 g/dL    Hematocrit 41.0 40.5 - 52.5 %    MCV 85.9 80.0 - 100.0 fL    MCH 28.9 26.0 - 34.0 pg    MCHC 33.6 31.0 - 36.0 g/dL    RDW 15.5 (H) 12.4 - 15.4 %    Platelets 470 543 - 430 K/uL    MPV 9.3 5.0 - 10.5 fL    Neutrophils % 68.0 %    Lymphocytes % 17.5 %    Monocytes % 10.8 %    Eosinophils % 2.6 %    Basophils % 1.1 %    Neutrophils Absolute 6.1 1.7 - 7.7 K/uL    Lymphocytes Absolute 1.6 1.0 - 5.1 K/uL    Monocytes Absolute 1.0 0.0 - 1.3 K/uL    Eosinophils Absolute 0.2 0.0 - 0.6 K/uL    Basophils Absolute 0.1 0.0 - 0.2 K/uL   Comprehensive Metabolic Panel   Result Value Ref Range    Sodium 133 (L) 136 - 145 mmol/L    Potassium 4.4 3.5 - 5.1 mmol/L    Chloride 100 99 - 110 mmol/L    CO2 25 21 - 32 mmol/L    Anion Gap 8 3 - 16    Glucose 128 (H) 70 - 99 mg/dL    BUN 12 7 - 20 mg/dL    CREATININE 0.8 (L) 0.9 - 1.3 mg/dL    GFR Non-African American >60 >60    GFR African American >60 >60    Calcium 9.4 8.3 - 10.6 mg/dL    Total Protein 6.8 6.4 - 8.2 g/dL    Albumin 4.1 3.4 - 5.0 g/dL    Albumin/Globulin Ratio 1.5 1.1 - 2.2    Total Bilirubin 0.4 0.0 - 1.0 mg/dL    Alkaline Phosphatase 66 40 - 129 U/L    ALT 25 10 - 40 U/L    AST 13 (L) 15 - 37 U/L    Globulin 2.7 g/dL   Brain Natriuretic Peptide   Result Value Ref Range    Pro- (H) 0 - 124 pg/mL   Troponin   Result Value Ref Range    Troponin <0.01 <0.01 ng/mL   EKG 12 Lead   Result Value Ref Range    Ventricular Rate 74 BPM    Atrial Rate 74 BPM    P-R Interval 172 ms    QRS Duration 92 ms    Q-T Interval 408 ms    QTc Calculation (Bazett) 452 ms    P Axis 23 degrees    R Axis 32 degrees    T Axis 52 degrees    Diagnosis       Normal sinus rhythmNormal ECGWhen compared with ECG of 20-JUL-2021 19:24,No significant change was found            EMERGENCY DEPARTMENT COURSE and DIFFERENTIAL DIAGNOSIS/MDM:     Vitals:    07/22/21 1307 07/22/21 1410   BP: 108/64 112/74   Pulse: 80 71   Resp: 18 18   Temp: 98.3 °F (36.8 °C)    TempSrc: Oral    SpO2: 92% 94%   Weight: 274 lb (124.3 kg)    Height: 6' (1.829 m)            [facility]      REASSESSMENT          CRITICAL CARE TIME     CONSULTS:  None      PROCEDURES:     Procedures    MEDICATIONS GIVEN THIS VISIT:  Medications   ipratropium-albuterol (DUONEB) nebulizer solution 1 ampule (1 ampule Inhalation Given 7/22/21 1408)   methylPREDNISolone sodium (SOLU-MEDROL) injection 125 mg (125 mg Intravenous Given 7/22/21 1408)        FINAL IMPRESSION      1. COPD exacerbation ([facility] [address]            DISPOSITION/PLAN   DISPOSITION Decision To Discharge 07/22/2021 03:30:04 PM      PATIENT REFERRED TO:  Sheela Baum MD  [address]    Schedule an appointment as soon as possible for a visit in 1 week        DISCHARGE MEDICATIONS:  New Prescriptions    DOXYCYCLINE HYCLATE (VIBRAMYCIN) 100 MG CAPSULE    Take 1 capsule by mouth 2 times daily for 10 days    IPRATROPIUM-ALBUTEROL (DUONEB) 0.5-2.5 (3) MG/3ML SOLN NEBULIZER SOLUTION    Inhale 3 mLs into the lungs every 4 hours    PREDNISONE (DELTASONE) 10 MG TABLET    Take 1 tablet by mouth See Admin Instructions Take 4 tablets ×4 days  Take 3 tablets ×3 days  Take 2 tablets ×2 days  Take 1 tablet ×1 day       Controlled Substances Monitoring  No flowsheet data found. (Please note that portions of this note were completed with a voice recognition program.  Efforts were made to edit the dictations but occasionally words are mis-transcribed.)    Patient was advised to return to the Emergency Department if there was any worsening.     Radha Gutierrez MD (electronically signed)  Attending Emergency Physician         Vijay Castaneda MD  07/22/21 5411

## 2021-07-26 ENCOUNTER — HOSPITAL ENCOUNTER (INPATIENT)
Age: 56
LOS: 2 days | Discharge: ANOTHER HOSPITAL | DRG: 139 | End: 2021-07-28
Attending: STUDENT IN AN ORGANIZED HEALTH CARE EDUCATION/TRAINING PROGRAM | Admitting: INTERNAL MEDICINE
Payer: COMMERCIAL

## 2021-07-26 ENCOUNTER — APPOINTMENT (OUTPATIENT)
Dept: GENERAL RADIOLOGY | Age: 56
DRG: 139 | End: 2021-07-26
Payer: COMMERCIAL

## 2021-07-26 DIAGNOSIS — J44.1 COPD EXACERBATION (HCC): Primary | ICD-10-CM

## 2021-07-26 DIAGNOSIS — J96.11 CHRONIC RESPIRATORY FAILURE WITH HYPOXIA (HCC): ICD-10-CM

## 2021-07-26 DIAGNOSIS — J18.9 PNEUMONIA DUE TO INFECTIOUS ORGANISM, UNSPECIFIED LATERALITY, UNSPECIFIED PART OF LUNG: ICD-10-CM

## 2021-07-26 DIAGNOSIS — J44.9 CHRONIC OBSTRUCTIVE PULMONARY DISEASE, UNSPECIFIED COPD TYPE (HCC): ICD-10-CM

## 2021-07-26 LAB
A/G RATIO: 1.2 (ref 1.1–2.2)
ALBUMIN SERPL-MCNC: 3.6 G/DL (ref 3.4–5)
ALP BLD-CCNC: 60 U/L (ref 40–129)
ALT SERPL-CCNC: 18 U/L (ref 10–40)
ANION GAP SERPL CALCULATED.3IONS-SCNC: 8 MMOL/L (ref 3–16)
AST SERPL-CCNC: 16 U/L (ref 15–37)
BANDED NEUTROPHILS RELATIVE PERCENT: 2 % (ref 0–7)
BASE EXCESS VENOUS: 1.7 MMOL/L (ref -3–3)
BASOPHILS ABSOLUTE: 0 K/UL (ref 0–0.2)
BASOPHILS RELATIVE PERCENT: 0 %
BILIRUB SERPL-MCNC: 0.3 MG/DL (ref 0–1)
BUN BLDV-MCNC: 12 MG/DL (ref 7–20)
CALCIUM SERPL-MCNC: 8.9 MG/DL (ref 8.3–10.6)
CARBOXYHEMOGLOBIN: 2.3 % (ref 0–1.5)
CHLORIDE BLD-SCNC: 98 MMOL/L (ref 99–110)
CO2: 25 MMOL/L (ref 21–32)
CREAT SERPL-MCNC: 0.7 MG/DL (ref 0.9–1.3)
EKG ATRIAL RATE: 72 BPM
EKG DIAGNOSIS: NORMAL
EKG P AXIS: 23 DEGREES
EKG P-R INTERVAL: 150 MS
EKG Q-T INTERVAL: 402 MS
EKG QRS DURATION: 92 MS
EKG QTC CALCULATION (BAZETT): 440 MS
EKG R AXIS: 23 DEGREES
EKG T AXIS: 48 DEGREES
EKG VENTRICULAR RATE: 72 BPM
EOSINOPHILS ABSOLUTE: 0 K/UL (ref 0–0.6)
EOSINOPHILS RELATIVE PERCENT: 0 %
GFR AFRICAN AMERICAN: >60
GFR NON-AFRICAN AMERICAN: >60
GLOBULIN: 3 G/DL
GLUCOSE BLD-MCNC: 159 MG/DL (ref 70–99)
HCO3 VENOUS: 26.1 MMOL/L (ref 23–29)
HCT VFR BLD CALC: 37.6 % (ref 40.5–52.5)
HEMOGLOBIN: 12.7 G/DL (ref 13.5–17.5)
INFLUENZA A: NOT DETECTED
INFLUENZA B: NOT DETECTED
LYMPHOCYTES ABSOLUTE: 1.7 K/UL (ref 1–5.1)
LYMPHOCYTES RELATIVE PERCENT: 14 %
MCH RBC QN AUTO: 29.4 PG (ref 26–34)
MCHC RBC AUTO-ENTMCNC: 33.9 G/DL (ref 31–36)
MCV RBC AUTO: 86.8 FL (ref 80–100)
METHEMOGLOBIN VENOUS: 0.3 %
MONOCYTES ABSOLUTE: 0.4 K/UL (ref 0–1.3)
MONOCYTES RELATIVE PERCENT: 3 %
NEUTROPHILS ABSOLUTE: 10.1 K/UL (ref 1.7–7.7)
NEUTROPHILS RELATIVE PERCENT: 81 %
O2 CONTENT, VEN: 18 VOL %
O2 SAT, VEN: 97 %
O2 THERAPY: ABNORMAL
PCO2, VEN: 40.1 MMHG (ref 40–50)
PDW BLD-RTO: 16 % (ref 12.4–15.4)
PH VENOUS: 7.43 (ref 7.35–7.45)
PLATELET # BLD: 164 K/UL (ref 135–450)
PLATELET SLIDE REVIEW: ADEQUATE
PMV BLD AUTO: 8.8 FL (ref 5–10.5)
PO2, VEN: 89.7 MMHG (ref 25–40)
POTASSIUM REFLEX MAGNESIUM: 4.3 MMOL/L (ref 3.5–5.1)
PRO-BNP: 367 PG/ML (ref 0–124)
PROCALCITONIN: 0.04 NG/ML (ref 0–0.15)
RBC # BLD: 4.33 M/UL (ref 4.2–5.9)
SARS-COV-2 RNA, RT PCR: NOT DETECTED
SLIDE REVIEW: ABNORMAL
SODIUM BLD-SCNC: 131 MMOL/L (ref 136–145)
TCO2 CALC VENOUS: 27 MMOL/L
TOTAL PROTEIN: 6.6 G/DL (ref 6.4–8.2)
TROPONIN: <0.01 NG/ML
WBC # BLD: 12.2 K/UL (ref 4–11)

## 2021-07-26 PROCEDURE — 6360000002 HC RX W HCPCS: Performed by: STUDENT IN AN ORGANIZED HEALTH CARE EDUCATION/TRAINING PROGRAM

## 2021-07-26 PROCEDURE — 84484 ASSAY OF TROPONIN QUANT: CPT

## 2021-07-26 PROCEDURE — 94761 N-INVAS EAR/PLS OXIMETRY MLT: CPT

## 2021-07-26 PROCEDURE — 96374 THER/PROPH/DIAG INJ IV PUSH: CPT

## 2021-07-26 PROCEDURE — 93005 ELECTROCARDIOGRAM TRACING: CPT | Performed by: STUDENT IN AN ORGANIZED HEALTH CARE EDUCATION/TRAINING PROGRAM

## 2021-07-26 PROCEDURE — 99221 1ST HOSP IP/OBS SF/LOW 40: CPT | Performed by: NURSE PRACTITIONER

## 2021-07-26 PROCEDURE — 94640 AIRWAY INHALATION TREATMENT: CPT

## 2021-07-26 PROCEDURE — 6370000000 HC RX 637 (ALT 250 FOR IP): Performed by: INTERNAL MEDICINE

## 2021-07-26 PROCEDURE — 99285 EMERGENCY DEPT VISIT HI MDM: CPT

## 2021-07-26 PROCEDURE — 2580000003 HC RX 258: Performed by: NURSE PRACTITIONER

## 2021-07-26 PROCEDURE — 6370000000 HC RX 637 (ALT 250 FOR IP): Performed by: NURSE PRACTITIONER

## 2021-07-26 PROCEDURE — 1200000000 HC SEMI PRIVATE

## 2021-07-26 PROCEDURE — 6370000000 HC RX 637 (ALT 250 FOR IP): Performed by: STUDENT IN AN ORGANIZED HEALTH CARE EDUCATION/TRAINING PROGRAM

## 2021-07-26 PROCEDURE — 93010 ELECTROCARDIOGRAM REPORT: CPT | Performed by: INTERNAL MEDICINE

## 2021-07-26 PROCEDURE — 83880 ASSAY OF NATRIURETIC PEPTIDE: CPT

## 2021-07-26 PROCEDURE — 71045 X-RAY EXAM CHEST 1 VIEW: CPT

## 2021-07-26 PROCEDURE — 6360000002 HC RX W HCPCS: Performed by: NURSE PRACTITIONER

## 2021-07-26 PROCEDURE — 87636 SARSCOV2 & INF A&B AMP PRB: CPT

## 2021-07-26 PROCEDURE — 80053 COMPREHEN METABOLIC PANEL: CPT

## 2021-07-26 PROCEDURE — 84145 PROCALCITONIN (PCT): CPT

## 2021-07-26 PROCEDURE — 2700000000 HC OXYGEN THERAPY PER DAY

## 2021-07-26 PROCEDURE — 82803 BLOOD GASES ANY COMBINATION: CPT

## 2021-07-26 PROCEDURE — 85025 COMPLETE CBC W/AUTO DIFF WBC: CPT

## 2021-07-26 RX ORDER — ATORVASTATIN CALCIUM 40 MG/1
40 TABLET, FILM COATED ORAL DAILY
Status: DISCONTINUED | OUTPATIENT
Start: 2021-07-26 | End: 2021-07-28 | Stop reason: HOSPADM

## 2021-07-26 RX ORDER — SODIUM CHLORIDE 0.9 % (FLUSH) 0.9 %
5-40 SYRINGE (ML) INJECTION PRN
Status: DISCONTINUED | OUTPATIENT
Start: 2021-07-26 | End: 2021-07-28 | Stop reason: HOSPADM

## 2021-07-26 RX ORDER — ISOSORBIDE MONONITRATE 30 MG/1
30 TABLET, EXTENDED RELEASE ORAL DAILY
Status: DISCONTINUED | OUTPATIENT
Start: 2021-07-26 | End: 2021-07-28 | Stop reason: HOSPADM

## 2021-07-26 RX ORDER — SODIUM CHLORIDE 9 MG/ML
25 INJECTION, SOLUTION INTRAVENOUS PRN
Status: DISCONTINUED | OUTPATIENT
Start: 2021-07-26 | End: 2021-07-28 | Stop reason: HOSPADM

## 2021-07-26 RX ORDER — ACETAMINOPHEN 325 MG/1
650 TABLET ORAL EVERY 6 HOURS PRN
Status: DISCONTINUED | OUTPATIENT
Start: 2021-07-26 | End: 2021-07-28 | Stop reason: HOSPADM

## 2021-07-26 RX ORDER — SOY ISOFLAVONE 40 MG
500 TABLET ORAL 2 TIMES DAILY
Status: DISCONTINUED | OUTPATIENT
Start: 2021-07-26 | End: 2021-07-26 | Stop reason: RX

## 2021-07-26 RX ORDER — PANTOPRAZOLE SODIUM 40 MG/1
40 TABLET, DELAYED RELEASE ORAL
Status: DISCONTINUED | OUTPATIENT
Start: 2021-07-26 | End: 2021-07-28 | Stop reason: HOSPADM

## 2021-07-26 RX ORDER — IPRATROPIUM BROMIDE AND ALBUTEROL SULFATE 2.5; .5 MG/3ML; MG/3ML
1 SOLUTION RESPIRATORY (INHALATION) ONCE
Status: COMPLETED | OUTPATIENT
Start: 2021-07-26 | End: 2021-07-26

## 2021-07-26 RX ORDER — QUETIAPINE FUMARATE 100 MG/1
100 TABLET, FILM COATED ORAL NIGHTLY
Status: DISCONTINUED | OUTPATIENT
Start: 2021-07-26 | End: 2021-07-28 | Stop reason: HOSPADM

## 2021-07-26 RX ORDER — RANOLAZINE 500 MG/1
1000 TABLET, EXTENDED RELEASE ORAL 2 TIMES DAILY
Status: DISCONTINUED | OUTPATIENT
Start: 2021-07-26 | End: 2021-07-28 | Stop reason: HOSPADM

## 2021-07-26 RX ORDER — IPRATROPIUM BROMIDE AND ALBUTEROL SULFATE 2.5; .5 MG/3ML; MG/3ML
1 SOLUTION RESPIRATORY (INHALATION) EVERY 4 HOURS
Status: DISCONTINUED | OUTPATIENT
Start: 2021-07-26 | End: 2021-07-28

## 2021-07-26 RX ORDER — GABAPENTIN 300 MG/1
300 CAPSULE ORAL 3 TIMES DAILY
Status: DISCONTINUED | OUTPATIENT
Start: 2021-07-26 | End: 2021-07-28 | Stop reason: HOSPADM

## 2021-07-26 RX ORDER — METHYLPREDNISOLONE SODIUM SUCCINATE 125 MG/2ML
125 INJECTION, POWDER, LYOPHILIZED, FOR SOLUTION INTRAMUSCULAR; INTRAVENOUS ONCE
Status: COMPLETED | OUTPATIENT
Start: 2021-07-26 | End: 2021-07-26

## 2021-07-26 RX ORDER — METOPROLOL SUCCINATE 50 MG/1
100 TABLET, EXTENDED RELEASE ORAL DAILY
Status: DISCONTINUED | OUTPATIENT
Start: 2021-07-26 | End: 2021-07-28 | Stop reason: HOSPADM

## 2021-07-26 RX ORDER — METHYLPREDNISOLONE SODIUM SUCCINATE 40 MG/ML
40 INJECTION, POWDER, LYOPHILIZED, FOR SOLUTION INTRAMUSCULAR; INTRAVENOUS EVERY 12 HOURS
Status: DISCONTINUED | OUTPATIENT
Start: 2021-07-27 | End: 2021-07-28 | Stop reason: HOSPADM

## 2021-07-26 RX ORDER — CLOPIDOGREL BISULFATE 75 MG/1
75 TABLET ORAL DAILY
Status: DISCONTINUED | OUTPATIENT
Start: 2021-07-26 | End: 2021-07-28 | Stop reason: HOSPADM

## 2021-07-26 RX ORDER — POLYETHYLENE GLYCOL 3350 17 G/17G
17 POWDER, FOR SOLUTION ORAL DAILY PRN
Status: DISCONTINUED | OUTPATIENT
Start: 2021-07-26 | End: 2021-07-28 | Stop reason: HOSPADM

## 2021-07-26 RX ORDER — ALBUTEROL SULFATE 90 UG/1
2 AEROSOL, METERED RESPIRATORY (INHALATION) EVERY 4 HOURS PRN
Status: DISCONTINUED | OUTPATIENT
Start: 2021-07-26 | End: 2021-07-27

## 2021-07-26 RX ORDER — NICOTINE 21 MG/24HR
1 PATCH, TRANSDERMAL 24 HOURS TRANSDERMAL DAILY
Status: DISCONTINUED | OUTPATIENT
Start: 2021-07-26 | End: 2021-07-28 | Stop reason: HOSPADM

## 2021-07-26 RX ORDER — LEVOFLOXACIN 5 MG/ML
500 INJECTION, SOLUTION INTRAVENOUS EVERY 24 HOURS
Status: DISCONTINUED | OUTPATIENT
Start: 2021-07-26 | End: 2021-07-28 | Stop reason: HOSPADM

## 2021-07-26 RX ORDER — ACETAMINOPHEN 650 MG/1
650 SUPPOSITORY RECTAL EVERY 6 HOURS PRN
Status: DISCONTINUED | OUTPATIENT
Start: 2021-07-26 | End: 2021-07-28 | Stop reason: HOSPADM

## 2021-07-26 RX ORDER — HYDROXYZINE PAMOATE 50 MG/1
50 CAPSULE ORAL DAILY
Status: DISCONTINUED | OUTPATIENT
Start: 2021-07-26 | End: 2021-07-28 | Stop reason: HOSPADM

## 2021-07-26 RX ORDER — BUDESONIDE AND FORMOTEROL FUMARATE DIHYDRATE 160; 4.5 UG/1; UG/1
1 AEROSOL RESPIRATORY (INHALATION) 2 TIMES DAILY
Status: DISCONTINUED | OUTPATIENT
Start: 2021-07-26 | End: 2021-07-28 | Stop reason: HOSPADM

## 2021-07-26 RX ORDER — ONDANSETRON 2 MG/ML
4 INJECTION INTRAMUSCULAR; INTRAVENOUS EVERY 6 HOURS PRN
Status: DISCONTINUED | OUTPATIENT
Start: 2021-07-26 | End: 2021-07-28 | Stop reason: HOSPADM

## 2021-07-26 RX ORDER — ONDANSETRON 4 MG/1
4 TABLET, ORALLY DISINTEGRATING ORAL EVERY 8 HOURS PRN
Status: DISCONTINUED | OUTPATIENT
Start: 2021-07-26 | End: 2021-07-28 | Stop reason: HOSPADM

## 2021-07-26 RX ORDER — ASPIRIN 325 MG
325 TABLET ORAL DAILY
Status: DISCONTINUED | OUTPATIENT
Start: 2021-07-26 | End: 2021-07-28 | Stop reason: HOSPADM

## 2021-07-26 RX ORDER — SODIUM CHLORIDE 0.9 % (FLUSH) 0.9 %
5-40 SYRINGE (ML) INJECTION EVERY 12 HOURS SCHEDULED
Status: DISCONTINUED | OUTPATIENT
Start: 2021-07-26 | End: 2021-07-28 | Stop reason: HOSPADM

## 2021-07-26 RX ADMIN — Medication 10 ML: at 20:47

## 2021-07-26 RX ADMIN — IPRATROPIUM BROMIDE AND ALBUTEROL SULFATE 1 AMPULE: .5; 3 SOLUTION RESPIRATORY (INHALATION) at 13:59

## 2021-07-26 RX ADMIN — LEVOFLOXACIN 500 MG: 5 INJECTION, SOLUTION INTRAVENOUS at 19:19

## 2021-07-26 RX ADMIN — RANOLAZINE 1000 MG: 500 TABLET, FILM COATED, EXTENDED RELEASE ORAL at 20:46

## 2021-07-26 RX ADMIN — PANTOPRAZOLE SODIUM 40 MG: 40 TABLET, DELAYED RELEASE ORAL at 19:06

## 2021-07-26 RX ADMIN — Medication 1 PUFF: at 18:44

## 2021-07-26 RX ADMIN — GABAPENTIN 300 MG: 300 CAPSULE ORAL at 20:46

## 2021-07-26 RX ADMIN — QUETIAPINE FUMARATE 100 MG: 100 TABLET ORAL at 20:46

## 2021-07-26 RX ADMIN — IPRATROPIUM BROMIDE AND ALBUTEROL SULFATE 3 ML: .5; 3 SOLUTION RESPIRATORY (INHALATION) at 18:44

## 2021-07-26 RX ADMIN — ENOXAPARIN SODIUM 40 MG: 40 INJECTION SUBCUTANEOUS at 19:07

## 2021-07-26 RX ADMIN — SODIUM CHLORIDE 25 ML: 9 INJECTION, SOLUTION INTRAVENOUS at 19:18

## 2021-07-26 RX ADMIN — METOPROLOL SUCCINATE 100 MG: 50 TABLET, EXTENDED RELEASE ORAL at 19:06

## 2021-07-26 RX ADMIN — METHYLPREDNISOLONE SODIUM SUCCINATE 125 MG: 125 INJECTION, POWDER, FOR SOLUTION INTRAMUSCULAR; INTRAVENOUS at 14:00

## 2021-07-26 RX ADMIN — IPRATROPIUM BROMIDE AND ALBUTEROL SULFATE 3 ML: .5; 3 SOLUTION RESPIRATORY (INHALATION) at 23:06

## 2021-07-26 RX ADMIN — ACETAMINOPHEN 650 MG: 325 TABLET ORAL at 20:54

## 2021-07-26 ASSESSMENT — PAIN DESCRIPTION - ORIENTATION
ORIENTATION: LOWER
ORIENTATION: LOWER

## 2021-07-26 ASSESSMENT — PAIN DESCRIPTION - PAIN TYPE
TYPE: CHRONIC PAIN
TYPE: CHRONIC PAIN

## 2021-07-26 ASSESSMENT — ENCOUNTER SYMPTOMS
CONSTIPATION: 0
VOMITING: 0
PHOTOPHOBIA: 0
COUGH: 1
NAUSEA: 0
BACK PAIN: 0
RHINORRHEA: 0
SORE THROAT: 0
SHORTNESS OF BREATH: 1
DIARRHEA: 0
ABDOMINAL PAIN: 0

## 2021-07-26 ASSESSMENT — PAIN - FUNCTIONAL ASSESSMENT: PAIN_FUNCTIONAL_ASSESSMENT: ACTIVITIES ARE NOT PREVENTED

## 2021-07-26 ASSESSMENT — PAIN DESCRIPTION - LOCATION
LOCATION: BACK
LOCATION: BACK

## 2021-07-26 ASSESSMENT — PAIN DESCRIPTION - FREQUENCY
FREQUENCY: INTERMITTENT
FREQUENCY: INTERMITTENT

## 2021-07-26 ASSESSMENT — PAIN DESCRIPTION - DESCRIPTORS
DESCRIPTORS: BURNING;SHOOTING
DESCRIPTORS: ACHING

## 2021-07-26 ASSESSMENT — PAIN DESCRIPTION - ONSET: ONSET: GRADUAL

## 2021-07-26 ASSESSMENT — PAIN SCALES - GENERAL
PAINLEVEL_OUTOF10: 2
PAINLEVEL_OUTOF10: 5
PAINLEVEL_OUTOF10: 4

## 2021-07-26 ASSESSMENT — PAIN DESCRIPTION - PROGRESSION: CLINICAL_PROGRESSION: GRADUALLY WORSENING

## 2021-07-26 NOTE — PROGRESS NOTES
Patient admitted to room 209 from ER. Patient oriented to room, call light, bed rails, phone, lights and bathroom. Patient instructed about the schedule of the day including: vital sign frequency, lab draws, possible tests, frequency of MD and staff rounds, daily weights, I &O's and prescribed diet. Bed alarm deferred patient low fall risk and refuses alarm. Telemetry box in place, patient aware of placement and reason. Bed locked, in lowest position, side rails up 2/4, call light within reach. Recliner Assessment:     Patient is able to demonstrate the ability to move from a reclining position to an upright position within the recliner. 4 Eyes Skin Assessment     The patient is being assess for   Admission    I agree that 2 RN's have performed a thorough Head to Toe Skin Assessment on the patient. ALL assessment sites listed below have been assessed. Areas assessed for pressure by both nurses:   [x]   Head, Face, and Ears   [x]   Shoulders, Back, and Chest, Abdomen  [x]   Arms, Elbows, and Hands   [x]   Coccyx, Sacrum, and Ischium  [x]   Legs, Feet, and Heels    Pt has scattered bruising with redness under his left armpit. **SHARE this note so that the co-signing nurse is able to place an eSignature**    Co-signer eSignature: Electronically signed by Dean Brown RN on 7/26/21 at 6:01 PM EDT    Does the Patient have Skin Breakdown related to pressure?   No              Lux Prevention initiated:  No   Wound Care Orders initiated:  No      [facility] nurse consulted for Pressure Injury (Stage 3,4, Unstageable, DTI, NWPT, Complex wounds)and New or Established Ostomies:  No      Primary Nurse eSignature: Electronically signed by Kimberly Sharpe RN on 7/26/21 at 5:59 PM EDT

## 2021-07-26 NOTE — PROGRESS NOTES
Consult has been called to Dr. Ashley Patel on 7/26/21. Spoke with will.  5:30 PM    Regenia Holstein  7/26/2021

## 2021-07-26 NOTE — PROGRESS NOTES
RESPIRATORY THERAPY ASSESSMENT    Name:  Kayleigh MORIN Record Number:  [id number]  Age: 48 y.o. Gender: male  : 1965  Today's Date:  2021  Room:  [address] [place], [place] [address]    Assessment     Is the patient being admitted for a COPD or Asthma exacerbation? No   (If yes the patient will be seen every 4 hours for the first 24 hours and then reassessed)    Patient Admission Diagnosis      Allergies  Allergies   Allergen Reactions    Pcn [Penicillins] Other (See Comments) and Hives     Other reaction(s): Light Headed  Patient passed out. syncope       Minimum Predicted Vital Capacity:               Actual Vital Capacity:                    Pulmonary History:COPD  Home Oxygen Therapy:  room air  Home Respiratory Therapy:Albuterol and Albuterol/Ipratropium Bromide HHN   Current Respiratory Therapy:  duoneb Q4 w/a, symbicort BID albuterol prn           Respiratory Severity Index(RSI)   Patients with orders for inhalation medications, oxygen, or any therapeutic treatment modality will be placed on Respiratory Protocol. They will be assessed with the first treatment and at least every 72 hours thereafter. The following severity scale will be used to determine frequency of treatment intervention. Smoking History: Mild Exacerbation = 3    Social History  Social History     Tobacco Use    Smoking status: Current Every Day Smoker     Packs/day: 1.00     Years: 45.00     Pack years: 45.00     Types: Cigarettes    Smokeless tobacco: Never Used   Vaping Use    Vaping Use: Never used   Substance Use Topics    Alcohol use:  Yes     Alcohol/week: 0.0 standard drinks     Comment: states \"a few beers monthly\"    Drug use: Yes     Types: Marijuana     Comment: took friend's suboxone        Recent Surgical History: None = 0  Past Surgical History  Past Surgical History:   Procedure Laterality Date    CORONARY ANGIOPLASTY WITH STENT PLACEMENT  , ,     [facility]       Level of Consciousness: Alert, Oriented, and Cooperative = 0    Level of Activity: Mostly sedentary, minimal walking = 2    Respiratory Pattern: Dyspnea with exertion;Irregular pattern;or RR less than 6 = 2    Breath Sounds: Absent bilaterally and/or with wheezes = 3    Sputum   ,  ,    Cough: Strong, spontaneous, non-productive = 0    Vital Signs   /77   Pulse 63   Temp 96.8 °F (36 °C) (Oral)   Resp 16   Ht 6' (1.829 m)   Wt 274 lb (124.3 kg)   SpO2 94%   BMI 37.16 kg/m²   SPO2 (COPD values may differ): 88-89% on room air or greater than 92% on FiO2 28- 35% = 2    Peak Flow (asthma only): not applicable = 0    RSI: 81-23 = Q6H or QID and Q4HPRN for dyspnea        Plan       Goals: medication delivery, mobilize retained secretions, volume expansion and improve oxygenation    Patient/caregiver was educated on the proper method of use for Respiratory Care Devices:  Yes      Level of patient/caregiver understanding able to:   ? Verbalize understanding   ? Demonstrate understanding       ? Teach back        ? Needs reinforcement       ? No available caregiver               ? Other:     Response to education:  Good     Is patient being placed on Home Treatment Regimen? No     Does the patient have everything they need prior to discharge? NA     Comments: pt assessed and chart reviewed    Plan of Care: duoneb Q4 and prn     Electronically signed by Angélica Colón on 7/26/2021 at 5:32 PM    Respiratory Protocol Guidelines     1. Assessment and treatment by Respiratory Therapy will be initiated for medication and therapeutic interventions upon initiation of aerosolized medication. 2. Physician will be contacted for respiratory rate (RR) greater than 35 breaths per minute. Therapy will be held for heart rate (HR) greater than 140 beats per minute, pending direction from physician. 3. Bronchodilators will be administered via Metered Dose Inhaler (MDI) with spacer when the following criteria are met:  a.  Alert and cooperative     b. HR < 140 bpm  c. RR < 30 bpm                d. Can demonstrate a 2-3 second inspiratory hold  4. Bronchodilators will be administered via Hand Held Nebulizer [facility]) to patients when ANY of the following criteria are met  a. Incognizant or uncooperative          b. Patients treated with HHN at Home        c. Unable to demonstrate proper use of MDI with spacer     d. RR > 30 bpm   5. Bronchodilators will be delivered via Metered Dose Inhaler (MDI), HHN, Aerogen to intubated patients on mechanical ventilation. 6. Inhalation medication orders will be delivered and/or substituted as outlined below. Aerosolized Medications Ordering and Administration Guidelines:    1. All Medications will be ordered by a physician, and their frequency and/or modality will be adjusted as defined by the patients Respiratory Severity Index (RSI) score. 2. If the patient does not have documented COPD, consider discontinuing anticholinergics when RSI is less than 9.  3. If the bronchospasm worsens (increased RSI), then the bronchodilator frequency can be increased to a maximum of every 4 hours. If greater than every 4 hours is required, the physician will be contacted. 4. If the bronchospasm improves, the frequency of the bronchodilator can be decreased, based on the patient's RSI, but not less than home treatment regimen frequency. 5. Bronchodilator(s) will be discontinued if patient has a RSI less than 9 and has received no scheduled or as needed treatment for 72  Hrs. Patients Ordered on a Mucolytic Agent:    1. Must always be administered with a bronchodilator. 2. Discontinue if patient experiences worsened bronchospasm, or secretions have lessened to the point that the patient is able to clear them with a cough. Anti-inflammatory and Combination Medications:    1.  If the patient lacks prior history of lung disease, is not using inhaled anti-inflammatory medication at home, and lacks wheezing by examination or by history for at least 24 hours, contact physician for possible discontinuation.

## 2021-07-26 NOTE — ED NOTES
1447 - [facility] Dr. Ariella Wilcox  07/26/21 1500 [facility] called back.       Mai Martel  07/26/21 3022

## 2021-07-26 NOTE — ED PROVIDER NOTES
[address] ED  EMERGENCY DEPARTMENT ENCOUNTER      Pt Name: Tani Paniagua  MRN: [id number]  [place] 1965  Date of evaluation: 7/26/2021  Provider: Jaylen Acevedo, [address] [place] [address]       Chief Complaint   Patient presents with    Shortness of Breath     x4 days          HISTORY OF PRESENT ILLNESS   (Location/Symptom, Timing/Onset, Context/Setting, Quality, Duration, Modifying Factors, Severity)  Note limiting factors. Tani Paniagua is a 48 y.o. male who presents to the emergency department complaining of shortness of breath, patient was sent over from [address]. Patient was seen evaluated on 7/22 at [facility] emergency department for COPD started on doxycycline, prednisone, breathing treatments which she has been compliant with however does complain of right now worsening symptoms. States that he was extremely short of breath earlier this morning, positive for chronic cough. Denies significant lower extremity edema posterior calf pain or history of DVT or PE. Does have cardiac history but denies chest pain or breath palpitations. Symptoms today are not consistent with prior MI history. Denies fevers chills, denies known Covid exposure he reports he had a negative rapid test performed in the outpatient office prior to transfer. Patient reports that he is a 1 pack/day smoker however since his breathing difficulties within the last week he is cut down to 1 cigarette daily. Nursing Notes were reviewed.     PAST MEDICAL HISTORY     Past Medical History:   Diagnosis Date    Anxiety     Bipolar affective ([address]     CAD (coronary artery disease)     Chronic back pain     COPD (chronic obstructive pulmonary disease) ([address]     DDD (degenerative disc disease), cervical     Depression     GERD (gastroesophageal reflux disease)     Hyperlipidemia     Hypertension     MI (myocardial infarction) ([address]     Pancreatitis     [facility]     Tobacco abuse 12/16/2016         SURGICAL HISTORY       Past Surgical History:   Procedure Laterality Date    CORONARY ANGIOPLASTY WITH STENT PLACEMENT  2006, 2012, 2013    [facility]         CURRENT MEDICATIONS       Previous Medications    ALBUTEROL SULFATE  (90 BASE) MCG/ACT INHALER    Inhale 2 puffs into the lungs every 4 hours as needed    ASPIRIN 325 MG TABLET    Take 325 mg by mouth daily    ATORVASTATIN (LIPITOR) 40 MG TABLET    Take 1 tablet by mouth daily    CLINDAMYCIN (CLEOCIN) 300 MG CAPSULE    Take 300 mg by mouth 3 times daily    CLOPIDOGREL (PLAVIX) 75 MG TABLET    Take 1 tablet by mouth daily    DOXYCYCLINE HYCLATE (VIBRAMYCIN) 100 MG CAPSULE    Take 1 capsule by mouth 2 times daily for 10 days    GABAPENTIN (NEURONTIN) 300 MG CAPSULE    Take 300 mg by mouth 3 times daily.      HYDROXYZINE PAMOATE (VISTARIL PO)    Take 50 mg by mouth daily     IPRATROPIUM-ALBUTEROL (DUONEB) 0.5-2.5 (3) MG/3ML SOLN NEBULIZER SOLUTION    Inhale 3 mLs into the lungs every 4 hours    ISOSORBIDE MONONITRATE (IMDUR) 30 MG EXTENDED RELEASE TABLET    TAKE (1) TABLET DAILY    L-ARGININE 500 MG CAPSULE    Take 1 capsule by mouth 2 times daily    METOPROLOL SUCCINATE (TOPROL XL) 100 MG EXTENDED RELEASE TABLET    Take 1 tablet by mouth daily    NITROGLYCERIN (NITROSTAT) 0.4 MG SL TABLET    Place 1 tablet under the tongue every 5 minutes as needed for Chest pain    PANTOPRAZOLE (PROTONIX) 20 MG TABLET    TAKE 2 TABLETS BY MOUTH DAILY    PREDNISONE (DELTASONE) 10 MG TABLET    Take 1 tablet by mouth See Admin Instructions Take 4 tablets ×4 days  Take 3 tablets ×3 days  Take 2 tablets ×2 days  Take 1 tablet ×1 day    QUETIAPINE (SEROQUEL) 100 MG TABLET        RANOLAZINE (RANEXA) 1000 MG EXTENDED RELEASE TABLET    Take 1 tablet by mouth 2 times daily    SYMBICORT 160-4.5 MCG/ACT AERO           ALLERGIES     Pcn [penicillins]    FAMILY HISTORY       Family History   Problem Relation Age of Onset    Other Mother     Arthritis Mother     Mental Illness Mother     Depression Mother     Diabetes Father     Heart Disease Father     Substance Abuse Father     Heart Disease Sister     High Blood Pressure Sister     Mental Retardation Brother     Other Brother     Learning Disabilities Brother     Early Death Sister     Cancer Sister     Substance Abuse Sister     Cancer Sister     Diabetes Brother     High Blood Pressure Brother           SOCIAL HISTORY       Social History     Socioeconomic History    Marital status: Single     Spouse name: Not on file    Number of children: Not on file    Years of education: Not on file    Highest education level: Not on file   Occupational History    Not on file   Tobacco Use    Smoking status: Current Every Day Smoker     Packs/day: 1.00     Years: 45.00     Pack years: 45.00     Types: Cigarettes    Smokeless tobacco: Never Used   Vaping Use    Vaping Use: Never used   Substance and Sexual Activity    Alcohol use: Yes     Alcohol/week: 0.0 standard drinks     Comment: states \"a few beers monthly\"    Drug use: Yes     Types: Marijuana     Comment: took friend's suboxone     Sexual activity: Never   Other Topics Concern    Not on file   Social History Narrative    Not on file     Social Determinants of Health     Financial Resource Strain:     Difficulty of Paying Living Expenses:    Food Insecurity:     Worried About Running Out of Food in the Last Year:     [address] in the Last Year:    Transportation Needs:     Lack of Transportation (Medical):      Lack of Transportation (Non-Medical):    Physical Activity:     Days of Exercise per Week:     Minutes of Exercise per Session:    Stress:     Feeling of Stress :    Social Connections:     Frequency of Communication with Friends and Family:     Frequency of Social Gatherings with Friends and Family:     Attends Zoroastrian Services:     Active Member of Clubs or Organizations:     Attends Club or Organization Meetings:     Marital Status:    Intimate Partner Violence:     Fear of Current or Ex-Partner:     Emotionally Abused:     Physically Abused:     Sexually Abused:        SCREENINGS        [place] Coma Scale  Eye Opening: Spontaneous  Best Verbal Response: Oriented  Best Motor Response: Obeys commands  [place] Coma Scale Score: 15                   REVIEW OF SYSTEMS    (2-9 systems for level 4, 10 or more for level 5)   Review of Systems   Constitutional: Negative for chills, fatigue and fever. HENT: Negative for congestion, rhinorrhea and sore throat. Eyes: Negative for photophobia and visual disturbance. Respiratory: Positive for cough and shortness of breath. Cardiovascular: Negative for chest pain and palpitations. Gastrointestinal: Negative for abdominal pain, constipation, diarrhea, nausea and vomiting. Genitourinary: Negative for decreased urine volume. Musculoskeletal: Negative for back pain, neck pain and neck stiffness. Skin: Negative for rash. Neurological: Negative for weakness, numbness and headaches. Psychiatric/Behavioral: Negative for confusion. PHYSICAL EXAM    (up to 7 for level 4, 8 or more for level 5)     ED Triage Vitals   BP Temp Temp src Pulse Resp SpO2 Height Weight   -- -- -- -- -- -- -- --       Physical Exam  Constitutional:       General: He is not in acute distress. Appearance: He is not diaphoretic. HENT:      Head: Normocephalic and atraumatic. Eyes:      Pupils: Pupils are equal, round, and reactive to light. Neck:      Trachea: No tracheal deviation. Cardiovascular:      Rate and Rhythm: Normal rate and regular rhythm. Pulmonary:      Effort: Tachypnea present. No respiratory distress. Breath sounds: No stridor. Decreased breath sounds and wheezing present. Abdominal:      General: There is no distension. Palpations: Abdomen is soft. Tenderness: There is no abdominal tenderness.    Musculoskeletal:         General: No deformity. Normal range of motion. Cervical back: Normal range of motion and neck supple. Skin:     General: Skin is warm and dry. Neurological:      Mental Status: He is alert and oriented to person, place, and time. DIAGNOSTIC RESULTS     EKG: All EKG's are interpreted by the Emergency Department Physician who either signs or Co-signs this chart in the absence of a cardiologist.      The Ekg interpreted by me shows  normal sinus rhythm with a rate of 72  Axis is   Normal  QTc is  normal  Intervals and Durations are unremarkable. ST Segments: nonspecific changes        RADIOLOGY:   Non-plain film images such as CT, Ultrasound and MRI are read by the radiologist. Plain radiographic images are visualized and preliminarily interpreted by the emergency physician.     Interpretation per the Radiologist below, if available at the time of this note:    XR CHEST PORTABLE   Final Result   Bilateral pneumonia, including possible viral/atypical               LABS:  Labs Reviewed   CBC WITH AUTO DIFFERENTIAL - Abnormal; Notable for the following components:       Result Value    WBC 12.2 (*)     Hemoglobin 12.7 (*)     Hematocrit 37.6 (*)     RDW 16.0 (*)     Neutrophils Absolute 10.1 (*)     All other components within normal limits    Narrative:     Performed at:  [facility],  [place], [facility]   Phone [phone]   COMPREHENSIVE METABOLIC PANEL W/ REFLEX TO MG FOR LOW K - Abnormal; Notable for the following components:    Sodium 131 (*)     Chloride 98 (*)     Glucose 159 (*)     CREATININE 0.7 (*)     All other components within normal limits    Narrative:     Performed at:  [facility]) - [facility],  [place], [facility]   Phone [phone]   BRAIN NATRIURETIC PEPTIDE - Abnormal; Notable for the following components:    Pro- (*)     All other components within normal limits    Narrative: Performed at:  [facility] ([facility]) - [facility] [address],  [place], [facility]   Phone [phone]   BLOOD GAS, VENOUS - Abnormal; Notable for the following components:    pO2, [place] 89.7 (*)     Carboxyhemoglobin 2.3 (*)     All other components within normal limits    Narrative:     Performed at:  [facility] [address],  [place], [facility]   Phone [phone]   TROPONIN    Narrative:     Performed at:  [facility],  [place], [facility]   Phone [phone]   PROCALCITONIN       All other labs were within normal range or not returned as of this dictation. EMERGENCY DEPARTMENT COURSE and DIFFERENTIAL DIAGNOSIS/MDM:   Dajuan Powell is a 48 y.o. male who presents to the emergency department with the complaint of shortness of breath, cough. History of COPD not on oxygen at baseline presents by EMS from outpatient office due to increased work of breathing. Off oxygen here sats initially maintaining in the low 90s. Lungs are diminished with diffuse expiratory wheeze. Will treat with Solu-Medrol, breathing treatments. Due to cardiac history will rule out ACS with troponin, EKG. Patient may require admission due to failed outpatient treatment for COPD with exacerbation. Patient's saturations remained low 90s and briefly dropped to upper 80s necessitating placement on 2 L nasal cannula slightly tachypneic however subjectively he feels somewhat improved following breathing treatment here. EKG nonischemic appearing with a troponin of less than 0.01. Slight increase of proBNP from check 4 days ago    White blood cell count slightly elevated at 12.2 likely due to being on steroids. Plan is to admit patient to hospital service due to failed outpatient therapy for COPD with exacerbation. Discussed with hospitalist agreed to admit patient.       CRITICAL CARE TIME Total Critical Care time was 0 minutes, excluding separately reportable procedures. There was a high probability of clinically significant/life threatening deterioration in the patient's condition which required my urgent intervention. Clinical concern   Intervention     CONSULTS:  IP CONSULT TO HOSPITALIST  IP CONSULT TO PULMONOLOGY    PROCEDURES:  Unless otherwise noted below, none     Procedures        FINAL IMPRESSION      1. COPD exacerbation ([facility]    2. Pneumonia due to infectious organism, unspecified laterality, unspecified part of lung          DISPOSITION/PLAN   DISPOSITION  admit      PATIENT REFERRED TO:  No follow-up provider specified. DISCHARGE MEDICATIONS:  New Prescriptions    No medications on file     Controlled Substances Monitoring:     No flowsheet data found.     (Please note that portions of this note were completed with a voice recognition program.  Efforts were made to edit the dictations but occasionally words are mis-transcribed.)    Natalie Zuluaga DO (electronically signed)  Attending Emergency Physician            Natalie Zuluaga DO  07/26/21 [id number]

## 2021-07-26 NOTE — H&P
Hospital Medicine History & Physical      PCP: Clotilde Tello MD    Date of Admission: 7/26/2021    Date of Service: Pt seen/examined on 7/26/2021     Chief Complaint:    Chief Complaint   Patient presents with    Shortness of Breath     x4 days        History Of Present Illness: The patient is a 48 y.o. male with hypertension, hyperlipidemia, CAD, DDD, COPD, Depression, chronic back pain, bipolar affective disorder, and anxiety who presents to [facility] with shortness of breath. He states ongoing since last Thursday. States he felt improved Saturday and then started worsening again on Sunday. He was seen in the ED on 7/22. He was discharged with Doxycycline and prednisone taper. He reports his cough was non-productive. Now he is bringing up some phlegm. Denies fever, chills, chest pain, abdominal pain, nausea, vomiting, or diarrhea. He c/o chronic back pain. Patient tachypneic and requiring 2 L O2. Does not wear O2 at home. Labs with hyponatremia and leukocytosis. CXR shows bilateral pneumonia, possible viral/atypical.  COVID not detected. Admitted to med-surg. Pulmonology consulted. Past Medical History:        Diagnosis Date    Anxiety     Bipolar affective ([address]     CAD (coronary artery disease)     Chronic back pain     COPD (chronic obstructive pulmonary disease) (HCC)     DDD (degenerative disc disease), cervical     Depression     GERD (gastroesophageal reflux disease)     Hyperlipidemia     Hypertension     MI (myocardial infarction) ([address]     Pancreatitis     Recovering alcoholic ([facility]     Tobacco abuse 12/16/2016       Past Surgical History:        Procedure Laterality Date    CORONARY ANGIOPLASTY WITH STENT PLACEMENT  2006, 2012, 2013    [facility]       Medications Prior to Admission:    Prior to Admission medications    Medication Sig Start Date End Date Taking?  Authorizing Provider   predniSONE (DELTASONE) 10 MG tablet Take 1 tablet by mouth See Admin Instructions Take 4 tablets ×4 days  Take 3 tablets ×3 days  Take 2 tablets ×2 days  Take 1 tablet ×1 day 7/23/21   Rick Brown MD   doxycycline hyclate (VIBRAMYCIN) 100 MG capsule Take 1 capsule by mouth 2 times daily for 10 days 7/22/21 8/1/21  Rick Brown MD   ipratropium-albuterol (DUONEB) 0.5-2.5 (3) MG/3ML SOLN nebulizer solution Inhale 3 mLs into the lungs every 4 hours 7/22/21   Rick Brown MD   QUEtiapine (SEROQUEL) 100 MG tablet  6/21/21   Historical Provider, MD   gabapentin (NEURONTIN) 300 MG capsule Take 300 mg by mouth 3 times daily.   6/21/21   Historical Provider, MD   aspirin 325 MG tablet Take 325 mg by mouth daily    Historical Provider, MD   clindamycin (CLEOCIN) 300 MG capsule Take 300 mg by mouth 3 times daily    Historical Provider, MD   ranolazine (RANEXA) 1000 MG extended release tablet Take 1 tablet by mouth 2 times daily 6/17/21   JOSE Layne CNP   isosorbide mononitrate (IMDUR) 30 MG extended release tablet TAKE (1) TABLET DAILY 6/17/21   JOSE Layne CNP   albuterol sulfate  (90 Base) MCG/ACT inhaler Inhale 2 puffs into the lungs every 4 hours as needed 2/2/21   Historical Provider, MD   SYMBICORT 160-4.5 MCG/ACT AERO  2/2/21   Historical Provider, MD   l-arginine 500 MG capsule Take 1 capsule by mouth 2 times daily 12/3/20   JOSE Layne CNP   nitroGLYCERIN (NITROSTAT) 0.4 MG SL tablet Place 1 tablet under the tongue every 5 minutes as needed for Chest pain 12/3/20   JOSE Layne CNP   clopidogrel (PLAVIX) 75 MG tablet Take 1 tablet by mouth daily 9/29/20   Wallace Miranda MD   metoprolol succinate (TOPROL XL) 100 MG extended release tablet Take 1 tablet by mouth daily 9/4/20   Wallace Miranda MD   HydrOXYzine Pamoate (VISTARIL PO) Take 50 mg by mouth daily     Historical Provider, MD   atorvastatin (LIPITOR) 40 MG tablet Take 1 tablet by mouth daily 9/29/17   JOSE Bruner - CNP   pantoprazole ([address]) 20 MG tablet TAKE 2 TABLETS BY MOUTH DAILY  Patient taking differently: TAKE 1 TABLET BY MOUTH BID 9/29/16   Antonia Tovard, APRN - CNP       Allergies:  Pcn [penicillins]    Social History:     TOBACCO:   reports that he has been smoking cigarettes. He has a 45.00 pack-year smoking history. He has never used smokeless tobacco.  ETOH:   reports current alcohol use. Family History:   Positive as follows:        Problem Relation Age of Onset    Other Mother     Arthritis Mother     Mental Illness Mother     Depression Mother     Diabetes Father     Heart Disease Father     Substance Abuse Father     Heart Disease Sister     High Blood Pressure Sister     Mental Retardation Brother     Other Brother     Learning Disabilities Brother     Early Death Sister    [facility] Cancer Sister     Substance Abuse Sister     Cancer Sister     Diabetes Brother     High Blood Pressure Brother        REVIEW OF SYSTEMS:       Constitutional: Negative for fever   Respiratory: + dyspnea, cough   Cardiovascular: Negative for chest pain   Gastrointestinal: Negative for vomiting, diarrhea   Genitourinary: Negative for hematuria   Musculoskeletal: + chronic back pain  Skin: Negative for rash   Neurological: Negative for syncope   Psychiatric/Behavorial: Negative for anxiety    PHYSICAL EXAM:    BP (!) 143/69   Pulse 71   Temp 98.1 °F (36.7 °C) (Oral)   Resp 18   Ht 6' (1.829 m)   Wt 274 lb (124.3 kg)   SpO2 90%   BMI 37.16 kg/m²     Gen: No distress. Alert. Eyes: PERRL. No sclera icterus. No conjunctival injection. ENT: No discharge. Pharynx clear. Neck:  Trachea midline. Resp: + accessory muscle use, tachypnea. No crackles. + wheezes. No rhonchi. CV: Regular rate. Regular rhythm. No murmur. No rub. No edema. GI: Non-tender. Non-distended. Normal bowel sounds. No hernia. Skin: Warm and dry. No nodule on exposed extremities. No rash on exposed extremities. M/S: No cyanosis. No joint deformity. No clubbing. Neuro: Awake. Grossly nonfocal    Psych: Oriented x 3. No anxiety or agitation. CBC:   Recent Labs     07/26/21  1347   WBC 12.2*   HGB 12.7*   HCT 37.6*   MCV 86.8        BMP:   Recent Labs     07/26/21  1347   *   K 4.3   CL 98*   CO2 25   BUN 12   CREATININE 0.7*     LIVER PROFILE:   Recent Labs     07/26/21  1347   AST 16   ALT 18   BILITOT 0.3   ALKPHOS 60         CARDIAC ENZYMES  Recent Labs     07/26/21  1347   TROPONINI <0.01       U/A:    Lab Results   Component Value Date    COLORU Yellow 05/28/2019    CLARITYU Clear 05/28/2019    SPECGRAV <=1.005 05/28/2019    LEUKOCYTESUR Negative 05/28/2019    BLOODU Negative 05/28/2019    GLUCOSEU Negative 05/28/2019       CULTURES  COVID: not detected    EKG:  I have reviewed the EKG with the following interpretation:   NSR    RADIOLOGY  XR CHEST PORTABLE   Final Result   Bilateral pneumonia, including possible viral/atypical               Active Problems:    Pneumonia  Resolved Problems:    * No resolved hospital problems. *        ASSESSMENT/PLAN:  Acute hypoxic respiratory failure  - admitted to med-surg. Pulmonology consulted  - on 2 L O2. Does not wear O2 at home. COPD exacerbation  Pneumonia, gram positive organism  - Treat with IV Solu-medrol, Levaquin D#1  - procal pending  - Duonebs, Albuterol    Hyponatremia  - repeat BMP tomorrow am    Leukocytosis  - related to Pneumonia, recent prednisone taper.  - repeat CBC tomorrow. CAD  - on aspirin, Ranexa, Imdur, Plavix, Atorvastatin, Toprol-XL. Hypertension  - BP elevated. Continue home medication regimen.   - Toprol-XL, Imdur. Hyperlipidemia  - on Atorvastatin. GERD  - on PPI. Chronic back pain  - continue home Gabapentin. Bipolar affective disorder, depression, anxiety  - continue home medication regimen.      Tobacco Dependence  -Recommended cessation  - nicotine patch ordered     DVT Prophylaxis: Lovenox  Diet: No diet orders on file  Code Status: Prior    Espiridion Magic Antonette VALENTIN-C  7/26/2021

## 2021-07-26 NOTE — PROGRESS NOTES
PHARMACY NOTE  Dusty Trejo was ordered l-arginine. Per the [address], this medication is non-formulary and not stocked by pharmacy. The medication can be reordered at discharge.    VENTURA Gonzales.Ph.7/26/20216:18 PM

## 2021-07-27 LAB
A/G RATIO: 1.2 (ref 1.1–2.2)
ALBUMIN SERPL-MCNC: 3.4 G/DL (ref 3.4–5)
ALP BLD-CCNC: 57 U/L (ref 40–129)
ALT SERPL-CCNC: 17 U/L (ref 10–40)
ANION GAP SERPL CALCULATED.3IONS-SCNC: 7 MMOL/L (ref 3–16)
ANISOCYTOSIS: ABNORMAL
AST SERPL-CCNC: 11 U/L (ref 15–37)
ATYPICAL LYMPHOCYTE RELATIVE PERCENT: 2 % (ref 0–6)
BANDED NEUTROPHILS RELATIVE PERCENT: 2 % (ref 0–7)
BASOPHILS ABSOLUTE: 0 K/UL (ref 0–0.2)
BASOPHILS RELATIVE PERCENT: 0 %
BILIRUB SERPL-MCNC: <0.2 MG/DL (ref 0–1)
BUN BLDV-MCNC: 12 MG/DL (ref 7–20)
CALCIUM SERPL-MCNC: 8.6 MG/DL (ref 8.3–10.6)
CHLORIDE BLD-SCNC: 98 MMOL/L (ref 99–110)
CO2: 26 MMOL/L (ref 21–32)
CREAT SERPL-MCNC: 0.6 MG/DL (ref 0.9–1.3)
EOSINOPHILS ABSOLUTE: 0 K/UL (ref 0–0.6)
EOSINOPHILS RELATIVE PERCENT: 0 %
GFR AFRICAN AMERICAN: >60
GFR NON-AFRICAN AMERICAN: >60
GLOBULIN: 2.9 G/DL
GLUCOSE BLD-MCNC: 133 MG/DL (ref 70–99)
HCT VFR BLD CALC: 37.8 % (ref 40.5–52.5)
HEMOGLOBIN: 12.4 G/DL (ref 13.5–17.5)
LYMPHOCYTES ABSOLUTE: 2.4 K/UL (ref 1–5.1)
LYMPHOCYTES RELATIVE PERCENT: 16 %
MCH RBC QN AUTO: 28.5 PG (ref 26–34)
MCHC RBC AUTO-ENTMCNC: 32.7 G/DL (ref 31–36)
MCV RBC AUTO: 87.2 FL (ref 80–100)
MONOCYTES ABSOLUTE: 0.7 K/UL (ref 0–1.3)
MONOCYTES RELATIVE PERCENT: 5 %
NEUTROPHILS ABSOLUTE: 10.2 K/UL (ref 1.7–7.7)
NEUTROPHILS RELATIVE PERCENT: 75 %
PDW BLD-RTO: 15.9 % (ref 12.4–15.4)
PLATELET # BLD: 174 K/UL (ref 135–450)
PLATELET SLIDE REVIEW: ADEQUATE
PMV BLD AUTO: 8.8 FL (ref 5–10.5)
POIKILOCYTES: ABNORMAL
POTASSIUM REFLEX MAGNESIUM: 4.2 MMOL/L (ref 3.5–5.1)
RBC # BLD: 4.34 M/UL (ref 4.2–5.9)
SLIDE REVIEW: ABNORMAL
SODIUM BLD-SCNC: 131 MMOL/L (ref 136–145)
TOTAL PROTEIN: 6.3 G/DL (ref 6.4–8.2)
WBC # BLD: 13.2 K/UL (ref 4–11)

## 2021-07-27 PROCEDURE — 87070 CULTURE OTHR SPECIMN AEROBIC: CPT

## 2021-07-27 PROCEDURE — 2580000003 HC RX 258: Performed by: NURSE PRACTITIONER

## 2021-07-27 PROCEDURE — 6370000000 HC RX 637 (ALT 250 FOR IP): Performed by: NURSE PRACTITIONER

## 2021-07-27 PROCEDURE — 99232 SBSQ HOSP IP/OBS MODERATE 35: CPT | Performed by: INTERNAL MEDICINE

## 2021-07-27 PROCEDURE — 1200000000 HC SEMI PRIVATE

## 2021-07-27 PROCEDURE — 36415 COLL VENOUS BLD VENIPUNCTURE: CPT

## 2021-07-27 PROCEDURE — 99255 IP/OBS CONSLTJ NEW/EST HI 80: CPT | Performed by: INTERNAL MEDICINE

## 2021-07-27 PROCEDURE — 2700000000 HC OXYGEN THERAPY PER DAY

## 2021-07-27 PROCEDURE — 80053 COMPREHEN METABOLIC PANEL: CPT

## 2021-07-27 PROCEDURE — 85025 COMPLETE CBC W/AUTO DIFF WBC: CPT

## 2021-07-27 PROCEDURE — 94640 AIRWAY INHALATION TREATMENT: CPT

## 2021-07-27 PROCEDURE — 6370000000 HC RX 637 (ALT 250 FOR IP): Performed by: INTERNAL MEDICINE

## 2021-07-27 PROCEDURE — 6360000002 HC RX W HCPCS: Performed by: NURSE PRACTITIONER

## 2021-07-27 PROCEDURE — 94761 N-INVAS EAR/PLS OXIMETRY MLT: CPT

## 2021-07-27 PROCEDURE — 87205 SMEAR GRAM STAIN: CPT

## 2021-07-27 RX ORDER — ALBUTEROL SULFATE 2.5 MG/3ML
2.5 SOLUTION RESPIRATORY (INHALATION) EVERY 4 HOURS PRN
Status: DISCONTINUED | OUTPATIENT
Start: 2021-07-27 | End: 2021-07-28 | Stop reason: HOSPADM

## 2021-07-27 RX ADMIN — HYDROXYZINE PAMOATE 50 MG: 50 CAPSULE ORAL at 08:53

## 2021-07-27 RX ADMIN — PANTOPRAZOLE SODIUM 40 MG: 40 TABLET, DELAYED RELEASE ORAL at 16:46

## 2021-07-27 RX ADMIN — IPRATROPIUM BROMIDE AND ALBUTEROL SULFATE 3 ML: .5; 3 SOLUTION RESPIRATORY (INHALATION) at 11:21

## 2021-07-27 RX ADMIN — ENOXAPARIN SODIUM 40 MG: 40 INJECTION SUBCUTANEOUS at 18:34

## 2021-07-27 RX ADMIN — ACETAMINOPHEN 650 MG: 325 TABLET ORAL at 20:07

## 2021-07-27 RX ADMIN — PANTOPRAZOLE SODIUM 40 MG: 40 TABLET, DELAYED RELEASE ORAL at 05:39

## 2021-07-27 RX ADMIN — Medication 1 PUFF: at 07:05

## 2021-07-27 RX ADMIN — CLOPIDOGREL BISULFATE 75 MG: 75 TABLET ORAL at 08:53

## 2021-07-27 RX ADMIN — ASPIRIN 325 MG: 325 TABLET, FILM COATED ORAL at 08:53

## 2021-07-27 RX ADMIN — IPRATROPIUM BROMIDE AND ALBUTEROL SULFATE 3 ML: .5; 3 SOLUTION RESPIRATORY (INHALATION) at 18:39

## 2021-07-27 RX ADMIN — METOPROLOL SUCCINATE 100 MG: 50 TABLET, EXTENDED RELEASE ORAL at 08:53

## 2021-07-27 RX ADMIN — IPRATROPIUM BROMIDE AND ALBUTEROL SULFATE 3 ML: .5; 3 SOLUTION RESPIRATORY (INHALATION) at 15:42

## 2021-07-27 RX ADMIN — IPRATROPIUM BROMIDE AND ALBUTEROL SULFATE 3 ML: .5; 3 SOLUTION RESPIRATORY (INHALATION) at 07:03

## 2021-07-27 RX ADMIN — GABAPENTIN 300 MG: 300 CAPSULE ORAL at 08:52

## 2021-07-27 RX ADMIN — QUETIAPINE FUMARATE 100 MG: 100 TABLET ORAL at 20:02

## 2021-07-27 RX ADMIN — Medication 1 PUFF: at 18:39

## 2021-07-27 RX ADMIN — GABAPENTIN 300 MG: 300 CAPSULE ORAL at 20:02

## 2021-07-27 RX ADMIN — ISOSORBIDE MONONITRATE 30 MG: 30 TABLET ORAL at 08:53

## 2021-07-27 RX ADMIN — RANOLAZINE 1000 MG: 500 TABLET, FILM COATED, EXTENDED RELEASE ORAL at 20:02

## 2021-07-27 RX ADMIN — IPRATROPIUM BROMIDE AND ALBUTEROL SULFATE 3 ML: .5; 3 SOLUTION RESPIRATORY (INHALATION) at 02:56

## 2021-07-27 RX ADMIN — METHYLPREDNISOLONE SODIUM SUCCINATE 40 MG: 40 INJECTION, POWDER, FOR SOLUTION INTRAMUSCULAR; INTRAVENOUS at 18:35

## 2021-07-27 RX ADMIN — METHYLPREDNISOLONE SODIUM SUCCINATE 40 MG: 40 INJECTION, POWDER, FOR SOLUTION INTRAMUSCULAR; INTRAVENOUS at 05:38

## 2021-07-27 RX ADMIN — ATORVASTATIN CALCIUM 40 MG: 40 TABLET, FILM COATED ORAL at 08:52

## 2021-07-27 RX ADMIN — Medication 10 ML: at 20:02

## 2021-07-27 RX ADMIN — IPRATROPIUM BROMIDE AND ALBUTEROL SULFATE 3 ML: .5; 3 SOLUTION RESPIRATORY (INHALATION) at 22:39

## 2021-07-27 RX ADMIN — LEVOFLOXACIN 500 MG: 5 INJECTION, SOLUTION INTRAVENOUS at 18:37

## 2021-07-27 RX ADMIN — RANOLAZINE 1000 MG: 500 TABLET, FILM COATED, EXTENDED RELEASE ORAL at 08:53

## 2021-07-27 RX ADMIN — Medication 10 ML: at 08:53

## 2021-07-27 ASSESSMENT — PAIN DESCRIPTION - PROGRESSION: CLINICAL_PROGRESSION: GRADUALLY WORSENING

## 2021-07-27 ASSESSMENT — PAIN DESCRIPTION - LOCATION: LOCATION: BACK

## 2021-07-27 ASSESSMENT — PAIN DESCRIPTION - DESCRIPTORS: DESCRIPTORS: ACHING

## 2021-07-27 ASSESSMENT — PAIN SCALES - GENERAL
PAINLEVEL_OUTOF10: 6
PAINLEVEL_OUTOF10: 4
PAINLEVEL_OUTOF10: 1

## 2021-07-27 ASSESSMENT — PAIN DESCRIPTION - ORIENTATION: ORIENTATION: LOWER

## 2021-07-27 ASSESSMENT — PAIN DESCRIPTION - ONSET: ONSET: GRADUAL

## 2021-07-27 ASSESSMENT — PAIN DESCRIPTION - PAIN TYPE: TYPE: CHRONIC PAIN

## 2021-07-27 ASSESSMENT — PAIN DESCRIPTION - FREQUENCY: FREQUENCY: INTERMITTENT

## 2021-07-27 ASSESSMENT — PAIN - FUNCTIONAL ASSESSMENT: PAIN_FUNCTIONAL_ASSESSMENT: ACTIVITIES ARE NOT PREVENTED

## 2021-07-27 NOTE — PROGRESS NOTES
BP (!) 145/95   Pulse 60   Temp 97.4 °F (36.3 °C) (Oral)   Resp 16   Ht 6' (1.829 m)   Wt 274 lb (124.3 kg)   SpO2 94%   BMI 37.16 kg/m²      Assessment complete. Meds passed. Pt denies needs at this time  Pt supplied with coffee and a full pitcher of water. Respirations even and unlabored. Bedside Mobility Assessment Tool (BMAT):     Assessment Level 1- Sit and Shake    1. From a semi-reclined position, ask patient to sit up and rotate to a seated position at the side of the bed. Can use the bedrail. 2. Ask patient to reach out and grab your hand and shake making sure patient reaches across his/her midline. Pass- Patient is able to come to a seated position, maintain core strength. Maintains seated balance while reaching across midline. Move on to Assessment Level 2. Assessment Level 2- Stretch and Point   1. With patient in seated position at the side of the bed, have patient place both feet on the floor (or stool) with knees no higher than hips. 2. Ask patient to stretch one leg and straighten the knee, then bend the ankle/flex and point the toes. If appropriate, repeat with the other leg. Pass- Patient is able to demonstrate appropriate quad strength on intended weight bearing limb(s). Move onto Assessment Level 3. Assessment Level 3- Stand   1. Ask patient to elevate off the bed or chair (seated to standing) using an assistive device (cane, bedrail). 2. Patient should be able to raise buttocks off be and hold for a count of five. May repeat once. Pass- Patient maintains standing stability for at least 5 seconds, proceed to assessment level 4. Assessment Level 4- Walk   1. Ask patient to march in place at bedside. 2. Then ask patient to advance step and return each foot. Some medical conditions may render a patient from stepping backwards, use your best clinical judgement.    Pass- Patient demonstrates balance while shifting weight and ability to step, takes independent steps, does not use assistive device patient is MOBILITY LEVEL 4.       Mobility Level- 4

## 2021-07-27 NOTE — PROGRESS NOTES
Progress Note    Admit Date:  7/26/2021    Subjective:  Mr. Kevin Guallpa was admitted to hospital for COPD exacerbation and bilateral pneumonia. He had failed outpatient treatment. He was taking doxycycline and prednisone as an outpatient. This morning he is afebrile. Subjectively he is improved. He is 92% on room air. He is 96% on 2.5 L. With ambulation he was 89%. I put him back on 1 L.  COVID-19 test is negative. He has not had a COVID-19 vaccine yet. Objective:   /74   Pulse 57   Temp 98.1 °F (36.7 °C) (Oral)   Resp 18   Ht 6' (1.829 m)   Wt 274 lb (124.3 kg)   SpO2 96%   BMI 37.16 kg/m²          Intake/Output Summary (Last 24 hours) at 7/27/2021 0553  Last data filed at 7/26/2021 2238  Gross per 24 hour   Intake 347.81 ml   Output 800 ml   Net -452.19 ml       Physical Exam:    General appearance: alert, appears stated age and cooperative  Head: Normocephalic, without obvious abnormality, atraumatic  Eyes: conjunctivae/corneas clear. PERRL, EOM's intact. Neck: no adenopathy, no carotid bruit, no JVD, supple, symmetrical, trachea midline and thyroid not enlarged, symmetric, no tenderness/mass/nodules  Lungs: Minimal accessory muscle use. Diminished breath sounds the bases. Bilateral wheezing. No crackles or rhonchi.   Heart: regular rate and rhythm, S1, S2 normal, no murmur, click, rub or gallop  Abdomen: soft, non-tender; bowel sounds normal; no masses,  no organomegaly  Extremities: extremities normal, atraumatic, no cyanosis or edema  Pulses: 2+ and symmetric  Skin: Skin color, texture, turgor normal. No rashes or lesions  Neurologic: Grossly normal    Scheduled Meds:   aspirin  325 mg Oral Daily    atorvastatin  40 mg Oral Daily    clopidogrel  75 mg Oral Daily    gabapentin  300 mg Oral TID    hydrOXYzine  50 mg Oral Daily    ipratropium-albuterol  1 vial Inhalation Q4H    isosorbide mononitrate  30 mg Oral Daily    metoprolol succinate  100 mg Oral Daily    pantoprazole  40 mg Solu-medrol, Levaquin D#2  - procal normal  - Duonebs, Albuterol     Hyponatremia mild  -will trend labs.      Leukocytosis  - related to Pneumonia, recent prednisone taper.  - will trend WBC.    CAD  - on aspirin, Ranexa, Imdur, Plavix, Atorvastatin, Toprol-XL.    Hypertension  - BP elevated.  Continue home medication regimen.   - Toprol-XL, Imdur.      Hyperlipidemia  - on Atorvastatin.      GERD  - on PPI.      Chronic back pain  - continue home Gabapentin.      Bipolar affective disorder, depression, anxiety  - continue home medication regimen.      Tobacco Dependence  -Recommended cessation  - nicotine patch ordered      DVT Prophylaxis: Lovenox  Diet: general  Code Status: Prior    Violeta Gavin MD, MD 7/27/2021 5:53 AM

## 2021-07-27 NOTE — CARE COORDINATION
Case Management Assessment  Initial Evaluation      Patient Name: Sarah Gomez  YOB: 1965  Diagnosis: Pneumonia [J18.9]  Date / Time: 7/26/2021  1:18 PM    Admission status/Date:  07/26/2021  Chart Reviewed: Yes      Patient Interviewed: Yes   Family Interviewed:  No      Hospitalization in the last 30 days:  No      Health Care Decision Maker :   Primary Decision Maker: Elia Andrade - Brother/Sister - [phone]    (CM - must 1st enter selection under Navigator - emergency contact- [place] Relationship and pick relationship)   Who do you trust or have selected to make healthcare decisions for you      Met with: patient  Interview conducted  (bedside/phone): bedside    Current PCP: [address] required for SNF : YES        3 night stay required - NO    ADLS  Support Systems/Care Needs: None  Transportation: [employer] CM    Meal Preparation: self    Housing  Living Arrangements: Single floor apartment/alone  Steps: 2-3 steps in  Intent for return to present living arrangements: Yes  Identified Issues: NA    Home Care Information  Active with [address] : No Agency:(Services)  Type of Home Care Services: None  Passport/Waiver : No  :                      Phone Number:    Passport/Waiver Services: NA          Durable Medical Equiptment   DME Provider:   Equipment:   Walker___Cane___RTS___ BSC___Shower Chair___Hospital Bed___W/C____Other________  02 at ____Liter(s)---wears(frequency)_______ HHN _X__ CPAP___ BiPap___   N/A____      Home O2 Use :  No  - Pt currently requiring supplemental O2 at 3 liters. CM will follow and reassess for continued need. Community Service Affiliation  Dialysis:  No    · Agency:  · Location:  · Dialysis Schedule:  · Phone:   · Fax:     Other Community Services: (ex:PT/OT,Mental Health,Wound Clinic, [address] [place], [place] [address])    DISCHARGE PLAN: Explained Case Management role/services. Chart reviewed. Met with pt at bedside and explained the role of the CM. Plans to return home. He will call [phone] for transportation upon DC. [facility] if needed upon DC. +CM following for potential new home O2 and HHC needs.

## 2021-07-27 NOTE — ACP (ADVANCE CARE PLANNING)
Advance Care Planning   Healthcare Decision Maker:    Primary Decision Maker: Giovana Hutchinson - Brother/Sister - [phone]      Today we documented Decision Maker(s) consistent with Legal Next of Kin hierarchy.

## 2021-07-27 NOTE — PLAN OF CARE
Problem: Infection:  Goal: Will remain free from infection  Description: Will remain free from infection  Outcome: Ongoing     Problem: Safety:  Goal: Free from accidental physical injury  Description: Free from accidental physical injury  Outcome: Ongoing  Goal: Free from intentional harm  Description: Free from intentional harm  Outcome: Ongoing

## 2021-07-27 NOTE — CONSULTS
isosorbide mononitrate  30 mg Oral Daily    metoprolol succinate  100 mg Oral Daily    pantoprazole  40 mg Oral BID AC    QUEtiapine  100 mg Oral Nightly    ranolazine  1,000 mg Oral BID    budesonide-formoterol  1 puff Inhalation BID    sodium chloride flush  5-40 mL Intravenous 2 times per day    enoxaparin  40 mg Subcutaneous Daily    levofloxacin  500 mg Intravenous Q24H    methylPREDNISolone  40 mg Intravenous Q12H    nicotine  1 patch Transdermal Daily    covid-19 vaccine  1 Dose Intramuscular Prior to discharge     PRN Meds:  albuterol, sodium chloride flush, sodium chloride, ondansetron **OR** ondansetron, polyethylene glycol, acetaminophen **OR** acetaminophen    REVIEW OF SYSTEMS:  Constitutional: Negative for fever  HENT: Negative for sore throat  Eyes: Negative for redness   Respiratory: + for dyspnea, cough  Cardiovascular: Negative for chest pain  Gastrointestinal: Negative for vomiting, diarrhea   Genitourinary: Negative for hematuria   Musculoskeletal: Negative for arthralgias   Skin: Negative for rash  Neurological: Negative for syncope  Hematological: Negative for adenopathy  Psychiatric/Behavorial: Negative for anxiety    PHYSICAL EXAM: /74   Pulse 57   Temp 98.1 °F (36.7 °C) (Oral)   Resp 18   Ht 6' (1.829 m)   Wt 274 lb (124.3 kg)   SpO2 97%   BMI 37.16 kg/m²  on 3 L  Constitutional:  No acute distress. Eyes: PERRL. Conjunctivae anicteric. ENT: Normal nose. Normal tongue. Neck:  Trachea is midline. No thyroid tenderness. Respiratory: No accessory muscle usage. + wheezes. No rales. No Rhonchi. Cardiovascular: Normal S1S2. No digit clubbing. No digit cyanosis. No LE edema. Gastrointestinal: No mass palpated. No tenderness palpated. No umbilical hernia. Lymphatic: No cervical or supraclavicular adenopathy. Skin: No rash on the exposed extremities. No Nodules or induration on exposed extremities. Psychiatric: No anxiety or Agitation.  Alert and Oriented to person, place and time. CBC:   Recent Labs     07/26/21  1347 07/27/21  0519   WBC 12.2* 13.2*   HGB 12.7* 12.4*   HCT 37.6* 37.8*   MCV 86.8 87.2    174     BMP:   Recent Labs     07/26/21  1347 07/27/21  0519   * 131*   K 4.3 4.2   CL 98* 98*   CO2 25 26   BUN 12 12   CREATININE 0.7* 0.6*        Recent Labs     07/26/21  1347 07/27/21  0519   AST 16 11*   ALT 18 17   BILITOT 0.3 <0.2   ALKPHOS 60 57     No results for input(s): PROTIME, INR in the last 72 hours. No results for input(s): NITRITE, COLORU, PHUR, LABCAST, WBCUA, RBCUA, MUCUS, TRICHOMONAS, YEAST, BACTERIA, CLARITYU, SPECGRAV, LEUKOCYTESUR, UROBILINOGEN, BILIRUBINUR, BLOODU, GLUCOSEU, AMORPHOUS in the last 72 hours. Invalid input(s): KETONESU  No results for input(s): PHART, OSX8MGK, PO2ART in the last 72 hours. Chest imaging was reviewed by me and showed cxr  Heart size stable.  Bilateral mixed ground-glass and linear opacities in the   lower lung fields, right greater than left.  No airspace consolidation           Impression   Bilateral pneumonia, including possible viral/atypical     PCT 0.04    ASSESSMENT:  · Acute hypoxic respiratory failure  · COPD AE  · CAP  · Tobacco abuse    PLAN:  Supplemental oxygen to maintain SaO2 >92%; wean as tolerated  Intensive inhaled bronchodilator therapy. IV solumedrol 40 mg IV Q12 hrs. Plan to switch to oral prednisone taper when improved. Levaquin   Sputum GS&C.   Acapella QID to mobilize respiratory secretions  Smoking cessation advised    Thank you Elvia Bermudez, * for this consult

## 2021-07-28 VITALS
SYSTOLIC BLOOD PRESSURE: 143 MMHG | HEART RATE: 88 BPM | OXYGEN SATURATION: 94 % | RESPIRATION RATE: 18 BRPM | BODY MASS INDEX: 37.11 KG/M2 | TEMPERATURE: 97.2 F | DIASTOLIC BLOOD PRESSURE: 97 MMHG | WEIGHT: 274 LBS | HEIGHT: 72 IN

## 2021-07-28 PROCEDURE — 6370000000 HC RX 637 (ALT 250 FOR IP): Performed by: NURSE PRACTITIONER

## 2021-07-28 PROCEDURE — 99233 SBSQ HOSP IP/OBS HIGH 50: CPT | Performed by: INTERNAL MEDICINE

## 2021-07-28 PROCEDURE — 6360000002 HC RX W HCPCS: Performed by: NURSE PRACTITIONER

## 2021-07-28 PROCEDURE — 99239 HOSP IP/OBS DSCHRG MGMT >30: CPT | Performed by: INTERNAL MEDICINE

## 2021-07-28 PROCEDURE — 2580000003 HC RX 258: Performed by: NURSE PRACTITIONER

## 2021-07-28 PROCEDURE — 2700000000 HC OXYGEN THERAPY PER DAY

## 2021-07-28 PROCEDURE — 94761 N-INVAS EAR/PLS OXIMETRY MLT: CPT

## 2021-07-28 PROCEDURE — 94640 AIRWAY INHALATION TREATMENT: CPT

## 2021-07-28 PROCEDURE — 6370000000 HC RX 637 (ALT 250 FOR IP): Performed by: INTERNAL MEDICINE

## 2021-07-28 RX ORDER — PREDNISONE 10 MG/1
TABLET ORAL
Qty: 30 TABLET | Refills: 0 | Status: SHIPPED | OUTPATIENT
Start: 2021-07-28 | End: 2021-09-02

## 2021-07-28 RX ORDER — LEVOFLOXACIN 500 MG/1
500 TABLET, FILM COATED ORAL DAILY
Qty: 7 TABLET | Refills: 0 | Status: SHIPPED | OUTPATIENT
Start: 2021-07-28 | End: 2021-08-04

## 2021-07-28 RX ORDER — LISINOPRIL 10 MG/1
10 TABLET ORAL DAILY
COMMUNITY
End: 2022-02-02 | Stop reason: SDUPTHER

## 2021-07-28 RX ORDER — IPRATROPIUM BROMIDE AND ALBUTEROL SULFATE 2.5; .5 MG/3ML; MG/3ML
1 SOLUTION RESPIRATORY (INHALATION)
Status: DISCONTINUED | OUTPATIENT
Start: 2021-07-28 | End: 2021-07-28 | Stop reason: HOSPADM

## 2021-07-28 RX ADMIN — Medication 1 PUFF: at 07:24

## 2021-07-28 RX ADMIN — GABAPENTIN 300 MG: 300 CAPSULE ORAL at 08:58

## 2021-07-28 RX ADMIN — CLOPIDOGREL BISULFATE 75 MG: 75 TABLET ORAL at 08:58

## 2021-07-28 RX ADMIN — ASPIRIN 325 MG: 325 TABLET, FILM COATED ORAL at 08:58

## 2021-07-28 RX ADMIN — Medication 10 ML: at 09:13

## 2021-07-28 RX ADMIN — IPRATROPIUM BROMIDE AND ALBUTEROL SULFATE 3 ML: .5; 3 SOLUTION RESPIRATORY (INHALATION) at 07:24

## 2021-07-28 RX ADMIN — METOPROLOL SUCCINATE 100 MG: 50 TABLET, EXTENDED RELEASE ORAL at 08:58

## 2021-07-28 RX ADMIN — PANTOPRAZOLE SODIUM 40 MG: 40 TABLET, DELAYED RELEASE ORAL at 05:48

## 2021-07-28 RX ADMIN — IPRATROPIUM BROMIDE AND ALBUTEROL SULFATE 3 ML: .5; 3 SOLUTION RESPIRATORY (INHALATION) at 11:05

## 2021-07-28 RX ADMIN — IPRATROPIUM BROMIDE AND ALBUTEROL SULFATE 3 ML: .5; 3 SOLUTION RESPIRATORY (INHALATION) at 15:14

## 2021-07-28 RX ADMIN — ISOSORBIDE MONONITRATE 30 MG: 30 TABLET ORAL at 08:58

## 2021-07-28 RX ADMIN — RANOLAZINE 1000 MG: 500 TABLET, FILM COATED, EXTENDED RELEASE ORAL at 08:58

## 2021-07-28 RX ADMIN — ATORVASTATIN CALCIUM 40 MG: 40 TABLET, FILM COATED ORAL at 08:58

## 2021-07-28 RX ADMIN — METHYLPREDNISOLONE SODIUM SUCCINATE 40 MG: 40 INJECTION, POWDER, FOR SOLUTION INTRAMUSCULAR; INTRAVENOUS at 05:48

## 2021-07-28 ASSESSMENT — PAIN DESCRIPTION - ONSET: ONSET: ON-GOING

## 2021-07-28 ASSESSMENT — PAIN DESCRIPTION - FREQUENCY: FREQUENCY: CONTINUOUS

## 2021-07-28 ASSESSMENT — PAIN DESCRIPTION - LOCATION: LOCATION: BACK

## 2021-07-28 ASSESSMENT — PAIN DESCRIPTION - PAIN TYPE: TYPE: CHRONIC PAIN

## 2021-07-28 ASSESSMENT — PAIN DESCRIPTION - PROGRESSION: CLINICAL_PROGRESSION: NOT CHANGED

## 2021-07-28 ASSESSMENT — PAIN SCALES - GENERAL: PAINLEVEL_OUTOF10: 5

## 2021-07-28 ASSESSMENT — PAIN DESCRIPTION - DESCRIPTORS: DESCRIPTORS: ACHING

## 2021-07-28 ASSESSMENT — PAIN DESCRIPTION - ORIENTATION: ORIENTATION: LOWER

## 2021-07-28 NOTE — PROGRESS NOTES
RESPIRATORY THERAPY ASSESSMENT    Name:  Kayleigh MORIN Record Number:  [id number]  Age: 48 y.o. Gender: male  : 1965  Today's Date:  2021  Room:  [address] [place], [place] [address]    Assessment     Is the patient being admitted for a COPD or Asthma exacerbation? No   (If yes the patient will be seen every 4 hours for the first 24 hours and then reassessed)    Patient Admission Diagnosis      Allergies  Allergies   Allergen Reactions    Pcn [Penicillins] Other (See Comments) and Hives     Other reaction(s): Light Headed  Patient passed out. syncope       Minimum Predicted Vital Capacity:               Actual Vital Capacity:                    Pulmonary History: COPD  Home Oxygen Therapy:  Room air  Home Respiratory Therapy: Duoneb, Albuterol, Symbicort   Current Respiratory Therapy:   Duoneb Q4, Albuterol prn, Symbicort BID  Treatment Type: HHN  Medications: Albuterol/Ipratropium    Respiratory Severity Index(RSI)   Patients with orders for inhalation medications, oxygen, or any therapeutic treatment modality will be placed on Respiratory Protocol. They will be assessed with the first treatment and at least every 72 hours thereafter. The following severity scale will be used to determine frequency of treatment intervention. Smoking History: Mild Exacerbation = 3    Social History  Social History     Tobacco Use    Smoking status: Current Every Day Smoker     Packs/day: 1.00     Years: 45.00     Pack years: 45.00     Types: Cigarettes    Smokeless tobacco: Never Used   Vaping Use    Vaping Use: Never used   Substance Use Topics    Alcohol use:  Yes     Alcohol/week: 0.0 standard drinks     Comment: states \"a few beers monthly\"    Drug use: Yes     Types: Marijuana     Comment: took friend's suboxone        Recent Surgical History: None = 0  Past Surgical History  Past Surgical History:   Procedure Laterality Date    CORONARY ANGIOPLASTY WITH STENT PLACEMENT  , ,     VENTURA Fofana Level of Consciousness: Alert, Oriented, and Cooperative = 0    Level of Activity: Walking with assistance = 1    Respiratory Pattern: Dyspnea with exertion;Irregular pattern;or RR less than 6 = 2    Breath Sounds: Absent bilaterally and/or with wheezes = 3    Sputum   ,  ,    Cough: Strong, spontaneous, non-productive = 0    Vital Signs   /75   Pulse 71   Temp 97.1 °F (36.2 °C) (Oral)   Resp 18   Ht 6' (1.829 m)   Wt 274 lb (124.3 kg)   SpO2 (!) 88%   BMI 37.16 kg/m²   SPO2 (COPD values may differ): 88-89% on room air or greater than 92% on FiO2 28- 35% = 2    Peak Flow (asthma only): not applicable = 0    RSI: 38-52 = Q6H or QID and Q4HPRN for dyspnea        Plan       Goals:  Medication delivery     Patient/caregiver was educated on the proper method of use for Respiratory Care Devices:  Yes      Level of patient/caregiver understanding able to:   ? Verbalize understanding   ? Demonstrate understanding       ? Teach back        ? Needs reinforcement       ? No available caregiver               ? Other:     Response to education:  Good     Is patient being placed on Home Treatment Regimen? No     Does the patient have everything they need prior to discharge? NA     Comments:  Chart reviewed, patient assessed    Plan of Care:  Duoneb Q4WA, Albuterol prn, Symbicort BID    Electronically signed by Carmita Muñoz RCP on 7/28/2021 at 12:29 AM    Respiratory Protocol Guidelines     1. Assessment and treatment by Respiratory Therapy will be initiated for medication and therapeutic interventions upon initiation of aerosolized medication. 2. Physician will be contacted for respiratory rate (RR) greater than 35 breaths per minute. Therapy will be held for heart rate (HR) greater than 140 beats per minute, pending direction from physician. 3. Bronchodilators will be administered via Metered Dose Inhaler (MDI) with spacer when the following criteria are met:  a.  Alert and cooperative     b. HR < 140 bpm  c. RR < 30 bpm                d. Can demonstrate a 2-3 second inspiratory hold  4. Bronchodilators will be administered via Hand Held Nebulizer JULI[facility] to patients when ANY of the following criteria are met  a. Incognizant or uncooperative          b. Patients treated with HHN at Home        c. Unable to demonstrate proper use of MDI with spacer     d. RR > 30 bpm   5. Bronchodilators will be delivered via Metered Dose Inhaler (MDI), HHN, Aerogen to intubated patients on mechanical ventilation. 6. Inhalation medication orders will be delivered and/or substituted as outlined below. Aerosolized Medications Ordering and Administration Guidelines:    1. All Medications will be ordered by a physician, and their frequency and/or modality will be adjusted as defined by the patients Respiratory Severity Index (RSI) score. 2. If the patient does not have documented COPD, consider discontinuing anticholinergics when RSI is less than 9.  3. If the bronchospasm worsens (increased RSI), then the bronchodilator frequency can be increased to a maximum of every 4 hours. If greater than every 4 hours is required, the physician will be contacted. 4. If the bronchospasm improves, the frequency of the bronchodilator can be decreased, based on the patient's RSI, but not less than home treatment regimen frequency. 5. Bronchodilator(s) will be discontinued if patient has a RSI less than 9 and has received no scheduled or as needed treatment for 72  Hrs. Patients Ordered on a Mucolytic Agent:    1. Must always be administered with a bronchodilator. 2. Discontinue if patient experiences worsened bronchospasm, or secretions have lessened to the point that the patient is able to clear them with a cough. Anti-inflammatory and Combination Medications:    1.  If the patient lacks prior history of lung disease, is not using inhaled anti-inflammatory medication at home, and lacks wheezing by examination or by history for at least 24 hours, contact physician for possible discontinuation.

## 2021-07-28 NOTE — FLOWSHEET NOTE
07/28/21 0845   Vital Signs   Temp 97.2 °F (36.2 °C)   Temp Source Oral   Pulse 88   Heart Rate Source Monitor   Resp 18   BP (!) 143/97   BP Location Left upper arm   Pain Assessment   Pain Assessment 0-10   Pain Type Chronic pain   Pain Location Back   Pain Orientation Lower   Pain Descriptors Aching   Pain Frequency Continuous   Pain Onset On-going   Clinical Progression Not changed   Pain Level 5   Oxygen Therapy   SpO2 92 %   O2 Device Nasal cannula   O2 Flow Rate (L/min) 2 L/min   AM assessment complete. Vital signs stable. Walked 40 feet in room, desat to 88%. Recovered well to 91% on RA after resting at EOB. AM assessment complete. Vital signs stable. No other needs identified at this time. Will continue to monitor. No other needs identified at this time. Will continue to monitor.

## 2021-07-28 NOTE — PROGRESS NOTES
Pt resting. Resp e/e. Shift assessment completed and charted. No needs. Will monitor.  Glenny Munoz RN

## 2021-07-28 NOTE — CARE COORDINATION
[facility]  Received referral regarding HC from [address]. Sent request to [facility] for [facility] coverage. [facility] is able to service for SN, PT/OT.  for Angela Car CM to inform. Spoke with pt in follow up. Pt is agreeable to [facility] for SN, PT/OT. Verified demographics. Pt requests Greer Larsen as second contact [phone]. Pt declines HCV and Zoom Programs. Sent referral to [facility] for [facility].     Electronically signed by Charbel oCreas RN on 7/28/2021 at 1:03 PM

## 2021-07-28 NOTE — DISCHARGE SUMMARY
Name:  Ramon Srinivasan  Room:  0209/0209-02  MRN:    [id number]    Discharge Summary      This discharge summary is in conjunction with a complete physical exam done on the day of discharge. Discharging Physician: Ann Gallegos MD       Admit: 7/26/2021  Discharge:   7/28/2021     Diagnoses this Admission    Principal Problem:    Acute respiratory failure with hypoxia (HCC)  Active Problems:    HTN (hypertension)    Hyperlipidemia    Coronary artery disease involving native coronary artery of native heart with unstable angina pectoris ([address]    Pneumonia    COPD exacerbation (HCC)    Hyponatremia  Resolved Problems:    * No resolved hospital problems. *          Procedures (Please Review Full Report for Details)  none    Consults    Pulmonary    HPI:    The patient is a 48 y.o. male with hypertension, hyperlipidemia, CAD, DDD, COPD, Depression, chronic back pain, bipolar affective disorder, and anxiety who presents to [facility] with shortness of breath. He states ongoing since last Thursday. States he felt improved Saturday and then started worsening again on Sunday. He was seen in the ED on 7/22. He was discharged with Doxycycline and prednisone taper. He reports his cough was non-productive. Now he is bringing up some phlegm. Denies fever, chills, chest pain, abdominal pain, nausea, vomiting, or diarrhea. He c/o chronic back pain.       Patient tachypneic and requiring 2 L O2. Does not wear O2 at home. Labs with hyponatremia and leukocytosis. CXR shows bilateral pneumonia, possible viral/atypical.  COVID not detected. Admitted to med-surg. Pulmonology consulted.         Physical Exam at Discharge:  BP (!) 143/97   Pulse 88   Temp 97.2 °F (36.2 °C) (Oral)   Resp 18   Ht 6' (1.829 m)   Wt 274 lb (124.3 kg)   SpO2 94%   BMI 37.16 kg/m²     General appearance: alert, appears stated age and cooperative  Head: Normocephalic, without obvious abnormality, atraumatic  Eyes: CL 98* 98*   CO2 25 26   BUN 12 12   CREATININE 0.7* 0.6*     LIVER PROFILE:   Recent Labs     07/26/21  1347 07/27/21  0519   AST 16 11*   ALT 18 17   BILITOT 0.3 <0.2   ALKPHOS 60 57     Results for Marilee San (MRN [id number]) as of 7/28/2021 13:15   Ref. Range 7/26/2021 14:00   INFLUENZA A Latest Ref Range: NOT DETECTED  NOT DETECTED   INFLUENZA B Latest Ref Range: NOT DETECTED  NOT DETECTED   SARS-CoV-2 RNA, RT PCR Latest Ref Range: NOT DETECTED  NOT DETECTED       XR CHEST PORTABLE   Final Result   Bilateral pneumonia, including possible viral/atypical              Discharge Medications     Medication List      START taking these medications    levoFLOXacin 500 MG tablet  Commonly known as: LEVAQUIN  Take 1 tablet by mouth daily for 7 days        CHANGE how you take these medications    pantoprazole 20 MG tablet  Commonly known as: PROTONIX  TAKE 2 TABLETS BY MOUTH DAILY  What changed: See the new instructions.      predniSONE 10 MG tablet  Commonly known as: DELTASONE  4 tabs for 3 days 3 tabs for 3 days 2 tabs for 3 days 1 tabs for 3 days  What changed:   · how much to take  · how to take this  · when to take this  · additional instructions        CONTINUE taking these medications    albuterol sulfate  (90 Base) MCG/ACT inhaler  Notes to patient: Use as needed for shortness of breath or wheezing     aspirin 325 MG tablet     atorvastatin 40 MG tablet  Commonly known as: LIPITOR  Take 1 tablet by mouth daily     clopidogrel 75 MG tablet  Commonly known as: PLAVIX  Take 1 tablet by mouth daily     gabapentin 300 MG capsule  Commonly known as: NEURONTIN     ipratropium-albuterol 0.5-2.5 (3) MG/3ML Soln nebulizer solution  Commonly known as: DUONEB  Inhale 3 mLs into the lungs every 4 hours     isosorbide mononitrate 30 MG extended release tablet  Commonly known as: IMDUR  TAKE (1) TABLET DAILY     l-arginine 500 MG capsule  Take 1 capsule by mouth 2 times daily     lisinopril 10 MG tablet  Commonly known as: PRINIVIL;ZESTRIL     metoprolol succinate 100 MG extended release tablet  Commonly known as: TOPROL XL  Take 1 tablet by mouth daily     nitroGLYCERIN 0.4 MG SL tablet  Commonly known as: NITROSTAT  Place 1 tablet under the tongue every 5 minutes as needed for Chest pain     QUEtiapine 100 MG tablet  Commonly known as: SEROQUEL     ranolazine 1000 MG extended release tablet  Commonly known as: RANEXA  Take 1 tablet by mouth 2 times daily     Symbicort 160-4.5 MCG/ACT Aero  Generic drug: budesonide-formoterol     VISTARIL PO        STOP taking these medications    clindamycin 300 MG capsule  Commonly known as: CLEOCIN     doxycycline hyclate 100 MG capsule  Commonly known as: VIBRAMYCIN           Where to Get Your Medications      These medications were sent to [address] [place] [id number]    Phone: [phone]   · levoFLOXacin 500 MG tablet  · predniSONE 10 MG tablet           Discharge Condition/Location: Stable    Follow Up: Follow up with PCP. More than 30 mts spent.      Ange Arce MD 7/28/2021 1:14 PM

## 2021-07-28 NOTE — DISCHARGE INSTR - COC
by Arie Garcia RN on 7/28/21 at 1:23 PM EDT    CASE MANAGEMENT/SOCIAL WORK SECTION    Inpatient Status Date: 07/26/2021    Readmission Risk Assessment Score:  Readmission Risk              Risk of Unplanned Readmission:  25           Discharging to Facility/ Agency   · Davy Aparicio  · Phone: [phone]: [phone]  Huey Salvador agency to establish plan of care for patient over 60 day period   Nursing  Initial home SN evaluation visit to occur within 24-48 hours for:  medication management  VS and clinical assessment  S&S chronic disease exacerbation education + when to contact MD / NP  care coordination  Medication Reconciliation during 1st SN visit       PT/OT   Evaluate with goal of regaining prior level of functioning   OT to evaluate if patient has [address] needs for personal care      PCP Visit scheduled within 7 days of hospital discharge       Dialysis Facility (if applicable)   · Name:  · Address:  · Dialysis Schedule:  · Phone:  · Fax:    / signature: {Esignature:[id number]}    PHYSICIAN SECTION    Prognosis: {Prognosis:2624385687}    Condition at Discharge: [address] Reid Patient Condition:[id number]}    Rehab Potential (if transferring to Rehab): {Prognosis:[id number]}    Recommended Labs or Other Treatments After Discharge: SN, PT/OT  HCV and Zoom Programs    Physician Certification: I certify the above information and transfer of George Grady  is necessary for the continuing treatment of the diagnosis listed and that he requires [address] for less 30 days.      Update Admission H&P: No change in H&P    PHYSICIAN SIGNATURE: TZI Hicks MD/  Electronically signed by Reynaldo Tripp RN on 7/28/21 at 12:47 PM EDT

## 2021-07-28 NOTE — PROGRESS NOTES
Pulmonary Progress Note  CC: SOB, COPD    Subjective: feels close to baseline but is still hypoxic      EXAM: BP (!) 143/97   Pulse 88   Temp 97.2 °F (36.2 °C) (Oral)   Resp 18   Ht 6' (1.829 m)   Wt 274 lb (124.3 kg)   SpO2 94%   BMI 37.16 kg/m²  on 2  Constitutional:  No acute distress. Eyes: PERRL. Conjunctivae anicteric. ENT: Normal nose. Normal tongue. Neck:  Trachea is midline. No thyroid tenderness. Respiratory: No accessory muscle usage. decreased breath sounds. No wheezes. No rales. No Rhonchi. Cardiovascular: Normal S1S2. No digit clubbing. No digit cyanosis. No LE edema. Psychiatric: No anxiety or Agitation. Alert and Oriented to person, place and time.     Scheduled Meds:   ipratropium-albuterol  1 vial Inhalation Q4H WA    aspirin  325 mg Oral Daily    atorvastatin  40 mg Oral Daily    clopidogrel  75 mg Oral Daily    gabapentin  300 mg Oral TID    hydrOXYzine  50 mg Oral Daily    isosorbide mononitrate  30 mg Oral Daily    metoprolol succinate  100 mg Oral Daily    pantoprazole  40 mg Oral BID AC    QUEtiapine  100 mg Oral Nightly    ranolazine  1,000 mg Oral BID    budesonide-formoterol  1 puff Inhalation BID    sodium chloride flush  5-40 mL Intravenous 2 times per day    enoxaparin  40 mg Subcutaneous Daily    levofloxacin  500 mg Intravenous Q24H    methylPREDNISolone  40 mg Intravenous Q12H    nicotine  1 patch Transdermal Daily    covid-19 vaccine  1 Dose Intramuscular Prior to discharge     Continuous Infusions:   sodium chloride 100 mL/hr at 07/27/21 2004     PRN Meds:  albuterol, sodium chloride flush, sodium chloride, ondansetron **OR** ondansetron, polyethylene glycol, acetaminophen **OR** acetaminophen    Labs:  CBC:   Recent Labs     07/26/21  1347 07/27/21  0519   WBC 12.2* 13.2*   HGB 12.7* 12.4*   HCT 37.6* 37.8*   MCV 86.8 87.2    174     BMP:   Recent Labs     07/26/21  1347 07/27/21  0519   * 131*   K 4.3 4.2   CL 98* 98*   CO2 25 26 BUN 12 12   CREATININE 0.7* 0.6*       Chest imaging was reviewed by me and showed cxr  Heart size stable.  Bilateral mixed ground-glass and linear opacities in the   lower lung fields, right greater than left.  No airspace consolidation           Impression   Bilateral pneumonia, including possible viral/atypical      PCT 0.04     ASSESSMENT:  · Acute hypoxic respiratory failure  · COPD AE  · CAP  · Tobacco abuse     PLAN:  · Supplemental oxygen to maintain SaO2 >92%; wean as tolerated  · Intensive inhaled bronchodilator therapy. · Prednisone taper  · Levaquin   · Sputum GS&C.   · Acapella QID to mobilize respiratory secretions  · Smoking cessation advised

## 2021-07-28 NOTE — PROGRESS NOTES
Pt discharged to home. PIVs removed. Verbal and written discharge instructions reviewed. Medications reviewed. Pt verbalized understanding. Pt left with all belongings via wheelchair for transport home [facility]

## 2021-07-29 LAB
CULTURE, RESPIRATORY: NORMAL
GRAM STAIN RESULT: NORMAL

## 2021-08-09 ENCOUNTER — TELEPHONE (OUTPATIENT)
Dept: PULMONOLOGY | Age: 56
End: 2021-08-09

## 2021-08-09 NOTE — TELEPHONE ENCOUNTER
FW: Referral on 8/4/2021  Received: 2 days ago  MD Robin Davis MA  I will see if patient prefers; otherwise he can see [facility] -  Other emphysema

## 2021-08-18 ENCOUNTER — HOSPITAL ENCOUNTER (OUTPATIENT)
Age: 56
Discharge: HOME | End: 2021-08-18
Payer: COMMERCIAL

## 2021-08-18 ENCOUNTER — HOSPITAL ENCOUNTER (OUTPATIENT)
Dept: GENERAL RADIOLOGY | Age: 56
Discharge: HOME | End: 2021-08-18
Payer: COMMERCIAL

## 2021-08-18 DIAGNOSIS — J18.9 PNEUMONIA OF BOTH LOWER LOBES DUE TO INFECTIOUS ORGANISM: ICD-10-CM

## 2021-08-18 PROCEDURE — 71046 X-RAY EXAM CHEST 2 VIEWS: CPT

## 2021-08-21 ENCOUNTER — HOSPITAL ENCOUNTER (EMERGENCY)
Age: 56
Discharge: HOME | End: 2021-08-21
Attending: EMERGENCY MEDICINE
Payer: COMMERCIAL

## 2021-08-21 ENCOUNTER — TELEPHONE (OUTPATIENT)
Dept: OTHER | Facility: CLINIC | Age: 56
End: 2021-08-21

## 2021-08-21 ENCOUNTER — APPOINTMENT (OUTPATIENT)
Dept: GENERAL RADIOLOGY | Age: 56
End: 2021-08-21
Payer: COMMERCIAL

## 2021-08-21 VITALS
DIASTOLIC BLOOD PRESSURE: 82 MMHG | RESPIRATION RATE: 19 BRPM | HEIGHT: 72 IN | HEART RATE: 75 BPM | OXYGEN SATURATION: 97 % | SYSTOLIC BLOOD PRESSURE: 115 MMHG | WEIGHT: 297 LBS | TEMPERATURE: 98.3 F | BODY MASS INDEX: 40.23 KG/M2

## 2021-08-21 DIAGNOSIS — J18.9 PNEUMONIA OF BOTH LOWER LOBES DUE TO INFECTIOUS ORGANISM: ICD-10-CM

## 2021-08-21 DIAGNOSIS — R07.9 ACUTE CHEST PAIN: Primary | ICD-10-CM

## 2021-08-21 LAB
A/G RATIO: 1.3 (ref 1.1–2.2)
ALBUMIN SERPL-MCNC: 3.5 G/DL (ref 3.4–5)
ALP BLD-CCNC: 57 U/L (ref 40–129)
ALT SERPL-CCNC: 18 U/L (ref 10–40)
ANION GAP SERPL CALCULATED.3IONS-SCNC: 9 MMOL/L (ref 3–16)
AST SERPL-CCNC: 9 U/L (ref 15–37)
BASOPHILS ABSOLUTE: 0.1 K/UL (ref 0–0.2)
BASOPHILS RELATIVE PERCENT: 0.7 %
BILIRUB SERPL-MCNC: <0.2 MG/DL (ref 0–1)
BUN BLDV-MCNC: 12 MG/DL (ref 7–20)
CALCIUM SERPL-MCNC: 8.4 MG/DL (ref 8.3–10.6)
CHLORIDE BLD-SCNC: 98 MMOL/L (ref 99–110)
CO2: 29 MMOL/L (ref 21–32)
CREAT SERPL-MCNC: 0.8 MG/DL (ref 0.9–1.3)
EOSINOPHILS ABSOLUTE: 0.4 K/UL (ref 0–0.6)
EOSINOPHILS RELATIVE PERCENT: 3.6 %
GFR AFRICAN AMERICAN: >60
GFR NON-AFRICAN AMERICAN: >60
GLOBULIN: 2.8 G/DL
GLUCOSE BLD-MCNC: 139 MG/DL (ref 70–99)
HCT VFR BLD CALC: 35.6 % (ref 40.5–52.5)
HEMOGLOBIN: 11.8 G/DL (ref 13.5–17.5)
LYMPHOCYTES ABSOLUTE: 2.5 K/UL (ref 1–5.1)
LYMPHOCYTES RELATIVE PERCENT: 25 %
MCH RBC QN AUTO: 28.9 PG (ref 26–34)
MCHC RBC AUTO-ENTMCNC: 33.3 G/DL (ref 31–36)
MCV RBC AUTO: 86.9 FL (ref 80–100)
MONOCYTES ABSOLUTE: 0.7 K/UL (ref 0–1.3)
MONOCYTES RELATIVE PERCENT: 7.3 %
NEUTROPHILS ABSOLUTE: 6.4 K/UL (ref 1.7–7.7)
NEUTROPHILS RELATIVE PERCENT: 63.4 %
PDW BLD-RTO: 15.7 % (ref 12.4–15.4)
PLATELET # BLD: 148 K/UL (ref 135–450)
PMV BLD AUTO: 8 FL (ref 5–10.5)
POTASSIUM REFLEX MAGNESIUM: 3.8 MMOL/L (ref 3.5–5.1)
PRO-BNP: 92 PG/ML (ref 0–124)
RBC # BLD: 4.09 M/UL (ref 4.2–5.9)
SODIUM BLD-SCNC: 136 MMOL/L (ref 136–145)
TOTAL PROTEIN: 6.3 G/DL (ref 6.4–8.2)
TROPONIN: <0.01 NG/ML
WBC # BLD: 10 K/UL (ref 4–11)

## 2021-08-21 PROCEDURE — 85025 COMPLETE CBC W/AUTO DIFF WBC: CPT

## 2021-08-21 PROCEDURE — 99285 EMERGENCY DEPT VISIT HI MDM: CPT

## 2021-08-21 PROCEDURE — 36415 COLL VENOUS BLD VENIPUNCTURE: CPT

## 2021-08-21 PROCEDURE — 80053 COMPREHEN METABOLIC PANEL: CPT

## 2021-08-21 PROCEDURE — 83880 ASSAY OF NATRIURETIC PEPTIDE: CPT

## 2021-08-21 PROCEDURE — 6370000000 HC RX 637 (ALT 250 FOR IP): Performed by: EMERGENCY MEDICINE

## 2021-08-21 PROCEDURE — 84484 ASSAY OF TROPONIN QUANT: CPT

## 2021-08-21 PROCEDURE — 71045 X-RAY EXAM CHEST 1 VIEW: CPT

## 2021-08-21 PROCEDURE — 93005 ELECTROCARDIOGRAM TRACING: CPT | Performed by: EMERGENCY MEDICINE

## 2021-08-21 RX ORDER — LEVOFLOXACIN 500 MG/1
500 TABLET, FILM COATED ORAL DAILY
Qty: 10 TABLET | Refills: 0 | Status: SHIPPED | OUTPATIENT
Start: 2021-08-21 | End: 2021-08-31

## 2021-08-21 RX ORDER — IPRATROPIUM BROMIDE AND ALBUTEROL SULFATE 2.5; .5 MG/3ML; MG/3ML
1 SOLUTION RESPIRATORY (INHALATION) ONCE
Status: COMPLETED | OUTPATIENT
Start: 2021-08-21 | End: 2021-08-21

## 2021-08-21 RX ORDER — LEVOFLOXACIN 500 MG/1
500 TABLET, FILM COATED ORAL ONCE
Status: COMPLETED | OUTPATIENT
Start: 2021-08-21 | End: 2021-08-21

## 2021-08-21 RX ADMIN — LEVOFLOXACIN 500 MG: 500 TABLET, FILM COATED ORAL at 19:28

## 2021-08-21 RX ADMIN — IPRATROPIUM BROMIDE AND ALBUTEROL SULFATE 1 AMPULE: .5; 2.5 SOLUTION RESPIRATORY (INHALATION) at 17:48

## 2021-08-21 ASSESSMENT — HEART SCORE: ECG: 0

## 2021-08-22 LAB
EKG ATRIAL RATE: 71 BPM
EKG DIAGNOSIS: NORMAL
EKG P AXIS: 17 DEGREES
EKG P-R INTERVAL: 160 MS
EKG Q-T INTERVAL: 406 MS
EKG QRS DURATION: 88 MS
EKG QTC CALCULATION (BAZETT): 441 MS
EKG R AXIS: 12 DEGREES
EKG T AXIS: 48 DEGREES
EKG VENTRICULAR RATE: 71 BPM

## 2021-08-22 PROCEDURE — 93010 ELECTROCARDIOGRAM REPORT: CPT | Performed by: INTERNAL MEDICINE

## 2021-08-22 ASSESSMENT — ENCOUNTER SYMPTOMS
SORE THROAT: 0
SHORTNESS OF BREATH: 0
EYE DISCHARGE: 0
DIARRHEA: 0
BACK PAIN: 0
VOMITING: 0
NAUSEA: 0
ABDOMINAL PAIN: 0
COUGH: 0

## 2021-08-22 NOTE — ED PROVIDER NOTES
[facility]        Pt Name: Gee Cary  MRN: [id number]  [place] 1965  Date of evaluation: 8/21/2021  Provider: Amber Andrade MD  PCP: Sandy Alvarado MD  ED Attending: No att. providers found    J Carlos Dears       Chief Complaint   Patient presents with    Chest Pain     Left sided radiating to right side into jaw. On oxygen X2 weeks for pneumonia. Pain sudden onset while walking into kitchen one hour ago. Pt took 1# nitro and 81mg ASA. Stated some of the pain radiates from lower right side. HISTORY OF PRESENT ILLNESS   (Location/Symptom, Timing/Onset, Context/Setting, Quality, Duration, Modifying Factors, Severity)  Note limiting factors. Gee Cary is a 55 y.o. male with a prior history of coronary disease and anxiety who presents to the emergency department complaining of right sided chest pain radiating to the right jaw. Nursing note reflects left-sided pain however patient was adamant that this was right sided. Patient is currently on oxygen after developing pneumonia a couple of weeks ago. Pain started while walking into the kitchen about an hour ago. It occasionally will radiate into the right upper quadrant. Movement and deep breathing tends to make the pain somewhat worse. He describes it as a moderate sharpness. Nothing really seems to make it better. He did take 1 sublingual nitroglycerin as well as a baby aspirin without any significant change. EMS was subsequently summoned. There twelve-lead EKG did not show acute abnormality. Patient on arrival here states that the pain is nearly completely gone now. History is obtained from the patient. REVIEW OF SYSTEMS    (2-9 systems for level 4, 10 or more for level 5)     Review of Systems   Constitutional: Negative for chills and fever. HENT: Negative for congestion and sore throat. Eyes: Negative for discharge.    Respiratory: Negative for cough and shortness of breath. Cardiovascular: Positive for chest pain. Negative for palpitations. Gastrointestinal: Negative for abdominal pain, diarrhea, nausea and vomiting. Endocrine: Negative for polydipsia. Genitourinary: Negative for dysuria and urgency. Musculoskeletal: Negative for back pain and myalgias. Skin: Negative for rash. Neurological: Negative for weakness and headaches. Hematological: Does not bruise/bleed easily. Psychiatric/Behavioral: Negative for confusion. Positives and Pertinent negatives as per HPI. Except as noted above in the ROS, all other systems were reviewed and negative.        PAST MEDICAL HISTORY     Past Medical History:   Diagnosis Date    Anxiety     [facility] affective [facility]     CAD (coronary artery disease)     Chronic back pain     COPD [facility]ronic obstructive pulmonary disease) [facility]     DDD (degenerative disc disease), cervical     Depression     GERD (gastroesophageal reflux disease)     Hyperlipidemia     Hypertension     MI [facility]ocardial infarction) [facility]     Pancreatitis     Recovering alcoholic ([facility]     Tobacco abuse 12/16/2016         SURGICAL HISTORY     Past Surgical History:   Procedure Laterality Date    CORONARY ANGIOPLASTY WITH STENT PLACEMENT  2006, 2012, 2013    [address]       Discharge Medication List as of 8/21/2021  7:32 PM      CONTINUE these medications which have NOT CHANGED    Details   lisinopril (PRINIVIL;ZESTRIL) 10 MG tablet Take 10 mg by mouth dailyHistorical Med      predniSONE (DELTASONE) 10 MG tablet 4 tabs for 3 days 3 tabs for 3 days 2 tabs for 3 days 1 tabs for 3 days, Disp-30 tablet, R-0Normal      ipratropium-albuterol (DUONEB) 0.5-2.5 (3) MG/3ML SOLN nebulizer solution Inhale 3 mLs into the lungs every 4 hours, Disp-360 mL, R-0Print      QUEtiapine (SEROQUEL) 100 MG tablet 100 mg 2 times daily Historical Med      gabapentin (NEURONTIN) 300 MG capsule Take 300 mg by mouth 3 times daily.  Historical Med      aspirin 325 MG tablet Take 325 mg by mouth dailyHistorical Med      ranolazine (RANEXA) 1000 MG extended release tablet Take 1 tablet by mouth 2 times daily, Disp-180 tablet, R-3Normal      isosorbide mononitrate (IMDUR) 30 MG extended release tablet TAKE (1) TABLET DAILY, Disp-90 tablet, R-3Normal      albuterol sulfate  (90 Base) MCG/ACT inhaler Inhale 2 puffs into the lungs every 4 hours as neededHistorical Med      SYMBICORT 160-4.5 MCG/ACT AERO DAWHistorical Med      l-arginine 500 MG capsule Take 1 capsule by mouth 2 times daily, Disp-60 capsule,R-3Normal      nitroGLYCERIN (NITROSTAT) 0.4 MG SL tablet Place 1 tablet under the tongue every 5 minutes as needed for Chest pain, Disp-25 tablet,R-3Pt needs ovNormal      clopidogrel (PLAVIX) 75 MG tablet Take 1 tablet by mouth daily, Disp-90 tablet,R-3Normal      metoprolol succinate (TOPROL XL) 100 MG extended release tablet Take 1 tablet by mouth daily, Disp-90 tablet,R-3Normal      atorvastatin (LIPITOR) 40 MG tablet Take 1 tablet by mouth daily, Disp-30 tablet, R-5Normal      pantoprazole (PROTONIX) 20 MG tablet TAKE 2 TABLETS BY MOUTH DAILY, Disp-30 tablet, R-0      HydrOXYzine Pamoate (VISTARIL PO) Take 50 mg by mouth daily Historical Med               ALLERGIES     Pcn [penicillins]    FAMILYHISTORY       Family History   Problem Relation Age of Onset    Other Mother     Arthritis Mother     Mental Illness Mother     Depression Mother     Diabetes Father     Heart Disease Father     Substance Abuse Father     Heart Disease Sister     High Blood Pressure Sister     Mental Retardation Brother     Other Brother     Learning Disabilities Brother     Early Death Sister     Cancer Sister     Substance Abuse Sister     Cancer Sister     Diabetes Brother     High Blood Pressure Brother           SOCIAL HISTORY       Social History     Socioeconomic History    Marital status: Single     Spouse name: None    Number of children: None    Years of education: None    Highest education level: None   Occupational History    None   Tobacco Use    Smoking status: Current Every Day Smoker     Packs/day: 1.00     Years: 45.00     Pack years: 45.00     Types: Cigarettes    Smokeless tobacco: Never Used   Vaping Use    Vaping Use: Never used   Substance and Sexual Activity    Alcohol use: Yes     Alcohol/week: 0.0 standard drinks     Comment: states \"a few beers monthly\"    Drug use: Yes     Types: Marijuana     Comment: took friend's suboxone     Sexual activity: Never   Other Topics Concern    None   Social History Narrative    None     Social Determinants of Health     Financial Resource Strain:     Difficulty of Paying Living Expenses:    Food Insecurity:     Worried About Running Out of Food in the Last Year:     [address] in the Last Year:    Transportation Needs:     Lack of Transportation (Medical):      Lack of Transportation (Non-Medical):    Physical Activity:     Days of Exercise per Week:     Minutes of Exercise per Session:    Stress:     Feeling of Stress :    Social Connections:     Frequency of Communication with Friends and Family:     Frequency of Social Gatherings with Friends and Family:     Attends Voodoo Services:     Active Member of Clubs or Organizations:     Attends Club or Organization Meetings:     Marital Status:    Intimate Partner Violence:     Fear of Current or Ex-Partner:     Emotionally Abused:     Physically Abused:     Sexually Abused:        SCREENINGS    [place] Coma Scale  Eye Opening: Spontaneous  Best Verbal Response: Oriented  Best Motor Response: Obeys commands  [place] Coma Scale Score: 15 Heart Score for chest pain patients  History: Slightly Suspicious  ECG: Normal  Patient Age: > 39 and < 65 years  *Risk factors for Atherosclerotic disease: Coronary Artery Disease  Risk Factors: > 3 Risk factors or history of atherosclerotic disease*  Troponin: < 1X normal limit  Heart Score Total: 3      PHYSICAL EXAM    (up to 7 for level 4, 8 or more for level 5)     ED Triage Vitals [08/21/21 1651]   BP Temp Temp Source Pulse Resp SpO2 Height Weight   111/66 98.3 °F (36.8 °C) Oral 78 17 97 % 6' (1.829 m) 297 lb (134.7 kg)       Physical Exam  Vitals and nursing note reviewed. Constitutional:       General: He is not in acute distress. Appearance: Normal appearance. He is well-developed. He is obese. He is not ill-appearing or toxic-appearing. Comments: Dried food adhered to his shirt as well as pants. HENT:      Head: Normocephalic and atraumatic. Right Ear: External ear normal.      Left Ear: External ear normal.      Nose:      Comments: Nose and mouth exam deferred secondary to coronavirus pandemic. Eyes:      Conjunctiva/sclera: Conjunctivae normal.   Cardiovascular:      Rate and Rhythm: Normal rate and regular rhythm. Heart sounds: Normal heart sounds. No murmur heard. No friction rub. No gallop. Pulmonary:      Effort: Pulmonary effort is normal. No respiratory distress. Breath sounds: Normal breath sounds. No wheezing. Abdominal:      General: Bowel sounds are normal. There is no distension. Palpations: Abdomen is soft. Tenderness: There is no abdominal tenderness. There is no guarding or rebound. Musculoskeletal:         General: No tenderness. Normal range of motion. Cervical back: Normal range of motion and neck supple. Right lower leg: No edema. Left lower leg: No edema. Lymphadenopathy:      Cervical: No cervical adenopathy. Skin:     General: Skin is warm and dry. Capillary Refill: Capillary refill takes less than 2 seconds. Findings: No rash. Neurological:      Mental Status: He is alert and oriented to person, place, and time. Motor: No weakness.       Gait: Gait normal.   Psychiatric:         Mood and Affect: Mood normal.         Behavior: Behavior normal. DIAGNOSTIC RESULTS   LABS:    Results for orders placed or performed during the hospital encounter of 08/21/21   CBC auto differential   Result Value Ref Range    WBC 10.0 4.0 - 11.0 K/uL    RBC 4.09 (L) 4.20 - 5.90 M/uL    Hemoglobin 11.8 (L) 13.5 - 17.5 g/dL    Hematocrit 35.6 (L) 40.5 - 52.5 %    MCV 86.9 80.0 - 100.0 fL    MCH 28.9 26.0 - 34.0 pg    MCHC 33.3 31.0 - 36.0 g/dL    RDW 15.7 (H) 12.4 - 15.4 %    Platelets 963 942 - 634 K/uL    MPV 8.0 5.0 - 10.5 fL    Neutrophils % 63.4 %    Lymphocytes % 25.0 %    Monocytes % 7.3 %    Eosinophils % 3.6 %    Basophils % 0.7 %    Neutrophils Absolute 6.4 1.7 - 7.7 K/uL    Lymphocytes Absolute 2.5 1.0 - 5.1 K/uL    Monocytes Absolute 0.7 0.0 - 1.3 K/uL    Eosinophils Absolute 0.4 0.0 - 0.6 K/uL    Basophils Absolute 0.1 0.0 - 0.2 K/uL   Comprehensive Metabolic Panel w/ Reflex to MG   Result Value Ref Range    Sodium 136 136 - 145 mmol/L    Potassium reflex Magnesium 3.8 3.5 - 5.1 mmol/L    Chloride 98 (L) 99 - 110 mmol/L    CO2 29 21 - 32 mmol/L    Anion Gap 9 3 - 16    Glucose 139 (H) 70 - 99 mg/dL    BUN 12 7 - 20 mg/dL    CREATININE 0.8 (L) 0.9 - 1.3 mg/dL    GFR Non-African American >60 >60    GFR African American >60 >60    Calcium 8.4 8.3 - 10.6 mg/dL    Total Protein 6.3 (L) 6.4 - 8.2 g/dL    Albumin 3.5 3.4 - 5.0 g/dL    Albumin/Globulin Ratio 1.3 1.1 - 2.2    Total Bilirubin <0.2 0.0 - 1.0 mg/dL    Alkaline Phosphatase 57 40 - 129 U/L    ALT 18 10 - 40 U/L    AST 9 (L) 15 - 37 U/L    Globulin 2.8 g/dL   Troponin   Result Value Ref Range    Troponin <0.01 <0.01 ng/mL   Brain Natriuretic Peptide   Result Value Ref Range    Pro-BNP 92 0 - 124 pg/mL   EKG 12 Lead   Result Value Ref Range    Ventricular Rate 71 BPM    Atrial Rate 71 BPM    P-R Interval 160 ms    QRS Duration 88 ms    Q-T Interval 406 ms    QTc Calculation (Bazett) 441 ms    P Axis 17 degrees    R Axis 12 degrees    T Axis 48 degrees    Diagnosis       Normal sinus rhythmNormal ECGWhen compared with ECG of 26-JUL-2021 13:40,No significant change was found       All other labs were within normal range ornot returned as of this dictation. EKG: All EKG's are interpreted by the Emergency Department Physician who either signs or Co-signs this chart in the absence of a cardiologist.  Please see their note for interpretation of EKG. EKG Interpretation    Interpreted by emergency department physician    Rhythm: normal sinus   Rate: normal  Axis: normal  Ectopy: none  Conduction: normal  ST Segments: normal  T Waves: normal  Q Waves: none    Clinical Impression: Normal sinus rhythm. No change as compared to previous EKGs. RADIOLOGY:   Non-plain film images such as CT, Ultrasound and MRI are read by the radiologist.  Plain radiographic images are visualized and preliminarily interpreted by the ED Provider with the belowfindings:    Interpretation per the Radiologist below, if available at the time of this note:    XR CHEST PORTABLE   Final Result   Bibasilar pulmonary opacities could represent subsegmental atelectasis or   pneumonia               PROCEDURES   Unless otherwise noted below, none     Procedures    CRITICAL CARE TIME   N/A    CONSULTS:  None      EMERGENCY DEPARTMENT COURSE and DIFFERENTIAL DIAGNOSIS/MDM:   Vitals:    Vitals:    08/21/21 1651 08/21/21 1749 08/21/21 1806 08/21/21 1925   BP: 111/66 (!) 99/59 113/71 115/82   Pulse: 78 69 74 75   Resp: 17 18 16 19   Temp: 98.3 °F (36.8 °C)      TempSrc: Oral      SpO2: 97% 96% 95% 97%   Weight: 297 lb (134.7 kg)      Height: 6' (1.829 m)          Patient was given the following medications:  Medications   ipratropium-albuterol (DUONEB) nebulizer solution 1 ampule (1 ampule Inhalation Given 8/21/21 1748)   levoFLOXacin (LEVAQUIN) tablet 500 mg (500 mg Oral Given 8/21/21 1928)     Patient is a 81-year-old male with a history of coronary disease who presents to the emergency department complaining of right-sided sharp chest pain.   EKG is normal.  Cardiac enzymes normal.  Patient has remained pain-free during his stay in the emergency department. Patient's heart score is 3. His chest x-ray does show possible bibasilar pneumonia. Patient currently is undergoing treatment for this however I am going to place him on antibiotics. I want him to follow-up closely with primary care. Given the current status of coronavirus in the hospital I believe the risks of hospitalization far outweigh the potential benefits. Patient is not in any respiratory distress and is not requiring more supplemental oxygen than his new normal.  Patient is agreeable with attempted outpatient management of this. I want him to see his primary care physician in the next couple of days. Differential diagnosis included but was not limited to: acute coronary syndrome, pulmonary embolism, COPD/asthma, pneumonia, musculoskeletal, reflux/PUD/gastritis, pneumothorax, CHF, thoracic aortic dissection, anxiety. The patient understands the importance of follow up and reasons to return. FINAL IMPRESSION      1. Acute chest pain    2. Pneumonia of both lower lobes due to infectious organism          DISPOSITION/PLAN   DISPOSITION Decision To Discharge 08/21/2021 07:17:03 PM      PATIENT REFERRED TO:  Gayle Woodard   [phone]    Schedule an appointment as soon as possible for a visit in 1 week      [place] [id number].  [facility] Emergency Department  [address]  [phone]  Go to   If symptoms worsen      DISCHARGE MEDICATIONS:  Discharge Medication List as of 8/21/2021  7:32 PM      START taking these medications    Details   levoFLOXacin (LEVAQUIN) 500 MG tablet Take 1 tablet by mouth daily for 10 days, Disp-10 tablet, R-0Normal             DISCONTINUED MEDICATIONS:  Discharge Medication List as of 8/21/2021  7:32 PM                 (Please note that portions of this note were completed with a voice recognition program.  Efforts were made to edit the dictations but occasionally words are mis-transcribed.)    Devante Ashley MD(electronically signed)              Devante Ashley MD  08/22/21 2834

## 2021-09-02 ENCOUNTER — OFFICE VISIT (OUTPATIENT)
Dept: PULMONOLOGY | Age: 56
End: 2021-09-02
Payer: COMMERCIAL

## 2021-09-02 VITALS
SYSTOLIC BLOOD PRESSURE: 123 MMHG | HEART RATE: 75 BPM | WEIGHT: 299.8 LBS | BODY MASS INDEX: 40.61 KG/M2 | HEIGHT: 72 IN | DIASTOLIC BLOOD PRESSURE: 65 MMHG | RESPIRATION RATE: 20 BRPM | TEMPERATURE: 97.7 F | OXYGEN SATURATION: 97 %

## 2021-09-02 DIAGNOSIS — E66.01 MORBID OBESITY (HCC): ICD-10-CM

## 2021-09-02 DIAGNOSIS — Z87.01 H/O RECURRENT PNEUMONIA: ICD-10-CM

## 2021-09-02 DIAGNOSIS — F17.200 SMOKER: ICD-10-CM

## 2021-09-02 DIAGNOSIS — R09.02 HYPOXIA: ICD-10-CM

## 2021-09-02 DIAGNOSIS — R29.818 SUSPECTED SLEEP APNEA: ICD-10-CM

## 2021-09-02 DIAGNOSIS — J43.2 CENTRILOBULAR EMPHYSEMA (HCC): Primary | ICD-10-CM

## 2021-09-02 DIAGNOSIS — F10.20 UNCOMPLICATED ALCOHOL DEPENDENCE (HCC): ICD-10-CM

## 2021-09-02 PROCEDURE — 3023F SPIROM DOC REV: CPT | Performed by: INTERNAL MEDICINE

## 2021-09-02 PROCEDURE — G8427 DOCREV CUR MEDS BY ELIG CLIN: HCPCS | Performed by: INTERNAL MEDICINE

## 2021-09-02 PROCEDURE — G8926 SPIRO NO PERF OR DOC: HCPCS | Performed by: INTERNAL MEDICINE

## 2021-09-02 PROCEDURE — G8417 CALC BMI ABV UP PARAM F/U: HCPCS | Performed by: INTERNAL MEDICINE

## 2021-09-02 PROCEDURE — 99244 OFF/OP CNSLTJ NEW/EST MOD 40: CPT | Performed by: INTERNAL MEDICINE

## 2021-09-02 RX ORDER — BUSPIRONE HYDROCHLORIDE 10 MG/1
TABLET ORAL
COMMUNITY
Start: 2021-03-31

## 2021-09-02 RX ORDER — AMLODIPINE BESYLATE 2.5 MG/1
2.5 TABLET ORAL DAILY
COMMUNITY
Start: 2021-08-19 | End: 2022-02-02 | Stop reason: SDUPTHER

## 2021-09-02 RX ORDER — FUROSEMIDE 20 MG/1
TABLET ORAL
COMMUNITY
Start: 2021-08-26 | End: 2022-02-02 | Stop reason: SDUPTHER

## 2021-09-02 ASSESSMENT — SLEEP AND FATIGUE QUESTIONNAIRES
HOW LIKELY ARE YOU TO NOD OFF OR FALL ASLEEP WHEN YOU ARE A PASSENGER IN A CAR FOR AN HOUR WITHOUT A BREAK: 2
NECK CIRCUMFERENCE (INCHES): 20.25
HOW LIKELY ARE YOU TO NOD OFF OR FALL ASLEEP WHILE SITTING AND TALKING TO SOMEONE: 1
HOW LIKELY ARE YOU TO NOD OFF OR FALL ASLEEP WHILE LYING DOWN TO REST IN THE AFTERNOON WHEN CIRCUMSTANCES PERMIT: 2
HOW LIKELY ARE YOU TO NOD OFF OR FALL ASLEEP IN A CAR, WHILE STOPPED FOR A FEW MINUTES IN TRAFFIC: 1
HOW LIKELY ARE YOU TO NOD OFF OR FALL ASLEEP WHILE SITTING QUIETLY AFTER LUNCH WITHOUT ALCOHOL: 1
HOW LIKELY ARE YOU TO NOD OFF OR FALL ASLEEP WHILE SITTING AND READING: 1
HOW LIKELY ARE YOU TO NOD OFF OR FALL ASLEEP WHILE WATCHING TV: 1
HOW LIKELY ARE YOU TO NOD OFF OR FALL ASLEEP WHILE SITTING INACTIVE IN A PUBLIC PLACE: 1
ESS TOTAL SCORE: 10

## 2021-09-02 ASSESSMENT — ENCOUNTER SYMPTOMS
COUGH: 1
SHORTNESS OF BREATH: 1

## 2021-09-02 NOTE — PATIENT INSTRUCTIONS
Remember to bring a list of pulmonary medications and any CPAP or BiPAP machines to your next appointment with the office. Please keep all of your future appointments scheduled by [facility] office. Out of respect for other patients and providers, you may be asked to reschedule your appointment if you arrive later than your scheduled appointment time. Appointments cancelled less than 24hrs in advance will be considered a no show. Patients with three missed appointments within 1 year or four missed appointments within 2 years can be dismissed from the practice. Please be aware that our physicians are required to work in the Intensive Care Unit at [facility].  Your appointment may need to be rescheduled if they are designated to work during your appointment time. You may receive a survey regarding the care you received during your visit. Your input is valuable to us. We encourage you to complete and return your survey. We hope you will choose us in the future for your healthcare needs. Pt instructed of all future appointment dates & times, including radiology, labs, procedures & referrals. If procedures were scheduled preparation instructions provided. Instructions on future appointments with [facility] were given.

## 2021-09-02 NOTE — PROGRESS NOTES
MA Communication:   The following orders are received by verbal communication from Ca Villar MD    Orders include: ct      pft 6 MIN WALK

## 2021-09-02 NOTE — PROGRESS NOTES
Pulmonary Outpatient Note   Elroy Craven MD       2021    1. Centrilobular emphysema ([facility]    2. H/O recurrent pneumonia    3. Hypoxia    4. Suspected sleep apnea    5. Smoker    6. Uncomplicated alcohol dependence ([facility]    7. Morbid obesity (HCC)          ASSESSMENT/PLAN:  COPD/centrilobular emphysema. Continue current treatment. Told him not to use the Symbicort and albuterol at the same time, attempt to use the nebulizer as needed. Will obtain PFT. Recurrent pneumonia. Unclear etiology, potentially related to smoking. Will obtain CT chest.  Hypoxia. Needs a 6-minute walk test to assess oxygen needs. He can then be given a portable concentrator. Suspected sleep apnea. Will address after completing COPD work-up  Smoker. Should quit smoking altogether. Alcohol dependence. Recommended he should quit drinking altogether, he has had pancreatitis in the past  Morbid obesity. Will address after COPD and sleep apnea have been worked up  Prophylaxis. Strongly urged him to get a COVID-19 vaccine. He agrees. He has received Pneumovax, continue with annual flu shots    RTC with testing      Orders Placed This Encounter   Procedures    CT CHEST WO CONTRAST    Full PFT Study With Bronchodilator    6 Minute Walk Test       No follow-ups on file. Chief Complaint:   New Patient (R/B Dr. Erma Lanza Emphysema)       HPI: Constancia Cough. Mu Davey is a 55y.o. year old male here for evaluation and management of emphysema, referred by his PCP Dr. Maribel Easton. Eleno Kirk is a pleasant, morbidly obese patient was brought in by his , Gail Obando. He was living at the [facility]. Presently he lives by himself in [facility], is single. He was never . 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. His father  of heart failure, mother from unknown cause, although she did have COPD. He has 2 sisters, one has had lung cancer, another stomach cancer.   One sister is  of ruptured appendix and adenopathy.  Thoracic aorta normal in caliber. Coronary artery calcifications.  No pericardial effusion.  Suspected wall   thickening of the distal esophagus.       Lungs/pleura: Pulmonary emphysema.  No infiltrate or suspicious nodule. Dependent atelectasis in the lung bases.  Scattered calcified granulomas.  No   pleural effusion.       Soft Tissues/Bones: No acute bony abnormality         Past Medical History:   Diagnosis Date    Anxiety     Bipolar affective [facility]     CAD (coronary artery disease)     Chronic back pain     [facility]     DDD (degenerative disc disease), cervical     Depression     GERD (gastroesophageal reflux disease)     Hyperlipidemia     Hypertension     MI (myocardial infarction) ([facility]     Pancreatitis     [facility]     Tobacco abuse 12/16/2016       Past Surgical History:   Procedure Laterality Date    CORONARY ANGIOPLASTY WITH STENT PLACEMENT  2006, 2012, 2013    [facility]       Social History     Tobacco Use    Smoking status: Current Every Day Smoker     Packs/day: 1.00     Years: 45.00     Pack years: 45.00     Types: Cigarettes     Start date: 8/17/1977    Smokeless tobacco: Never Used   Substance Use Topics    Alcohol use:  Yes     Alcohol/week: 0.0 standard drinks     Comment: states \"a few beers monthly\"       Family History   Problem Relation Age of Onset   Tena Her Other Mother     Arthritis Mother     Mental Illness Mother     Depression Mother     Diabetes Father     Heart Disease Father     Substance Abuse Father     Heart Disease Sister     High Blood Pressure Sister     Mental Retardation Brother     Other Brother     Learning Disabilities Brother     Early Death Sister    Tena Her Cancer Sister     Substance Abuse Sister     Cancer Sister     Diabetes Brother     High Blood Pressure Brother          Current Outpatient Medications:     amLODIPine (NORVASC) 2.5 MG tablet, Take 2.5 mg by mouth daily, Disp: , Rfl:     furosemide (LASIX) 20 MG tablet, , Disp: , Rfl:     busPIRone (BUSPAR) 10 MG tablet, 1/2 - 1 tab 2-3 times daily, Disp: , Rfl:     lisinopril (PRINIVIL;ZESTRIL) 10 MG tablet, Take 10 mg by mouth daily, Disp: , Rfl:     ipratropium-albuterol (DUONEB) 0.5-2.5 (3) MG/3ML SOLN nebulizer solution, Inhale 3 mLs into the lungs every 4 hours, Disp: 360 mL, Rfl: 0    QUEtiapine (SEROQUEL) 100 MG tablet, 100 mg 2 times daily , Disp: , Rfl:     gabapentin (NEURONTIN) 300 MG capsule, Take 300 mg by mouth 3 times daily.  , Disp: , Rfl:     aspirin 325 MG tablet, Take 325 mg by mouth daily, Disp: , Rfl:     ranolazine (RANEXA) 1000 MG extended release tablet, Take 1 tablet by mouth 2 times daily, Disp: 180 tablet, Rfl: 3    isosorbide mononitrate (IMDUR) 30 MG extended release tablet, TAKE (1) TABLET DAILY, Disp: 90 tablet, Rfl: 3    albuterol sulfate  (90 Base) MCG/ACT inhaler, Inhale 2 puffs into the lungs every 4 hours as needed, Disp: , Rfl:     SYMBICORT 160-4.5 MCG/ACT AERO, , Disp: , Rfl:     nitroGLYCERIN (NITROSTAT) 0.4 MG SL tablet, Place 1 tablet under the tongue every 5 minutes as needed for Chest pain, Disp: 25 tablet, Rfl: 3    clopidogrel (PLAVIX) 75 MG tablet, Take 1 tablet by mouth daily, Disp: 90 tablet, Rfl: 3    metoprolol succinate (TOPROL XL) 100 MG extended release tablet, Take 1 tablet by mouth daily, Disp: 90 tablet, Rfl: 3    HydrOXYzine Pamoate (VISTARIL PO), Take 50 mg by mouth daily , Disp: , Rfl:     atorvastatin (LIPITOR) 40 MG tablet, Take 1 tablet by mouth daily, Disp: 30 tablet, Rfl: 5    pantoprazole (PROTONIX) 20 MG tablet, TAKE 2 TABLETS BY MOUTH DAILY (Patient taking differently: TAKE 1 TABLET BY MOUTH BID), Disp: 30 tablet, Rfl: 0    Pcn [penicillins]    Vitals:    09/02/21 1007   BP: 123/65   Site: Right Upper Arm   Position: Sitting   Cuff Size: Medium Adult   Pulse: 75   Resp: 20   Temp: 97.7 °F (36.5 °C)   TempSrc: Oral   SpO2: 97% Weight: 299 lb 12.8 oz (136 kg)   Height: 6' (1.829 m)       Review of Systems   Respiratory: Positive for cough and shortness of breath. Cardiovascular: Positive for chest pain and leg swelling. Gastrointestinal:        ANDREA, possible hemorrhoidal bleed   Psychiatric/Behavioral: Positive for sleep disturbance. All other systems reviewed and are negative. Physical Exam  Vitals reviewed. Constitutional:       General: He is not in acute distress. Appearance: He is well-developed. He is obese. He is not ill-appearing, toxic-appearing or diaphoretic. Comments: Pleasant, averagely built, appears disheveled   HENT:      Head: Normocephalic and atraumatic. Nose: Nose normal. No congestion or rhinorrhea. Mouth/Throat:      Mouth: Mucous membranes are moist.      Pharynx: Oropharynx is clear. No oropharyngeal exudate or posterior oropharyngeal erythema. Comments: Class III airway  Eyes:      General: No scleral icterus. Right eye: No discharge. Left eye: No discharge. Conjunctiva/sclera: Conjunctivae normal.      Pupils: Pupils are equal, round, and reactive to light. Neck:      Thyroid: No thyromegaly. Vascular: No JVD. Trachea: No tracheal deviation. Comments: Short and large neck  Cardiovascular:      Rate and Rhythm: Normal rate and regular rhythm. Heart sounds: Normal heart sounds. No murmur heard. No friction rub. No gallop. Pulmonary:      Effort: Pulmonary effort is normal. No respiratory distress. Breath sounds: No stridor. Wheezing (Few) present. No rhonchi or rales. Abdominal:      Palpations: Abdomen is soft. Tenderness: There is no abdominal tenderness. Comments: Fatty, protuberant   Musculoskeletal:      Right lower leg: Edema present. Left lower leg: Edema (2+ pitting) present. Lymphadenopathy:      Cervical: No cervical adenopathy. Skin:     General: Skin is warm and dry.       Coloration: Skin is not jaundiced or pale. Findings: No bruising, erythema, lesion or rash. Neurological:      Mental Status: He is alert and oriented to person, place, and time.       Gait: Gait normal.   Psychiatric:         Mood and Affect: Mood normal.         Behavior: Behavior normal.

## 2021-10-06 ENCOUNTER — HOSPITAL ENCOUNTER (OUTPATIENT)
Age: 56
Discharge: HOME | End: 2021-10-06
Payer: COMMERCIAL

## 2021-10-06 PROCEDURE — U0003 INFECTIOUS AGENT DETECTION BY NUCLEIC ACID (DNA OR RNA); SEVERE ACUTE RESPIRATORY SYNDROME CORONAVIRUS 2 (SARS-COV-2) (CORONAVIRUS DISEASE [COVID-19]), AMPLIFIED PROBE TECHNIQUE, MAKING USE OF HIGH THROUGHPUT TECHNOLOGIES AS DESCRIBED BY CMS-2020-01-R: HCPCS

## 2021-10-06 PROCEDURE — U0005 INFEC AGEN DETEC AMPLI PROBE: HCPCS

## 2021-10-07 LAB — SARS-COV-2: NOT DETECTED

## 2021-10-11 ENCOUNTER — HOSPITAL ENCOUNTER (OUTPATIENT)
Dept: PULMONOLOGY | Age: 56
Discharge: HOME | End: 2021-10-11
Payer: COMMERCIAL

## 2021-10-11 ENCOUNTER — OFFICE VISIT (OUTPATIENT)
Dept: PULMONOLOGY | Age: 56
End: 2021-10-11
Payer: COMMERCIAL

## 2021-10-11 ENCOUNTER — HOSPITAL ENCOUNTER (OUTPATIENT)
Dept: CT IMAGING | Age: 56
Discharge: HOME | End: 2021-10-11
Payer: COMMERCIAL

## 2021-10-11 VITALS
DIASTOLIC BLOOD PRESSURE: 65 MMHG | WEIGHT: 283.6 LBS | RESPIRATION RATE: 18 BRPM | BODY MASS INDEX: 38.41 KG/M2 | SYSTOLIC BLOOD PRESSURE: 99 MMHG | TEMPERATURE: 97.9 F | OXYGEN SATURATION: 96 % | HEIGHT: 72 IN | HEART RATE: 71 BPM

## 2021-10-11 DIAGNOSIS — Z87.01 H/O RECURRENT PNEUMONIA: ICD-10-CM

## 2021-10-11 DIAGNOSIS — G47.33 OSA (OBSTRUCTIVE SLEEP APNEA): Primary | ICD-10-CM

## 2021-10-11 DIAGNOSIS — R91.8 MULTIPLE LUNG NODULES: ICD-10-CM

## 2021-10-11 DIAGNOSIS — J43.2 CENTRILOBULAR EMPHYSEMA (HCC): ICD-10-CM

## 2021-10-11 DIAGNOSIS — Z72.821 POOR SLEEP HYGIENE: ICD-10-CM

## 2021-10-11 DIAGNOSIS — F17.200 SMOKER: ICD-10-CM

## 2021-10-11 DIAGNOSIS — F51.01 PRIMARY INSOMNIA: ICD-10-CM

## 2021-10-11 DIAGNOSIS — E66.9 OBESITY (BMI 35.0-39.9 WITHOUT COMORBIDITY): ICD-10-CM

## 2021-10-11 LAB
DLCO %PRED: 106 %
DLCO PRED: NORMAL
DLCO/VA %PRED: NORMAL
DLCO/VA PRED: NORMAL
DLCO/VA: NORMAL
DLCO: NORMAL
EXPIRATORY TIME-POST: NORMAL
EXPIRATORY TIME: NORMAL
FEF 25-75% %CHNG: NORMAL
FEF 25-75% %PRED-POST: NORMAL
FEF 25-75% %PRED-PRE: NORMAL
FEF 25-75% PRED: NORMAL
FEF 25-75%-POST: NORMAL
FEF 25-75%-PRE: NORMAL
FEV1 %PRED-POST: 47 %
FEV1 %PRED-PRE: 45 %
FEV1 PRED: NORMAL
FEV1-POST: NORMAL
FEV1-PRE: NORMAL
FEV1/FVC %PRED-POST: NORMAL
FEV1/FVC %PRED-PRE: NORMAL
FEV1/FVC PRED: NORMAL
FEV1/FVC-POST: 69 %
FEV1/FVC-PRE: 69 %
FVC %PRED-POST: NORMAL
FVC %PRED-PRE: NORMAL
FVC PRED: NORMAL
FVC-POST: NORMAL
FVC-PRE: NORMAL
GAW %PRED: NORMAL
GAW PRED: NORMAL
GAW: NORMAL
IC %PRED: NORMAL
IC PRED: NORMAL
IC: NORMAL
MEP: NORMAL
MIP: NORMAL
MVV %PRED-PRE: NORMAL
MVV PRED: NORMAL
MVV-PRE: NORMAL
PEF %PRED-POST: NORMAL
PEF %PRED-PRE: NORMAL
PEF PRED: NORMAL
PEF%CHNG: NORMAL
PEF-POST: NORMAL
PEF-PRE: NORMAL
RAW %PRED: NORMAL
RAW PRED: NORMAL
RAW: NORMAL
RV %PRED: NORMAL
RV PRED: NORMAL
RV: NORMAL
SVC %PRED: NORMAL
SVC PRED: NORMAL
SVC: NORMAL
TLC %PRED: 92 %
TLC PRED: NORMAL
TLC: NORMAL
VA %PRED: NORMAL
VA PRED: NORMAL
VA: NORMAL
VTG %PRED: NORMAL
VTG PRED: NORMAL
VTG: NORMAL

## 2021-10-11 PROCEDURE — 94726 PLETHYSMOGRAPHY LUNG VOLUMES: CPT

## 2021-10-11 PROCEDURE — G8925 SPIR FEV1/FVC>=60% & NO COPD: HCPCS | Performed by: INTERNAL MEDICINE

## 2021-10-11 PROCEDURE — 3017F COLORECTAL CA SCREEN DOC REV: CPT | Performed by: INTERNAL MEDICINE

## 2021-10-11 PROCEDURE — 94060 EVALUATION OF WHEEZING: CPT

## 2021-10-11 PROCEDURE — 94618 PULMONARY STRESS TESTING: CPT

## 2021-10-11 PROCEDURE — 94729 DIFFUSING CAPACITY: CPT

## 2021-10-11 PROCEDURE — 6370000000 HC RX 637 (ALT 250 FOR IP): Performed by: INTERNAL MEDICINE

## 2021-10-11 PROCEDURE — G8417 CALC BMI ABV UP PARAM F/U: HCPCS | Performed by: INTERNAL MEDICINE

## 2021-10-11 PROCEDURE — 3023F SPIROM DOC REV: CPT | Performed by: INTERNAL MEDICINE

## 2021-10-11 PROCEDURE — G8482 FLU IMMUNIZE ORDER/ADMIN: HCPCS | Performed by: INTERNAL MEDICINE

## 2021-10-11 PROCEDURE — G8427 DOCREV CUR MEDS BY ELIG CLIN: HCPCS | Performed by: INTERNAL MEDICINE

## 2021-10-11 PROCEDURE — 71250 CT THORAX DX C-: CPT

## 2021-10-11 PROCEDURE — 4004F PT TOBACCO SCREEN RCVD TLK: CPT | Performed by: INTERNAL MEDICINE

## 2021-10-11 PROCEDURE — 90756 CCIIV4 VACC ABX FREE IM: CPT | Performed by: INTERNAL MEDICINE

## 2021-10-11 PROCEDURE — 90471 IMMUNIZATION ADMIN: CPT | Performed by: INTERNAL MEDICINE

## 2021-10-11 PROCEDURE — 99213 OFFICE O/P EST LOW 20 MIN: CPT | Performed by: INTERNAL MEDICINE

## 2021-10-11 RX ORDER — ALBUTEROL SULFATE 90 UG/1
4 AEROSOL, METERED RESPIRATORY (INHALATION) ONCE
Status: COMPLETED | OUTPATIENT
Start: 2021-10-11 | End: 2021-10-11

## 2021-10-11 RX ADMIN — Medication 4 PUFF: at 11:51

## 2021-10-11 ASSESSMENT — PULMONARY FUNCTION TESTS
FEV1/FVC_PRE: 69
FEV1/FVC_POST: 69
FEV1_PERCENT_PREDICTED_PRE: 45
FEV1_PERCENT_PREDICTED_POST: 47

## 2021-10-11 ASSESSMENT — ENCOUNTER SYMPTOMS
COUGH: 1
SHORTNESS OF BREATH: 1

## 2021-10-11 NOTE — PROGRESS NOTES
Pulmonary Outpatient Note   Mynor Medrano MD       10/11/2021    1. JULIANN (obstructive sleep apnea)    2. Primary insomnia    3. Poor sleep hygiene    4. Centrilobular emphysema ([address]    5. Multiple lung nodules    6. Smoker    7. Obesity (BMI 35.0-39.9 without comorbidity)          ASSESSMENT/PLAN:  COPD/centrilobular emphysema. PFT discussed with him, he has severe COPD. Recurrent pneumonia. Unclear etiology, potentially related to smoking. Will obtain CT chest.  Hypoxia. Will discontinue oxygen in the daytime, will decide about overnight oximetry depending on results of his sleep study. Discussed sleep hygiene measures. He can try melatonin. Suspected sleep apnea. Will address after completing COPD work-up  Smoker, multiple lung nodules. Should quit smoking altogether. Told him not to wait. Alcohol dependence. Recommended he should quit drinking altogether, he has had pancreatitis in the past  Obesity. Reduce caloric intake, regular walking at his own pace  Prophylaxis. Has received 1 dose of his COVID-19 vaccine, has received flu shot today without adverse event. He has received Pneumovax, continue with annual flu shots    RTC 3 months, call earlier if symptomatic      Orders Placed This Encounter   Procedures    INFLUENZA, MDCK QUADV, 2 YRS AND OLDER, IM, MDV, 0.5ML (FLUCELVAX QUADV)    Home Sleep Study    Baseline Diagnostic Sleep Study       No follow-ups on file. Chief Complaint:   Follow-up (PFT   CT ) and Results       HPI: William Abarca. Maddison Hassan is a 55y.o. year old male here for follow-up for emphysema. His PCP is Dr. Gautam Gibbs. Simin Guan was seen on 9/2, presents with CT chest, PFT and 6-minute walk test.  The patient has improved with regards to shortness of breath. He has significant problems with sleep with insomnia, lies awake until 3 or 4 AM.  He sleeps for longer hours in the daytime. He has discontinued smoking and vaping.   There was no other change in his medical or surgical history since his last visit. CT chest 10/11/2021  Mediastinum: Coronary artery calcifications are a marker of atherosclerosis. There are no enlarged thoracic lymph nodes.  Calcified granulomatous disease   is present.       Lungs/pleura: The tracheobronchial tree is patent. Jennifer Medina is no pneumothorax   or pleural effusion.  Mild to moderate emphysema involves the bilateral upper   lungs.  There is bibasilar scarring.       No change in the multiple 2-3 mm pulmonary nodules scattered throughout the   bilateral lungs.  No new pulmonary nodules have developed.       Upper Abdomen: A small hiatal hernia is noted.       Soft Tissues/Bones: Degenerative changes involve the thoracic spine. PFT 2021  FVC 2.65 L, 50% predicted, FEV1 1.82 L, 45% predicted, with a ratio of 69%. There was no response to bronchodilator. Lung volumes were significant air trapping, ERV 14% predicted. DLCO 106% predicted. 6 MWT 10/11/2021  Baseline oxygen saturation 94%, Mckenzie scales 0 each. The patient walked 800 feet, 42% predicted. He did not desaturate, there was increase in heart rate, mild increase in respiratory rate. There was no significant change in Mckenzie indices. Previous notes reviewed and edited as necessary. Teresa Santos is a pleasant, morbidly obese patient was brought in by his , Roopa Núñez. He was living at the [facility]. Presently he lives by himself in [facility], is single. He was never . 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 His father  of heart failure, mother from unknown cause, although she did have COPD. He has 2 sisters, one has had lung cancer, another stomach cancer. One sister is  of ruptured appendix and brain aneurysm, brother is  from unknown cause. He has no children. Presently smokes 2 to 3 cigarettes a day, started smoking at age 7 and picked up a pack per day around age 16. He did smoke up to 1.5 PPD.   He has smoked at least 1 PPD for 45 years. The patient was an alcoholic, was a heavy drinker for 1-1/2 years. Presently he is still drinking 4-5 beers occasionally, but not heavy. He tried marijuana many years ago, \"occasionally has a gummy\". Patient has a history of hypertension, hyperlipidemia, CAD, GERD, bipolar disorder, anxiety, depression, DJD, back pain. He had pancreatitis. The patient has been diagnosed with but has not had a PFT was normal.  [facility] through 7/28/2021 for acute respiratory failure with hypoxia, COPD exacerbation, pneumonia. He was hyponatremic. His other listed medical problems were hypertension, hyperlipidemia and CAD. He was treated with Levaquin, discharged on doxycycline, Symbicort 160 and DuoNeb. The patient states he has had pneumonia in the [de-identified], 90s and again this year. Presently the patient is using his nebulizer every 4 hours, he is using Symbicort twice a day and rinsing his mouth. He uses rescue albuterol as needed. He is on supplemental oxygen at 2 LPM, has a portable tank and a nebulizer. He does not have a portable concentrator. The patient does have GE reflux rarely, is on PPI twice daily. He does have occasional hemorrhoidal bleed. The patient has had left leg swelling and burning. He has been told by a  that he likely has sleep apnea, he has not been evaluated. There is no PFT in EMR    CXR 8/18/2021 [facility]  Heart size and pulmonary vasculature stable.  Interval resolution of right-sided infiltrates.  Interval improvement in left-sided parenchymal lung disease with a few residual bandlike opacities.  Costophrenic angles sharp     CT chest 3/29/2018  Chest:       Mediastinum: No mediastinal adenopathy.  Thoracic aorta normal in caliber. Coronary artery calcifications.  No pericardial effusion.  Suspected wall   thickening of the distal esophagus.       Lungs/pleura: Pulmonary emphysema.  No infiltrate or suspicious nodule.    Dependent atelectasis in the lung bases.  Scattered calcified granulomas.  No   pleural effusion.       Soft Tissues/Bones: No acute bony abnormality         Past Medical History:   Diagnosis Date    Anxiety     [facility]     CAD (coronary artery disease)     Chronic back pain     [facility]     DDD (degenerative disc disease), cervical     Depression     GERD (gastroesophageal reflux disease)     Hyperlipidemia     Hypertension     MI (myocardial infarction) ([facility]     Pancreatitis     Recovering alcoholic ([facility]     Tobacco abuse 12/16/2016       Past Surgical History:   Procedure Laterality Date    CORONARY ANGIOPLASTY WITH STENT PLACEMENT  2006, 2012, 2013    [facility]       Social History     Tobacco Use    Smoking status: Current Every Day Smoker     Packs/day: 0.25     Years: 45.00     Pack years: 11.25     Types: Cigarettes     Start date: 8/17/1977    Smokeless tobacco: Never Used   Substance Use Topics    Alcohol use:  Yes     Alcohol/week: 0.0 standard drinks     Comment: states \"a few beers monthly\"       Family History   Problem Relation Age of Onset   Verl Raw Other Mother     Arthritis Mother     Mental Illness Mother     Depression Mother     Diabetes Father     Heart Disease Father     Substance Abuse Father     Heart Disease Sister     High Blood Pressure Sister     Mental Retardation Brother     Other Brother     Learning Disabilities Brother     Early Death Sister     Cancer Sister     Substance Abuse Sister     Cancer Sister     Diabetes Brother     High Blood Pressure Brother          Current Outpatient Medications:     amLODIPine (NORVASC) 2.5 MG tablet, Take 2.5 mg by mouth daily, Disp: , Rfl:     furosemide (LASIX) 20 MG tablet, , Disp: , Rfl:     busPIRone (BUSPAR) 10 MG tablet, 1/2 - 1 tab 2-3 times daily, Disp: , Rfl:     lisinopril (PRINIVIL;ZESTRIL) 10 MG tablet, Take 10 mg by mouth daily, Disp: , Rfl:    ipratropium-albuterol (DUONEB) 0.5-2.5 (3) MG/3ML SOLN nebulizer solution, Inhale 3 mLs into the lungs every 4 hours, Disp: 360 mL, Rfl: 0    QUEtiapine (SEROQUEL) 100 MG tablet, 100 mg 2 times daily , Disp: , Rfl:     gabapentin (NEURONTIN) 300 MG capsule, Take 300 mg by mouth 3 times daily. , Disp: , Rfl:     aspirin 325 MG tablet, Take 325 mg by mouth daily, Disp: , Rfl:     ranolazine (RANEXA) 1000 MG extended release tablet, Take 1 tablet by mouth 2 times daily, Disp: 180 tablet, Rfl: 3    isosorbide mononitrate (IMDUR) 30 MG extended release tablet, TAKE (1) TABLET DAILY, Disp: 90 tablet, Rfl: 3    albuterol sulfate  (90 Base) MCG/ACT inhaler, Inhale 2 puffs into the lungs every 4 hours as needed, Disp: , Rfl:     SYMBICORT 160-4.5 MCG/ACT AERO, , Disp: , Rfl:     nitroGLYCERIN (NITROSTAT) 0.4 MG SL tablet, Place 1 tablet under the tongue every 5 minutes as needed for Chest pain, Disp: 25 tablet, Rfl: 3    clopidogrel (PLAVIX) 75 MG tablet, Take 1 tablet by mouth daily, Disp: 90 tablet, Rfl: 3    metoprolol succinate (TOPROL XL) 100 MG extended release tablet, Take 1 tablet by mouth daily, Disp: 90 tablet, Rfl: 3    HydrOXYzine Pamoate (VISTARIL PO), Take 50 mg by mouth daily , Disp: , Rfl:     atorvastatin (LIPITOR) 40 MG tablet, Take 1 tablet by mouth daily, Disp: 30 tablet, Rfl: 5    pantoprazole (PROTONIX) 20 MG tablet, TAKE 2 TABLETS BY MOUTH DAILY (Patient taking differently: TAKE 1 TABLET BY MOUTH BID), Disp: 30 tablet, Rfl: 0    Pcn [penicillins]    Vitals:    10/11/21 1302   BP: 99/65   Pulse: 71   Resp: 18   Temp: 97.9 °F (36.6 °C)   TempSrc: Oral   SpO2: 96%   Weight: 283 lb 9.6 oz (128.6 kg)   Height: 6' (1.829 m)       Review of Systems   Respiratory: Positive for cough and shortness of breath. Cardiovascular: Positive for chest pain and leg swelling. Gastrointestinal:        ANDREA, possible hemorrhoidal bleed   Psychiatric/Behavioral: Positive for sleep disturbance.    All

## 2021-10-11 NOTE — PROGRESS NOTES
MA Communication:   The following orders are received by verbal communication from Luanne Perez MD    Orders include:  Sleep Study /FU 3 mo       Fu 3 mo       Flu vax

## 2021-10-11 NOTE — PATIENT INSTRUCTIONS
Remember to bring a list of pulmonary medications and any CPAP or BiPAP machines to your next appointment with the office. Please keep all of your future appointments scheduled by [facility]. Out of respect for other patients and providers, you may be asked to reschedule your appointment if you arrive later than your scheduled appointment time. Appointments cancelled less than 24hrs in advance will be considered a no show. Patients with three missed appointments within 1 year or four missed appointments within 2 years can be dismissed from the practice. Please be aware that our physicians are required to work in the Intensive Care Unit at [facility].  Your appointment may need to be rescheduled if they are designated to work during your appointment time. You may receive a survey regarding the care you received during your visit. Your input is valuable to us. We encourage you to complete and return your survey. We hope you will choose us in the future for your healthcare needs. Pt instructed of all future appointment dates & times, including radiology, labs, procedures & referrals. If procedures were scheduled preparation instructions provided. Instructions on future appointments with [facility] were given.         Sleep lab [phone]

## 2021-10-11 NOTE — PROCEDURES
[id number] [facility] Pulmonary Function Lab - Six Minute Walk      Test Performed on:   Room Air_X___   Oxygen at _____ lpm via N/C- continuous Oxygen at _____ lpm via N/C- OCD  Assist Device Used During Test:  None_X___ Cane________ Walker_________    Modified Mckenzie's Scale  0 Nothing at all 5 Strong    0.5 Extremely Weak 6 Stronger (Hard)    1 Very weak 7 Very Strong   2 Weak (light) 8 Very Very Strong   3 Moderate 9 Extremely Strong   4 Somewhat Strong 10 Maximum All out      Time SpO2 Heart Rate Respiratory Rate Dyspnea-  Modified Mckenzies Scale Fatigue- Modified Mckenzies Scale Other Symptoms   Baseline                     94% 75 17 0 0      1 minute                     93% 102 32 2 2    2 minutes                     92% 114 32 2 2      3 minutes                     93% 127 32 2 3    4 minutes                     94% 108 32 2 3    5 minutes                     94% 111 28 2 3    6 minutes                     94% 112 28 2 3    Recovery x 1 minute                     95% 74 20 2 1    Recovery x 2 Minute                     95% 71 18 1 1     Number of Laps____8___ X 100 feet + _________ additional feet = Total Distance __800___feet   Stopped or paused before 6 minutes? No__X___ Yes ________  Total expected 6 MW distance is __1889___feet. Patient achieved __42__% of expected distance.    Pre Blood Pressure:97/56  Post Blood Pressure:126/66  Other symptoms at the end of exercise:

## 2021-10-12 NOTE — PROCEDURES
[address] [address]                               PULMONARY FUNCTION    PATIENT NAME: Chrissy Dent                    :        1965  MED REC NO:   [id number]                          ROOM:  ACCOUNT NO:   [de-identified]                           ADMIT DATE: 10/11/2021  PROVIDER:     Sean De Santiago MD    DATE OF PROCEDURE:  10/11/2021    PFT  Spirometry showed FVC 2.65L, 50% predicted, FEV1 1.82L, 45% predicted,  with FEV1/FVC ratio of 69%. There was no response to bronchodilator. Lung volumes showed air trapping and ERV of 14% predicted. Diffusion  capacity was normal.    IMPRESSION:  PFT shows a severe obstructive defect without response to  bronchodilator. This is compatible with COPD. Reduced ERV reflects  body habitus. Clinical correlation is recommended.         Zaida Mayo MD    D: 10/12/2021 9:48:43       T: 10/12/2021 10:58:40     [email or web address]  Job#: [id number]     Doc#: [id number]    CC:  Guatam Gibbs MD

## 2021-10-25 DIAGNOSIS — I25.10 CORONARY ARTERY DISEASE INVOLVING NATIVE HEART WITHOUT ANGINA PECTORIS, UNSPECIFIED VESSEL OR LESION TYPE: ICD-10-CM

## 2021-10-25 RX ORDER — CLOPIDOGREL BISULFATE 75 MG/1
75 TABLET ORAL DAILY
Qty: 90 TABLET | Refills: 1 | Status: SHIPPED | OUTPATIENT
Start: 2021-10-25 | End: 2022-02-02 | Stop reason: SDUPTHER

## 2021-11-29 PROBLEM — J44.9 COPD, SEVERE (HCC): Status: ACTIVE | Noted: 2021-11-29

## 2021-11-29 PROBLEM — K76.0 FATTY LIVER: Status: ACTIVE | Noted: 2021-02-02

## 2021-11-29 PROBLEM — F41.8 DEPRESSION WITH ANXIETY: Status: ACTIVE | Noted: 2021-11-29

## 2021-12-22 ENCOUNTER — APPOINTMENT (OUTPATIENT)
Dept: GENERAL RADIOLOGY | Age: 56
End: 2021-12-22
Payer: COMMERCIAL

## 2021-12-22 ENCOUNTER — HOSPITAL ENCOUNTER (EMERGENCY)
Age: 56
Discharge: HOME | End: 2021-12-22
Attending: EMERGENCY MEDICINE
Payer: COMMERCIAL

## 2021-12-22 VITALS
HEART RATE: 67 BPM | DIASTOLIC BLOOD PRESSURE: 70 MMHG | HEIGHT: 72 IN | WEIGHT: 281 LBS | SYSTOLIC BLOOD PRESSURE: 117 MMHG | TEMPERATURE: 97.8 F | RESPIRATION RATE: 18 BRPM | BODY MASS INDEX: 38.06 KG/M2 | OXYGEN SATURATION: 96 %

## 2021-12-22 DIAGNOSIS — F41.1 ANXIETY STATE: Primary | ICD-10-CM

## 2021-12-22 DIAGNOSIS — R07.89 ATYPICAL CHEST PAIN: ICD-10-CM

## 2021-12-22 LAB
ANION GAP SERPL CALCULATED.3IONS-SCNC: 9 MMOL/L (ref 3–16)
BASOPHILS ABSOLUTE: 0.1 K/UL (ref 0–0.2)
BASOPHILS RELATIVE PERCENT: 1 %
BUN BLDV-MCNC: 9 MG/DL (ref 7–20)
CALCIUM SERPL-MCNC: 9.1 MG/DL (ref 8.3–10.6)
CHLORIDE BLD-SCNC: 93 MMOL/L (ref 99–110)
CO2: 29 MMOL/L (ref 21–32)
CREAT SERPL-MCNC: 0.9 MG/DL (ref 0.9–1.3)
EOSINOPHILS ABSOLUTE: 0.4 K/UL (ref 0–0.6)
EOSINOPHILS RELATIVE PERCENT: 3.4 %
GFR AFRICAN AMERICAN: >60
GFR NON-AFRICAN AMERICAN: >60
GLUCOSE BLD-MCNC: 107 MG/DL (ref 70–99)
HCT VFR BLD CALC: 39.8 % (ref 40.5–52.5)
HEMOGLOBIN: 13.3 G/DL (ref 13.5–17.5)
LYMPHOCYTES ABSOLUTE: 2.8 K/UL (ref 1–5.1)
LYMPHOCYTES RELATIVE PERCENT: 25.2 %
MCH RBC QN AUTO: 28.4 PG (ref 26–34)
MCHC RBC AUTO-ENTMCNC: 33.5 G/DL (ref 31–36)
MCV RBC AUTO: 84.8 FL (ref 80–100)
MONOCYTES ABSOLUTE: 1.1 K/UL (ref 0–1.3)
MONOCYTES RELATIVE PERCENT: 10 %
NEUTROPHILS ABSOLUTE: 6.8 K/UL (ref 1.7–7.7)
NEUTROPHILS RELATIVE PERCENT: 60.4 %
PDW BLD-RTO: 15.5 % (ref 12.4–15.4)
PLATELET # BLD: 163 K/UL (ref 135–450)
PMV BLD AUTO: 8.4 FL (ref 5–10.5)
POTASSIUM SERPL-SCNC: 4.5 MMOL/L (ref 3.5–5.1)
RBC # BLD: 4.69 M/UL (ref 4.2–5.9)
SODIUM BLD-SCNC: 131 MMOL/L (ref 136–145)
TROPONIN: <0.01 NG/ML
TROPONIN: <0.01 NG/ML
WBC # BLD: 11.3 K/UL (ref 4–11)

## 2021-12-22 PROCEDURE — 6370000000 HC RX 637 (ALT 250 FOR IP): Performed by: EMERGENCY MEDICINE

## 2021-12-22 PROCEDURE — 93005 ELECTROCARDIOGRAM TRACING: CPT | Performed by: EMERGENCY MEDICINE

## 2021-12-22 PROCEDURE — 36415 COLL VENOUS BLD VENIPUNCTURE: CPT

## 2021-12-22 PROCEDURE — 99285 EMERGENCY DEPT VISIT HI MDM: CPT

## 2021-12-22 PROCEDURE — 71045 X-RAY EXAM CHEST 1 VIEW: CPT

## 2021-12-22 PROCEDURE — 84484 ASSAY OF TROPONIN QUANT: CPT

## 2021-12-22 PROCEDURE — 85025 COMPLETE CBC W/AUTO DIFF WBC: CPT

## 2021-12-22 PROCEDURE — 80048 BASIC METABOLIC PNL TOTAL CA: CPT

## 2021-12-22 RX ORDER — HYDROXYZINE PAMOATE 50 MG/1
50 CAPSULE ORAL ONCE
Status: COMPLETED | OUTPATIENT
Start: 2021-12-22 | End: 2021-12-22

## 2021-12-22 RX ORDER — QUETIAPINE FUMARATE 100 MG/1
100 TABLET, FILM COATED ORAL ONCE
Status: COMPLETED | OUTPATIENT
Start: 2021-12-22 | End: 2021-12-22

## 2021-12-22 RX ADMIN — HYDROXYZINE PAMOATE 50 MG: 50 CAPSULE ORAL at 19:37

## 2021-12-22 RX ADMIN — QUETIAPINE 100 MG: 100 TABLET, FILM COATED ORAL at 19:37

## 2021-12-22 ASSESSMENT — ENCOUNTER SYMPTOMS
CHEST TIGHTNESS: 1
DIARRHEA: 0
VOMITING: 0
BACK PAIN: 0
COUGH: 0
ABDOMINAL PAIN: 0
NAUSEA: 0
WHEEZING: 0
RHINORRHEA: 0
PHOTOPHOBIA: 0

## 2021-12-22 ASSESSMENT — PAIN DESCRIPTION - PAIN TYPE: TYPE: ACUTE PAIN

## 2021-12-22 ASSESSMENT — PAIN DESCRIPTION - ONSET: ONSET: ON-GOING

## 2021-12-22 ASSESSMENT — PAIN SCALES - GENERAL: PAINLEVEL_OUTOF10: 4

## 2021-12-22 ASSESSMENT — PAIN DESCRIPTION - DESCRIPTORS: DESCRIPTORS: PRESSURE;SQUEEZING

## 2021-12-22 ASSESSMENT — PAIN DESCRIPTION - LOCATION: LOCATION: CHEST

## 2021-12-22 ASSESSMENT — PAIN DESCRIPTION - ORIENTATION: ORIENTATION: MID

## 2021-12-22 ASSESSMENT — PAIN DESCRIPTION - FREQUENCY: FREQUENCY: CONTINUOUS

## 2021-12-22 ASSESSMENT — PAIN DESCRIPTION - PROGRESSION: CLINICAL_PROGRESSION: GRADUALLY IMPROVING

## 2021-12-22 NOTE — ED PROVIDER NOTES
Emergency Department Provider Note  Location: [facility] EMERGENCY DEPARTMENT  12/22/2021     Patient Identification  Connie Richards is a 55 y.o. male    Chief Complaint  Chest Pain (Reports intermittent chest pressure x1 weeks. Reports history of anxiety with similar symptoms. Nitro x1 prior to EMS arrival without relief. ASA 324mg given by EMS)          HPI  (History provided by patient)  Patient is a 71-year-old male history of anxiety bipolar CAD noncompliant with Seroquel and Vistaril over the past month who presents with episode of chest pain and tightness that occurred just prior to arrival.  Patient was at home not doing anything in particular when he got a pressure sensation and feeling like \"my throat was closing up\". Lasted for few minutes and he was able to \"calm myself down and focus on my breathing and it went away\". It recurred a few minutes later and again he was able to resolve it. Describes a pressure in the center of his chest does not radiate no other exacerbating or alleviating factors. He does state that since it started it seems to be fluctuating with his level of stress and anxiety. No short other shortness of breath no pleurisy no back pain no numbness or weakness. He follows with cardiology Dr. Osiris Max, last stress test reportedly 2 years ago. I have reviewed the following nursing documentation:  Allergies: Allergies   Allergen Reactions    Pcn [Penicillins] Other (See Comments) and Hives     Other reaction(s): Light Headed  Patient passed out.   syncope       Past medical history:  has a past medical history of Affective bipolar disorder ([address] (11/18/2010), Anxiety, Bipolar affective ([address] CAD (coronary artery disease), Chronic back pain, COPD (chronic obstructive pulmonary disease) ([address] DDD (degenerative disc disease), cervical, Depression, Fatty liver (2/2/2021), GERD (gastroesophageal reflux disease), Hyperlipidemia, Hypertension, MI (myocardial infarction) ([address] Pancreatitis, Recovering alcoholic ([facility] and Tobacco abuse (12/16/2016). Past surgical history:  has a past surgical history that includes Coronary angioplasty with stent (2006, 2012, 2013). Home medications:   Prior to Admission medications    Medication Sig Start Date End Date Taking? Authorizing Provider   clopidogrel (PLAVIX) 75 MG tablet TAKE 1 TABLET BY MOUTH DAILY 10/25/21   JOSE Ramos CNP   amLODIPine (NORVASC) 2.5 MG tablet Take 2.5 mg by mouth daily 8/19/21   Historical Provider, MD   furosemide (LASIX) 20 MG tablet  8/26/21   Historical Provider, MD   busPIRone (BUSPAR) 10 MG tablet 1/2 - 1 tab 2-3 times daily 3/31/21   Historical Provider, MD   lisinopril (PRINIVIL;ZESTRIL) 10 MG tablet Take 10 mg by mouth daily    Historical Provider, MD   ipratropium-albuterol (DUONEB) 0.5-2.5 (3) MG/3ML SOLN nebulizer solution Inhale 3 mLs into the lungs every 4 hours 7/22/21   Ramon Zamora MD   QUEtiapine (SEROQUEL) 100 MG tablet 100 mg 2 times daily  6/21/21   Historical Provider, MD   gabapentin (NEURONTIN) 300 MG capsule Take 300 mg by mouth 3 times daily.   6/21/21   Historical Provider, MD   aspirin 325 MG tablet Take 325 mg by mouth daily    Historical Provider, MD   ranolazine (RANEXA) 1000 MG extended release tablet Take 1 tablet by mouth 2 times daily 6/17/21   JOSE Ramos CNP   isosorbide mononitrate (IMDUR) 30 MG extended release tablet TAKE (1) TABLET DAILY 6/17/21   JOSE Ramos CNP   albuterol sulfate  (90 Base) MCG/ACT inhaler Inhale 2 puffs into the lungs every 4 hours as needed 2/2/21   Historical Provider, MD   SYMBICORT 160-4.5 MCG/ACT AERO  2/2/21   Historical Provider, MD   nitroGLYCERIN (NITROSTAT) 0.4 MG SL tablet Place 1 tablet under the tongue every 5 minutes as needed for Chest pain 12/3/20   OJSE Ramos CNP   metoprolol succinate (TOPROL XL) 100 MG extended release tablet Take 1 tablet by mouth daily 9/4/20   Magan Liu MD   HydrOXYzine Pamoate (VISTARIL PO) Take 50 mg by mouth daily     Historical Provider, MD   atorvastatin (LIPITOR) 40 MG tablet Take 1 tablet by mouth daily 9/29/17   JOSE Montes CNP   pantoprazole (PROTONIX) 20 MG tablet TAKE 2 TABLETS BY MOUTH DAILY  Patient taking differently: TAKE 1 TABLET BY MOUTH BID 9/29/16   JOSE Pickering CNP       Social history:  reports that he has been smoking cigarettes. He started smoking about 44 years ago. He has a 45.00 pack-year smoking history. He has never used smokeless tobacco. He reports current alcohol use. He reports current drug use. Drug: Marijuana Sharda Coil). Family history:    Family History   Problem Relation Age of Onset    Other Mother     Arthritis Mother     Mental Illness Mother     Depression Mother     Diabetes Father     Heart Disease Father     Substance Abuse Father     Heart Disease Sister     High Blood Pressure Sister     Mental Retardation Brother     Other Brother     Learning Disabilities Brother     Early Death Sister    [facility] Cancer Sister     Substance Abuse Sister     Cancer Sister     Diabetes Brother     High Blood Pressure Brother          ROS  Review of Systems   Constitutional: Negative for chills and fever. HENT: Negative for congestion and rhinorrhea. Eyes: Negative for photophobia and visual disturbance. Respiratory: Positive for chest tightness. Negative for cough and wheezing. Cardiovascular: Positive for chest pain. Negative for palpitations. Gastrointestinal: Negative for abdominal pain, diarrhea, nausea and vomiting. Genitourinary: Negative for dysuria and hematuria. Musculoskeletal: Negative for back pain and neck pain. Skin: Negative for rash and wound. Neurological: Negative for syncope and weakness. Psychiatric/Behavioral: Negative for agitation and confusion. The patient is nervous/anxious.           Exam  ED Triage Vitals [12/22/21 1717]   BP Temp Temp Source Pulse Resp SpO2 Height Weight   (!) 145/86 97.8 °F (36.6 °C) Oral 66 22 95 % 6' (1.829 m) 281 lb (127.5 kg)       Physical Exam  Vitals and nursing note reviewed. Constitutional:       General: He is not in acute distress. Appearance: He is well-developed. HENT:      Head: Normocephalic and atraumatic. Nose: Nose normal. No congestion. Eyes:      Extraocular Movements: Extraocular movements intact. Pupils: Pupils are equal, round, and reactive to light. Cardiovascular:      Rate and Rhythm: Normal rate and regular rhythm. Heart sounds: No murmur heard. Comments: Equal radial pulses  Pulmonary:      Effort: Pulmonary effort is normal.      Breath sounds: Normal breath sounds. Comments: No chest wall tenderness or crepitus  Abdominal:      General: There is no distension. Palpations: Abdomen is soft. Tenderness: There is no abdominal tenderness. Musculoskeletal:         General: No deformity. Normal range of motion. Cervical back: Normal range of motion and neck supple. Skin:     General: Skin is warm. Findings: No rash. Neurological:      Mental Status: He is alert and oriented to person, place, and time. Motor: No abnormal muscle tone.       Coordination: Coordination normal.   Psychiatric:         Mood and Affect: Mood normal.         Behavior: Behavior normal.           ED Course    ED Medication Orders (From admission, onward)    Start Ordered     Status Ordering Provider    12/22/21 1915 12/22/21 1853  hydrOXYzine (VISTARIL) capsule 50 mg  ONCE         Ordered HEAVEN GOMEZ    12/22/21 1915 12/22/21 1853  QUEtiapine (SEROQUEL) tablet 100 mg  ONCE         Ordered HEAVEN GOMEZ          EKG  Normal sinus rhythm rate 70 normal axis normal intervals no evidence of conduction abnormalities no diagnostic ischemic changes noted, QTC 45      Radiology  XR CHEST PORTABLE    Result Date: 12/22/2021  EXAMINATION: ONE XRAY VIEW OF THE CHEST 12/22/2021 5:23 pm COMPARISON: 08/21/2021. HISTORY: ORDERING SYSTEM PROVIDED HISTORY: chest pain TECHNOLOGIST PROVIDED HISTORY: Reason for exam:->chest pain Reason for Exam: chest pain FINDINGS: The cardiomediastinal silhouette is unremarkable. The lungs are clear. No infiltrate, pleural fluid or focal process is identified. No acute cardiopulmonary disease.          Labs  Results for orders placed or performed during the hospital encounter of 17/15/53   Basic Metabolic Panel   Result Value Ref Range    Sodium 131 (L) 136 - 145 mmol/L    Potassium 4.5 3.5 - 5.1 mmol/L    Chloride 93 (L) 99 - 110 mmol/L    CO2 29 21 - 32 mmol/L    Anion Gap 9 3 - 16    Glucose 107 (H) 70 - 99 mg/dL    BUN 9 7 - 20 mg/dL    CREATININE 0.9 0.9 - 1.3 mg/dL    GFR Non-African American >60 >60    GFR African American >60 >60    Calcium 9.1 8.3 - 10.6 mg/dL   Troponin   Result Value Ref Range    Troponin <0.01 <0.01 ng/mL   CBC Auto Differential   Result Value Ref Range    WBC 11.3 (H) 4.0 - 11.0 K/uL    RBC 4.69 4.20 - 5.90 M/uL    Hemoglobin 13.3 (L) 13.5 - 17.5 g/dL    Hematocrit 39.8 (L) 40.5 - 52.5 %    MCV 84.8 80.0 - 100.0 fL    MCH 28.4 26.0 - 34.0 pg    MCHC 33.5 31.0 - 36.0 g/dL    RDW 15.5 (H) 12.4 - 15.4 %    Platelets 385 100 - 188 K/uL    MPV 8.4 5.0 - 10.5 fL    Neutrophils % 60.4 %    Lymphocytes % 25.2 %    Monocytes % 10.0 %    Eosinophils % 3.4 %    Basophils % 1.0 %    Neutrophils Absolute 6.8 1.7 - 7.7 K/uL    Lymphocytes Absolute 2.8 1.0 - 5.1 K/uL    Monocytes Absolute 1.1 0.0 - 1.3 K/uL    Eosinophils Absolute 0.4 0.0 - 0.6 K/uL    Basophils Absolute 0.1 0.0 - 0.2 K/uL   EKG 12 Lead   Result Value Ref Range    Ventricular Rate 70 BPM    Atrial Rate 70 BPM    P-R Interval 178 ms    QRS Duration 98 ms    Q-T Interval 394 ms    QTc Calculation (Bazett) 425 ms    P Axis 37 degrees    R Axis 13 degrees    T Axis 47 degrees    Diagnosis       Normal sinus rhythmNormal ECGWhen compared with ECG of 21-AUG-2021 16:50,No significant change was found         MDM  Patient seen and evaluated. Relevant records reviewed. 20-year-old male who presents after 2 3 episodes of chest tightness and feeling anxious complaining that he had a panic attack. On exam here he is overall well-appearing no acute distress his vitals are reassuring. He has an unremarkable physical exam for any acute process. His EKG does not show any acute pattern his initial troponin is negative. He is moderate risk by calculated heart score however his symptoms are consistent with anxiety attack. There is a clear correlation with his level of anxiety and him having symptoms. I did offer the patient admission for further chest pain rule out however patient is adamant that he wishes to go home with close outpatient follow-up. He does understand that I did recommend admission over outpatient follow-up however he prefers to follow-up outpatient with close return precautions. Otherwise low concern for pneumonia PE dissection or other acute cardiopulmonary process requiring emergent intervention. I have ordered repeat cardiac markers and EKG. Patient's care will be signed out to oncoming physician. See his note for reassessment and ultimate disposition. 7:27 PM      Clinical Impression:  1. Anxiety state    2. Atypical chest pain          Blood pressure 120/72, pulse 67, temperature 97.8 °F (36.6 °C), temperature source Oral, resp. rate 16, height 6' (1.829 m), weight 281 lb (127.5 kg), SpO2 98 %. Patient was given scripts for the following medications. I counseled patient how to take these medications. New Prescriptions    No medications on file       Disposition referral (if applicable):  No follow-up provider specified. Total critical care time is 0 minutes, which excludes separately billable procedures and updating family.  Time spent is specifically for management of the presenting complaint and symptoms initially, direct bedside care, reevaluation, review of records, and consultation. There was a high probability of clinically significant life-threatening deterioration in the patient's condition, which required my urgent intervention. This chart was generated in part by using Dragon Dictation system and may contain errors related to that system including errors in grammar, punctuation, and spelling, as well as words and phrases that may be inappropriate. If there are any questions or concerns please feel free to contact the dictating provider for clarification.      Breanne Rinaldi MD  [address] MD  12/22/21 Yeny Reyes

## 2021-12-23 LAB
EKG ATRIAL RATE: 65 BPM
EKG ATRIAL RATE: 70 BPM
EKG DIAGNOSIS: NORMAL
EKG DIAGNOSIS: NORMAL
EKG P AXIS: 34 DEGREES
EKG P AXIS: 37 DEGREES
EKG P-R INTERVAL: 174 MS
EKG P-R INTERVAL: 178 MS
EKG Q-T INTERVAL: 394 MS
EKG Q-T INTERVAL: 438 MS
EKG QRS DURATION: 92 MS
EKG QRS DURATION: 98 MS
EKG QTC CALCULATION (BAZETT): 425 MS
EKG QTC CALCULATION (BAZETT): 455 MS
EKG R AXIS: 13 DEGREES
EKG R AXIS: 6 DEGREES
EKG T AXIS: 38 DEGREES
EKG T AXIS: 47 DEGREES
EKG VENTRICULAR RATE: 65 BPM
EKG VENTRICULAR RATE: 70 BPM

## 2021-12-23 PROCEDURE — 93010 ELECTROCARDIOGRAM REPORT: CPT | Performed by: INTERNAL MEDICINE

## 2021-12-23 NOTE — ED PROVIDER NOTES
Dr. Satish Lee initial dictation. I was asked to follow-up on repeat EKG and troponin and if this remained normal to discharge the patient home for evaluation of anxiety, acute on chronic, with exacerbation of chest pain.   Ramos EKG shows sinus rhythm, normal axis, normal intervals with no ST segment abnormalities and his repeat troponin is normal.      Impression:  Atypical chest pain, anxiety            Shona Maddox MD  83/49/64 1630

## 2021-12-24 ENCOUNTER — HOSPITAL ENCOUNTER (EMERGENCY)
Age: 56
Discharge: HOME | End: 2021-12-24
Attending: EMERGENCY MEDICINE
Payer: COMMERCIAL

## 2021-12-24 VITALS
DIASTOLIC BLOOD PRESSURE: 82 MMHG | TEMPERATURE: 98.1 F | BODY MASS INDEX: 37.25 KG/M2 | SYSTOLIC BLOOD PRESSURE: 135 MMHG | OXYGEN SATURATION: 100 % | WEIGHT: 275 LBS | HEART RATE: 83 BPM | HEIGHT: 72 IN | RESPIRATION RATE: 16 BRPM

## 2021-12-24 DIAGNOSIS — R04.0 EPISTAXIS: Primary | ICD-10-CM

## 2021-12-24 DIAGNOSIS — Z79.02 ANTIPLATELET OR ANTITHROMBOTIC LONG-TERM USE: ICD-10-CM

## 2021-12-24 PROCEDURE — 99284 EMERGENCY DEPT VISIT MOD MDM: CPT

## 2021-12-24 PROCEDURE — 6370000000 HC RX 637 (ALT 250 FOR IP): Performed by: EMERGENCY MEDICINE

## 2021-12-24 RX ORDER — AMLODIPINE BESYLATE 5 MG/1
2.5 TABLET ORAL DAILY
Status: DISCONTINUED | OUTPATIENT
Start: 2021-12-24 | End: 2021-12-24 | Stop reason: HOSPADM

## 2021-12-24 RX ORDER — RANOLAZINE 500 MG/1
1000 TABLET, EXTENDED RELEASE ORAL 2 TIMES DAILY
Status: DISCONTINUED | OUTPATIENT
Start: 2021-12-24 | End: 2021-12-24 | Stop reason: HOSPADM

## 2021-12-24 RX ORDER — ISOSORBIDE MONONITRATE 30 MG/1
30 TABLET, EXTENDED RELEASE ORAL DAILY
Status: DISCONTINUED | OUTPATIENT
Start: 2021-12-24 | End: 2021-12-24 | Stop reason: HOSPADM

## 2021-12-24 RX ORDER — METOPROLOL SUCCINATE 50 MG/1
100 TABLET, EXTENDED RELEASE ORAL ONCE
Status: COMPLETED | OUTPATIENT
Start: 2021-12-24 | End: 2021-12-24

## 2021-12-24 RX ORDER — LISINOPRIL 10 MG/1
10 TABLET ORAL ONCE
Status: COMPLETED | OUTPATIENT
Start: 2021-12-24 | End: 2021-12-24

## 2021-12-24 RX ORDER — OXYMETAZOLINE HYDROCHLORIDE 0.05 G/100ML
2 SPRAY NASAL ONCE
Status: COMPLETED | OUTPATIENT
Start: 2021-12-24 | End: 2021-12-24

## 2021-12-24 RX ADMIN — Medication 2 SPRAY: at 13:43

## 2021-12-24 RX ADMIN — METOPROLOL SUCCINATE 100 MG: 50 TABLET, EXTENDED RELEASE ORAL at 14:53

## 2021-12-24 RX ADMIN — LISINOPRIL 10 MG: 10 TABLET ORAL at 14:53

## 2021-12-24 RX ADMIN — AMLODIPINE BESYLATE 2.5 MG: 5 TABLET ORAL at 14:53

## 2021-12-24 NOTE — ED NOTES
Patient stated Bleeding has stopped.  Writer did no noticed any blood dripping      Cameron Eduardo RN  12/24/21 0280

## 2021-12-24 NOTE — ED PROVIDER NOTES
Emergency Physician Note        Note Open Time: 3:51 PM EST    Chief Complaint  Epistaxis (c/o of nosebleed starting around 6am this morning. patient stays it has stopped and started a few times since. )       History of Present Illness  Samantha Deluca is a 55 y.o. male who presents to the ED for epistaxis. Patient reports that today he developed epistaxis primarily through the right naris. He takes Plavix and aspirin daily as well as some antihypertensive medications. The bleeding began this morning and so he did not take any of his medications. He denies any chest pain or shortness of breath. Has never had this before. 10 systems reviewed, pertinent positives per HPI otherwise noted to be negative    I have reviewed the following from the nursing documentation:      Prior to Admission medications    Medication Sig Start Date End Date Taking? Authorizing Provider   clopidogrel (PLAVIX) 75 MG tablet TAKE 1 TABLET BY MOUTH DAILY 10/25/21   JOSE Richards CNP   amLODIPine (NORVASC) 2.5 MG tablet Take 2.5 mg by mouth daily 8/19/21   Historical Provider, MD   furosemide (LASIX) 20 MG tablet  8/26/21   Historical Provider, MD   busPIRone (BUSPAR) 10 MG tablet 1/2 - 1 tab 2-3 times daily 3/31/21   Historical Provider, MD   lisinopril (PRINIVIL;ZESTRIL) 10 MG tablet Take 10 mg by mouth daily    Historical Provider, MD   ipratropium-albuterol (DUONEB) 0.5-2.5 (3) MG/3ML SOLN nebulizer solution Inhale 3 mLs into the lungs every 4 hours 7/22/21   Farhan Arias MD   QUEtiapine (SEROQUEL) 100 MG tablet 100 mg 2 times daily  6/21/21   Historical Provider, MD   gabapentin (NEURONTIN) 300 MG capsule Take 300 mg by mouth 3 times daily.   6/21/21   Historical Provider, MD   aspirin 325 MG tablet Take 325 mg by mouth daily    Historical Provider, MD   ranolazine (RANEXA) 1000 MG extended release tablet Take 1 tablet by mouth 2 times daily 6/17/21   JOSE Richards CNP   isosorbide mononitrate (IMDUR) 30 MG extended release tablet TAKE (1) TABLET DAILY 6/17/21   JOSE Starr CNP   albuterol sulfate  (90 Base) MCG/ACT inhaler Inhale 2 puffs into the lungs every 4 hours as needed 2/2/21   Historical Provider, MD   SYMBICORT 160-4.5 MCG/ACT AERO  2/2/21   Historical Provider, MD   nitroGLYCERIN (NITROSTAT) 0.4 MG SL tablet Place 1 tablet under the tongue every 5 minutes as needed for Chest pain 12/3/20   JOSE Starr CNP   metoprolol succinate (TOPROL XL) 100 MG extended release tablet Take 1 tablet by mouth daily 9/4/20   Srinivasa Story MD   HydrOXYzine Pamoate (VISTARIL PO) Take 50 mg by mouth daily     Historical Provider, MD   atorvastatin (LIPITOR) 40 MG tablet Take 1 tablet by mouth daily 9/29/17   JOSE Degroot CNP   pantoprazole (PROTONIX) 20 MG tablet TAKE 2 TABLETS BY MOUTH DAILY  Patient taking differently: TAKE 1 TABLET BY MOUTH BID 9/29/16   JOSE Pickering CNP       Allergies as of 12/24/2021 - Fully Reviewed 12/24/2021   Allergen Reaction Noted    Pcn [penicillins] Other (See Comments) and Hives 04/18/2013       Past Medical History:   Diagnosis Date    Affective bipolar disorder ([address] 11/18/2010    Anxiety     Bipolar affective ([address]     CAD (coronary artery disease)     Chronic back pain     COPD (chronic obstructive pulmonary disease) ([address]     DDD (degenerative disc disease), cervical     Depression     Fatty liver 2/2/2021    GERD (gastroesophageal reflux disease)     Hyperlipidemia     Hypertension     MI (myocardial infarction) ([address]     Pancreatitis     Recovering alcoholic ([address]     Tobacco abuse 12/16/2016        Surgical History:   Past Surgical History:   Procedure Laterality Date    CORONARY ANGIOPLASTY WITH STENT PLACEMENT  2006, 2012, 2013    [facility]        Family History:    Family History   Problem Relation Age of Onset    Other Mother     Arthritis Mother     Mental Illness Mother     Depression Mother    Carrillo Gita Diabetes Father     Heart Disease Father     Substance Abuse Father     Heart Disease Sister     High Blood Pressure Sister     Mental Retardation Brother     Other Brother     Learning Disabilities Brother     Early Death Sister    Julieth Stallings Cancer Sister     Substance Abuse Sister     Cancer Sister     Diabetes Brother     High Blood Pressure Brother        Social History     Socioeconomic History    Marital status: Single     Spouse name: Not on file    Number of children: Not on file    Years of education: Not on file    Highest education level: Not on file   Occupational History    Not on file   Tobacco Use    Smoking status: Current Every Day Smoker     Packs/day: 1.00     Years: 45.00     Pack years: 45.00     Types: Cigarettes     Start date: 8/17/1977    Smokeless tobacco: Never Used   Vaping Use    Vaping Use: Never used   Substance and Sexual Activity    Alcohol use: Yes     Alcohol/week: 0.0 standard drinks     Comment: states \"a few beers monthly\"    Drug use: Yes     Types: Marijuana Lauren Won)     Comment: took friend's suboxone     Sexual activity: Never   Other Topics Concern    Not on file   Social History Narrative    Not on file     Social Determinants of Health     Financial Resource Strain:     Difficulty of Paying Living Expenses: Not on file   Food Insecurity:     Worried About Running Out of Food in the Last Year: Not on file    Alisa of Food in the Last Year: Not on file   Transportation Needs:     Lack of Transportation (Medical): Not on file    Lack of Transportation (Non-Medical):  Not on file   Physical Activity:     Days of Exercise per Week: Not on file    Minutes of Exercise per Session: Not on file   Stress:     Feeling of Stress : Not on file   Social Connections:     Frequency of Communication with Friends and Family: Not on file    Frequency of Social Gatherings with Friends and Family: Not on file    Attends Yazidism Services: Not on file   Julieth Stallings Active Member of Clubs or Organizations: Not on file    Attends Club or Organization Meetings: Not on file    Marital Status: Not on file   Intimate Partner Violence:     Fear of Current or Ex-Partner: Not on file    Emotionally Abused: Not on file    Physically Abused: Not on file    Sexually Abused: Not on file   Housing Stability:     Unable to Pay for Housing in the Last Year: Not on file    Number of [place] in the Last Year: Not on file    Unstable Housing in the Last Year: Not on file       Nursing notes reviewed. ED Triage Vitals [12/24/21 1340]   Enc Vitals Group      BP (!) 144/130      Pulse 79      Resp 16      Temp 98.1 °F (36.7 °C)      Temp Source Oral      SpO2 98 %      Weight 275 lb (124.7 kg)      Height 6' (1.829 m)      Head Circumference       Peak Flow       Pain Score       Pain Loc       Pain Edu? Excl. in [address]? GENERAL:  Awake, alert. Well developed, well nourished with no apparent distress. HENT:  Normocephalic, Atraumatic, moist mucous membranes. Brisk bleeding from the right naris. EYES:  Pupils equal round and reactive to light, Conjunctiva normal, extraocular movements normal.  NECK:  No meningeal signs, Supple. SKIN: Warm, dry and intact. NEUROLOGIC: Normal mental status. Moving all extremities to command. PROCEDURES  After holding pressure for half an hour there was no resolution of the bleeding and he was given Afrin spray. This also did not lead to any resolution. No obvious source was seen for cautery and so a 7.5 cm Rhino Rocket was placed in the right naris and inflated with 10 cc of air. Patient tolerated tolerated procedure well with minimal pain. This resolved the bleeding from the right naris and the patient was observed for about an hour and there was only a slight oozing through the left naris. MEDICAL DECISION MAKING        Cause of patient's nosebleed is likely due to his nighttime nasal oxygen treatment.  Patient was given a mask by the nursing staff to allow him to treat himself with oxygen at night but not through the nose given that his right naris is occluded with a Rhino Rocket. I advised the patient to return to the emergency department immediately for any new or worsening symptoms, such as return of bleeding. The patient voiced agreement and understanding of the treatment plan. Final Impression    1. Epistaxis    2. Antiplatelet or antithrombotic long-term use        Blood pressure 135/82, pulse 83, temperature 98.1 °F (36.7 °C), temperature source Oral, resp. rate 16, height 6' (1.829 m), weight 275 lb (124.7 kg), SpO2 100 %. Patient was given scripts for the following medications. I counseled patient how to take these medications. Discharge Medication List as of 12/24/2021  4:23 PM          Disposition  Pt is in good condition upon Discharge to home. This chart was generated using the [address] [place] [address] dictation system. I created this record but it may contain dictation errors.           Angela Hermosillo MD  12/24/21 2818

## 2021-12-24 NOTE — ED NOTES
Bed: 05  Expected date:   Expected time:   Means of arrival:   Comments:  [address] RN  12/24/21 0237

## 2021-12-24 NOTE — ED NOTES
Patient sitting in bed with nasal packing in place in right nostril, left nostril with scant blood dripping. Administered BP meds ordered per MAR.  Will continue to monitor patient     Natacha Coronado RN  12/24/21 2621

## 2021-12-24 NOTE — ED NOTES
Md Jennyfer Mckeon notified ER does not stock Imdur and Ranexa. Dr. Jennyfer Mckeon stated to have patient take when patient gets home.       Mathieu Ahuja RN  12/24/21 2284

## 2021-12-28 ENCOUNTER — OFFICE VISIT (OUTPATIENT)
Dept: ENT CLINIC | Age: 56
End: 2021-12-28
Payer: COMMERCIAL

## 2021-12-28 VITALS — HEIGHT: 72 IN | BODY MASS INDEX: 38.06 KG/M2 | WEIGHT: 281 LBS | TEMPERATURE: 97.5 F

## 2021-12-28 DIAGNOSIS — R04.0 EPISTAXIS: Primary | ICD-10-CM

## 2021-12-28 DIAGNOSIS — Z79.01 ANTICOAGULATED: ICD-10-CM

## 2021-12-28 PROCEDURE — G8427 DOCREV CUR MEDS BY ELIG CLIN: HCPCS | Performed by: OTOLARYNGOLOGY

## 2021-12-28 PROCEDURE — G8417 CALC BMI ABV UP PARAM F/U: HCPCS | Performed by: OTOLARYNGOLOGY

## 2021-12-28 PROCEDURE — 30901 CONTROL OF NOSEBLEED: CPT | Performed by: OTOLARYNGOLOGY

## 2021-12-28 PROCEDURE — 99203 OFFICE O/P NEW LOW 30 MIN: CPT | Performed by: OTOLARYNGOLOGY

## 2021-12-28 PROCEDURE — G8482 FLU IMMUNIZE ORDER/ADMIN: HCPCS | Performed by: OTOLARYNGOLOGY

## 2021-12-28 PROCEDURE — 4004F PT TOBACCO SCREEN RCVD TLK: CPT | Performed by: OTOLARYNGOLOGY

## 2021-12-28 PROCEDURE — 3017F COLORECTAL CA SCREEN DOC REV: CPT | Performed by: OTOLARYNGOLOGY

## 2021-12-28 RX ORDER — CLINDAMYCIN HYDROCHLORIDE 300 MG/1
300 CAPSULE ORAL 3 TIMES DAILY
Qty: 21 CAPSULE | Refills: 0 | Status: SHIPPED | OUTPATIENT
Start: 2021-12-28 | End: 2022-01-04

## 2021-12-28 ASSESSMENT — ENCOUNTER SYMPTOMS
FACIAL SWELLING: 0
TROUBLE SWALLOWING: 0
EYE ITCHING: 0
COUGH: 0
SORE THROAT: 0
VOICE CHANGE: 0
SINUS PRESSURE: 0
SHORTNESS OF BREATH: 0
APNEA: 0

## 2021-12-28 NOTE — PATIENT INSTRUCTIONS
Nose Bleeds     Your nose was cauterized today Silver nitrate cautery of the right  nasal septum was performed in the office today without complication.  Strict Sinus precautions x1 week: Refrain from blowing or picking your nose. Avoid heavy lifting and straining to have a bowl movement. Cough and sneeze with an open mouth.  Begin [place] nasal saline spray or [place] Nasal Saline Mist/ Gel, 2 sprays to each nostril 4-6 times daily.  Nasal gel to nose nightly   Consider using a home humidifier in the bedroom at night.  The patient was provided with an educational handout on home management of epistaxis.  The patient was instructed to seek medical care for any nosebleed that is posterior, causes severe bleeding, or lasts longer than 20 minutes.

## 2021-12-28 NOTE — PROGRESS NOTES
Lucia Ramirez [address], [address], [address] [place] [address]  P: [phone]       Patient     Thu Fill  1965    ChiefComplaint     Chief Complaint   Patient presents with    New Patient     Epistaxis, packing removal       History of Present Illness     Caryle Ferraris is a 81-year-old male here today for evaluation of right-sided epistaxis. Bleeding started anteriorly on 12/24/2021 lasted approximately 9 hours, went to ED and was back to sinus and the [employer]. No bleeding since placement. History of status post child but nothing since. On blood thinners and home oxygen.     Past Medical History     Past Medical History:   Diagnosis Date    Affective bipolar disorder ([address] 11/18/2010    Anxiety     Bipolar affective ([address]     CAD (coronary artery disease)     Chronic back pain     COPD (chronic obstructive pulmonary disease) (HCC)     DDD (degenerative disc disease), cervical     Depression     Fatty liver 2/2/2021    GERD (gastroesophageal reflux disease)     Hyperlipidemia     Hypertension     MI (myocardial infarction) ([address]     Pancreatitis     Recovering alcoholic ([address]     Tobacco abuse 12/16/2016       Past Surgical History     Past Surgical History:   Procedure Laterality Date    CORONARY ANGIOPLASTY WITH STENT PLACEMENT  2006, 2012, 2013    [facility]       Family History     Family History   Problem Relation Age of Onset    Other Mother     Arthritis Mother     Mental Illness Mother     Depression Mother     Diabetes Father     Heart Disease Father     Substance Abuse Father     Heart Disease Sister     High Blood Pressure Sister     Mental Retardation Brother     Other Brother     Learning Disabilities Brother     Early Death Sister    [employer] Cancer Sister     Substance Abuse Sister     Cancer Sister     Diabetes Brother     High Blood Pressure Brother        Social History     Social History     Tobacco Use    Smoking status: Current Every Day Smoker     Packs/day: 1.00     Years: 45.00     Pack years: 45.00     Types: Cigarettes     Start date: 8/17/1977    Smokeless tobacco: Never Used   Vaping Use    Vaping Use: Never used   Substance Use Topics    Alcohol use: Yes     Alcohol/week: 0.0 standard drinks     Comment: states \"a few beers monthly\"    Drug use: Yes     Types: Marijuana Lucianne Bars)     Comment: took friend's suboxone         Allergies     Allergies   Allergen Reactions    Pcn [Penicillins] Other (See Comments) and Hives     Other reaction(s): Light Headed  Patient passed out. syncope       Medications     Current Outpatient Medications   Medication Sig Dispense Refill    clindamycin (CLEOCIN) 300 MG capsule Take 1 capsule by mouth 3 times daily for 7 days 21 capsule 0    clopidogrel (PLAVIX) 75 MG tablet TAKE 1 TABLET BY MOUTH DAILY (Patient not taking: Reported on 12/28/2021) 90 tablet 1    amLODIPine (NORVASC) 2.5 MG tablet Take 2.5 mg by mouth daily      furosemide (LASIX) 20 MG tablet       busPIRone (BUSPAR) 10 MG tablet 1/2 - 1 tab 2-3 times daily      lisinopril (PRINIVIL;ZESTRIL) 10 MG tablet Take 10 mg by mouth daily      ipratropium-albuterol (DUONEB) 0.5-2.5 (3) MG/3ML SOLN nebulizer solution Inhale 3 mLs into the lungs every 4 hours 360 mL 0    QUEtiapine (SEROQUEL) 100 MG tablet 100 mg 2 times daily       gabapentin (NEURONTIN) 300 MG capsule Take 300 mg by mouth 3 times daily.        aspirin 325 MG tablet Take 325 mg by mouth daily      ranolazine (RANEXA) 1000 MG extended release tablet Take 1 tablet by mouth 2 times daily 180 tablet 3    isosorbide mononitrate (IMDUR) 30 MG extended release tablet TAKE (1) TABLET DAILY 90 tablet 3    albuterol sulfate  (90 Base) MCG/ACT inhaler Inhale 2 puffs into the lungs every 4 hours as needed      SYMBICORT 160-4.5 MCG/ACT AERO       nitroGLYCERIN (NITROSTAT) 0.4 MG SL tablet Place 1 tablet under the tongue every 5 minutes as needed for Chest pain 25 tablet 3    metoprolol succinate (TOPROL XL) 100 MG extended release tablet Take 1 tablet by mouth daily 90 tablet 3    HydrOXYzine Pamoate (VISTARIL PO) Take 50 mg by mouth daily       atorvastatin (LIPITOR) 40 MG tablet Take 1 tablet by mouth daily 30 tablet 5    pantoprazole (PROTONIX) 20 MG tablet TAKE 2 TABLETS BY MOUTH DAILY (Patient taking differently: TAKE 1 TABLET BY MOUTH BID) 30 tablet 0     No current facility-administered medications for this visit. Review of Systems     Review of Systems   Constitutional: Negative for appetite change, chills, fatigue, fever and unexpected weight change. HENT: Positive for nosebleeds. Negative for congestion, ear discharge, ear pain, facial swelling, hearing loss, postnasal drip, sinus pressure, sneezing, sore throat, tinnitus, trouble swallowing and voice change. Eyes: Negative for itching. Respiratory: Negative for apnea, cough and shortness of breath. Endocrine: Negative for cold intolerance and heat intolerance. Musculoskeletal: Negative for myalgias and neck pain. Skin: Negative for rash. Allergic/Immunologic: Negative for environmental allergies. Neurological: Negative for dizziness and headaches. Psychiatric/Behavioral: Negative for confusion, decreased concentration and sleep disturbance. PhysicalExam     Vitals:    12/28/21 1119   Temp: 97.5 °F (36.4 °C)   Weight: 281 lb (127.5 kg)   Height: 6' (1.829 m)       Constitutional:       Appearance: Normal appearance. HENT:      Head: Normocephalic. Nose: Nose normal.   Eyes:      Extraocular Movements: Extraocular movements intact. Neck:      Musculoskeletal: Normal range of motion. Neurological:      General: No focal deficit present. Mental Status: She is alert and oriented to person, place, and time. Psychiatric:         Mood and Affect: Mood normal.         Behavior: Behavior normal.         Thought Content:  Thought content normal.       Procedure Control Nasal Hemorrhage with nasal endoscopy  Preop: epistaxis  Postop: same  Consent: verbal  Anes: topical lidocaine with afrin  Description:  After consent right nasal packing removed- purulent mucus present. Slow oozing from right anterior nasal septum. Afrin and lidocaine applied to nasal cavity. 0 degree scope did not demonstrate any evidence or posterior bleeding source. After 5 minutes cautery was performed of the right anterior septum. Hemostasis was obtained. Area lined with surgicel. The patient tolerated well. No complications. Assessment and Plan     1. Epistaxis  - right anterior septum cauterized   -nasal hygiene measures discussed  - clindamycin (CLEOCIN) 300 MG capsule; Take 1 capsule by mouth 3 times daily for 7 days  Dispense: 21 capsule; Refill: 0    2. Anticoagulated  -okay to resume anticoagulation      Return in 2 weeks    Lilia Erazo DO  12/28/21      Portions of this note were dictated using Dragon.  There may be linguistic errors secondary to the use of this program.

## 2022-01-03 ENCOUNTER — TELEPHONE (OUTPATIENT)
Dept: ENT CLINIC | Age: 57
End: 2022-01-03

## 2022-01-03 NOTE — TELEPHONE ENCOUNTER
The patient called and wanted to know when he should restart his Plavix. He states that he has not had any nose bleeds since the packing was removed.  He is scheduled to see you on 01/12/2022

## 2022-01-04 ENCOUNTER — TELEPHONE (OUTPATIENT)
Dept: PULMONOLOGY | Age: 57
End: 2022-01-04

## 2022-01-12 ENCOUNTER — OFFICE VISIT (OUTPATIENT)
Dept: ENT CLINIC | Age: 57
End: 2022-01-12
Payer: COMMERCIAL

## 2022-01-12 VITALS — HEIGHT: 72 IN | BODY MASS INDEX: 37.93 KG/M2 | TEMPERATURE: 97.2 F | WEIGHT: 280 LBS

## 2022-01-12 DIAGNOSIS — R04.0 EPISTAXIS: Primary | ICD-10-CM

## 2022-01-12 DIAGNOSIS — Z79.01 ANTICOAGULATED: ICD-10-CM

## 2022-01-12 PROCEDURE — G8417 CALC BMI ABV UP PARAM F/U: HCPCS | Performed by: OTOLARYNGOLOGY

## 2022-01-12 PROCEDURE — G8427 DOCREV CUR MEDS BY ELIG CLIN: HCPCS | Performed by: OTOLARYNGOLOGY

## 2022-01-12 PROCEDURE — 3017F COLORECTAL CA SCREEN DOC REV: CPT | Performed by: OTOLARYNGOLOGY

## 2022-01-12 PROCEDURE — 4004F PT TOBACCO SCREEN RCVD TLK: CPT | Performed by: OTOLARYNGOLOGY

## 2022-01-12 PROCEDURE — 99212 OFFICE O/P EST SF 10 MIN: CPT | Performed by: OTOLARYNGOLOGY

## 2022-01-12 PROCEDURE — G8482 FLU IMMUNIZE ORDER/ADMIN: HCPCS | Performed by: OTOLARYNGOLOGY

## 2022-01-12 ASSESSMENT — ENCOUNTER SYMPTOMS
VOICE CHANGE: 0
FACIAL SWELLING: 0
COUGH: 0
EYE ITCHING: 0
SORE THROAT: 0
APNEA: 0
SHORTNESS OF BREATH: 0
TROUBLE SWALLOWING: 0
SINUS PRESSURE: 0

## 2022-01-12 NOTE — PROGRESS NOTES
Lucia Ramirez [address], [address], [address] [place] [address]  P: [phone]       Patient     Yves Mcgovern  1965    ChiefComplaint     Chief Complaint   Patient presents with    Follow-up     Had a small nose bleed last night, was able to get it stopped pretty quick. History of Present Illness     Genesis Butt is a 41-year-old male here today for follow-up regarding epistaxis. Since last seen by me he had 1 small nosebleed last night which resolved after 1 minute. Has been using nasal saline but ran out. Restarted anticoagulation without issue.     Past Medical History     Past Medical History:   Diagnosis Date    Affective bipolar disorder ([address] 11/18/2010    Anxiety     Bipolar affective ([address]     CAD (coronary artery disease)     Chronic back pain     COPD (chronic obstructive pulmonary disease) (HCC)     DDD (degenerative disc disease), cervical     Depression     Fatty liver 2/2/2021    GERD (gastroesophageal reflux disease)     Hyperlipidemia     Hypertension     MI (myocardial infarction) ([address]     Pancreatitis     Recovering alcoholic ([address]     Tobacco abuse 12/16/2016       Past Surgical History     Past Surgical History:   Procedure Laterality Date    CORONARY AN[facility]OP[facility]AC[facility]  2006, 2012, [facility]       Family History     Family History   Problem Relation Age of Onset    Other Mother     Arthritis Mother     Mental Illness Mother     Depression Mother     Diabetes Father     Heart Disease Father     Substance Abuse Father     Heart Disease Sister     High Blood Pressure Sister     Mental Retardation Brother     Other Brother     Learning Disabilities Brother     Early Death Sister    Jeremiah Robindles Cancer Sister     Substance Abuse Sister     Cancer Sister     Diabetes Brother     High Blood Pressure Brother        Social History     Social History     Tobacco Use    Smoking status: Current Every Day Smoker     Packs/day: 1.00     Years: 45.00     Pack years: 45.00     Types: Cigarettes     Start date: 8/17/1977    Smokeless tobacco: Never Used   Vaping Use    Vaping Use: Never used   Substance Use Topics    Alcohol use: Yes     Alcohol/week: 0.0 standard drinks     Comment: states \"a few beers monthly\"    Drug use: Yes     Types: Marijuana Alaina Liao)     Comment: took friend's suboxone         Allergies     Allergies   Allergen Reactions    Pcn [Penicillins] Other (See Comments) and Hives     Other reaction(s): Light Headed  Patient passed out. syncope       Medications     Current Outpatient Medications   Medication Sig Dispense Refill    amLODIPine (NORVASC) 2.5 MG tablet Take 2.5 mg by mouth daily      furosemide (LASIX) 20 MG tablet       busPIRone (BUSPAR) 10 MG tablet 1/2 - 1 tab 2-3 times daily      lisinopril (PRINIVIL;ZESTRIL) 10 MG tablet Take 10 mg by mouth daily      ipratropium-albuterol (DUONEB) 0.5-2.5 (3) MG/3ML SOLN nebulizer solution Inhale 3 mLs into the lungs every 4 hours 360 mL 0    QUEtiapine (SEROQUEL) 100 MG tablet 100 mg 2 times daily       gabapentin (NEURONTIN) 300 MG capsule Take 300 mg by mouth 3 times daily.        aspirin 325 MG tablet Take 325 mg by mouth daily      ranolazine (RANEXA) 1000 MG extended release tablet Take 1 tablet by mouth 2 times daily 180 tablet 3    isosorbide mononitrate (IMDUR) 30 MG extended release tablet TAKE (1) TABLET DAILY 90 tablet 3    albuterol sulfate  (90 Base) MCG/ACT inhaler Inhale 2 puffs into the lungs every 4 hours as needed      SYMBICORT 160-4.5 MCG/ACT AERO       nitroGLYCERIN (NITROSTAT) 0.4 MG SL tablet Place 1 tablet under the tongue every 5 minutes as needed for Chest pain 25 tablet 3    metoprolol succinate (TOPROL XL) 100 MG extended release tablet Take 1 tablet by mouth daily 90 tablet 3    HydrOXYzine Pamoate (VISTARIL PO) Take 50 mg by mouth daily       atorvastatin (LIPITOR) 40 MG tablet Take 1 tablet by mouth daily 30 tablet 5    pantoprazole (PROTONIX) 20 MG tablet TAKE 2 TABLETS BY MOUTH DAILY (Patient taking differently: TAKE 1 TABLET BY MOUTH BID) 30 tablet 0    clopidogrel (PLAVIX) 75 MG tablet TAKE 1 TABLET BY MOUTH DAILY (Patient not taking: Reported on 12/28/2021) 90 tablet 1     No current facility-administered medications for this visit. Review of Systems     Review of Systems   Constitutional: Negative for appetite change, chills, fatigue, fever and unexpected weight change. HENT: Positive for nosebleeds. Negative for congestion, ear discharge, ear pain, facial swelling, hearing loss, postnasal drip, sinus pressure, sneezing, sore throat, tinnitus, trouble swallowing and voice change. Eyes: Negative for itching. Respiratory: Negative for apnea, cough and shortness of breath. Endocrine: Negative for cold intolerance and heat intolerance. Musculoskeletal: Negative for myalgias and neck pain. Skin: Negative for rash. Allergic/Immunologic: Negative for environmental allergies. Neurological: Negative for dizziness and headaches. Psychiatric/Behavioral: Negative for confusion, decreased concentration and sleep disturbance. PhysicalExam     Vitals:    01/12/22 1017   Temp: 97.2 °F (36.2 °C)   TempSrc: Infrared   Weight: 280 lb (127 kg)   Height: 6' (1.829 m)     Constitutional:       Appearance: Normal appearance. HENT:      Head: Normocephalic. Nose: Nose normal. Crusting of right septum, mild. No active bleeding  Eyes:      Extraocular Movements: Extraocular movements intact. Neck:      Musculoskeletal: Normal range of motion. Neurological:      General: No focal deficit present. Mental Status: She is alert and oriented to person, place, and time. Psychiatric:         Mood and Affect: Mood normal.         Behavior: Behavior normal.         Thought Content: Thought content normal.         Assessment and Plan     1. Epistaxis    2.  Anticoagulated    Doing well  Continue nasal saline spray  Return as needed        Aayush Garrison DO  1/12/22      Portions of this note were dictated using Dragon.  There may be linguistic errors secondary to the use of this program.

## 2022-02-02 ENCOUNTER — OFFICE VISIT (OUTPATIENT)
Dept: CARDIOLOGY CLINIC | Age: 57
End: 2022-02-02
Payer: COMMERCIAL

## 2022-02-02 VITALS
OXYGEN SATURATION: 95 % | DIASTOLIC BLOOD PRESSURE: 70 MMHG | WEIGHT: 291.4 LBS | SYSTOLIC BLOOD PRESSURE: 118 MMHG | HEART RATE: 80 BPM | HEIGHT: 72 IN | TEMPERATURE: 98.5 F | BODY MASS INDEX: 39.47 KG/M2

## 2022-02-02 DIAGNOSIS — E78.2 MIXED HYPERLIPIDEMIA: ICD-10-CM

## 2022-02-02 DIAGNOSIS — I25.10 CORONARY ARTERY DISEASE INVOLVING NATIVE CORONARY ARTERY OF NATIVE HEART WITHOUT ANGINA PECTORIS: Primary | ICD-10-CM

## 2022-02-02 DIAGNOSIS — I10 PRIMARY HYPERTENSION: ICD-10-CM

## 2022-02-02 DIAGNOSIS — Z72.0 TOBACCO ABUSE: ICD-10-CM

## 2022-02-02 DIAGNOSIS — I20.8 CHRONIC STABLE ANGINA (HCC): ICD-10-CM

## 2022-02-02 DIAGNOSIS — E66.09 CLASS 2 OBESITY DUE TO EXCESS CALORIES WITH BODY MASS INDEX (BMI) OF 39.0 TO 39.9 IN ADULT, UNSPECIFIED WHETHER SERIOUS COMORBIDITY PRESENT: ICD-10-CM

## 2022-02-02 DIAGNOSIS — I25.10 CORONARY ARTERY DISEASE INVOLVING NATIVE HEART WITHOUT ANGINA PECTORIS, UNSPECIFIED VESSEL OR LESION TYPE: ICD-10-CM

## 2022-02-02 DIAGNOSIS — E78.00 PURE HYPERCHOLESTEROLEMIA: ICD-10-CM

## 2022-02-02 PROBLEM — I20.89 CHRONIC STABLE ANGINA: Status: ACTIVE | Noted: 2022-02-02

## 2022-02-02 PROBLEM — E66.812 CLASS 2 OBESITY DUE TO EXCESS CALORIES WITH BODY MASS INDEX (BMI) OF 39.0 TO 39.9 IN ADULT: Status: ACTIVE | Noted: 2022-02-02

## 2022-02-02 PROCEDURE — 3017F COLORECTAL CA SCREEN DOC REV: CPT | Performed by: INTERNAL MEDICINE

## 2022-02-02 PROCEDURE — G8417 CALC BMI ABV UP PARAM F/U: HCPCS | Performed by: INTERNAL MEDICINE

## 2022-02-02 PROCEDURE — 99214 OFFICE O/P EST MOD 30 MIN: CPT | Performed by: INTERNAL MEDICINE

## 2022-02-02 PROCEDURE — G8482 FLU IMMUNIZE ORDER/ADMIN: HCPCS | Performed by: INTERNAL MEDICINE

## 2022-02-02 PROCEDURE — 4004F PT TOBACCO SCREEN RCVD TLK: CPT | Performed by: INTERNAL MEDICINE

## 2022-02-02 PROCEDURE — G8427 DOCREV CUR MEDS BY ELIG CLIN: HCPCS | Performed by: INTERNAL MEDICINE

## 2022-02-02 RX ORDER — CLOPIDOGREL BISULFATE 75 MG/1
75 TABLET ORAL DAILY
Qty: 90 TABLET | Refills: 3 | Status: SHIPPED | OUTPATIENT
Start: 2022-02-02

## 2022-02-02 RX ORDER — METOPROLOL SUCCINATE 100 MG/1
100 TABLET, EXTENDED RELEASE ORAL DAILY
Qty: 90 TABLET | Refills: 3 | Status: SHIPPED | OUTPATIENT
Start: 2022-02-02

## 2022-02-02 RX ORDER — FUROSEMIDE 20 MG/1
20 TABLET ORAL DAILY
Qty: 90 TABLET | Refills: 3 | Status: SHIPPED | OUTPATIENT
Start: 2022-02-02

## 2022-02-02 RX ORDER — ATORVASTATIN CALCIUM 40 MG/1
40 TABLET, FILM COATED ORAL DAILY
Qty: 90 TABLET | Refills: 3 | Status: SHIPPED | OUTPATIENT
Start: 2022-02-02

## 2022-02-02 RX ORDER — LISINOPRIL 10 MG/1
10 TABLET ORAL DAILY
Qty: 90 TABLET | Refills: 3 | Status: SHIPPED | OUTPATIENT
Start: 2022-02-02

## 2022-02-02 RX ORDER — AMLODIPINE BESYLATE 2.5 MG/1
2.5 TABLET ORAL DAILY
Qty: 90 TABLET | Refills: 3 | Status: SHIPPED | OUTPATIENT
Start: 2022-02-02

## 2022-02-02 NOTE — PROGRESS NOTES
[address] [facility]   Cardiovascular Evaluation    PATIENT: Parviz Kay  DATE: 2022  MRN: [id number]  CSN: [id number]  : 1965    Primary Care Doctor: Diana Linares MD    Reason for evaluation:   6 Month Follow-Up, Coronary Artery Disease, Other (swelling in the legs and possible abdomen), and Other (feels short of breath today at office visit)      Subjective:    History of present illness on initial date of evaluation:   Parviz Kay is a 55 y.o. male  patient who presents for follow up. Since his last office visit he has had multiple emergency room visits. He was admitted for COPD exacerbation from -2021 treated with IV antibiotics. He was seen multiple times for chest pain workup was negative for acute coronary syndrome. At his last follow up with [facility] 2021 his lisinopril was discontinued and he was continued on Ranexa for angina. Today he states he is having chest pains at times, he states he believes this to be anxiety related. He states he continues to smoke. He presents on oxygen today via nasal cannula. Patient currently denies any weight gain, edema, palpitations, shortness of breath, dizziness, and syncope. He is taking all medications as prescribed, tolerating well.          Patient Active Problem List   Diagnosis    Claudication ([address]    HTN (hypertension)    Hyperlipidemia    Acute chest pain    Coronary artery disease involving native heart without angina pectoris    Tobacco abuse    Thumb sprain    Mass of hand    Cardiac microvascular disease    Pneumonia    COPD exacerbation ([address]    Acute respiratory failure with hypoxia ([address]    Hyponatremia    Affective bipolar disorder ([address]    Depression with anxiety    Fatty liver    GERD (gastroesophageal reflux disease)    COPD, severe (HCC)    Class 2 obesity due to excess calories with body mass index (BMI) of 39.0 to 39.9 in adult    Chronic stable angina ([address] Cardiac Testing: I have reviewed the findings below. EKG:  ECHO:   STRESS TEST:  CATH:  BYPASS:  VASCULAR:    Past Medical History:   has a past medical history of Affective bipolar disorder ([address] Anxiety, Bipolar affective ([address] CAD (coronary artery disease), Chronic back pain, Class 2 obesity due to excess calories with body mass index (BMI) of 39.0 to 39.9 in adult, COPD (chronic obstructive pulmonary disease) (HCC), DDD (degenerative disc disease), cervical, Depression, Fatty liver, GERD (gastroesophageal reflux disease), Hyperlipidemia, Hypertension, MI (myocardial infarction) ([address] Pancreatitis, Recovering alcoholic ([address] and Tobacco abuse. Surgical History:   has a past surgical history that includes Coronary angioplasty with stent (2006, 2012, 2013). Social History:   reports that he has been smoking cigarettes. He started smoking about 44 years ago. He has a 45.00 pack-year smoking history. He has never used smokeless tobacco. He reports current alcohol use. He reports current drug use. Drug: Marijuana Abran Grace). Family History:  No evidence for sudden cardiac death or premature CAD    Medications:  Reviewed and are listed in nursing record. and/or listed below  Outpatient Medications:  Prior to Admission medications    Medication Sig Start Date End Date Taking?  Authorizing Provider   amLODIPine (NORVASC) 2.5 MG tablet Take 1 tablet by mouth daily 2/2/22  Yes Ginna Mora MD   atorvastatin (LIPITOR) 40 MG tablet Take 1 tablet by mouth daily 2/2/22  Yes Ginna Mora MD   clopidogrel (PLAVIX) 75 MG tablet Take 1 tablet by mouth daily 2/2/22  Yes Ginna Mora MD   furosemide (LASIX) 20 MG tablet Take 1 tablet by mouth daily 2/2/22  Yes Ginna Mora MD   lisinopril (PRINIVIL;ZESTRIL) 10 MG tablet Take 1 tablet by mouth daily 2/2/22  Yes Ginna Mora MD   metoprolol succinate (TOPROL XL) 100 MG extended release tablet Take 1 tablet by mouth daily 2/2/22  Yes Anthony Garcia MD   busPIRone (BUSPAR) 10 MG tablet 1/2 - 1 tab 2-3 times daily 3/31/21  Yes Historical Provider, MD   ipratropium-albuterol (DUONEB) 0.5-2.5 (3) MG/3ML SOLN nebulizer solution Inhale 3 mLs into the lungs every 4 hours 7/22/21  Yes Garcia Kumar MD   QUEtiapine (SEROQUEL) 100 MG tablet 100 mg 2 times daily  6/21/21  Yes Historical Provider, MD   gabapentin (NEURONTIN) 300 MG capsule Take 300 mg by mouth 3 times daily. 6/21/21  Yes Historical Provider, MD   aspirin 325 MG tablet Take 325 mg by mouth daily   Yes Historical Provider, MD   ranolazine (RANEXA) 1000 MG extended release tablet Take 1 tablet by mouth 2 times daily 6/17/21  Yes JSOE Chan CNP   isosorbide mononitrate (IMDUR) 30 MG extended release tablet TAKE (1) TABLET DAILY 6/17/21  Yes JOSE Chan CNP   albuterol sulfate  (90 Base) MCG/ACT inhaler Inhale 2 puffs into the lungs every 4 hours as needed 2/2/21  Yes Historical Provider, MD   SYMBICORT 160-4.5 MCG/ACT AERO  2/2/21  Yes Historical Provider, MD   nitroGLYCERIN (NITROSTAT) 0.4 MG SL tablet Place 1 tablet under the tongue every 5 minutes as needed for Chest pain 12/3/20  Yes JOSE Chan CNP   HydrOXYzine Pamoate (VISTARIL PO) Take 50 mg by mouth daily    Yes Historical Provider, MD   pantoprazole (PROTONIX) 20 MG tablet TAKE 2 TABLETS BY MOUTH DAILY  Patient taking differently: TAKE 1 TABLET BY MOUTH BID 9/29/16  Yes JOSE Pickering CNP       In-patient schedule medications:        Infusion Medications: Allergies:  Pcn [penicillins]     Review of Systems:   All 14 point review of symptoms completed. Pertinent positives identified in the HPI, all other review of symptoms negative as below.     Review of Systems - History obtained from the patient  General ROS: negative for - chills, fever or night sweats  Psychological ROS: negative for - disorientation or hallucinations  Ophthalmic ROS: negative for - dry eyes, eye pain or loss of vision  ENT ROS: negative for - nasal discharge or sore throat  Allergy and Immunology ROS: negative for - hives or itchy/watery eyes  Hematological and Lymphatic ROS: negative for - jaundice or night sweats  Endocrine ROS: negative for - mood swings or temperature intolerance  Breast ROS: deferred  Respiratory ROS: negative for - hemoptysis or stridor  Cardiovascular ROS: negative for - chest pain, dyspnea on exertion or palpitations  Gastrointestinal ROS: no abdominal pain, change in bowel habits, or black or bloody stools  Genito-Urinary ROS: no dysuria, trouble voiding, or hematuria  Musculoskeletal ROS: negative for - gait disturbance, joint pain or joint stiffness  Neurological ROS: negative for - seizures or speech problems  Dermatological ROS: negative for - rash or skin lesion changes        Physical Examination:    Vitals:    02/02/22 1043   BP: 118/70   Pulse: 80   Temp: 98.5 °F (36.9 °C)   SpO2: 95%    Weight: 291 lb 6.4 oz (132.2 kg)     Wt Readings from Last 3 Encounters:   02/02/22 291 lb 6.4 oz (132.2 kg)   01/12/22 280 lb (127 kg)   12/28/21 281 lb (127.5 kg)     No intake or output data in the 24 hours ending 02/02/22 1229    General Appearance:  Alert, cooperative, no distress, appears stated age   Head:  Normocephalic, without obvious abnormality, atraumatic   Eyes:  PERRL, conjunctiva/corneas clear       Nose: Nares normal, no drainage or sinus tenderness   Throat: Lips, mucosa, and tongue normal   Neck: Supple, symmetrical, trachea midline, no adenopathy, thyroid: not enlarged, symmetric, no tenderness/mass/nodules, no carotid bruit or JVD       Lungs:   Clear to auscultation bilaterally, respirations unlabored   Chest Wall:  No tenderness or deformity   Heart:  Regular rhythm and normal rate; S1, S2 are normal; no murmur noted; no rub or gallop   Abdomen:   Obese, soft, non-tender, bowel sounds active all four quadrants,  no masses, no organomegaly           Extremities: Extremities normal, atraumatic, no cyanosis or edema   Pulses: 2+ and symmetric   Skin: Skin color, texture, turgor normal, no rashes or lesions   Pysch: Normal mood and affect   Neurologic: Normal gross motor and sensory exam.         Labs  No results for input(s): WBC, HGB, HCT, MCV, PLT in the last 72 hours. No results for input(s): CREATININE, BUN, NA, K, CL, CO2 in the last 72 hours. No results for input(s): INR, PROTIME in the last 72 hours. No results for input(s): TROPONINI in the last 72 hours. Invalid input(s): PRO-BNP  No results for input(s): CHOL, LDL, HDL in the last 72 hours. Invalid input(s): TG      Imaging:  I have reviewed the below testing personally and my interpretation is below. EKG:  CXR:      Assessment:  55 y.o. patient with:   Diagnosis Orders   1. Coronary artery disease involving native coronary artery of native heart without angina pectoris  clopidogrel (PLAVIX) 75 MG tablet    furosemide (LASIX) 20 MG tablet    metoprolol succinate (TOPROL XL) 100 MG extended release tablet   2. Primary hypertension  amLODIPine (NORVASC) 2.5 MG tablet    lisinopril (PRINIVIL;ZESTRIL) 10 MG tablet    metoprolol succinate (TOPROL XL) 100 MG extended release tablet   3. Mixed hyperlipidemia  atorvastatin (LIPITOR) 40 MG tablet   4. Tobacco abuse     5. Class 2 obesity due to excess calories with body mass index (BMI) of 39.0 to 39.9 in adult, unspecified whether serious comorbidity present     6. Chronic stable angina [address]   7. Pure hypercholesterolemia  atorvastatin (LIPITOR) 40 MG tablet   8. Coronary artery disease involving native heart without angina pectoris, unspecified vessel or lesion type  clopidogrel (PLAVIX) 75 MG tablet          Plan:  1. Tobacco Cessation ~ high risk factor for heart attack or stroke  ~1-800-QUIT-NOW  2.  Weight loss encouraged with diet and exercise changes.    ~ Get at least 150 minutes per week of moderate-intensity aerobic activity or 75 minutes per week of high intensity aerobic activity. Examples of moderate intensity activity- brisk walking, water aerobics, gardening, or dancing. Examples of high intensity activity- hiking, running, swimming laps, heavy yard work, playing tennis, or biking. 3. Referral to Cardiac Rehabilitation ~ history of chronic stable angina   4. Follow up in 6 months with Sadie Calderon CNP. 5. Follow up with me in one year. Scribe's attestation: This note was scribed in the presence of Eve Morris by Robin Mahajan RN     I, Dr. Antonieta Lemus, personally performed the services described in this documentation, as scribed by the above signed scribe in my presence. It is both accurate and complete to my knowledge. I agree with the details independently gathered by the clinical support staff, while the remaining scribed note accurately describes my personal service to the patient. Medical Decision Making: The following items were considered in medical decision making:  Independent review of images  Review / order clinical lab tests  Review / order radiology tests  Decision to obtain old records  Review and summation of old records as accessed through [facility] (a summary of my findings in these old records)      Time Based Itemization  A total of 30 minutes was spent on today's patient encounter. If applicable, non-patient-facing activities:  (X)Preparing to see the patient and reviewing records  (X) Individual interpretation of results  ( ) Discussion or coordination of care with other health care professionals  () Ordering of unique tests, medications, or procedures  (X) Documentation within the EHR             All questions and concerns were addressed to the patient/family. Alternatives to my treatment were discussed. The note was completed using EMR. Every effort was made to ensure accuracy; however, inadvertent computerized transcription errors may be present.           Antonieta Lemus MD, LILIA, Nenita Nieves  [phone] [facility]  [phone] [facility]  2/2/2022  12:29 PM

## 2022-02-02 NOTE — PATIENT INSTRUCTIONS
Plan:  1. Tobacco Cessation ~ high risk factor for heart attack or stroke  ~1-800-QUIT-NOW  2. Weight loss encouraged with diet and exercise changes.    ~ Get at least 150 minutes per week of moderate-intensity aerobic activity or 75 minutes per week of high intensity aerobic activity. Examples of moderate intensity activity- brisk walking, water aerobics, gardening, or dancing. Examples of high intensity activity- hiking, running, swimming laps, heavy yard work, playing tennis, or biking. 3. Referral to Cardiac Rehabilitation ~ history of chronic stable angina   4. Follow up in 6 months with Raul Gastelum CNP. 5. Follow up with me in one year.

## 2022-05-09 ENCOUNTER — APPOINTMENT (OUTPATIENT)
Dept: GENERAL RADIOLOGY | Age: 57
End: 2022-05-09
Payer: COMMERCIAL

## 2022-05-09 ENCOUNTER — HOSPITAL ENCOUNTER (EMERGENCY)
Age: 57
Discharge: HOME | End: 2022-05-09
Attending: EMERGENCY MEDICINE
Payer: COMMERCIAL

## 2022-05-09 VITALS
WEIGHT: 286 LBS | HEART RATE: 65 BPM | SYSTOLIC BLOOD PRESSURE: 146 MMHG | RESPIRATION RATE: 18 BRPM | HEIGHT: 72 IN | BODY MASS INDEX: 38.74 KG/M2 | DIASTOLIC BLOOD PRESSURE: 96 MMHG | TEMPERATURE: 97.9 F | OXYGEN SATURATION: 96 %

## 2022-05-09 DIAGNOSIS — R07.9 CHEST PAIN, UNSPECIFIED TYPE: Primary | ICD-10-CM

## 2022-05-09 LAB
A/G RATIO: 1.3 (ref 1.1–2.2)
ALBUMIN SERPL-MCNC: 3.8 G/DL (ref 3.4–5)
ALP BLD-CCNC: 74 U/L (ref 40–129)
ALT SERPL-CCNC: 14 U/L (ref 10–40)
ANION GAP SERPL CALCULATED.3IONS-SCNC: 12 MMOL/L (ref 3–16)
AST SERPL-CCNC: 11 U/L (ref 15–37)
BASOPHILS ABSOLUTE: 0.2 K/UL (ref 0–0.2)
BASOPHILS RELATIVE PERCENT: 1.4 %
BILIRUB SERPL-MCNC: 0.3 MG/DL (ref 0–1)
BUN BLDV-MCNC: 7 MG/DL (ref 7–20)
CALCIUM SERPL-MCNC: 8.4 MG/DL (ref 8.3–10.6)
CHLORIDE BLD-SCNC: 100 MMOL/L (ref 99–110)
CO2: 24 MMOL/L (ref 21–32)
CREAT SERPL-MCNC: 0.8 MG/DL (ref 0.9–1.3)
EKG ATRIAL RATE: 55 BPM
EKG ATRIAL RATE: 63 BPM
EKG DIAGNOSIS: NORMAL
EKG DIAGNOSIS: NORMAL
EKG P AXIS: 10 DEGREES
EKG P AXIS: 2 DEGREES
EKG P-R INTERVAL: 170 MS
EKG P-R INTERVAL: 174 MS
EKG Q-T INTERVAL: 442 MS
EKG Q-T INTERVAL: 464 MS
EKG QRS DURATION: 94 MS
EKG QRS DURATION: 96 MS
EKG QTC CALCULATION (BAZETT): 443 MS
EKG QTC CALCULATION (BAZETT): 452 MS
EKG R AXIS: 34 DEGREES
EKG R AXIS: 36 DEGREES
EKG T AXIS: 49 DEGREES
EKG T AXIS: 56 DEGREES
EKG VENTRICULAR RATE: 55 BPM
EKG VENTRICULAR RATE: 63 BPM
EOSINOPHILS ABSOLUTE: 0.3 K/UL (ref 0–0.6)
EOSINOPHILS RELATIVE PERCENT: 2.8 %
GFR AFRICAN AMERICAN: >60
GFR NON-AFRICAN AMERICAN: >60
GLUCOSE BLD-MCNC: 97 MG/DL (ref 70–99)
HCT VFR BLD CALC: 36.7 % (ref 40.5–52.5)
HEMOGLOBIN: 12.4 G/DL (ref 13.5–17.5)
LYMPHOCYTES ABSOLUTE: 2.6 K/UL (ref 1–5.1)
LYMPHOCYTES RELATIVE PERCENT: 23.6 %
MCH RBC QN AUTO: 27.1 PG (ref 26–34)
MCHC RBC AUTO-ENTMCNC: 33.8 G/DL (ref 31–36)
MCV RBC AUTO: 80.4 FL (ref 80–100)
MONOCYTES ABSOLUTE: 0.8 K/UL (ref 0–1.3)
MONOCYTES RELATIVE PERCENT: 6.8 %
NEUTROPHILS ABSOLUTE: 7.3 K/UL (ref 1.7–7.7)
NEUTROPHILS RELATIVE PERCENT: 65.4 %
PDW BLD-RTO: 16.7 % (ref 12.4–15.4)
PLATELET # BLD: 165 K/UL (ref 135–450)
PMV BLD AUTO: 8.5 FL (ref 5–10.5)
POTASSIUM REFLEX MAGNESIUM: 4 MMOL/L (ref 3.5–5.1)
RBC # BLD: 4.57 M/UL (ref 4.2–5.9)
SODIUM BLD-SCNC: 136 MMOL/L (ref 136–145)
TOTAL PROTEIN: 6.8 G/DL (ref 6.4–8.2)
TROPONIN: <0.01 NG/ML
TROPONIN: <0.01 NG/ML
WBC # BLD: 11.1 K/UL (ref 4–11)

## 2022-05-09 PROCEDURE — 80053 COMPREHEN METABOLIC PANEL: CPT

## 2022-05-09 PROCEDURE — 36415 COLL VENOUS BLD VENIPUNCTURE: CPT

## 2022-05-09 PROCEDURE — 99285 EMERGENCY DEPT VISIT HI MDM: CPT

## 2022-05-09 PROCEDURE — 71045 X-RAY EXAM CHEST 1 VIEW: CPT

## 2022-05-09 PROCEDURE — 85025 COMPLETE CBC W/AUTO DIFF WBC: CPT

## 2022-05-09 PROCEDURE — 93005 ELECTROCARDIOGRAM TRACING: CPT | Performed by: EMERGENCY MEDICINE

## 2022-05-09 PROCEDURE — 84484 ASSAY OF TROPONIN QUANT: CPT

## 2022-05-09 ASSESSMENT — PAIN - FUNCTIONAL ASSESSMENT: PAIN_FUNCTIONAL_ASSESSMENT: 0-10

## 2022-05-09 ASSESSMENT — PAIN DESCRIPTION - LOCATION: LOCATION: CHEST

## 2022-05-09 ASSESSMENT — PAIN SCALES - GENERAL: PAINLEVEL_OUTOF10: 1

## 2022-05-09 ASSESSMENT — PAIN DESCRIPTION - ORIENTATION: ORIENTATION: LEFT

## 2022-05-09 NOTE — ED NOTES
AVS provided and reviewed with the patient. The patient verbalized understanding of care at home, follow up care, and emergent symptoms to return for. No questions or concerns verbalized at this time. The patient is alert, oriented, stable, and ambulatory out of the department at the time of discharge. Lorraine Hilton, contacted at [phone] for transport home.       Tod Busby RN  05/09/22 [id number]

## 2022-05-09 NOTE — ED PROVIDER NOTES
[address] [facility] Emergency Department      CHIEF COMPLAINT  Chest Pain (Pt having pain in his L chest that radiates into his back. Pt rates pain 1/10 at this time. Given 324 of ASA in route with EMS)      [facility]  Miryam Tuttle is a 55 y.o. male with a history of COPD, anxiety and coronary artery disease presents with chest pain. He states that he was at home on his porch talking to a neighbor. He developed chest pain on the left side of his chest that radiated to his back. He states he does have a history of anxiety and he tried to walk around his yard doing some breathing exercises but it did not help. That is when his neighbors called EMS. He states currently the symptoms are improving. He did feel slightly short of breath with the symptoms status is resolved. He was given aspirin in route to the hospital.  He states the back pain is now resolved. .   No other complaints, modifying factors or associated symptoms. I have reviewed the following from the nursing documentation.     Past Medical History:   Diagnosis Date    Affective bipolar disorder ([address] 11/18/2010    Anxiety     Bipolar affective ([address]     CAD (coronary artery disease)     Chronic back pain     Class 2 obesity due to excess calories with body mass index (BMI) of 39.0 to 39.9 in adult 2/2/2022    COPD (chronic obstructive pulmonary disease) ([address]     DDD (degenerative disc disease), cervical     Depression     Fatty liver 2/2/2021    GERD (gastroesophageal reflux disease)     Hyperlipidemia     Hypertension     MI (myocardial infarction) ([address]     Pancreatitis     Recovering alcoholic ([address]     Tobacco abuse 12/16/2016     Past Surgical History:   Procedure Laterality Date    CORONARY ANGIOPLASTY WITH STENT PLACEMENT  2006, 2012, 2013    [address]     Family History   Problem Relation Age of Onset    Other Mother     Arthritis Mother     Mental Illness Mother     Depression Mother    Triana Diabetes Father     Heart Disease Father     Substance Abuse Father     Heart Disease Sister     High Blood Pressure Sister     Mental Retardation Brother     Other Brother     Learning Disabilities Brother     Early Death Sister    [employer] Cancer Sister     Substance Abuse Sister     Cancer Sister     Diabetes Brother     High Blood Pressure Brother      Social History     Socioeconomic History    Marital status: Single     Spouse name: Not on file    Number of children: Not on file    Years of education: Not on file    Highest education level: Not on file   Occupational History    Not on file   Tobacco Use    Smoking status: Current Every Day Smoker     Packs/day: 1.00     Years: 45.00     Pack years: 45.00     Types: Cigarettes     Start date: 8/17/1977    Smokeless tobacco: Never Used   Vaping Use    Vaping Use: Never used   Substance and Sexual Activity    Alcohol use: Yes     Alcohol/week: 0.0 standard drinks     Comment: states \"a few beers monthly\"    Drug use: Yes     Types: Marijuana Rafy Divine)     Comment: took friend's suboxone     Sexual activity: Never   Other Topics Concern    Not on file   Social History Narrative    Not on file     Social Determinants of Health     Financial Resource Strain:     Difficulty of Paying Living Expenses: Not on file   Food Insecurity:     Worried About Running Out of Food in the Last Year: Not on file    Alisa of Food in the Last Year: Not on file   Transportation Needs:     Lack of Transportation (Medical): Not on file    Lack of Transportation (Non-Medical):  Not on file   Physical Activity:     Days of Exercise per Week: Not on file    Minutes of Exercise per Session: Not on file   Stress:     Feeling of Stress : Not on file   Social Connections:     Frequency of Communication with Friends and Family: Not on file    Frequency of Social Gatherings with Friends and Family: Not on file    Attends Sabianist Services: Not on file   [employer] Active Member of Clubs or Organizations: Not on file    Attends Club or Organization Meetings: Not on file    Marital Status: Not on file   Intimate Partner Violence:     Fear of Current or Ex-Partner: Not on file    Emotionally Abused: Not on file    Physically Abused: Not on file    Sexually Abused: Not on file   Housing Stability:     Unable to Pay for Housing in the Last Year: Not on file    Number of [place] in the Last Year: Not on file    Unstable Housing in the Last Year: Not on file     No current facility-administered medications for this encounter. Current Outpatient Medications   Medication Sig Dispense Refill    amLODIPine (NORVASC) 2.5 MG tablet Take 1 tablet by mouth daily 90 tablet 3    atorvastatin (LIPITOR) 40 MG tablet Take 1 tablet by mouth daily 90 tablet 3    clopidogrel (PLAVIX) 75 MG tablet Take 1 tablet by mouth daily 90 tablet 3    furosemide (LASIX) 20 MG tablet Take 1 tablet by mouth daily 90 tablet 3    lisinopril (PRINIVIL;ZESTRIL) 10 MG tablet Take 1 tablet by mouth daily 90 tablet 3    metoprolol succinate (TOPROL XL) 100 MG extended release tablet Take 1 tablet by mouth daily 90 tablet 3    busPIRone (BUSPAR) 10 MG tablet 1/2 - 1 tab 2-3 times daily      ipratropium-albuterol (DUONEB) 0.5-2.5 (3) MG/3ML SOLN nebulizer solution Inhale 3 mLs into the lungs every 4 hours 360 mL 0    QUEtiapine (SEROQUEL) 100 MG tablet 100 mg 2 times daily       gabapentin (NEURONTIN) 300 MG capsule Take 300 mg by mouth 3 times daily.        aspirin 325 MG tablet Take 325 mg by mouth daily      ranolazine (RANEXA) 1000 MG extended release tablet Take 1 tablet by mouth 2 times daily 180 tablet 3    isosorbide mononitrate (IMDUR) 30 MG extended release tablet TAKE (1) TABLET DAILY 90 tablet 3    albuterol sulfate  (90 Base) MCG/ACT inhaler Inhale 2 puffs into the lungs every 4 hours as needed      SYMBICORT 160-4.5 MCG/ACT AERO       nitroGLYCERIN (NITROSTAT) 0.4 MG SL tablet Place 1 tablet under the tongue every 5 minutes as needed for Chest pain 25 tablet 3    HydrOXYzine Pamoate (VISTARIL PO) Take 50 mg by mouth daily       pantoprazole (PROTONIX) 20 MG tablet TAKE 2 TABLETS BY MOUTH DAILY (Patient taking differently: TAKE 1 TABLET BY MOUTH BID) 30 tablet 0     Allergies   Allergen Reactions    Pcn [Penicillins] Other (See Comments) and Hives     Other reaction(s): Light Headed  Patient passed out. syncope       REVIEW OF SYSTEMS      General:  No fevers  Eyes:  No recent vison changes  ENT:  No sore throat, no nasal congestion  Cardiovascular:  no palpitations. Chest pain  Respiratory:   no cough, no wheezing  Gastrointestinal:  No abdominal pain, no vomiting, no diarrhea  Musculoskeletal:  No muscle pain, no joint pain  Skin:  No rash   Neurologic:  No speech problems, no headache, no extremity numbness, no extremity weakness  Genitourinary:  No dysuria  Extremities:  no edema, no pain      Unless otherwise stated in this report, this patient's positive and negative responses for review of systems (constitutional, eyes, ENT, cardiovascular, respiratory, gastrointestinal, neurological, genitourinary, musculoskeletal, integument systems and systems related to the presenting problem) are either stated in the preceding paragraph, were not pertinent or were negative for the symptoms and/or complaints related to the medical problem. PHYSICAL EXAM  /73   Pulse 61   Temp 97.9 °F (36.6 °C) (Oral)   Resp 20   Ht 6' (1.829 m)   Wt 286 lb (129.7 kg)   SpO2 96%   BMI 38.79 kg/m²   GENERAL APPEARANCE: Awake and alert. Cooperative. No acute distress. HEAD: Normocephalic. Atraumatic. EYES: EOM's grossly intact. ENT: Mucous membranes are moist.   NECK: Supple, trachea midline. HEART: RRR. Equal pulses in all 4 extremities. LUNGS: Respirations unlabored. CTAB. Good air exchange. No wheezes, rales, or rhonchi. Speaking comfortably in full sentences. ABDOMEN: Soft. Non-distended. Non-tender. No guarding or rebound. EXTREMITIES: No peripheral edema. MAEE. No acute deformities. SKIN: Warm, dry and intact. No acute rashes. NEUROLOGICAL: Alert and oriented X 3. CN II-XII grossly intact. Strength 5/5, sensation intact. PSYCHIATRIC: Normal mood and affect. LABS  I have reviewed all labs for this visit.    Results for orders placed or performed during the hospital encounter of 05/09/22   Comprehensive Metabolic Panel w/ Reflex to MG   Result Value Ref Range    Sodium 136 136 - 145 mmol/L    Potassium reflex Magnesium 4.0 3.5 - 5.1 mmol/L    Chloride 100 99 - 110 mmol/L    CO2 24 21 - 32 mmol/L    Anion Gap 12 3 - 16    Glucose 97 70 - 99 mg/dL    BUN 7 7 - 20 mg/dL    CREATININE 0.8 (L) 0.9 - 1.3 mg/dL    GFR Non-African American >60 >60    GFR African American >60 >60    Calcium 8.4 8.3 - 10.6 mg/dL    Total Protein 6.8 6.4 - 8.2 g/dL    Albumin 3.8 3.4 - 5.0 g/dL    Albumin/Globulin Ratio 1.3 1.1 - 2.2    Total Bilirubin 0.3 0.0 - 1.0 mg/dL    Alkaline Phosphatase 74 40 - 129 U/L    ALT 14 10 - 40 U/L    AST 11 (L) 15 - 37 U/L   CBC with Auto Differential   Result Value Ref Range    WBC 11.1 (H) 4.0 - 11.0 K/uL    RBC 4.57 4.20 - 5.90 M/uL    Hemoglobin 12.4 (L) 13.5 - 17.5 g/dL    Hematocrit 36.7 (L) 40.5 - 52.5 %    MCV 80.4 80.0 - 100.0 fL    MCH 27.1 26.0 - 34.0 pg    MCHC 33.8 31.0 - 36.0 g/dL    RDW 16.7 (H) 12.4 - 15.4 %    Platelets 020 609 - 742 K/uL    MPV 8.5 5.0 - 10.5 fL    Neutrophils % 65.4 %    Lymphocytes % 23.6 %    Monocytes % 6.8 %    Eosinophils % 2.8 %    Basophils % 1.4 %    Neutrophils Absolute 7.3 1.7 - 7.7 K/uL    Lymphocytes Absolute 2.6 1.0 - 5.1 K/uL    Monocytes Absolute 0.8 0.0 - 1.3 K/uL    Eosinophils Absolute 0.3 0.0 - 0.6 K/uL    Basophils Absolute 0.2 0.0 - 0.2 K/uL   Troponin   Result Value Ref Range    Troponin <0.01 <0.01 ng/mL   Troponin   Result Value Ref Range    Troponin <0.01 <0.01 ng/mL   EKG 12 Lead   Result Value Ref Range Ventricular Rate 63 BPM    Atrial Rate 63 BPM    P-R Interval 170 ms    QRS Duration 96 ms    Q-T Interval 442 ms    QTc Calculation (Bazett) 452 ms    P Axis 2 degrees    R Axis 36 degrees    T Axis 56 degrees    Diagnosis       Normal sinus rhythmNormal ECGWhen compared with ECG of 22-DEC-2021 21:14,No significant change was found   EKG 12 Lead   Result Value Ref Range    Ventricular Rate 55 BPM    Atrial Rate 55 BPM    P-R Interval 174 ms    QRS Duration 94 ms    Q-T Interval 464 ms    QTc Calculation (Bazett) 443 ms    P Axis 10 degrees    R Axis 34 degrees    T Axis 49 degrees    Diagnosis       Sinus bradycardiaOtherwise normal ECGWhen compared with ECG of 09-MAY-2022 15:58, (unconfirmed)No significant change was found       EKG  The Ekg interpreted by myself  normal sinus rhythm with a rate of 63  Axis is   Normal  QTc is  normal  Intervals and Durations are unremarkable. No specific ST-T wave changes appreciated. No evidence of acute ischemia. No significant change from prior EKG dated 12/22/2021      EKG  The Ekg interpreted by myself  sinus bradycardia, rate=55 with a rate of 55  Axis is   Normal  QTc is  normal  Intervals and Durations are unremarkable. No specific ST-T wave changes appreciated. No evidence of acute ischemia. Sinus bradycardia has replaced normal sinus rhythm compared to the EKG from earlier tonight. Cardiac Monitoring: The cardiac monitor revealed normal sinus rhythm as interpreted by me. The cardiac monitor was ordered secondary to the patient's complaint of chest pain and to monitor the patient for dysrhythmia. RADIOLOGY  X-RAYS: ALL IMAGES INCLUDING PLAIN FILMS, CT, ULTRASOUND AND MRI HAVE BEEN READ BY THE RADIOLOGIST. I have personally reviewed plain film images and have reviewed the radiology reports. XR CHEST PORTABLE   Final Result   No acute cardiopulmonary disease. Rechecks: Physical assessment performed.   The patient symptoms significantly improved on arrival here. While he has been here in the ER being observed his symptoms resolved. He has been updated on his lab work findings. I spoke with the patient at length. He states he has significant anxiety with maybe this was a panic attack. He states his cardiologist is told him there is nothing else I have to offer him. Smoking Cessation counseling: At least 3 minutes of smoking cessation education was provided to the patient including assessing the patient's readiness for change, identifying barriers to change, suggesting specific actions for change, motivational counseling and arranging follow-up. Heart Score: HEART SCORE:  History: +2 high suspicion, +1 moderate, 0 low  EKG: +2 significant ST depression, +1 nonspec changes, 0 normal  Age: +2 >65, +1 45-65, 0 <45  Risk factors: +2 >3 or known CAD, +1 1-2, 0 none (factors = HLD, HTN, DM, tobacco, obesity, FHx)  Troponin: +2 >3x normal limit, +1 1-3x normal limit, 0 neg    Heart score:3    ED COURSE/MDM  Patient seen and evaluated. Here the patient is afebrile with normal vitals signs. Old records reviewed. Here the patient looks well. Symptoms are almost resolved on my initial evaluation and do resolve while in the ER. EKG with no ischemic changes, troponin is negative, lab work is all baseline. I have extremely low suspicion for aortic dissection or PE. Chest x-ray is normal.  A 3-hour repeat troponin remains negative. The patient although he has a cardiac history only has a heart score of 3. In light of this I do think he is appropriate for outpatient follow-up. Labs and imaging reviewed and results discussed with patient. Patient was reassessed as noted above . Plan of care discussed with patient. Patient in agreement with plan. Strict return precautions have been given. I completed a HEART Score to screen for Major Adverse Cardiac Event (MACE) in this patient.  The evidence indicates that the patient is very low risk for MACE and this is consistent with my clinical intuition. The risk of further workup or hospitalization for MACE is likely higher than the risk of the patient having a MACE. It is, therefore, in the patient's best interest not to do additional emergent testing or to be hospitalized for MACE. I have discussed with the patient my clinical impression and the result of the HEART Score to screen for MACE, as well as the risks of further testing and hospitalization. I estimate there is LOW risk for PULMONARY EMBOLISM, ACUTE CORONARY SYNDROME, OR THORACIC AORTIC DISSECTION, thus I consider the discharge disposition reasonable. Segun Wolff and I have discussed the diagnosis and risks, and we agree with discharging home to follow-up with their primary doctor. We also discussed returning to the Emergency Department immediately if new or worsening symptoms occur. We have discussed the symptoms which are most concerning (e.g., bloody sputum, fever, worsening pain or shortness of breath, vomiting) that necessitate immediate return. Patient was given scripts for the following medications. I counseled patient how to take these medications. New Prescriptions    No medications on file           CLINICAL IMPRESSION  1. Chest pain, unspecified type        Blood pressure 123/73, pulse 61, temperature 97.9 °F (36.6 °C), temperature source Oral, resp. rate 20, height 6' (1.829 m), weight 286 lb (129.7 kg), SpO2 96 %. [address] was discharged to home in stable condition.     (Please note this note was completed with a voice recognition program.  Efforts were made to edit the dictations but occasionally words are mis-transcribed.)        Waylon Srinivasan MD  05/12/22 [id number]

## 2022-07-05 NOTE — PROGRESS NOTES
[address]   Cardiology Note              Date:  July 5, 2022  Patientname: Reva Segura  YOB: 1965    Primary Care physician: Devante Regan MD    HISTORY OF PRESENT ILLNESS: Reva Segura is a 55 y.o. male with a history of CAD, HTN, HLD, COPD, anxiety. He states he had PCI in 2006, 2012 and 2013. Echo 2017 showed EF 55%. [address] 2018 showed patent stent and nonobstructive CAD. Stress test 2020 normal.     Today he presents for follow up for CAD, HTN, HLD. He has daily intermittent chest pain, can be constant for hours, does not worsen with exertion, improves sometimes when he pushes on his chest, has had this for years. He has shortness of breath at baseline that is unchanged. He has occasional dizziness and edema. He smokes. He tries to stay active at home. Past Medical History:   has a past medical history of Affective bipolar disorder ([address] Anxiety, Bipolar affective ([address] CAD (coronary artery disease), Chronic back pain, Class 2 obesity due to excess calories with body mass index (BMI) of 39.0 to 39.9 in adult, COPD (chronic obstructive pulmonary disease) ([address] DDD (degenerative disc disease), cervical, Depression, Fatty liver, GERD (gastroesophageal reflux disease), Hyperlipidemia, Hypertension, MI (myocardial infarction) ([address] Pancreatitis, Recovering alcoholic ([address] and Tobacco abuse. Past Surgical History:   has a past surgical history that includes Coronary angioplasty with stent (2006, 2012, 2013). Home Medications:    Prior to Admission medications    Medication Sig Start Date End Date Taking?  Authorizing Provider   amLODIPine (NORVASC) 2.5 MG tablet Take 1 tablet by mouth daily 2/2/22   Mak Robb MD   atorvastatin (LIPITOR) 40 MG tablet Take 1 tablet by mouth daily 2/2/22   Mak Robb MD   clopidogrel (PLAVIX) 75 MG tablet Take 1 tablet by mouth daily 2/2/22   Mak Robb MD   furosemide (LASIX) 20 MG tablet Take 1 tablet by mouth daily 2/2/22   Riri Peterson MD   lisinopril (PRINIVIL;ZESTRIL) 10 MG tablet Take 1 tablet by mouth daily 2/2/22   Riri Peterson MD   metoprolol succinate (TOPROL XL) 100 MG extended release tablet Take 1 tablet by mouth daily 2/2/22   Riri Peterson MD   busPIRone (BUSPAR) 10 MG tablet 1/2 - 1 tab 2-3 times daily 3/31/21   Historical Provider, MD   ipratropium-albuterol (DUONEB) 0.5-2.5 (3) MG/3ML SOLN nebulizer solution Inhale 3 mLs into the lungs every 4 hours 7/22/21   Kelvin Kelly MD   QUEtiapine (SEROQUEL) 100 MG tablet 100 mg 2 times daily  6/21/21   Historical Provider, MD   gabapentin (NEURONTIN) 300 MG capsule Take 300 mg by mouth 3 times daily. 6/21/21   Historical Provider, MD   aspirin 325 MG tablet Take 325 mg by mouth daily    Historical Provider, MD   ranolazine (RANEXA) 1000 MG extended release tablet Take 1 tablet by mouth 2 times daily 6/17/21   JOSE Mike CNP   isosorbide mononitrate (IMDUR) 30 MG extended release tablet TAKE (1) TABLET DAILY 6/17/21   JOSE Mike CNP   albuterol sulfate  (90 Base) MCG/ACT inhaler Inhale 2 puffs into the lungs every 4 hours as needed 2/2/21   Historical Provider, MD   SYMBICORT 160-4.5 MCG/ACT AERO  2/2/21   Historical Provider, MD   nitroGLYCERIN (NITROSTAT) 0.4 MG SL tablet Place 1 tablet under the tongue every 5 minutes as needed for Chest pain 12/3/20   JOSE Mike CNP   HydrOXYzine Pamoate (VISTARIL PO) Take 50 mg by mouth daily     Historical Provider, MD   pantoprazole (PROTONIX) 20 MG tablet TAKE 2 TABLETS BY MOUTH DAILY  Patient taking differently: TAKE 1 TABLET BY MOUTH BID 9/29/16   JOSE Pickering CNP     Allergies:  Pcn [penicillins]    Social History:   reports that he has been smoking cigarettes. He started smoking about 44 years ago. He has a 45.00 pack-year smoking history. He has never used smokeless tobacco. He reports current alcohol use. He reports current drug use.  Drug: Marijuana Nick Potter. Family History: family history includes Arthritis in his mother; Cancer in his sister and sister; Depression in his mother; Diabetes in his brother and father; Early Death in his sister; Heart Disease in his father and sister; High Blood Pressure in his brother and sister; Learning Disabilities in his brother; Mental Illness in his mother; Mental Retardation in his brother; Other in his brother and mother; Substance Abuse in his father and sister. Review of Systems   Review of Systems   Constitutional: Negative. Respiratory: Positive for shortness of breath. Cardiovascular: Positive for chest pain. Gastrointestinal: Negative. Neurological: Positive for dizziness. OBJECTIVE:    Vital signs:    /70   Pulse 75   Temp 98.6 °F (37 °C)   Ht 6' (1.829 m)   Wt 274 lb (124.3 kg)   SpO2 95%   BMI 37.16 kg/m²      Physical Exam:  Constitutional:  Comfortable and alert, NAD, appears older than stated age  Eyes: PERRL, sclera nonicteric  Neck:  Supple, no masses, no thyroidmegaly, no JVD  Skin:  Warm and dry; no rash or lesions  Heart: Regular, normal apex, S1 and S2 normal, no M/G/R  Lungs:  Normal respiratory effort; clear; no wheezing/rhonchi/rales  Abdomen: soft, non tender, + bowel sounds  Extremities:  No edema or cyanosis; no clubbing  Neuro: alert and oriented, moves legs and arms equally, normal mood and affect    Data Reviewed:      Stress test 2020:   Normal LV size and systolic function. Left ventricular ejection fraction of    64%. Normal wall motion. There is normal isotope uptake at stress and rest.    There is no evidence of myocardial ischemia or scar. Echo 8/2017:   Left ventricular systolic function is normal with an ejection fraction of   55-60%. No wall motion abnormalities noted. Normal left ventricular diastolic filling pressure. Trivial mitral regurgitation. Coronary angiogram 1/2018:  1. Selective coronary angiography.   2.  Left heart catheterization. 3.  Left ventriculography. ANGIOGRAPHY FINDINGS:  1.  Mild-to-moderate coronary artery disease of all three coronary  arteries. 2.  Preserved left ventricular function with an EF of 60%. 3.  Normal hemodynamics. 4.  Patent stent noted within the distal right coronary artery with minimal  in-stent restenosis. INDICATIONS FOR PROCEDURE:  The patient is a 52-year-old gentleman who was  referred as an outpatient for evaluation. I had the opportunity to see him  this morning. He is a new patient in consultation for chest pain and was  concerned about unstable angina class II and III symptoms and _____  referred for _____ coronary angiography this afternoon. OPERATIVE PROCEDURE:  After informed consent was obtained, he was brought  to the cardiac cath lab. The right radial artery was evaluated and prepped  and draped in a sterile fashion. A 2% lidocaine was injected into the  right radial site and a 5-Kazakh arterial sheath was introduced using a  single arterial wall puncture modified technique. Diagnostic catheter was  TIG 4.0. Initially, the TIG catheter was advanced into the left ventricle. The left ventricular end-diastolic pressure was measured at 12, the aortic  pressure was 104/72, and left ventricular pressure is 100/12. No gradient  across pullback and wall motion was normal with an EF of 50%. Coronary angiography demonstrates a large left main, which bifurcates into  a large LAD and circ. There is no disease noted in the left main. There  is significant tortuosity and overlap. The circ gives rise to a codominant  vessel. There is a modest size AV groove circumflex and then an early  obtuse marginal branch. The origin of this obtuse marginal branch probably  has about 50% area of atherosclerosis. The LAD is visualized. The LAD is  a large vessel.   It gives off to early diagonal branches and mild disease  in the diagonal branch and then the LAD itself has some mild-to-moderate  disease throughout about 20% to 30%. The distal LAD is a modest size  vessel and has no critical obstructive disease. The right coronary artery is engaged. The right coronary artery is a large  vessel. It is a dominant vessel in circulation, has some modest disease in  the proximal area, it is about 30% and then the mid segment has some  luminal irregularities and there were two stents placed. One prior to the  bifurcation and the one into the posterolateral branch. The one prior to  the bifurcation has some irregularities with mild in-stent restenosis and  then there was one in the distal right coronary artery that has also some  mild in-stent restenosis. There is no critical obstructive disease noted  within the right coronary artery and some branch vessel disease. CONCLUSION:  Mild-to-moderate diffuse coronary artery disease with  preserved left ventricular function. There was also previously placed  stent within the distal right coronary artery that are patent and had some  mild in-stent restenosis, but no flow limiting lesions noted. At this point, I suspect that the patient's symptoms are attributed to the  combination of his inflammatory lung disease along with small vessel  coronary artery disease. I am going to add Ranexa 500 mg p.o. b.i.d. to  his regimen. In addition, I will have him continue to take his dual  antiplatelet therapy and current secondary prevention. Cardiology Labs Reviewed:   CBC: No results for input(s): WBC, HGB, HCT, PLT in the last 72 hours. BMP:No results for input(s): NA, K, CO2, BUN, CREATININE, LABGLOM, GLUCOSE in the last 72 hours. PT/INR: No results for input(s): PROTIME, INR in the last 72 hours. APTT:No results for input(s): APTT in the last 72 hours.   FASTING LIPID PANEL:  Lab Results   Component Value Date/Time    HDL 36 12/18/2018 03:52 AM    LDLDIRECT 77 01/29/2018 11:10 AM    LDLCALC 95 12/18/2018 03:52 AM    TRIG 150 12/18/2018 03:52 AM LIVER PROFILE:No results for input(s): AST, ALT, ALB in the last 72 hours. BNP:   Lab Results   Component Value Date/Time    PROBNP 92 08/21/2021 04:55 PM    PROBNP 367 07/26/2021 01:47 PM    PROBNP 136 07/22/2021 02:05 PM    PROBNP 224 07/20/2021 07:25 PM    PROBNP 44 01/10/2017 06:56 PM     Reviewed all labs and imaging today    Assessment:   Chest pain: atypical, not c/w prior angina, ongoing  CAD: normal stress test 2020; patent stent and nonobstructive on [facility] 2018; reported PCI in 2006, 2012, 2013  HTN: controlled  HLD: sub optimal, LDL 95, statin and recheck  COPD: on home oxygen  Tobacco abuse: counseled    Plan:   1. Continue current medications  2. Stay active along with a healthy diet  3. Check BP at home and call the office if consistently out of goal range  4.  Follow up with Dr. Kelvin Horton in 6 months    Felipe Medrano APRN-PK  [facility]  [phone]

## 2022-07-06 ENCOUNTER — OFFICE VISIT (OUTPATIENT)
Dept: CARDIOLOGY CLINIC | Age: 57
End: 2022-07-06
Payer: COMMERCIAL

## 2022-07-06 VITALS
BODY MASS INDEX: 37.11 KG/M2 | HEIGHT: 72 IN | HEART RATE: 75 BPM | TEMPERATURE: 98.6 F | OXYGEN SATURATION: 95 % | WEIGHT: 274 LBS | DIASTOLIC BLOOD PRESSURE: 70 MMHG | SYSTOLIC BLOOD PRESSURE: 118 MMHG

## 2022-07-06 DIAGNOSIS — I10 ESSENTIAL HYPERTENSION: ICD-10-CM

## 2022-07-06 DIAGNOSIS — I25.10 CORONARY ARTERY DISEASE INVOLVING NATIVE CORONARY ARTERY OF NATIVE HEART WITHOUT ANGINA PECTORIS: Primary | ICD-10-CM

## 2022-07-06 DIAGNOSIS — E78.2 MIXED HYPERLIPIDEMIA: ICD-10-CM

## 2022-07-06 DIAGNOSIS — Z72.0 TOBACCO ABUSE: ICD-10-CM

## 2022-07-06 PROCEDURE — G8427 DOCREV CUR MEDS BY ELIG CLIN: HCPCS | Performed by: NURSE PRACTITIONER

## 2022-07-06 PROCEDURE — 99214 OFFICE O/P EST MOD 30 MIN: CPT | Performed by: NURSE PRACTITIONER

## 2022-07-06 PROCEDURE — G8417 CALC BMI ABV UP PARAM F/U: HCPCS | Performed by: NURSE PRACTITIONER

## 2022-07-06 PROCEDURE — 4004F PT TOBACCO SCREEN RCVD TLK: CPT | Performed by: NURSE PRACTITIONER

## 2022-07-06 PROCEDURE — 3017F COLORECTAL CA SCREEN DOC REV: CPT | Performed by: NURSE PRACTITIONER

## 2022-07-06 ASSESSMENT — ENCOUNTER SYMPTOMS
SHORTNESS OF BREATH: 1
GASTROINTESTINAL NEGATIVE: 1

## 2022-07-06 NOTE — PATIENT INSTRUCTIONS
Everything looks great today, good job!   Continue current medications  Stay active along with a healthy diet  Try to stop smoking  Follow up in 6 months with Dr. Jesse Stratton

## 2022-07-13 RX ORDER — RANOLAZINE 1000 MG/1
TABLET, EXTENDED RELEASE ORAL
Qty: 180 TABLET | Refills: 3 | Status: SHIPPED | OUTPATIENT
Start: 2022-07-13

## 2022-09-02 ASSESSMENT — ENCOUNTER SYMPTOMS
COUGH: 1
SHORTNESS OF BREATH: 1

## 2022-09-02 NOTE — PROGRESS NOTES
Pulmonary Outpatient Note   Juliocesar Sharpe MD       9/7/2022    1. COPD, severe ([address]    2. Centrilobular emphysema ([address]    3. Multiple lung nodules    4. Hypoxia    5. Suspected sleep apnea    6. Smoker    7. Uncomplicated alcohol dependence ([address]    8. Obesity (BMI 35.0-39.9 without comorbidity)            ASSESSMENT/PLAN:  COPD/centrilobular emphysema. He has severe COPD, is on adequate bronchodilator therapy. The importance of quitting smoking was discussed. Multiple lung nodules. Discussed with him that he meets the criteria for annual screening CT chest.  The rationale was discussed with him in terms of early diagnosis of lung cancer, relatively low radiation exposure. He expressed understanding and agrees to proceed. Hypoxia, suspected sleep apnea. Had discontinued oxygen in the daytime. He does not have transportation to come to  equipment for the sleep study. We will try to get it through [facility] at his home. He is agreeable. I informed him that nighttime oxygen requirement can only be determined after completing the home sleep study. He must do the study without oxygen, he expressed understanding  Smoker, alcohol dependence. Due to COPD, he should quit smoking altogether. He does not appear motivated at present. Recommended he should quit drinking altogether, he has had pancreatitis in the past he says he has cut back significantly  Obesity. Reduce caloric intake, regular walking at his own pace  Prophylaxis. Has received 2 doses of the COVID-19 vaccine, recommend he should take the new booster vaccine. He has received Pneumovax, continue with annual flu shots    RTC 12 months, call earlier if symptomatic. We will call him with CT results and home sleep study if he is able to complete them      Orders Placed This Encounter   Procedures    CT CHEST LOW DOSE (LDCT)         No follow-ups on file.       Chief Complaint:   Follow-up (Emphysema needs to recertify for O2) and Sleep Apnea       HPI: Sebastian Najera. Juvenal Bailey is a 65y.o. year old male here for follow-up for emphysema and annual re-certification for home O2. His DME is [employer]. His PCP is Dr. Alvarez Ford. Shoshana Cheek was was last seen on  10/11/2021. He has severe COPD by PFT, did not qualify for daytime oxygen on his 6-minute walk test.  He has been on overnight oxygen, was recommended a home sleep study to determine both whether he has sleep apnea and whether he qualifies for nocturnal oxygen. He did not show for the study, says that he is dependent on transportation, they do not have enough employees. Unfortunately the patient continues to smoke, half a pack per day. He says he has cut back alcohol, now drinks 6-7 beers in a month. The patient lives by himself. He says he has exertional dyspnea, it appears to fluctuate from Monday to the other. He is particularly worse on hot and humid days. He does walk about 3 blocks to a nearby gas station on some days, for want of something to do. On some days he can walk without stopping, one of the days he needs to stop to catch his breath. On other days he is short of breath walking 8 feet from his bedroom to the kitchen. He does have cough, has occasional yellowish phlegm, has intermittent wheezing. He denies chest pain or leg edema. He sleeps through the night usually, denies PND or orthopnea. He is using Symbicort 160 twice a day, rinsing his mouth. He uses his DuoNeb about twice a day. His appetite is good, he has no GI or  complaints. His reflux is controlled. His other medical issues include hypertension, hyperlipidemia, CAD, bipolar disorder/depression, claudication, degenerative disc disease, fatty liver. The patient lives by himself in [facility], his sister is close by. Previous notes reviewed and edited as necessary.    Shoshana Cheek was seen on 9/2, presents with CT chest, PFT and 6-minute walk test.  The patient has improved with regards to shortness of breath. He has significant problems with sleep with insomnia, lies awake until 3 or 4 AM.  He sleeps for longer hours in the daytime. He has discontinued smoking and vaping. There was no other change in his medical or surgical history since his last visit. CT chest 10/11/2021  Mediastinum: Coronary artery calcifications are a marker of atherosclerosis. There are no enlarged thoracic lymph nodes. Calcified granulomatous disease   is present. Lungs/pleura: The tracheobronchial tree is patent. There is no pneumothorax   or pleural effusion. Mild to moderate emphysema involves the bilateral upper   lungs. There is bibasilar scarring. No change in the multiple 2-3 mm pulmonary nodules scattered throughout the   bilateral lungs. No new pulmonary nodules have developed. Upper Abdomen: A small hiatal hernia is noted. Soft Tissues/Bones: Degenerative changes involve the thoracic spine. PFT 2021  FVC 2.65 L, 50% predicted, FEV1 1.82 L, 45% predicted, with a ratio of 69%. There was no response to bronchodilator. Lung volumes were significant air trapping, ERV 14% predicted. DLCO 106% predicted. 6 MWT 10/11/2021  Baseline oxygen saturation 94%, Mckenzie scales 0 each. The patient walked 800 feet, 42% predicted. He did not desaturate, there was increase in heart rate, mild increase in respiratory rate. There was no significant change in Mckenzie indices. Previous notes reviewed and edited as necessary. Major Carrel is a pleasant, morbidly obese patient was brought in by his , Thom Daniel. He was living at the [facility]. Presently he lives by himself in [facility], is single. He was never . 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 His father  of heart failure, mother from unknown cause, although she did have COPD. He has 2 sisters, one has had lung cancer, another stomach cancer.   One sister is  of ruptured appendix and brain aneurysm, brother is in caliber. Coronary artery calcifications. No pericardial effusion. Suspected wall   thickening of the distal esophagus. Lungs/pleura: Pulmonary emphysema. No infiltrate or suspicious nodule. Dependent atelectasis in the lung bases. Scattered calcified granulomas. No   pleural effusion. Soft Tissues/Bones: No acute bony abnormality         Past Medical History:   Diagnosis Date    Affective bipolar disorder [address] 11/18/2010    Anxiety     Bipolar affective ([address]     CAD (coronary artery disease)     Chronic back pain     Class 2 obesity due to excess calories with body mass index (BMI) of 39.0 to 39.9 in adult 2/2/2022    COPD (chronic obstructive pulmonary disease) ([facility])     DDD (degenerative disc disease), cervical     Depression     Fatty liver 2/2/2021    GERD (gastroesophageal reflux disease)     Hyperlipidemia     Hypertension     MI (myocardial infarction) ([address]     Pancreatitis     Recovering alcoholic ([facility] [facility]     Tobacco abuse 12/16/2016       Past Surgical History:   Procedure Laterality Date    CORONARY ANGIOPLASTY WITH STENT PLACEMENT  2006, 2012, 2013    [facility]       Social History     Tobacco Use    Smoking status: Every Day     Packs/day: 1.00     Years: 45.00     Pack years: 45.00     Types: Cigarettes     Start date: 8/17/1977    Smokeless tobacco: Never   Substance Use Topics    Alcohol use:  Yes     Alcohol/week: 0.0 standard drinks     Comment: states \"a few beers monthly\"       Family History   Problem Relation Age of Onset    Other Mother     Arthritis Mother     Mental Illness Mother     Depression Mother     Diabetes Father     Heart Disease Father     Substance Abuse Father     Heart Disease Sister     High Blood Pressure Sister     Mental Retardation Brother     Other Brother     Learning Disabilities Brother     Early Death Sister     Cancer Sister     Substance Abuse Sister     Cancer Sister     Diabetes Brother     High Blood Pressure Brother          Current Outpatient Medications:     ranolazine (RANEXA) 1000 MG extended release tablet, TAKE 1 TABLET TWICE DAILY, Disp: 180 tablet, Rfl: 3    amLODIPine (NORVASC) 2.5 MG tablet, Take 1 tablet by mouth daily, Disp: 90 tablet, Rfl: 3    atorvastatin (LIPITOR) 40 MG tablet, Take 1 tablet by mouth daily, Disp: 90 tablet, Rfl: 3    clopidogrel (PLAVIX) 75 MG tablet, Take 1 tablet by mouth daily, Disp: 90 tablet, Rfl: 3    furosemide (LASIX) 20 MG tablet, Take 1 tablet by mouth daily, Disp: 90 tablet, Rfl: 3    lisinopril (PRINIVIL;ZESTRIL) 10 MG tablet, Take 1 tablet by mouth daily, Disp: 90 tablet, Rfl: 3    metoprolol succinate (TOPROL XL) 100 MG extended release tablet, Take 1 tablet by mouth daily, Disp: 90 tablet, Rfl: 3    busPIRone (BUSPAR) 10 MG tablet, 1/2 - 1 tab 2-3 times daily, Disp: , Rfl:     ipratropium-albuterol (DUONEB) 0.5-2.5 (3) MG/3ML SOLN nebulizer solution, Inhale 3 mLs into the lungs every 4 hours, Disp: 360 mL, Rfl: 0    QUEtiapine (SEROQUEL) 100 MG tablet, 100 mg 2 times daily , Disp: , Rfl:     gabapentin (NEURONTIN) 300 MG capsule, Take 300 mg by mouth 3 times daily.  , Disp: , Rfl:     aspirin 325 MG tablet, Take 325 mg by mouth daily, Disp: , Rfl:     isosorbide mononitrate (IMDUR) 30 MG extended release tablet, TAKE (1) TABLET DAILY, Disp: 90 tablet, Rfl: 3    albuterol sulfate  (90 Base) MCG/ACT inhaler, Inhale 2 puffs into the lungs every 4 hours as needed, Disp: , Rfl:     SYMBICORT 160-4.5 MCG/ACT AERO, , Disp: , Rfl:     nitroGLYCERIN (NITROSTAT) 0.4 MG SL tablet, Place 1 tablet under the tongue every 5 minutes as needed for Chest pain, Disp: 25 tablet, Rfl: 3    HydrOXYzine Pamoate (VISTARIL PO), Take 50 mg by mouth daily , Disp: , Rfl:     pantoprazole (PROTONIX) 20 MG tablet, TAKE 2 TABLETS BY MOUTH DAILY, Disp: 30 tablet, Rfl: 0    Pcn [penicillins]    Vitals:    09/07/22 0925   BP: 139/79   Pulse: 68   Resp: 18   Temp: 98.1 °F (36.7 °C)   TempSrc: Temporal   SpO2: 96%   Weight: 274 lb 12.8 oz (124.6 kg)   Height: 6' (1.829 m)         Review of Systems   Respiratory:  Positive for cough, shortness of breath and wheezing. Cardiovascular:  Negative for chest pain and leg swelling. Gastrointestinal:         ANDREA, possible hemorrhoidal bleed   Psychiatric/Behavioral:  Positive for sleep disturbance. All other systems reviewed and are negative. Physical Exam  Vitals reviewed. Constitutional:       General: He is not in acute distress. Appearance: He is well-developed. He is obese. He is not ill-appearing, toxic-appearing or diaphoretic. Comments: Pleasant, averagely built, appears disheveled   HENT:      Head: Normocephalic and atraumatic. Nose: Nose normal. No congestion or rhinorrhea. Mouth/Throat:      Mouth: Mucous membranes are moist.      Pharynx: Oropharynx is clear. No oropharyngeal exudate or posterior oropharyngeal erythema. Comments: Class III airway  Eyes:      General: No scleral icterus. Right eye: No discharge. Left eye: No discharge. Conjunctiva/sclera: Conjunctivae normal.      Pupils: Pupils are equal, round, and reactive to light. Neck:      Thyroid: No thyromegaly. Vascular: No JVD. Trachea: No tracheal deviation. Comments: Short and large neck  Cardiovascular:      Rate and Rhythm: Normal rate and regular rhythm. Heart sounds: Normal heart sounds. No murmur heard. No friction rub. No gallop. Pulmonary:      Effort: Pulmonary effort is normal. No respiratory distress. Breath sounds: No stridor. No wheezing, rhonchi or rales. Comments: Significantly diminished air entry bilaterally  Abdominal:      Tenderness: There is no abdominal tenderness. There is no guarding or rebound. Comments: Protuberant   Musculoskeletal:      Right lower leg: No edema. Left lower leg: No edema. Lymphadenopathy:      Cervical: No cervical adenopathy. Skin:     General: Skin is warm and dry. Coloration: Skin is not jaundiced or pale. Findings: No bruising, erythema, lesion or rash. Comments: Multiple scratch marks on both arms, which he says is from his cat   Neurological:      Mental Status: He is alert and oriented to person, place, and time.       Gait: Gait normal.      Comments: Detailed neurologic exam not performed   Psychiatric:         Mood and Affect: Mood normal.         Behavior: Behavior normal.

## 2022-09-07 ENCOUNTER — OFFICE VISIT (OUTPATIENT)
Dept: PULMONOLOGY | Age: 57
End: 2022-09-07
Payer: COMMERCIAL

## 2022-09-07 VITALS
HEIGHT: 72 IN | OXYGEN SATURATION: 96 % | BODY MASS INDEX: 37.22 KG/M2 | DIASTOLIC BLOOD PRESSURE: 79 MMHG | TEMPERATURE: 98.1 F | SYSTOLIC BLOOD PRESSURE: 139 MMHG | WEIGHT: 274.8 LBS | HEART RATE: 68 BPM | RESPIRATION RATE: 18 BRPM

## 2022-09-07 DIAGNOSIS — R09.02 HYPOXIA: ICD-10-CM

## 2022-09-07 DIAGNOSIS — R91.8 MULTIPLE LUNG NODULES: ICD-10-CM

## 2022-09-07 DIAGNOSIS — R29.818 SUSPECTED SLEEP APNEA: ICD-10-CM

## 2022-09-07 DIAGNOSIS — F10.20 UNCOMPLICATED ALCOHOL DEPENDENCE (HCC): ICD-10-CM

## 2022-09-07 DIAGNOSIS — J43.2 CENTRILOBULAR EMPHYSEMA (HCC): ICD-10-CM

## 2022-09-07 DIAGNOSIS — E66.9 OBESITY (BMI 35.0-39.9 WITHOUT COMORBIDITY): ICD-10-CM

## 2022-09-07 DIAGNOSIS — J44.9 COPD, SEVERE (HCC): Primary | ICD-10-CM

## 2022-09-07 DIAGNOSIS — F17.200 SMOKER: ICD-10-CM

## 2022-09-07 PROCEDURE — 3023F SPIROM DOC REV: CPT | Performed by: INTERNAL MEDICINE

## 2022-09-07 PROCEDURE — 4004F PT TOBACCO SCREEN RCVD TLK: CPT | Performed by: INTERNAL MEDICINE

## 2022-09-07 PROCEDURE — G8417 CALC BMI ABV UP PARAM F/U: HCPCS | Performed by: INTERNAL MEDICINE

## 2022-09-07 PROCEDURE — G8427 DOCREV CUR MEDS BY ELIG CLIN: HCPCS | Performed by: INTERNAL MEDICINE

## 2022-09-07 PROCEDURE — 99214 OFFICE O/P EST MOD 30 MIN: CPT | Performed by: INTERNAL MEDICINE

## 2022-09-07 PROCEDURE — 3017F COLORECTAL CA SCREEN DOC REV: CPT | Performed by: INTERNAL MEDICINE

## 2022-09-07 ASSESSMENT — ENCOUNTER SYMPTOMS: WHEEZING: 1

## 2022-09-07 NOTE — PROGRESS NOTES
MA Communication: The following orders are received by verbal communication from Luanne ePrez MD    Orders include:  pt.  Has CT in OCT      Snap sleep test      FU 1 yr

## 2022-09-07 NOTE — PATIENT INSTRUCTIONS
Remember to bring a list of pulmonary medications and any CPAP or BiPAP machines to your next appointment with the office. Please keep all of your future appointments scheduled by [address], [facility]. Out of respect for other patients and providers, you may be asked to reschedule your appointment if you arrive later than your scheduled appointment time. Appointments cancelled less than 24hrs in advance will be considered a no show. Patients with three missed appointments within 1 year or four missed appointments within 2 years can be dismissed from the practice. Please be aware that our physicians are required to work in the Intensive Care Unit at [facility].  Your appointment may need to be rescheduled if they are designated to work during your appointment time. You may receive a survey regarding the care you received during your visit. Your input is valuable to us. We encourage you to complete and return your survey. We hope you will choose us in the future for your healthcare needs. Pt instructed of all future appointment dates & times, including radiology, labs, procedures & referrals. If procedures were scheduled preparation instructions provided. Instructions on future appointments with [facility] were given.

## 2022-09-09 ENCOUNTER — TELEPHONE (OUTPATIENT)
Dept: PULMONOLOGY | Age: 57
End: 2022-09-09

## 2022-09-09 NOTE — TELEPHONE ENCOUNTER
Pt. Called to say sleep study can't be done until first of the month when he can pay the company 100 dollars up front due to his insurance.

## 2022-09-29 ENCOUNTER — TELEPHONE (OUTPATIENT)
Dept: PULMONOLOGY | Age: 57
End: 2022-09-29

## 2022-10-05 ENCOUNTER — TELEPHONE (OUTPATIENT)
Dept: PULMONOLOGY | Age: 57
End: 2022-10-05

## 2022-10-06 ENCOUNTER — TELEPHONE (OUTPATIENT)
Dept: PULMONOLOGY | Age: 57
End: 2022-10-06

## 2022-10-06 NOTE — TELEPHONE ENCOUNTER
Patient called stating that the oxygen company is refusing to fill is oxygen due to him not being able to afford a test requested by Dr. Ruchi Kerr. Patient would like a return call.

## 2022-10-07 NOTE — TELEPHONE ENCOUNTER
I s/w patient. He has contacted [facility] and they are sending him forms to fill out. He still has his concentrator at the moment so he is not in desperate need for tanks. He uses O2 sporadically but mostly has not been using it during day. I told him at this point there is not much we can do. That would be between his insurance company and the DME company. Pt verbalized understanding.

## 2022-10-21 ENCOUNTER — TELEPHONE (OUTPATIENT)
Dept: PULMONOLOGY | Age: 57
End: 2022-10-21

## 2022-10-21 DIAGNOSIS — J44.9 COPD, SEVERE (HCC): Primary | ICD-10-CM

## 2022-10-21 NOTE — TELEPHONE ENCOUNTER
Scheduled walk test and fu as per [facility] he needs this to keep from loosing his O2 due to insurance must due this every year.

## 2022-10-26 ENCOUNTER — OFFICE VISIT (OUTPATIENT)
Dept: PULMONOLOGY | Age: 57
End: 2022-10-26
Payer: COMMERCIAL

## 2022-10-26 ENCOUNTER — HOSPITAL ENCOUNTER (OUTPATIENT)
Dept: PULMONOLOGY | Age: 57
Discharge: HOME | End: 2022-10-26
Payer: COMMERCIAL

## 2022-10-26 VITALS
TEMPERATURE: 96.7 F | HEIGHT: 72 IN | SYSTOLIC BLOOD PRESSURE: 115 MMHG | BODY MASS INDEX: 37.44 KG/M2 | HEART RATE: 63 BPM | DIASTOLIC BLOOD PRESSURE: 65 MMHG | WEIGHT: 276.4 LBS | OXYGEN SATURATION: 94 % | RESPIRATION RATE: 16 BRPM

## 2022-10-26 DIAGNOSIS — E66.9 OBESITY (BMI 35.0-39.9 WITHOUT COMORBIDITY): ICD-10-CM

## 2022-10-26 DIAGNOSIS — R91.8 MULTIPLE LUNG NODULES: ICD-10-CM

## 2022-10-26 DIAGNOSIS — F17.200 SMOKER: ICD-10-CM

## 2022-10-26 DIAGNOSIS — J43.2 CENTRILOBULAR EMPHYSEMA (HCC): ICD-10-CM

## 2022-10-26 DIAGNOSIS — J44.9 COPD, SEVERE (HCC): ICD-10-CM

## 2022-10-26 DIAGNOSIS — F10.20 UNCOMPLICATED ALCOHOL DEPENDENCE (HCC): ICD-10-CM

## 2022-10-26 DIAGNOSIS — R29.818 SUSPECTED SLEEP APNEA: ICD-10-CM

## 2022-10-26 DIAGNOSIS — J44.9 COPD, SEVERE (HCC): Primary | ICD-10-CM

## 2022-10-26 DIAGNOSIS — R09.02 HYPOXIA: ICD-10-CM

## 2022-10-26 PROCEDURE — 3078F DIAST BP <80 MM HG: CPT | Performed by: INTERNAL MEDICINE

## 2022-10-26 PROCEDURE — 3017F COLORECTAL CA SCREEN DOC REV: CPT | Performed by: INTERNAL MEDICINE

## 2022-10-26 PROCEDURE — G8484 FLU IMMUNIZE NO ADMIN: HCPCS | Performed by: INTERNAL MEDICINE

## 2022-10-26 PROCEDURE — 3074F SYST BP LT 130 MM HG: CPT | Performed by: INTERNAL MEDICINE

## 2022-10-26 PROCEDURE — 94618 PULMONARY STRESS TESTING: CPT

## 2022-10-26 PROCEDURE — 3023F SPIROM DOC REV: CPT | Performed by: INTERNAL MEDICINE

## 2022-10-26 PROCEDURE — G8417 CALC BMI ABV UP PARAM F/U: HCPCS | Performed by: INTERNAL MEDICINE

## 2022-10-26 PROCEDURE — 99213 OFFICE O/P EST LOW 20 MIN: CPT | Performed by: INTERNAL MEDICINE

## 2022-10-26 PROCEDURE — G8427 DOCREV CUR MEDS BY ELIG CLIN: HCPCS | Performed by: INTERNAL MEDICINE

## 2022-10-26 PROCEDURE — 4004F PT TOBACCO SCREEN RCVD TLK: CPT | Performed by: INTERNAL MEDICINE

## 2022-10-26 ASSESSMENT — ENCOUNTER SYMPTOMS
COUGH: 1
SHORTNESS OF BREATH: 1
WHEEZING: 0

## 2022-10-26 NOTE — PROGRESS NOTES
MA Communication: The following orders are received by verbal communication from Murtaza Wu MD    Orders include:  Pt will call to schedule appt once he has the sleep date.

## 2022-10-26 NOTE — PATIENT INSTRUCTIONS
Remember to bring a list of pulmonary medications and any CPAP or BiPAP machines to your next appointment with the office. Please keep all of your future appointments scheduled by [address], [facility] office. Out of respect for other patients and providers, you may be asked to reschedule your appointment if you arrive later than your scheduled appointment time. Appointments cancelled less than 24hrs in advance will be considered a no show. Patients with three missed appointments within 1 year or four missed appointments within 2 years can be dismissed from the practice. Please be aware that our physicians are required to work in the Intensive Care Unit at [facility].  Your appointment may need to be rescheduled if they are designated to work during your appointment time. You may receive a survey regarding the care you received during your visit. Your input is valuable to us. We encourage you to complete and return your survey. We hope you will choose us in the future for your healthcare needs. Pt instructed of all future appointment dates & times, including radiology, labs, procedures & referrals. If procedures were scheduled preparation instructions provided. Instructions on future appointments with [facility] were given. Someone from the Sleep Ctr will call to schedule. If you have not heard from them in 5 or more days you may call [phone] and stalin with someone to schedule.

## 2022-10-26 NOTE — PROGRESS NOTES
Pulmonary Outpatient Note   George Sharpe MD       10/26/2022    1. COPD, severe ([facility]    2. Centrilobular emphysema ([address]    3. Hypoxia    4. Multiple lung nodules    5. Suspected sleep apnea    6. Smoker    7. Uncomplicated alcohol dependence ([facility]    8. Obesity (BMI 35.0-39.9 without comorbidity)            ASSESSMENT/PLAN:  COPD/centrilobular emphysema. He has severe COPD, is on adequate bronchodilator therapy. The importance of quitting smoking was discussed. Multiple lung nodules. Discussed with him that he meets the criteria for annual screening CT chest.  The rationale was discussed with him in terms of early diagnosis of lung cancer, relatively low radiation exposure. He expressed understanding and agrees to proceed. Hypoxia, suspected sleep apnea. Patient says he now has somebody to drive him for an in lab sleep study. Due to hypoxia, he meets the criteria. Order placed. Smoker, alcohol dependence. Due to COPD, he should quit smoking altogether. He says he has cut back. Recommended he should quit drinking altogether, he has had pancreatitis in the past.  He says he has cut back significantly  Obesity. Reduce caloric intake, regular walking at his own pace  Prophylaxis. Has received 2 doses of the COVID-19 vaccine, recommend he should take the new booster vaccine. He has received Pneumovax, a flu shot    RTC after completing sleep study      Orders Placed This Encounter   Procedures    Baseline Diagnostic Sleep Study           No follow-ups on file. Chief Complaint:   Follow-up (6 MW - recert for oxygen/)       HPI: Juan C Pedraza. Chaparrita Villagomez is a 65y.o. year old male here for follow-up for emphysema and re-certification for home O2. His DME is [employer]. His PCP is Dr. Brandon Campa.     Ansley Hernandez was last seen on 9/7/2022, presents for follow-up after completing a 6-minute walk test.  The patient was due to get an overnight sleep study through [facility], says he could not afford it as it was not covered by his insurance. The patient continues to smoke, says he is cutting down, down to less than 1 pack/day. He says he has cut back alcohol, drinks about once a month. His shortness of breath is about the same, but fluctuates from 1 day to another. He is short of breath when he walks fast.  He does have cough with yellowish phlegm, denies hemoptysis. He has a good appetite, denies GI or  complaints. He thinks that he sleeps well, but on some days he is up all night and then sleep through the day. He naps 1 or 2 times a day, up to 2 hours at a time. The rest of his ROS was negative. 6-minute walk test 10/26/2022  Baseline oxygen saturation was 94%, Mckenzie scales were 3 each. The patient walked 1200 feet, there was no significant desaturation. The patient had increasing Mckenzie scales to 6 each, mild increase in respiratory rate and heart rate    Previous notes reviewed and edited as necessary. Loraine Holstein was was last seen on  10/11/2021. He has severe COPD by PFT, did not qualify for daytime oxygen on his 6-minute walk test.  He has been on overnight oxygen, was recommended a home sleep study to determine both whether he has sleep apnea and whether he qualifies for nocturnal oxygen. He did not show up for the study, says that he is dependent on transportation, they do not have enough employees. Unfortunately the patient continues to smoke, half a pack per day. He says he has cut back alcohol, now drinks 6-7 beers in a month. The patient lives by himself. He says he has exertional dyspnea, it appears to fluctuate from one day to the other. He is particularly worse on hot and humid days. He does walk about 3 blocks to a nearby gas station on some days, for want of something to do. On some days he can walk without stopping, one of the days he needs to stop to catch his breath. On other days he is short of breath walking 8 feet from his bedroom to the kitchen.   He does have cough, has occasional yellowish phlegm, has intermittent wheezing. He denies chest pain or leg edema. He sleeps through the night usually, denies PND or orthopnea. He is using Symbicort 160 twice a day, rinsing his mouth. He uses his DuoNeb about twice a day. His appetite is good, he has no GI or  complaints. His reflux is controlled. His other medical issues include hypertension, hyperlipidemia, CAD, bipolar disorder/depression, claudication, degenerative disc disease, fatty liver. The patient lives by himself in [facility], his sister is close by. Previous notes reviewed and edited as necessary. Brittnee Mohamud was seen on 9/2, presents with CT chest, PFT and 6-minute walk test.  The patient has improved with regards to shortness of breath. He has significant problems with sleep with insomnia, lies awake until 3 or 4 AM.  He sleeps for longer hours in the daytime. He has discontinued smoking and vaping. There was no other change in his medical or surgical history since his last visit. CT chest 10/11/2021  Mediastinum: Coronary artery calcifications are a marker of atherosclerosis. There are no enlarged thoracic lymph nodes. Calcified granulomatous disease   is present. Lungs/pleura: The tracheobronchial tree is patent. There is no pneumothorax   or pleural effusion. Mild to moderate emphysema involves the bilateral upper   lungs. There is bibasilar scarring. No change in the multiple 2-3 mm pulmonary nodules scattered throughout the   bilateral lungs. No new pulmonary nodules have developed. Upper Abdomen: A small hiatal hernia is noted. Soft Tissues/Bones: Degenerative changes involve the thoracic spine. PFT 1/11/2021  FVC 2.65 L, 50% predicted, FEV1 1.82 L, 45% predicted, with a ratio of 69%. There was no response to bronchodilator. Lung volumes were significant air trapping, ERV 14% predicted. DLCO 106% predicted.     6 MWT 10/11/2021  Baseline oxygen saturation 94%, Mckenzie scales 0 each. The patient walked 800 feet, 42% predicted. He did not desaturate, there was increase in heart rate, mild increase in respiratory rate. There was no significant change in Mckenzie indices. Previous notes reviewed and edited as necessary. Lilia Sheikh is a pleasant, morbidly obese patient was brought in by his , Serg Og. He was living at the [facility]. Presently he lives by himself in [facility], is single. He was never . 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 His father  of heart failure, mother from unknown cause, although she did have COPD. He has 2 sisters, one has had lung cancer, another stomach cancer. One sister is  of ruptured appendix and brain aneurysm, brother is  from unknown cause. He has no children. Presently smokes 2 to 3 cigarettes a day, started smoking at age 7 and picked up a pack per day around age 16. He did smoke up to 1.5 PPD. He has smoked at least 1 PPD for 45 years. The patient was an alcoholic, was a heavy drinker for 1-1/2 years. Presently he is still drinking 4-5 beers occasionally, but not heavy. He tried marijuana many years ago, \"occasionally has a gummy\". Patient has a history of hypertension, hyperlipidemia, CAD, GERD, bipolar disorder, anxiety, depression, DJD, back pain. He had pancreatitis. The patient has been diagnosed with but has not had a PFT was normal.  He was hospitalized at [facility] from  through 2021 for acute respiratory failure with hypoxia, COPD exacerbation, pneumonia. He was hyponatremic. His other listed medical problems were hypertension, hyperlipidemia and CAD. He was treated with Levaquin, discharged on doxycycline, Symbicort 160 and DuoNeb. The patient states he has had pneumonia in the [de-identified], 90s and again this year. Presently the patient is using his nebulizer every 4 hours, he is using Symbicort twice a day and rinsing his mouth. He uses rescue albuterol as needed.   He is on supplemental oxygen at 2 LPM, has a portable tank and a nebulizer. He does not have a portable concentrator. The patient does have GE reflux rarely, is on PPI twice daily. He does have occasional hemorrhoidal bleed. The patient has had left leg swelling and burning. He has been told by a  that he likely has sleep apnea, he has not been evaluated. There is no PFT in EMR    CXR 8/18/2021 [facility]  Heart size and pulmonary vasculature stable. Interval resolution of right-sided infiltrates. Interval improvement in left-sided parenchymal lung disease with a few residual bandlike opacities. Costophrenic angles sharp     CT chest 3/29/2018  Chest:       Mediastinum: No mediastinal adenopathy. Thoracic aorta normal in caliber. Coronary artery calcifications. No pericardial effusion. Suspected wall   thickening of the distal esophagus. Lungs/pleura: Pulmonary emphysema. No infiltrate or suspicious nodule. Dependent atelectasis in the lung bases. Scattered calcified granulomas. No   pleural effusion.        Soft Tissues/Bones: No acute bony abnormality         Past Medical History:   Diagnosis Date    Affective bipolar disorder ([address] 11/18/2010    Anxiety     Bipolar affective ([address]     CAD (coronary artery disease)     Chronic back pain     Class 2 obesity due to excess calories with body mass index (BMI) of 39.0 to 39.9 in adult 2/2/2022    COPD (chronic obstructive pulmonary disease) (HCC)     DDD (degenerative disc disease), cervical     Depression     Fatty liver 2/2/2021    GERD (gastroesophageal reflux disease)     Hyperlipidemia     Hypertension     MI (myocardial infarction) ([address]     Pancreatitis     Recovering alcoholic ([address]     Tobacco abuse 12/16/2016       Past Surgical History:   Procedure Laterality Date    CORONARY ANGIOPLASTY WITH STENT PLACEMENT  2006, 2012, 2013    [facility]       Social History     Tobacco Use    Smoking status: Every Day     Packs/day: 1.00 Years: 45.00     Pack years: 45.00     Types: Cigarettes     Start date: 8/17/1977    Smokeless tobacco: Never   Substance Use Topics    Alcohol use: Yes     Alcohol/week: 0.0 standard drinks     Comment: states \"a few beers monthly\"       Family History   Problem Relation Age of Onset    Other Mother     Arthritis Mother     Mental Illness Mother     Depression Mother     Diabetes Father     Heart Disease Father     Substance Abuse Father     Heart Disease Sister     High Blood Pressure Sister     Mental Retardation Brother     Other Brother     Learning Disabilities Brother     Early Death Sister     Cancer Sister     Substance Abuse Sister     Cancer Sister     Diabetes Brother     High Blood Pressure Brother          Current Outpatient Medications:     amLODIPine (NORVASC) 2.5 MG tablet, Take 1 tablet by mouth daily, Disp: 90 tablet, Rfl: 3    atorvastatin (LIPITOR) 40 MG tablet, Take 1 tablet by mouth daily, Disp: 90 tablet, Rfl: 3    clopidogrel (PLAVIX) 75 MG tablet, Take 1 tablet by mouth daily, Disp: 90 tablet, Rfl: 3    lisinopril (PRINIVIL;ZESTRIL) 10 MG tablet, Take 1 tablet by mouth daily, Disp: 90 tablet, Rfl: 3    metoprolol succinate (TOPROL XL) 100 MG extended release tablet, Take 1 tablet by mouth daily, Disp: 90 tablet, Rfl: 3    busPIRone (BUSPAR) 10 MG tablet, 1/2 - 1 tab 2-3 times daily, Disp: , Rfl:     ipratropium-albuterol (DUONEB) 0.5-2.5 (3) MG/3ML SOLN nebulizer solution, Inhale 3 mLs into the lungs every 4 hours, Disp: 360 mL, Rfl: 0    QUEtiapine (SEROQUEL) 100 MG tablet, 100 mg 2 times daily , Disp: , Rfl:     gabapentin (NEURONTIN) 300 MG capsule, Take 300 mg by mouth 3 times daily.  , Disp: , Rfl:     aspirin 325 MG tablet, Take 325 mg by mouth daily, Disp: , Rfl:     isosorbide mononitrate (IMDUR) 30 MG extended release tablet, TAKE (1) TABLET DAILY, Disp: 90 tablet, Rfl: 3    albuterol sulfate  (90 Base) MCG/ACT inhaler, Inhale 2 puffs into the lungs every 4 hours as needed, Disp: , Rfl:     SYMBICORT 160-4.5 MCG/ACT AERO, , Disp: , Rfl:     HydrOXYzine Pamoate (VISTARIL PO), Take 50 mg by mouth daily , Disp: , Rfl:     pantoprazole (PROTONIX) 20 MG tablet, TAKE 2 TABLETS BY MOUTH DAILY, Disp: 30 tablet, Rfl: 0    ranolazine (RANEXA) 1000 MG extended release tablet, TAKE 1 TABLET TWICE DAILY, Disp: 180 tablet, Rfl: 3    furosemide (LASIX) 20 MG tablet, Take 1 tablet by mouth daily (Patient not taking: Reported on 10/26/2022), Disp: 90 tablet, Rfl: 3    nitroGLYCERIN (NITROSTAT) 0.4 MG SL tablet, Place 1 tablet under the tongue every 5 minutes as needed for Chest pain (Patient not taking: Reported on 10/26/2022), Disp: 25 tablet, Rfl: 3    Pcn [penicillins]    Vitals:    10/26/22 1032   BP: 115/65   Site: Right Upper Arm   Position: Sitting   Cuff Size: Medium Adult   Pulse: 63   Resp: 16   Temp: (!) 96.7 °F (35.9 °C)   TempSrc: Temporal   SpO2: 94%   Weight: 276 lb 6.4 oz (125.4 kg)   Height: 6' (1.829 m)         Review of Systems   Respiratory:  Positive for cough and shortness of breath. Negative for wheezing. Cardiovascular:  Negative for chest pain and leg swelling. Gastrointestinal:         ANDREA, possible hemorrhoidal bleed   Psychiatric/Behavioral:  Positive for sleep disturbance. All other systems reviewed and are negative. Physical Exam  Vitals reviewed. Constitutional:       General: He is not in acute distress. Appearance: He is well-developed. He is obese. He is not ill-appearing, toxic-appearing or diaphoretic. Comments: Pleasant, averagely built, appears disheveled   HENT:      Head: Normocephalic and atraumatic. Nose: Nose normal. No congestion or rhinorrhea. Mouth/Throat:      Mouth: Mucous membranes are moist.      Pharynx: Oropharynx is clear. No oropharyngeal exudate or posterior oropharyngeal erythema. Comments: Class III airway  Eyes:      General: No scleral icterus. Right eye: No discharge. Left eye: No discharge. Conjunctiva/sclera: Conjunctivae normal.      Pupils: Pupils are equal, round, and reactive to light. Neck:      Thyroid: No thyromegaly. Vascular: No JVD. Trachea: No tracheal deviation. Comments: Short and large neck  Cardiovascular:      Rate and Rhythm: Normal rate and regular rhythm. Heart sounds: Normal heart sounds. No murmur heard. No friction rub. No gallop. Pulmonary:      Effort: Pulmonary effort is normal. No respiratory distress. Breath sounds: No stridor. No wheezing, rhonchi or rales. Comments: Significantly diminished air entry bilaterally  Abdominal:      Comments: Protuberant   Musculoskeletal:      Right lower leg: No edema. Left lower leg: No edema. Lymphadenopathy:      Cervical: No cervical adenopathy. Skin:     General: Skin is warm and dry. Coloration: Skin is not jaundiced or pale. Findings: No bruising, erythema, lesion or rash. Comments: Multiple scratch marks on both arms, which he says is from his cat   Neurological:      Mental Status: He is alert and oriented to person, place, and time.       Gait: Gait normal.      Comments: Detailed neurologic exam not performed   Psychiatric:         Mood and Affect: Mood normal.         Behavior: Behavior normal.

## 2022-10-28 NOTE — PROCEDURES
[address] [address]                               PULMONARY FUNCTION    PATIENT NAME: Terry Lake                    :        1965  MED REC NO:   [id number]                          ROOM:  ACCOUNT NO:   [de-identified]                           ADMIT DATE: 10/26/2022  PROVIDER:     Khadijah Larson MD    DATE OF PROCEDURE:  10/26/2022    SIX-MINUTE WALK TEST    A six minute walk test was performed utilizing standard criteria on  10/26/2022. Baseline oxygen saturation was 95%, Mckenzie scales were 3  each. The patient walked a total distance of 1200 feet, 64% predicted  distance without stopping. The lowest oxygen saturation was 94%. There  was mild increase in respiratory rate. Mckenzie scales peaked at 6 each. IMPRESSION:  No significant desaturation with ambulation. Kiersten Sheppard MD    D: 10/27/2022 12:32:00       T: 10/27/2022 12:36:08     [email or web address]  Job#: [id number]     Doc#: [id number]    CC:   Paras Montoya MD

## 2023-01-10 DIAGNOSIS — I25.10 CORONARY ARTERY DISEASE INVOLVING NATIVE HEART WITHOUT ANGINA PECTORIS, UNSPECIFIED VESSEL OR LESION TYPE: ICD-10-CM

## 2023-01-10 DIAGNOSIS — E78.2 MIXED HYPERLIPIDEMIA: ICD-10-CM

## 2023-01-10 DIAGNOSIS — I10 PRIMARY HYPERTENSION: ICD-10-CM

## 2023-01-10 DIAGNOSIS — I25.10 CORONARY ARTERY DISEASE INVOLVING NATIVE CORONARY ARTERY OF NATIVE HEART WITHOUT ANGINA PECTORIS: ICD-10-CM

## 2023-01-10 DIAGNOSIS — E78.00 PURE HYPERCHOLESTEROLEMIA: ICD-10-CM

## 2023-01-10 NOTE — TELEPHONE ENCOUNTER
Called and spoke to  Isaura Bianchi. Upon review of chart I do not see where recently metoprolol succinate (Toprol XL) was prescribed at 1 1/2 tablet daily. MAR shows Toprol  mg daily.     ~ Giancarlo Porter to call office next time she is at patient residence to review all of patient medications in comparison to [facility] to make sure there are no discrepancies as she reports patient can be confused at times. Yearly appointment made, she declines patient has any symptoms next available scheduled.

## 2023-01-10 NOTE — TELEPHONE ENCOUNTER
Pt  Kaity Ventura called into [facility], she was currently with pt spoke to both of them on phone. Pt is out of Metoprolol, pt did not remember his dosage changed from 1 1/2 a day to only 1 a day so pt ran out more quickly. Pt has been going without metoprolol medication.  Please review and send medication to     [address] [place] [id number]   Phone:  [phone]  Fax:  [phone]    Pt phone currently not working, please call  if further questions [phone]

## 2023-01-12 RX ORDER — AMLODIPINE BESYLATE 2.5 MG/1
2.5 TABLET ORAL DAILY
Qty: 90 TABLET | Refills: 0 | Status: SHIPPED | OUTPATIENT
Start: 2023-01-12

## 2023-01-12 RX ORDER — METOPROLOL SUCCINATE 100 MG/1
100 TABLET, EXTENDED RELEASE ORAL DAILY
Qty: 90 TABLET | Refills: 0 | Status: SHIPPED | OUTPATIENT
Start: 2023-01-12

## 2023-01-12 RX ORDER — CLOPIDOGREL BISULFATE 75 MG/1
75 TABLET ORAL DAILY
Qty: 90 TABLET | Refills: 0 | Status: SHIPPED | OUTPATIENT
Start: 2023-01-12

## 2023-01-12 RX ORDER — ATORVASTATIN CALCIUM 40 MG/1
40 TABLET, FILM COATED ORAL DAILY
Qty: 90 TABLET | Refills: 0 | Status: SHIPPED | OUTPATIENT
Start: 2023-01-12

## 2023-01-12 RX ORDER — LISINOPRIL 10 MG/1
10 TABLET ORAL DAILY
Qty: 90 TABLET | Refills: 0 | Status: SHIPPED | OUTPATIENT
Start: 2023-01-12

## 2023-01-19 DIAGNOSIS — E78.00 PURE HYPERCHOLESTEROLEMIA: ICD-10-CM

## 2023-01-19 DIAGNOSIS — E78.2 MIXED HYPERLIPIDEMIA: ICD-10-CM

## 2023-01-19 RX ORDER — ATORVASTATIN CALCIUM 40 MG/1
TABLET, FILM COATED ORAL
Qty: 90 TABLET | Refills: 3 | OUTPATIENT
Start: 2023-01-19

## 2023-03-08 ENCOUNTER — TELEPHONE (OUTPATIENT)
Dept: PULMONOLOGY | Age: 58
End: 2023-03-08

## 2023-03-08 DIAGNOSIS — J44.9 COPD, SEVERE (HCC): Primary | ICD-10-CM

## 2023-03-13 ASSESSMENT — ENCOUNTER SYMPTOMS
COUGH: 1
SHORTNESS OF BREATH: 1

## 2023-03-14 ENCOUNTER — HOSPITAL ENCOUNTER (OUTPATIENT)
Dept: CT IMAGING | Age: 58
Discharge: HOME | End: 2023-03-14
Payer: COMMERCIAL

## 2023-03-14 ENCOUNTER — OFFICE VISIT (OUTPATIENT)
Dept: PULMONOLOGY | Age: 58
End: 2023-03-14
Payer: COMMERCIAL

## 2023-03-14 VITALS
HEART RATE: 68 BPM | DIASTOLIC BLOOD PRESSURE: 71 MMHG | HEIGHT: 72 IN | TEMPERATURE: 97.7 F | RESPIRATION RATE: 18 BRPM | BODY MASS INDEX: 35.59 KG/M2 | SYSTOLIC BLOOD PRESSURE: 100 MMHG | WEIGHT: 262.8 LBS | OXYGEN SATURATION: 96 %

## 2023-03-14 DIAGNOSIS — R29.818 SUSPECTED SLEEP APNEA: ICD-10-CM

## 2023-03-14 DIAGNOSIS — F17.200 SMOKER: ICD-10-CM

## 2023-03-14 DIAGNOSIS — J43.2 CENTRILOBULAR EMPHYSEMA (HCC): ICD-10-CM

## 2023-03-14 DIAGNOSIS — J44.9 COPD, SEVERE (HCC): Primary | ICD-10-CM

## 2023-03-14 DIAGNOSIS — R91.8 MULTIPLE LUNG NODULES: ICD-10-CM

## 2023-03-14 DIAGNOSIS — E66.9 OBESITY (BMI 35.0-39.9 WITHOUT COMORBIDITY): ICD-10-CM

## 2023-03-14 PROCEDURE — G8484 FLU IMMUNIZE NO ADMIN: HCPCS | Performed by: INTERNAL MEDICINE

## 2023-03-14 PROCEDURE — 99213 OFFICE O/P EST LOW 20 MIN: CPT | Performed by: INTERNAL MEDICINE

## 2023-03-14 PROCEDURE — 3078F DIAST BP <80 MM HG: CPT | Performed by: INTERNAL MEDICINE

## 2023-03-14 PROCEDURE — G8417 CALC BMI ABV UP PARAM F/U: HCPCS | Performed by: INTERNAL MEDICINE

## 2023-03-14 PROCEDURE — 3074F SYST BP LT 130 MM HG: CPT | Performed by: INTERNAL MEDICINE

## 2023-03-14 PROCEDURE — 3023F SPIROM DOC REV: CPT | Performed by: INTERNAL MEDICINE

## 2023-03-14 PROCEDURE — 3017F COLORECTAL CA SCREEN DOC REV: CPT | Performed by: INTERNAL MEDICINE

## 2023-03-14 PROCEDURE — 71250 CT THORAX DX C-: CPT

## 2023-03-14 PROCEDURE — G8427 DOCREV CUR MEDS BY ELIG CLIN: HCPCS | Performed by: INTERNAL MEDICINE

## 2023-03-14 PROCEDURE — 4004F PT TOBACCO SCREEN RCVD TLK: CPT | Performed by: INTERNAL MEDICINE

## 2023-03-14 RX ORDER — BENZONATATE 200 MG/1
CAPSULE ORAL
COMMUNITY
Start: 2023-03-06

## 2023-03-14 ASSESSMENT — ENCOUNTER SYMPTOMS: WHEEZING: 1

## 2023-03-14 NOTE — PROGRESS NOTES
Pulmonary Outpatient Note   Lottie Prieto MD       3/14/2023    1. COPD, severe ([address]    2. Centrilobular emphysema ([address]    3. Multiple lung nodules    4. Suspected sleep apnea    5. Smoker    6. Obesity (BMI 35.0-39.9 without comorbidity)              ASSESSMENT/PLAN:  COPD/centrilobular emphysema. He has severe COPD, is on adequate bronchodilator therapy. Told him to use Symbicort twice a day, DuoNeb and albuterol only occasionally, 1 or the other. The shortage of nebulizer solution was discussed with him. He expressed understanding. .    Multiple lung nodules. Discussed with him that I do not see significant abnormality on low-dose screening CT chest done today. We will be in touch with him if there is abnormality reported by radiology  Hypoxia, suspected sleep apnea. Patient has not been able to get a sleep study done. He is unable to afford the oxygen tubing. Recommend overnight pulse oximetry by his DME to see if he meets the criteria for nighttime oxygen for now. Smoker, alcohol dependence. Due to COPD, he should quit smoking altogether. He says he has cut back, but often smokes more than a pack a day. Recommended he should quit drinking altogether, he has had pancreatitis in the past.  He says he has cut back significantly, but it appears he is also doing binge drinking  Obesity. Reduce caloric intake, regular walking at his own pace  Prophylaxis. Has received 2 doses of the COVID-19 vaccine, recommend he should take the new booster vaccine. He has received Pneumovax, a flu shot    RTC 6 months, call earlier if symptomatic      Orders Placed This Encounter   Procedures    Pulse oximetry, overnight             No follow-ups on file. Chief Complaint:   Shortness of Breath and Results (LDCT same day)       HPI: Mark Gardner. Jerrod Hernandez is a 65y.o. year old male here for follow-up for severe COPD/emphysema, multiple lung nodules, chronic respiratory failure. His DME is [employer].  His PCP is  Julius Cameron. Gerhardt Pu was last seen on 10/26/2022, presents for follow-up visit. The patient says he is getting his \"doctor appointments together\", says there are no drivers for transportation. He has severe COPD with FEV1 45% predicted on PFT January 2021. Patient has been on home oxygen. He says he is unable to get tubing from his DME. At his last 6-minute walk test he did not qualify for home O2. The patient has been using Symbicort and rescue DuoNeb. He also uses albuterol occasionally. The patient says he had a \"bad cough\" about 2 weeks ago, he is now improving. He does have wheezing particularly noticeable at night, revisiting the plan. He tries to walk. He says the shortness of breath fluctuates based on the weather. Unfortunately continues to smoke a pack per day, occasionally more than occasionally less. He has significantly cut down alcohol intake, but claims that he did drink 1/5 of vodka about 3 weeks ago. He has not got around to doing a sleep study. His other medical problems include hypertension, hyperlipidemia, CAD, bipolar disorder, depression, GERD, fatty liver and obesity. He denies urinary or bowel complaints. There was no other change in medical or surgical history, his ROS is unchanged. Low-dose CT chest 3/14/2023  Results pending    Previous notes reviewed and edited as necessary. Gerhardt Pu was last seen on 9/7/2022, presents for follow-up after completing a 6-minute walk test.  The patient was due to get an overnight sleep study through [facility], says he could not afford it as it was not covered by his insurance. The patient continues to smoke, says he is cutting down, down to less than 1 pack/day. He says he has cut back alcohol, drinks about once a month. His shortness of breath is about the same, but fluctuates from 1 day to another. He is short of breath when he walks fast.  He does have cough with yellowish phlegm, denies hemoptysis.   He has a good appetite, denies GI or  complaints. He thinks that he sleeps well, but on some days he is up all night and then sleep through the day. He naps 1 or 2 times a day, up to 2 hours at a time. The rest of his ROS was negative. 6-minute walk test 10/26/2022  Baseline oxygen saturation was 94%, Mckenzie scales were 3 each. The patient walked 1200 feet, there was no significant desaturation. The patient had increasing Mckenzie scales to 6 each, mild increase in respiratory rate and heart rate    Previous notes reviewed and edited as necessary. Jovanna Perez was was last seen on  10/11/2021. He has severe COPD by PFT, did not qualify for daytime oxygen on his 6-minute walk test.  He has been on overnight oxygen, was recommended a home sleep study to determine both whether he has sleep apnea and whether he qualifies for nocturnal oxygen. He did not show up for the study, says that he is dependent on transportation, they do not have enough employees. Unfortunately the patient continues to smoke, half a pack per day. He says he has cut back alcohol, now drinks 6-7 beers in a month. The patient lives by himself. He says he has exertional dyspnea, it appears to fluctuate from one day to the other. He is particularly worse on hot and humid days. He does walk about 3 blocks to a nearby gas station on some days, for want of something to do. On some days he can walk without stopping, one of the days he needs to stop to catch his breath. On other days he is short of breath walking 8 feet from his bedroom to the kitchen. He does have cough, has occasional yellowish phlegm, has intermittent wheezing. He denies chest pain or leg edema. He sleeps through the night usually, denies PND or orthopnea. He is using Symbicort 160 twice a day, rinsing his mouth. He uses his DuoNeb about twice a day. His appetite is good, he has no GI or  complaints. His reflux is controlled.   His other medical issues include hypertension, hyperlipidemia, CAD, bipolar disorder/depression, claudication, degenerative disc disease, fatty liver. The patient lives by himself in [facility], his sister is close by. Previous notes reviewed and edited as necessary. Sincere Brand was seen on 9/2, presents with CT chest, PFT and 6-minute walk test.  The patient has improved with regards to shortness of breath. He has significant problems with sleep with insomnia, lies awake until 3 or 4 AM.  He sleeps for longer hours in the daytime. He has discontinued smoking and vaping. There was no other change in his medical or surgical history since his last visit. CT chest 10/11/2021  Mediastinum: Coronary artery calcifications are a marker of atherosclerosis. There are no enlarged thoracic lymph nodes. Calcified granulomatous disease   is present. Lungs/pleura: The tracheobronchial tree is patent. There is no pneumothorax   or pleural effusion. Mild to moderate emphysema involves the bilateral upper   lungs. There is bibasilar scarring. No change in the multiple 2-3 mm pulmonary nodules scattered throughout the   bilateral lungs. No new pulmonary nodules have developed. Upper Abdomen: A small hiatal hernia is noted. Soft Tissues/Bones: Degenerative changes involve the thoracic spine. PFT 1/11/2021  FVC 2.65 L, 50% predicted, FEV1 1.82 L, 45% predicted, with a ratio of 69%. There was no response to bronchodilator. Lung volumes were significant air trapping, ERV 14% predicted. DLCO 106% predicted. 6 MWT 10/11/2021  Baseline oxygen saturation 94%, Mckenzie scales 0 each. The patient walked 800 feet, 42% predicted. He did not desaturate, there was increase in heart rate, mild increase in respiratory rate. There was no significant change in Mckenzie indices. Previous notes reviewed and edited as necessary. Doug Cramer is a pleasant, morbidly obese patient was brought in by his , Carline Mueller. He was living at the [facility].   Presently he lives by himself in [facility], is single. He was never . 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 His father  of heart failure, mother from unknown cause, although she did have COPD. He has 2 sisters, one has had lung cancer, another stomach cancer. One sister is  of ruptured appendix and brain aneurysm, brother is  from unknown cause. He has no children. Presently smokes 2 to 3 cigarettes a day, started smoking at age 7 and picked up a pack per day around age 16. He did smoke up to 1.5 PPD. He has smoked at least 1 PPD for 45 years. The patient was an alcoholic, was a heavy drinker for 1-1/2 years. Presently he is still drinking 4-5 beers occasionally, but not heavy. He tried marijuana many years ago, \"occasionally has a gummy\". Patient has a history of hypertension, hyperlipidemia, CAD, GERD, bipolar disorder, anxiety, depression, DJD, back pain. He had pancreatitis. The patient has been diagnosed with but has not had a PFT was normal.  He was hospitalized at [facility] from  through 2021 for acute respiratory failure with hypoxia, COPD exacerbation, pneumonia. He was hyponatremic. His other listed medical problems were hypertension, hyperlipidemia and CAD. He was treated with Levaquin, discharged on doxycycline, Symbicort 160 and DuoNeb. The patient states he has had pneumonia in the [de-identified], 90s and again this year. Presently the patient is using his nebulizer every 4 hours, he is using Symbicort twice a day and rinsing his mouth. He uses rescue albuterol as needed. He is on supplemental oxygen at 2 LPM, has a portable tank and a nebulizer. He does not have a portable concentrator. The patient does have GE reflux rarely, is on PPI twice daily. He does have occasional hemorrhoidal bleed. The patient has had left leg swelling and burning. He has been told by a  that he likely has sleep apnea, he has not been evaluated.     There is no PFT in EMR    CXR 8/18/2021 [address]  Heart size and pulmonary vasculature stable. Interval resolution of right-sided infiltrates. Interval improvement in left-sided parenchymal lung disease with a few residual bandlike opacities. Costophrenic angles sharp     CT chest 3/29/2018  Chest:       Mediastinum: No mediastinal adenopathy. Thoracic aorta normal in caliber. Coronary artery calcifications. No pericardial effusion. Suspected wall   thickening of the distal esophagus. Lungs/pleura: Pulmonary emphysema. No infiltrate or suspicious nodule. Dependent atelectasis in the lung bases. Scattered calcified granulomas. No   pleural effusion. Soft Tissues/Bones: No acute bony abnormality         Past Medical History:   Diagnosis Date    Affective bipolar disorder ([address] 11/18/2010    Anxiety     Bipolar affective ([address]     CAD (coronary artery disease)     Chronic back pain     Class 2 obesity due to excess calories with body mass index (BMI) of 39.0 to 39.9 in adult 2/2/2022    COPD (chronic obstructive pulmonary disease) (HCC)     DDD (degenerative disc disease), cervical     Depression     Fatty liver 2/2/2021    GERD (gastroesophageal reflux disease)     Hyperlipidemia     Hypertension     MI (myocardial infarction) ([address]     Pancreatitis     Recovering alcoholic ([address]     Tobacco abuse 12/16/2016       Past Surgical History:   Procedure Laterality Date    CORONARY ANGIOPLASTY WITH STENT PLACEMENT  2006, 2012, 2013    [facility]       Social History     Tobacco Use    Smoking status: Every Day     Packs/day: 1.00     Years: 45.00     Pack years: 45.00     Types: Cigarettes     Start date: 8/17/1977    Smokeless tobacco: Never   Substance Use Topics    Alcohol use:  Yes     Alcohol/week: 0.0 standard drinks     Comment: states \"a few beers monthly\"       Family History   Problem Relation Age of Onset    Other Mother     Arthritis Mother     Mental Illness Mother     Depression Mother     Diabetes Father     Heart Disease Father     Substance Abuse Father     Heart Disease Sister     High Blood Pressure Sister     Mental Retardation Brother     Other Brother     Learning Disabilities Brother     Early Death Sister     Cancer Sister     Substance Abuse Sister     Cancer Sister     Diabetes Brother     High Blood Pressure Brother          Current Outpatient Medications:     benzonatate (TESSALON) 200 MG capsule, , Disp: , Rfl:     amLODIPine (NORVASC) 2.5 MG tablet, Take 1 tablet by mouth daily, Disp: 90 tablet, Rfl: 0    atorvastatin (LIPITOR) 40 MG tablet, Take 1 tablet by mouth daily, Disp: 90 tablet, Rfl: 0    clopidogrel (PLAVIX) 75 MG tablet, Take 1 tablet by mouth daily, Disp: 90 tablet, Rfl: 0    lisinopril (PRINIVIL;ZESTRIL) 10 MG tablet, Take 1 tablet by mouth daily, Disp: 90 tablet, Rfl: 0    metoprolol succinate (TOPROL XL) 100 MG extended release tablet, Take 1 tablet by mouth daily, Disp: 90 tablet, Rfl: 0    ipratropium-albuterol (DUONEB) 0.5-2.5 (3) MG/3ML SOLN nebulizer solution, Inhale 3 mLs into the lungs every 4 hours, Disp: 360 mL, Rfl: 0    QUEtiapine (SEROQUEL) 100 MG tablet, 100 mg 2 times daily , Disp: , Rfl:     gabapentin (NEURONTIN) 300 MG capsule, Take 300 mg by mouth 3 times daily.  , Disp: , Rfl:     aspirin 325 MG tablet, Take 325 mg by mouth daily, Disp: , Rfl:     isosorbide mononitrate (IMDUR) 30 MG extended release tablet, TAKE (1) TABLET DAILY, Disp: 90 tablet, Rfl: 3    albuterol sulfate  (90 Base) MCG/ACT inhaler, Inhale 2 puffs into the lungs every 4 hours as needed, Disp: , Rfl:     SYMBICORT 160-4.5 MCG/ACT AERO, , Disp: , Rfl:     nitroGLYCERIN (NITROSTAT) 0.4 MG SL tablet, Place 1 tablet under the tongue every 5 minutes as needed for Chest pain, Disp: 25 tablet, Rfl: 3    pantoprazole (PROTONIX) 20 MG tablet, TAKE 2 TABLETS BY MOUTH DAILY, Disp: 30 tablet, Rfl: 0    ranolazine (RANEXA) 1000 MG extended release tablet, TAKE 1 TABLET TWICE DAILY (Patient not taking: Reported on 3/14/2023), Disp: 180 tablet, Rfl: 3    Pcn [penicillins]    Vitals:    03/14/23 1331   BP: 100/71   Pulse: 68   Resp: 18   Temp: 97.7 °F (36.5 °C)   TempSrc: Temporal   SpO2: 96%   Weight: 262 lb 12.8 oz (119.2 kg)   Height: 6' (1.829 m)         Review of Systems   Respiratory:  Positive for cough, shortness of breath and wheezing. Cardiovascular:  Negative for chest pain and leg swelling. Gastrointestinal:         ANDREA, possible hemorrhoidal bleed   Psychiatric/Behavioral:  Positive for sleep disturbance. All other systems reviewed and are negative. Physical Exam  Vitals reviewed. Constitutional:       General: He is not in acute distress. Appearance: He is well-developed. He is obese. He is not ill-appearing, toxic-appearing or diaphoretic. Comments: Pleasant, averagely built, appears disheveled. Overweight   HENT:      Head: Normocephalic and atraumatic. Nose: Nose normal. No congestion or rhinorrhea. Mouth/Throat:      Mouth: Mucous membranes are moist.      Pharynx: Oropharynx is clear. No oropharyngeal exudate or posterior oropharyngeal erythema. Comments: Class III airway  Eyes:      General: No scleral icterus. Right eye: No discharge. Left eye: No discharge. Conjunctiva/sclera: Conjunctivae normal.      Pupils: Pupils are equal, round, and reactive to light. Neck:      Thyroid: No thyromegaly. Vascular: No JVD. Trachea: No tracheal deviation. Comments: Short and large neck  Cardiovascular:      Rate and Rhythm: Normal rate and regular rhythm. Heart sounds: Normal heart sounds. No murmur heard. No friction rub. No gallop. Pulmonary:      Effort: Pulmonary effort is normal. No respiratory distress. Breath sounds: No stridor. No wheezing, rhonchi or rales. Comments: Significantly diminished air entry bilaterally  Abdominal:      Comments: Protuberant   Musculoskeletal:      Right lower leg: No edema.       Left lower leg: No edema. Lymphadenopathy:      Cervical: No cervical adenopathy. Skin:     General: Skin is warm and dry. Coloration: Skin is not jaundiced or pale. Findings: No bruising, erythema, lesion or rash. Comments: Multiple scratch marks on both arms, which he says is from his cat   Neurological:      General: No focal deficit present. Mental Status: He is alert and oriented to person, place, and time.       Gait: Gait normal.      Comments: Detailed neurologic exam not performed   Psychiatric:         Mood and Affect: Mood normal.         Behavior: Behavior normal.

## 2023-03-14 NOTE — PROGRESS NOTES
MA Communication:   The following orders are received by verbal communication from Steph Michael MD    Orders include:  fu 6 mo lungs        ONPO

## 2023-03-22 DIAGNOSIS — J44.9 COPD, SEVERE (HCC): Primary | ICD-10-CM

## 2023-03-22 NOTE — ED NOTES
Verbal order from MD to this RN for 1in nitro paste      Kennedy Donahue RN  12/17/18 [id number] [employer] [address]

## 2023-03-22 NOTE — PROGRESS NOTES
Reviewed overnight oximetry. Wendy Gallegos qualifies for night time oxygen. He is to wear 2 L nc at night. He will need repeat overnight oximetry on 2 L nc in 1 month.

## 2023-04-08 DIAGNOSIS — J44.9 COPD, SEVERE (HCC): Primary | ICD-10-CM

## 2023-05-18 ENCOUNTER — OFFICE VISIT (OUTPATIENT)
Dept: CARDIOLOGY CLINIC | Age: 58
End: 2023-05-18

## 2023-05-18 VITALS
DIASTOLIC BLOOD PRESSURE: 60 MMHG | WEIGHT: 251.5 LBS | SYSTOLIC BLOOD PRESSURE: 110 MMHG | OXYGEN SATURATION: 96 % | HEIGHT: 72 IN | HEART RATE: 74 BPM | BODY MASS INDEX: 34.06 KG/M2

## 2023-05-18 DIAGNOSIS — Z72.0 TOBACCO ABUSE: Primary | ICD-10-CM

## 2023-05-18 DIAGNOSIS — I25.10 CORONARY ARTERY DISEASE INVOLVING NATIVE CORONARY ARTERY OF NATIVE HEART WITHOUT ANGINA PECTORIS: ICD-10-CM

## 2023-05-18 DIAGNOSIS — I10 PRIMARY HYPERTENSION: ICD-10-CM

## 2023-05-18 DIAGNOSIS — E66.09 CLASS 2 OBESITY DUE TO EXCESS CALORIES WITH BODY MASS INDEX (BMI) OF 39.0 TO 39.9 IN ADULT, UNSPECIFIED WHETHER SERIOUS COMORBIDITY PRESENT: ICD-10-CM

## 2023-05-18 RX ORDER — ISOSORBIDE MONONITRATE 30 MG/1
TABLET, EXTENDED RELEASE ORAL
Qty: 90 TABLET | Refills: 3 | Status: SHIPPED | OUTPATIENT
Start: 2023-05-18

## 2023-05-18 RX ORDER — NITROGLYCERIN 0.4 MG/1
0.4 TABLET SUBLINGUAL EVERY 5 MIN PRN
Qty: 25 TABLET | Refills: 3 | Status: SHIPPED | OUTPATIENT
Start: 2023-05-18

## 2023-05-18 RX ORDER — LISINOPRIL 10 MG/1
10 TABLET ORAL DAILY
Qty: 90 TABLET | Refills: 3 | Status: SHIPPED | OUTPATIENT
Start: 2023-05-18

## 2023-05-18 RX ORDER — METOPROLOL SUCCINATE 100 MG/1
100 TABLET, EXTENDED RELEASE ORAL DAILY
Qty: 90 TABLET | Refills: 3 | Status: SHIPPED | OUTPATIENT
Start: 2023-05-18

## 2023-05-18 RX ORDER — CLOPIDOGREL BISULFATE 75 MG/1
TABLET ORAL
Qty: 90 TABLET | Refills: 3 | Status: SHIPPED | OUTPATIENT
Start: 2023-05-18

## 2023-05-18 RX ORDER — AMLODIPINE BESYLATE 2.5 MG/1
2.5 TABLET ORAL DAILY
Qty: 90 TABLET | Refills: 3 | Status: SHIPPED | OUTPATIENT
Start: 2023-05-18

## 2023-05-18 RX ORDER — RANOLAZINE 1000 MG/1
1000 TABLET, EXTENDED RELEASE ORAL 2 TIMES DAILY
Qty: 180 TABLET | Refills: 3 | Status: SHIPPED | OUTPATIENT
Start: 2023-05-18

## 2023-05-18 NOTE — PATIENT INSTRUCTIONS
Plan:  1. Tobacco cessation ~ high risk factor to heart attack or stoke  ~1-800-QUIT-NOW  2. Weight loss encouraged with routine physical activity regimen  ~Get at least 150 minutes per week of moderate-intensity aerobic activity or 75 minutes per week of high intensity aerobic activity. Examples of moderate intensity activity- brisk walking, water aerobics, gardening, or dancing. Examples of high intensity activity- hiking, running, swimming laps, heavy yard work, playing tennis, or biking. 3. I recommend that the patient continue their currently prescribed medications. Their drug modifiable risk factors appear to be well controlled. I will continue to address the need/dosing of medications in future visits. 4. Follow up in one year.

## 2023-05-18 NOTE — PROGRESS NOTES
[address] [place] [address]   Cardiovascular Evaluation    PATIENT: Jovana Brumfield  DATE: 2023  MRN: [id number]  CSN: [id number]  : 1965    Primary Care Doctor: Luna Meigs    Reason for evaluation:   1 Year Follow Up (Pt reports chest discomfort), Coronary Artery Disease, Hyperlipidemia, and Hypertension      Subjective:    History of present illness on initial date of evaluation:   Jovana Brumfield is a 65 y.o. male  patient who presents for follow up. He has a past medical history including CAD, HTN, HLD, and COPD. He states he had PCI in ,  and . Echo 2017 showed EF 55%. [facility] 2018 showed mild to moderate diffuse CAD with mild in stent stenosis of distal RCA. Lexiscan stress test 2020 no ischemia or scar. Today he states he has lost weight, he states he has been not eating as much in correlation to this. He is trying to walk more at one point walking 24 blocks per day. He states he is not working at this time and is disabled in correlation to COPD. Patient currently denies any weight gain, edema, palpitations,shortness of breath, dizziness, and syncope. He continues to smoke 1/2 ppd. He reports chest pain that radiates to the back while sitting or resting. He states improves with walking. He has reduced stress in his life.       Patient Active Problem List   Diagnosis    Claudication ([address]    HTN (hypertension)    Hyperlipidemia    Acute chest pain    Coronary artery disease involving native heart without angina pectoris    Tobacco abuse    Thumb sprain    Mass of hand    Cardiac microvascular disease    Chest pain    Pneumonia    COPD exacerbation ([address]    Acute respiratory failure with hypoxia (HCC)    Hyponatremia    Affective bipolar disorder ([address]    Depression with anxiety    Fatty liver    GERD (gastroesophageal reflux disease)    COPD, severe (HCC)    Class 2 obesity due to excess calories with body mass index (BMI) of 39.0 to 39.9 in adult    Chronic stable

## 2023-06-19 ENCOUNTER — HOSPITAL ENCOUNTER (OUTPATIENT)
Dept: GENERAL RADIOLOGY | Age: 58
Discharge: HOME | End: 2023-06-19
Payer: COMMERCIAL

## 2023-06-19 ENCOUNTER — HOSPITAL ENCOUNTER (OUTPATIENT)
Age: 58
Discharge: HOME | End: 2023-06-19
Payer: COMMERCIAL

## 2023-06-19 DIAGNOSIS — J44.9 CHRONIC OBSTRUCTIVE PULMONARY DISEASE, UNSPECIFIED COPD TYPE (HCC): ICD-10-CM

## 2023-06-19 PROCEDURE — 71046 X-RAY EXAM CHEST 2 VIEWS: CPT

## 2023-07-05 DIAGNOSIS — I10 PRIMARY HYPERTENSION: ICD-10-CM

## 2023-07-06 RX ORDER — LISINOPRIL 10 MG/1
TABLET ORAL
Qty: 90 TABLET | Refills: 3 | Status: SHIPPED | OUTPATIENT
Start: 2023-07-06

## 2023-09-25 ENCOUNTER — OFFICE VISIT (OUTPATIENT)
Dept: PULMONOLOGY | Age: 58
End: 2023-09-25
Payer: COMMERCIAL

## 2023-09-25 VITALS
DIASTOLIC BLOOD PRESSURE: 77 MMHG | SYSTOLIC BLOOD PRESSURE: 129 MMHG | BODY MASS INDEX: 33.35 KG/M2 | HEART RATE: 67 BPM | WEIGHT: 246.2 LBS | TEMPERATURE: 96.7 F | HEIGHT: 72 IN | OXYGEN SATURATION: 95 % | RESPIRATION RATE: 16 BRPM

## 2023-09-25 DIAGNOSIS — E66.9 OBESITY (BMI 35.0-39.9 WITHOUT COMORBIDITY): ICD-10-CM

## 2023-09-25 DIAGNOSIS — J96.11 CHRONIC RESPIRATORY FAILURE WITH HYPOXIA (HCC): ICD-10-CM

## 2023-09-25 DIAGNOSIS — J44.9 COPD, SEVERE (HCC): Primary | ICD-10-CM

## 2023-09-25 DIAGNOSIS — J43.2 CENTRILOBULAR EMPHYSEMA (HCC): ICD-10-CM

## 2023-09-25 DIAGNOSIS — Z87.891 PERSONAL HISTORY OF TOBACCO USE: ICD-10-CM

## 2023-09-25 DIAGNOSIS — R91.8 MULTIPLE LUNG NODULES: ICD-10-CM

## 2023-09-25 DIAGNOSIS — F17.200 TOBACCO DEPENDENCE: ICD-10-CM

## 2023-09-25 PROCEDURE — 90471 IMMUNIZATION ADMIN: CPT | Performed by: STUDENT IN AN ORGANIZED HEALTH CARE EDUCATION/TRAINING PROGRAM

## 2023-09-25 PROCEDURE — 3017F COLORECTAL CA SCREEN DOC REV: CPT | Performed by: STUDENT IN AN ORGANIZED HEALTH CARE EDUCATION/TRAINING PROGRAM

## 2023-09-25 PROCEDURE — 3074F SYST BP LT 130 MM HG: CPT | Performed by: STUDENT IN AN ORGANIZED HEALTH CARE EDUCATION/TRAINING PROGRAM

## 2023-09-25 PROCEDURE — 4004F PT TOBACCO SCREEN RCVD TLK: CPT | Performed by: STUDENT IN AN ORGANIZED HEALTH CARE EDUCATION/TRAINING PROGRAM

## 2023-09-25 PROCEDURE — G8427 DOCREV CUR MEDS BY ELIG CLIN: HCPCS | Performed by: STUDENT IN AN ORGANIZED HEALTH CARE EDUCATION/TRAINING PROGRAM

## 2023-09-25 PROCEDURE — G0296 VISIT TO DETERM LDCT ELIG: HCPCS | Performed by: STUDENT IN AN ORGANIZED HEALTH CARE EDUCATION/TRAINING PROGRAM

## 2023-09-25 PROCEDURE — 3078F DIAST BP <80 MM HG: CPT | Performed by: STUDENT IN AN ORGANIZED HEALTH CARE EDUCATION/TRAINING PROGRAM

## 2023-09-25 PROCEDURE — 99213 OFFICE O/P EST LOW 20 MIN: CPT | Performed by: STUDENT IN AN ORGANIZED HEALTH CARE EDUCATION/TRAINING PROGRAM

## 2023-09-25 PROCEDURE — G8417 CALC BMI ABV UP PARAM F/U: HCPCS | Performed by: STUDENT IN AN ORGANIZED HEALTH CARE EDUCATION/TRAINING PROGRAM

## 2023-09-25 PROCEDURE — 3023F SPIROM DOC REV: CPT | Performed by: STUDENT IN AN ORGANIZED HEALTH CARE EDUCATION/TRAINING PROGRAM

## 2023-09-25 PROCEDURE — 90674 CCIIV4 VAC NO PRSV 0.5 ML IM: CPT | Performed by: STUDENT IN AN ORGANIZED HEALTH CARE EDUCATION/TRAINING PROGRAM

## 2023-09-25 RX ORDER — BUPROPION HYDROCHLORIDE 150 MG/1
150 TABLET ORAL EVERY MORNING
Qty: 30 TABLET | Refills: 3 | Status: SHIPPED | OUTPATIENT
Start: 2023-09-25

## 2023-09-25 RX ORDER — BUDESONIDE AND FORMOTEROL FUMARATE DIHYDRATE 160; 4.5 UG/1; UG/1
2 AEROSOL RESPIRATORY (INHALATION) 2 TIMES DAILY
Qty: 10.2 G | Refills: 3 | Status: SHIPPED | OUTPATIENT
Start: 2023-09-25

## 2023-09-25 ASSESSMENT — ENCOUNTER SYMPTOMS
SORE THROAT: 0
EYE REDNESS: 0
EYE ITCHING: 0
CONSTIPATION: 0
EYE PAIN: 0
BACK PAIN: 0
VOMITING: 0
WHEEZING: 0
ABDOMINAL DISTENTION: 0
TROUBLE SWALLOWING: 0
COUGH: 0
NAUSEA: 0
STRIDOR: 0
EYE DISCHARGE: 0
DIARRHEA: 0
ABDOMINAL PAIN: 0
SHORTNESS OF BREATH: 0
COLOR CHANGE: 0

## 2023-09-25 NOTE — PATIENT INSTRUCTIONS
Remember to bring a list of pulmonary medications and any CPAP or BiPAP machines to your next appointment with the office. Please keep all of your future appointments scheduled by [id number] [facility]. Out of respect for other patients and providers, you may be asked to reschedule your appointment if you arrive later than your scheduled appointment time. Appointments cancelled less than 24hrs in advance will be considered a no show. Patients with three missed appointments within 1 year or four missed appointments within 2 years can be dismissed from the practice. Please be aware that our physicians are required to work in the Intensive Care Unit at [facility].  Your appointment may need to be rescheduled if they are designated to work during your appointment time. You may receive a survey regarding the care you received during your visit. Your input is valuable to us. We encourage you to complete and return your survey. We hope you will choose us in the future for your healthcare needs. Pt instructed of all future appointment dates & times, including radiology, labs, procedures & referrals. If procedures were scheduled preparation instructions provided. Instructions on future appointments with [facility] were given. MA Communication: The following orders are received by verbal communication from Konstantin Linares MD    Orders include:  LDCT in March and FU with Dr Rikki Angela is screening for lung cancer? Lung cancer screening is a way to find some lung cancers early, before a person has any symptoms of the cancer. Lung cancer screening may help those who have the highest risk for lung cancer--people age 48 and older who are or were heavy smokers. For most people, who aren't at increased risk, screening for lung cancer probably isn't helpful. Screening won't prevent cancer.  And it may

## 2023-09-25 NOTE — PROGRESS NOTES
MA Communication:   The following orders are received by verbal communication from Miguel Pillai MD    Orders include:  LDCT in March and FU with Dr Joaquina Hendrickson

## 2023-09-25 NOTE — PROGRESS NOTES
[facility]monary Follow-up  [address] [facility], [address] [place] [address],[address]  [phone]        Alena Cox (: 1965 ) is a 63 y.o. male here for an evaluation of   Chief Complaint   Patient presents with    Follow-up     6 mo lungs     COPD    Results     ONPO       SUBJECTIVE/OBJECTIVE:  Patient is a 81-year-old male with significant past medical history of severe COPD, lung nodules, chronic hypoxic respiratory failure, emphysema, tobacco dependence that presents to [facility] pulmonary clinic for follow-up visit. Patient is still having shortness of breath. He is only using albuterol nebulizer and inhaler 2 times a day. He is not on any maintenance inhaler for his COPD. Patient is also smoking up to 1 to 2 packs of cigarettes per day. He has no other complaints today. He is here for follow-up visit. Per Dr. Chyna Corral:  Claudia RodrigezChelsey Parsons is a 65y.o. year old male here for follow-up for severe COPD/emphysema, multiple lung nodules, chronic respiratory failure. His DME is [employer] His PCP is Dr. Daniel Khalil. Gonzalo Lacey was last seen on 10/26/2022, presents for follow-up visit. The patient says he is getting his \"doctor appointments together\", says there are no drivers for transportation. He has severe COPD with FEV1 45% predicted on PFT 2021. Patient has been on home oxygen. He says he is unable to get tubing from his DME. At his last 6-minute walk test he did not qualify for home O2. The patient has been using Symbicort and rescue DuoNeb. He also uses albuterol occasionally. The patient says he had a \"bad cough\" about 2 weeks ago, he is now improving. He does have wheezing particularly noticeable at night, revisiting the plan. He tries to walk. He says the shortness of breath fluctuates based on the weather. Unfortunately continues to smoke a pack per day, occasionally more than occasionally less.   He has significantly cut down alcohol intake, but claims that he did

## 2023-10-13 ENCOUNTER — HOSPITAL ENCOUNTER (OUTPATIENT)
Dept: GENERAL RADIOLOGY | Age: 58
Discharge: HOME | End: 2023-10-13
Payer: COMMERCIAL

## 2023-10-13 ENCOUNTER — HOSPITAL ENCOUNTER (OUTPATIENT)
Age: 58
Discharge: HOME | End: 2023-10-13
Payer: COMMERCIAL

## 2023-10-13 DIAGNOSIS — R52 PAIN: ICD-10-CM

## 2023-10-13 PROCEDURE — 72100 X-RAY EXAM L-S SPINE 2/3 VWS: CPT

## 2023-10-13 PROCEDURE — 73502 X-RAY EXAM HIP UNI 2-3 VIEWS: CPT

## 2023-12-01 ENCOUNTER — HOSPITAL ENCOUNTER (EMERGENCY)
Age: 58
Discharge: HOME | End: 2023-12-01
Attending: EMERGENCY MEDICINE
Payer: COMMERCIAL

## 2023-12-01 VITALS
HEART RATE: 70 BPM | OXYGEN SATURATION: 98 % | RESPIRATION RATE: 18 BRPM | SYSTOLIC BLOOD PRESSURE: 101 MMHG | DIASTOLIC BLOOD PRESSURE: 69 MMHG | WEIGHT: 239 LBS | HEIGHT: 72 IN | BODY MASS INDEX: 32.37 KG/M2 | TEMPERATURE: 98.5 F

## 2023-12-01 DIAGNOSIS — R07.89 ATYPICAL CHEST PAIN: Primary | ICD-10-CM

## 2023-12-01 LAB
ALBUMIN SERPL-MCNC: 4 G/DL (ref 3.4–5)
ALBUMIN/GLOB SERPL: 1.6 {RATIO} (ref 1.1–2.2)
ALP SERPL-CCNC: 63 U/L (ref 40–129)
ALT SERPL-CCNC: 74 U/L (ref 10–40)
ANION GAP SERPL CALCULATED.3IONS-SCNC: 10 MMOL/L (ref 3–16)
AST SERPL-CCNC: 42 U/L (ref 15–37)
BASOPHILS # BLD: 0.1 K/UL (ref 0–0.2)
BASOPHILS NFR BLD: 0.5 %
BILIRUB SERPL-MCNC: 0.3 MG/DL (ref 0–1)
BUN SERPL-MCNC: 8 MG/DL (ref 7–20)
CALCIUM SERPL-MCNC: 8.9 MG/DL (ref 8.3–10.6)
CHLORIDE SERPL-SCNC: 96 MMOL/L (ref 99–110)
CO2 SERPL-SCNC: 24 MMOL/L (ref 21–32)
CREAT SERPL-MCNC: 0.8 MG/DL (ref 0.9–1.3)
DEPRECATED RDW RBC AUTO: 15.3 % (ref 12.4–15.4)
EOSINOPHIL # BLD: 0.1 K/UL (ref 0–0.6)
EOSINOPHIL NFR BLD: 0.8 %
GFR SERPLBLD CREATININE-BSD FMLA CKD-EPI: >60 ML/MIN/{1.73_M2}
GLUCOSE SERPL-MCNC: 107 MG/DL (ref 70–99)
HCT VFR BLD AUTO: 40.2 % (ref 40.5–52.5)
HGB BLD-MCNC: 13.5 G/DL (ref 13.5–17.5)
LYMPHOCYTES # BLD: 1.8 K/UL (ref 1–5.1)
LYMPHOCYTES NFR BLD: 17 %
MCH RBC QN AUTO: 28.6 PG (ref 26–34)
MCHC RBC AUTO-ENTMCNC: 33.5 G/DL (ref 31–36)
MCV RBC AUTO: 85.4 FL (ref 80–100)
MONOCYTES # BLD: 0.6 K/UL (ref 0–1.3)
MONOCYTES NFR BLD: 5.8 %
NEUTROPHILS # BLD: 8.1 K/UL (ref 1.7–7.7)
NEUTROPHILS NFR BLD: 75.9 %
PLATELET # BLD AUTO: 167 K/UL (ref 135–450)
PMV BLD AUTO: 9.2 FL (ref 5–10.5)
POTASSIUM SERPL-SCNC: 4.5 MMOL/L (ref 3.5–5.1)
PROT SERPL-MCNC: 6.5 G/DL (ref 6.4–8.2)
RBC # BLD AUTO: 4.71 M/UL (ref 4.2–5.9)
SODIUM SERPL-SCNC: 130 MMOL/L (ref 136–145)
TROPONIN, HIGH SENSITIVITY: 9 NG/L (ref 0–22)
WBC # BLD AUTO: 10.7 K/UL (ref 4–11)

## 2023-12-01 PROCEDURE — 36415 COLL VENOUS BLD VENIPUNCTURE: CPT

## 2023-12-01 PROCEDURE — 84484 ASSAY OF TROPONIN QUANT: CPT

## 2023-12-01 PROCEDURE — 80053 COMPREHEN METABOLIC PANEL: CPT

## 2023-12-01 PROCEDURE — 93005 ELECTROCARDIOGRAM TRACING: CPT | Performed by: EMERGENCY MEDICINE

## 2023-12-01 PROCEDURE — 99284 EMERGENCY DEPT VISIT MOD MDM: CPT

## 2023-12-01 PROCEDURE — 85025 COMPLETE CBC W/AUTO DIFF WBC: CPT

## 2023-12-01 RX ORDER — GABAPENTIN 300 MG/1
300 CAPSULE ORAL 3 TIMES DAILY
COMMUNITY

## 2023-12-01 RX ORDER — FAMOTIDINE 20 MG/1
20 TABLET, FILM COATED ORAL 2 TIMES DAILY
COMMUNITY

## 2023-12-01 RX ORDER — LORATADINE 10 MG/1
10 TABLET ORAL DAILY
COMMUNITY

## 2023-12-01 ASSESSMENT — ENCOUNTER SYMPTOMS
STRIDOR: 0
WHEEZING: 1
APNEA: 0
SHORTNESS OF BREATH: 0
COUGH: 0
CHOKING: 0
CHEST TIGHTNESS: 1

## 2023-12-01 ASSESSMENT — PAIN SCALES - GENERAL: PAINLEVEL_OUTOF10: 1

## 2023-12-01 ASSESSMENT — PAIN - FUNCTIONAL ASSESSMENT: PAIN_FUNCTIONAL_ASSESSMENT: 0-10

## 2023-12-01 NOTE — ED NOTES
Pt discharge instructions, follow up reviewed with pt. Pt verbalized understanding. No further needs. Pt discharged at this time.         Александр Paredes RN  12/01/23 [id number]

## 2023-12-01 NOTE — ED PROVIDER NOTES
[facility]      Pt Name: Tamara Rincon  MRN: [id number]  [address] [facility] [place] 1965  Date of evaluation: 12/1/2023  Provider: Sary Hernandez MD    CHIEF COMPLAINT       Chief Complaint   Patient presents with    Chest Pain     Pt was at [address] [place] [address] and started having CP. Pt has hx of anxiety and reports a lot of stress going on currently. HISTORY OF PRESENT ILLNESS   (Location/Symptom, Timing/Onset, Context/Setting, Quality, Duration, Modifying Factors, Severity)  Note limiting factors. Tamara Rincon is a 63 y.o. male who presents to the emergency department     Patient presented to the emergency department being very anxious today. Liat Deed that he had some family issues at [facility] and that they will probably never have another [facility] he also states that he has some neighbors that are kind of bickering back-and-forth which has been very very stressful for him. He says he is kind of scared of them also. He has a history of anxiety bipolar disease history of panic attacks. He thinks it is probably just a panic attack but could not be sure he does have cardiac disease and has 3 stents    The history is provided by the patient. Or he does have known cardiac disease and has 3 stents    Nursing Notes were reviewed. REVIEW OF SYSTEMS    (2-9 systems for level 4, 10 or more for level 5)     Review of Systems   Constitutional:  Positive for activity change. HENT:  Negative for congestion. Respiratory:  Positive for chest tightness and wheezing. Negative for apnea, cough, choking, shortness of breath and stridor. Cardiovascular:  Positive for chest pain. Negative for palpitations and leg swelling. Neurological:  Positive for light-headedness. All other systems reviewed and are negative. Except as noted above the remainder of the review of systems was reviewed and negative.        PAST MEDICAL HISTORY     Past Medical History: (NITROSTAT) 0.4 MG SL TABLET    Place 1 tablet under the tongue every 5 minutes as needed for Chest pain    PANTOPRAZOLE (PROTONIX) 20 MG TABLET    TAKE 2 TABLETS BY MOUTH DAILY    RANOLAZINE (RANEXA) 1000 MG EXTENDED RELEASE TABLET    Take 1 tablet by mouth 2 times daily    SYMBICORT 160-4.5 MCG/ACT AERO    Inhale 2 puffs into the lungs 2 times daily       ALLERGIES     Pcn [penicillins]    FAMILY HISTORY       Family History   Problem Relation Age of Onset    Other Mother     Arthritis Mother     Mental Illness Mother     Depression Mother     Diabetes Father     Heart Disease Father     Substance Abuse Father     Heart Disease Sister     High Blood Pressure Sister     Mental Retardation Brother     Other Brother     Learning Disabilities Brother     Early Death Sister     Cancer Sister     Substance Abuse Sister     Cancer Sister     Diabetes Brother     High Blood Pressure Brother           SOCIAL HISTORY       Social History     Socioeconomic History    Marital status: Single     Spouse name: None    Number of children: None    Years of education: None    Highest education level: None   Tobacco Use    Smoking status: Every Day     Packs/day: 1.00     Years: 45.00     Additional pack years: 0.00     Total pack years: 45.00     Types: Cigarettes     Start date: 8/17/1977    Smokeless tobacco: Never    Tobacco comments:     1/2 pack day    Vaping Use    Vaping Use: Never used   Substance and Sexual Activity    Alcohol use:  Yes     Alcohol/week: 0.0 standard drinks of alcohol     Comment: states \"a few beers monthly\"    Drug use: Yes     Types: Marijuana (Weed)     Comment: took friend's suboxone     Sexual activity: Never       SCREENINGS    Powersite Coma Scale  Eye Opening: Spontaneous  Best Verbal Response: Oriented  Best Motor Response: Obeys commands  Jannet Coma Scale Score: 15          PHYSICAL EXAM    (up to 7 for level 4, 8 or more for level 5)     ED Triage Vitals   BP Temp Temp Source Pulse Respirations

## 2023-12-02 LAB
EKG ATRIAL RATE: 72 BPM
EKG DIAGNOSIS: NORMAL
EKG P AXIS: 44 DEGREES
EKG P-R INTERVAL: 184 MS
EKG Q-T INTERVAL: 416 MS
EKG QRS DURATION: 92 MS
EKG QTC CALCULATION (BAZETT): 455 MS
EKG R AXIS: 25 DEGREES
EKG T AXIS: 55 DEGREES
EKG VENTRICULAR RATE: 72 BPM

## 2023-12-02 PROCEDURE — 93010 ELECTROCARDIOGRAM REPORT: CPT | Performed by: INTERNAL MEDICINE

## 2024-01-03 ENCOUNTER — TELEPHONE (OUTPATIENT)
Dept: PULMONOLOGY | Age: 59
End: 2024-01-03

## 2024-01-03 NOTE — TELEPHONE ENCOUNTER
[facility] needs additional documentation (office visit notes and signed prescription for oxygen equipment). Fax is [phone]. Rosio from [employer] says she already sent request back on Dec 26th and did not receive response. Thank you

## 2024-02-28 DIAGNOSIS — J43.2 CENTRILOBULAR EMPHYSEMA (HCC): ICD-10-CM

## 2024-02-29 RX ORDER — BUDESONIDE AND FORMOTEROL FUMARATE DIHYDRATE 160; 4.5 UG/1; UG/1
AEROSOL RESPIRATORY (INHALATION)
Qty: 10.2 G | Refills: 5 | Status: SHIPPED | OUTPATIENT
Start: 2024-02-29

## 2024-03-25 ENCOUNTER — HOSPITAL ENCOUNTER (OUTPATIENT)
Dept: CT IMAGING | Age: 59
Discharge: HOME | End: 2024-03-25
Payer: COMMERCIAL

## 2024-03-25 ENCOUNTER — OFFICE VISIT (OUTPATIENT)
Dept: PULMONOLOGY | Age: 59
End: 2024-03-25
Payer: COMMERCIAL

## 2024-03-25 VITALS
TEMPERATURE: 96.8 F | SYSTOLIC BLOOD PRESSURE: 134 MMHG | HEIGHT: 72 IN | BODY MASS INDEX: 31.76 KG/M2 | WEIGHT: 234.5 LBS | HEART RATE: 77 BPM | OXYGEN SATURATION: 96 % | RESPIRATION RATE: 16 BRPM | DIASTOLIC BLOOD PRESSURE: 87 MMHG

## 2024-03-25 DIAGNOSIS — Z87.891 PERSONAL HISTORY OF TOBACCO USE: ICD-10-CM

## 2024-03-25 DIAGNOSIS — F17.200 TOBACCO DEPENDENCE: ICD-10-CM

## 2024-03-25 DIAGNOSIS — E66.9 OBESITY (BMI 35.0-39.9 WITHOUT COMORBIDITY): ICD-10-CM

## 2024-03-25 DIAGNOSIS — J43.2 CENTRILOBULAR EMPHYSEMA (HCC): ICD-10-CM

## 2024-03-25 DIAGNOSIS — R91.8 MULTIPLE LUNG NODULES: ICD-10-CM

## 2024-03-25 DIAGNOSIS — J96.11 CHRONIC RESPIRATORY FAILURE WITH HYPOXIA (HCC): ICD-10-CM

## 2024-03-25 DIAGNOSIS — J44.9 COPD, SEVERE (HCC): Primary | ICD-10-CM

## 2024-03-25 PROCEDURE — G8482 FLU IMMUNIZE ORDER/ADMIN: HCPCS | Performed by: STUDENT IN AN ORGANIZED HEALTH CARE EDUCATION/TRAINING PROGRAM

## 2024-03-25 PROCEDURE — 71271 CT THORAX LUNG CANCER SCR C-: CPT

## 2024-03-25 PROCEDURE — 4004F PT TOBACCO SCREEN RCVD TLK: CPT | Performed by: STUDENT IN AN ORGANIZED HEALTH CARE EDUCATION/TRAINING PROGRAM

## 2024-03-25 PROCEDURE — 3023F SPIROM DOC REV: CPT | Performed by: STUDENT IN AN ORGANIZED HEALTH CARE EDUCATION/TRAINING PROGRAM

## 2024-03-25 PROCEDURE — 3017F COLORECTAL CA SCREEN DOC REV: CPT | Performed by: STUDENT IN AN ORGANIZED HEALTH CARE EDUCATION/TRAINING PROGRAM

## 2024-03-25 PROCEDURE — G8417 CALC BMI ABV UP PARAM F/U: HCPCS | Performed by: STUDENT IN AN ORGANIZED HEALTH CARE EDUCATION/TRAINING PROGRAM

## 2024-03-25 PROCEDURE — 99214 OFFICE O/P EST MOD 30 MIN: CPT | Performed by: STUDENT IN AN ORGANIZED HEALTH CARE EDUCATION/TRAINING PROGRAM

## 2024-03-25 PROCEDURE — G8427 DOCREV CUR MEDS BY ELIG CLIN: HCPCS | Performed by: STUDENT IN AN ORGANIZED HEALTH CARE EDUCATION/TRAINING PROGRAM

## 2024-03-25 PROCEDURE — 3075F SYST BP GE 130 - 139MM HG: CPT | Performed by: STUDENT IN AN ORGANIZED HEALTH CARE EDUCATION/TRAINING PROGRAM

## 2024-03-25 PROCEDURE — 3079F DIAST BP 80-89 MM HG: CPT | Performed by: STUDENT IN AN ORGANIZED HEALTH CARE EDUCATION/TRAINING PROGRAM

## 2024-03-25 RX ORDER — ALBUTEROL SULFATE 90 UG/1
2 AEROSOL, METERED RESPIRATORY (INHALATION) EVERY 4 HOURS PRN
Qty: 18 G | Refills: 3 | Status: SHIPPED | OUTPATIENT
Start: 2024-03-25

## 2024-03-25 RX ORDER — BUDESONIDE AND FORMOTEROL FUMARATE DIHYDRATE 160; 4.5 UG/1; UG/1
AEROSOL RESPIRATORY (INHALATION)
Qty: 10.2 G | Refills: 5 | Status: SHIPPED | OUTPATIENT
Start: 2024-03-25

## 2024-03-25 ASSESSMENT — ENCOUNTER SYMPTOMS
STRIDOR: 0
ABDOMINAL PAIN: 0
TROUBLE SWALLOWING: 0
WHEEZING: 0
EYE REDNESS: 0
BACK PAIN: 0
COUGH: 0
EYE PAIN: 0
EYE ITCHING: 0
VOMITING: 0
NAUSEA: 0
SORE THROAT: 0
ABDOMINAL DISTENTION: 0
CONSTIPATION: 0
SHORTNESS OF BREATH: 0
DIARRHEA: 0
COLOR CHANGE: 0
EYE DISCHARGE: 0

## 2024-03-25 NOTE — PATIENT INSTRUCTIONS
Remember to bring a list of pulmonary medications and any CPAP or BiPAP machines to your next appointment with the office.     Please keep all of your future appointments scheduled by [facility] [place] [facility] office. Out of respect for other patients and providers, you may be asked to reschedule your appointment if you arrive later than your scheduled appointment time. Appointments cancelled less than 24hrs in advance will be considered a no show. Patients with three missed appointments within 1 year or four missed appointments within 2 years can be dismissed from the practice.     Please be aware that our physicians are required to work in the Intensive Care Unit at [facility].  Your appointment may need to be rescheduled if they are designated to work during your appointment time.      You may receive a survey regarding the care you received during your visit.  Your input is valuable to us.  We encourage you to complete and return your survey.  We hope you will choose us in the future for your healthcare needs.     Pt instructed of all future appointment dates & times, including radiology, labs, procedures & referrals. If procedures were scheduled preparation instructions provided. Instructions on future appointments with [facility] were given.      In the next few weeks, you will be receiving a survey from [facility] regarding your visit today.  We would greatly appreciate it if you would take just a few minutes to fill that out.  It is very important to us that our patients receive top notch care and our surveys help keep us accountable. However, if your experience was not a good one, we want to hear about that as well. This is a key way we can keep track of problems and strive to correct any for future visits.    Again, we appreciate your time and thank you for choosing [facility]!  Britany SOUSA

## 2024-03-25 NOTE — PROGRESS NOTES
MA Communication:  The following orders are received by verbal communication from   Brian Gurrola MD    Orders include:  FU 6 mo       ONPO    
atraumatic.      Nose: Nose normal.      Mouth/Throat:      Pharynx: No oropharyngeal exudate.   Eyes:      General: No scleral icterus.        Right eye: No discharge.         Left eye: No discharge.   Cardiovascular:      Rate and Rhythm: Normal rate.      Heart sounds: No murmur heard.     No gallop.   Pulmonary:      Breath sounds: No wheezing.      Comments: Diminished breath sounds in all lung fields  Abdominal:      General: Abdomen is flat. Bowel sounds are normal. There is no distension.      Tenderness: There is no abdominal tenderness.   Musculoskeletal:         General: No swelling.      Cervical back: Normal range of motion.   Skin:     General: Skin is warm and dry.   Neurological:      Mental Status: He is alert and oriented to person, place, and time.            Data  Low-dose CT scan 3/25/2024  No lymphadenopathy.  No pericardial or pleural effusion.  Paraseptal emphysema.  Centrilobular emphysema.  Left lower lobe calcified nodule.  Calcified nodule in the lingula.  Right lower lobe calcified nodule.  Right upper lobe less than 6 mm noncalcified nodule    6-minute walk test 10/26/2022  Baseline oxygen saturation was 94%, Mckenzie scales were 3 each.  The patient walked 1200 feet, there was no significant desaturation.  The patient had increasing Mckenzie scales to 6 each, mild increase in respiratory rate and heart rate     LDCT 3/14/23  IMPRESSION:  Emphysema with stable tiny pulmonary nodules     Moderate to severe coronary artery calcification     RECOMMENDATIONS:  Lung-RADS 2 - Benign (v2022)     Management:  12 month screening LDCT      ASSESSMENT:  Encounter Diagnoses   Name Primary?    COPD, severe (HCC) Yes    Chronic respiratory failure with hypoxia (HCC)     Tobacco dependence     Obesity (BMI 35.0-39.9 without comorbidity)     Multiple lung nodules     Centrilobular emphysema (HCC)     Personal history of tobacco use        Plan:  - Cont O2 therapy nightly   -Cont Symbicort with Albuterol PRN

## 2024-05-13 DIAGNOSIS — I10 PRIMARY HYPERTENSION: ICD-10-CM

## 2024-05-13 RX ORDER — AMLODIPINE BESYLATE 2.5 MG/1
2.5 TABLET ORAL DAILY
Qty: 90 TABLET | Refills: 1 | Status: SHIPPED | OUTPATIENT
Start: 2024-05-13

## 2024-06-03 DIAGNOSIS — I25.10 CORONARY ARTERY DISEASE INVOLVING NATIVE CORONARY ARTERY OF NATIVE HEART WITHOUT ANGINA PECTORIS: ICD-10-CM

## 2024-06-03 DIAGNOSIS — I10 PRIMARY HYPERTENSION: ICD-10-CM

## 2024-06-03 RX ORDER — METOPROLOL SUCCINATE 100 MG/1
100 TABLET, EXTENDED RELEASE ORAL DAILY
Qty: 90 TABLET | Refills: 1 | Status: SHIPPED | OUTPATIENT
Start: 2024-06-03

## 2024-06-04 DIAGNOSIS — I25.10 CORONARY ARTERY DISEASE INVOLVING NATIVE CORONARY ARTERY OF NATIVE HEART WITHOUT ANGINA PECTORIS: ICD-10-CM

## 2024-06-04 RX ORDER — CLOPIDOGREL BISULFATE 75 MG/1
TABLET ORAL
Qty: 90 TABLET | Refills: 1 | Status: SHIPPED | OUTPATIENT
Start: 2024-06-04

## 2024-06-19 RX ORDER — RANOLAZINE 1000 MG/1
TABLET, EXTENDED RELEASE ORAL
Qty: 90 TABLET | Refills: 0 | Status: SHIPPED | OUTPATIENT
Start: 2024-06-19

## 2024-06-21 RX ORDER — NITROGLYCERIN 0.4 MG/1
TABLET SUBLINGUAL
Qty: 25 TABLET | Refills: 3 | Status: SHIPPED | OUTPATIENT
Start: 2024-06-21

## 2024-07-02 DIAGNOSIS — I10 PRIMARY HYPERTENSION: ICD-10-CM

## 2024-07-03 RX ORDER — LISINOPRIL 10 MG/1
TABLET ORAL
Qty: 30 TABLET | Refills: 2 | Status: SHIPPED | OUTPATIENT
Start: 2024-07-03

## 2024-07-03 NOTE — TELEPHONE ENCOUNTER
Last ov: 5/18/23 [facility]  Upcoming ov: 10/31/24 [facility]    Labs- BMP 12/01/23

## 2024-07-24 ENCOUNTER — TELEPHONE (OUTPATIENT)
Dept: PULMONOLOGY | Age: 59
End: 2024-07-24

## 2024-07-24 DIAGNOSIS — J44.9 COPD, SEVERE (HCC): Primary | ICD-10-CM

## 2024-07-24 NOTE — TELEPHONE ENCOUNTER
After looking at chart and talking to Bety at [employer] and the pt. It was discovered that the pt. 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 I have advised pt. Of this and he was unable to schedule today as his  has to schedule for him due to lack of transportation. He will have her call me as soon as possible and I will set up testing and FU ASAP. 's name is Gail. Pt. States his O2 hose is so old it won't stay in his nose at night. Advised if someone could stop by our office we could give him some tubing. Mee ruby order to provider to be signed.

## 2024-07-24 NOTE — TELEPHONE ENCOUNTER
According to last office visit, he only wears oxygen at night however patient told LPN he also wears as needed during the day. Order signed for 6 minute walk test.  Overnight oximetry completed in May 2024 shows continued need for oxygen with sleeping.  Schedule 6 minute walk test and can have appointment the same day.

## 2024-07-24 NOTE — TELEPHONE ENCOUNTER
Patient left a vm stating someone needs to find out why he is not able to get his cpap supplies. They tell him is because provider has not sent sleep study result to ins and he needs them because it's been months. Please advise. Thanks

## 2024-08-06 RX ORDER — RANOLAZINE 1000 MG/1
TABLET, EXTENDED RELEASE ORAL
Qty: 90 TABLET | Refills: 0 | Status: SHIPPED | OUTPATIENT
Start: 2024-08-06

## 2024-08-15 ENCOUNTER — OFFICE VISIT (OUTPATIENT)
Dept: ORTHOPEDIC SURGERY | Age: 59
End: 2024-08-15
Payer: COMMERCIAL

## 2024-08-15 VITALS — HEIGHT: 72 IN | WEIGHT: 234 LBS | BODY MASS INDEX: 31.69 KG/M2

## 2024-08-15 DIAGNOSIS — M43.10 DEGENERATIVE SPONDYLOLISTHESIS: Primary | ICD-10-CM

## 2024-08-15 PROCEDURE — 4004F PT TOBACCO SCREEN RCVD TLK: CPT | Performed by: ORTHOPAEDIC SURGERY

## 2024-08-15 PROCEDURE — 99204 OFFICE O/P NEW MOD 45 MIN: CPT | Performed by: ORTHOPAEDIC SURGERY

## 2024-08-15 PROCEDURE — G8427 DOCREV CUR MEDS BY ELIG CLIN: HCPCS | Performed by: ORTHOPAEDIC SURGERY

## 2024-08-15 PROCEDURE — 3017F COLORECTAL CA SCREEN DOC REV: CPT | Performed by: ORTHOPAEDIC SURGERY

## 2024-08-15 PROCEDURE — G8417 CALC BMI ABV UP PARAM F/U: HCPCS | Performed by: ORTHOPAEDIC SURGERY

## 2024-08-15 RX ORDER — GABAPENTIN 300 MG/1
300 CAPSULE ORAL 4 TIMES DAILY
Qty: 120 CAPSULE | Refills: 2 | Status: SHIPPED | OUTPATIENT
Start: 2024-08-15 | End: 2024-11-13

## 2024-08-15 NOTE — PROGRESS NOTES
Pressure Brother        REVIEW OF SYSTEMS: Full ROS noted & scanned   CONSTITUTIONAL: Denies unexplained weight loss, fevers, chills or fatigue  NEUROLOGICAL: Denies unsteady gait or progressive weakness  MUSCULOSKELETAL: Denies joint swelling or redness  PSYCHOLOGICAL: Denies anxiety, depression   SKIN: Denies skin changes, delayed healing, rash, itching   HEMATOLOGIC: Denies easy bleeding or bruising  ENDOCRINE: Denies excessive thirst, urination, heat/cold  RESPIRATORY: Denies current dyspnea, cough  GI: Denies nausea, vomiting, diarrhea   : Denies bowel or bladder issues      PHYSICAL EXAM:    Vitals: Height 1.829 m (6'), weight 106.1 kg (234 lb).    GENERAL EXAM:  General Apparence: Patient is adequately groomed with no evidence of malnutrition.  Orientation: The patient is oriented to time, place and person.   Mood & Affect:The patient's mood and affect are appropriate.  Vascular: Examination reveals no swelling tenderness in upper or lower extremities. Good capillary refill.  Lymphatic: The lymphatic examination bilaterally reveals all areas to be without enlargement or induration  Sensation: Sensation is intact without deficit  Coordination/Balance: Good coordination     LUMBAR/SACRAL EXAMINATION:  Inspection: Local inspection shows no step-off or bruising.  Lumbar alignment is normal.  Sagittal and Coronal balance is neutral.      Palpation:   No evidence of tenderness at the midline.  No tenderness bilaterally at the paraspinal or trochanters.  There is no step-off or paraspinal spasm.   Range of Motion: Lumbar flexion, extension and rotation are mildly limited due to pain.  Strength:   Strength testing is 5/5 in all muscle groups tested.   Special Tests:   Straight leg raise and crossed SLR negative.  Leg length and pelvis level.     Skin: There are no rashes, ulcerations or lesions.  Reflexes: Reflexes are symmetrically 2+ at the patellar and ankle tendons.  Clonus absent bilaterally at the feet.  Gait

## 2024-08-22 ENCOUNTER — HOSPITAL ENCOUNTER (OUTPATIENT)
Dept: PULMONOLOGY | Age: 59
Discharge: HOME | End: 2024-08-22
Payer: COMMERCIAL

## 2024-08-22 ENCOUNTER — OFFICE VISIT (OUTPATIENT)
Dept: PULMONOLOGY | Age: 59
End: 2024-08-22

## 2024-08-22 VITALS
DIASTOLIC BLOOD PRESSURE: 79 MMHG | BODY MASS INDEX: 31.36 KG/M2 | OXYGEN SATURATION: 94 % | RESPIRATION RATE: 16 BRPM | HEIGHT: 72 IN | SYSTOLIC BLOOD PRESSURE: 133 MMHG | WEIGHT: 231.5 LBS | TEMPERATURE: 98.2 F | HEART RATE: 69 BPM

## 2024-08-22 DIAGNOSIS — J43.2 CENTRILOBULAR EMPHYSEMA (HCC): ICD-10-CM

## 2024-08-22 DIAGNOSIS — J96.11 CHRONIC RESPIRATORY FAILURE WITH HYPOXIA (HCC): ICD-10-CM

## 2024-08-22 DIAGNOSIS — Z87.891 PERSONAL HISTORY OF TOBACCO USE: ICD-10-CM

## 2024-08-22 DIAGNOSIS — Z23 IMMUNIZATION DUE: ICD-10-CM

## 2024-08-22 DIAGNOSIS — J44.9 COPD, SEVERE (HCC): ICD-10-CM

## 2024-08-22 DIAGNOSIS — R91.8 MULTIPLE LUNG NODULES: ICD-10-CM

## 2024-08-22 DIAGNOSIS — E66.9 OBESITY (BMI 35.0-39.9 WITHOUT COMORBIDITY): ICD-10-CM

## 2024-08-22 DIAGNOSIS — F17.200 TOBACCO DEPENDENCE: ICD-10-CM

## 2024-08-22 DIAGNOSIS — J44.9 COPD, SEVERE (HCC): Primary | ICD-10-CM

## 2024-08-22 PROCEDURE — 94618 PULMONARY STRESS TESTING: CPT

## 2024-08-22 RX ORDER — VARENICLINE TARTRATE 1 MG/1
1 TABLET, FILM COATED ORAL 2 TIMES DAILY
Qty: 60 TABLET | Refills: 5 | Status: SHIPPED | OUTPATIENT
Start: 2024-08-22

## 2024-08-22 RX ORDER — VARENICLINE TARTRATE 0.5 MG/1
.5-1 TABLET, FILM COATED ORAL SEE ADMIN INSTRUCTIONS
Qty: 57 TABLET | Refills: 0 | Status: SHIPPED | OUTPATIENT
Start: 2024-08-22

## 2024-08-22 RX ORDER — BUDESONIDE AND FORMOTEROL FUMARATE DIHYDRATE 160; 4.5 UG/1; UG/1
2 AEROSOL RESPIRATORY (INHALATION) 2 TIMES DAILY
COMMUNITY

## 2024-08-22 ASSESSMENT — ENCOUNTER SYMPTOMS
SHORTNESS OF BREATH: 0
TROUBLE SWALLOWING: 0
ABDOMINAL DISTENTION: 0
EYE ITCHING: 0
EYE PAIN: 0
DIARRHEA: 0
CONSTIPATION: 0
EYE DISCHARGE: 0
NAUSEA: 0
SORE THROAT: 0
ABDOMINAL PAIN: 0
WHEEZING: 0
EYE REDNESS: 0
VOMITING: 0
COLOR CHANGE: 0
COUGH: 0
STRIDOR: 0
BACK PAIN: 0

## 2024-08-22 NOTE — PROCEDURES
PROCEDURE NOTE  Date: 8/22/2024   Name: Kam Nguyen  YOB: 1965    Procedures  [facility] Pulmonary Function Lab - Six Minute Walk    Test Performed on:   Room Air___X___   Oxygen at _____ lpm via N/C- continuous Oxygen at _____ lpm via N/C- OCD  Assist Device Used During Test:  None____X__ Cane________ Walker_________    Modified Mckenzie's Scale  0 Nothing at all 5 Strong    0.5 Just noticeable 6 Stronger (Hard)    1 Very Light 7 Very Strong   2 Light 8 Very Very Strong   3 Moderate 9 Extremely Strong   4 Somewhat Strong 10 Maximum All out      Time SpO2 Heart Rate Respiratory Rate Dyspnea-  Modified Mckenzie’s Scale Fatigue- Modified Mckenzie’s Scale Other Symptoms   Baseline                     96% room air@rest 70 17 2 2      1 minute                     95% 78 18 2 2    2 minutes                     95% 83 19 2 2      3 minutes                     95% 82 19 2 2    4 minutes                     96% 89 20 2 2    5 minutes                     96% 84 20 3 3    6 minutes                     96% 81 21 3 3    Recovery x 1 minute                     96% 74 20 3 3    Recovery x 2 Minute                     97% 71 20 2 2     Number of Laps____11___ X 120 feet + _________ additional feet = Total Distance __1320_____feet     Total expected 6 MW distance is _______feet. Patient achieved ______% of expected distance.   Pre Blood Pressure:  Post Blood Pressure:  Other symptoms at the end of exercise: some sob, little chest pressure, knee pain

## 2024-08-22 NOTE — PATIENT INSTRUCTIONS
Remember to bring a list of pulmonary medications and any CPAP or BiPAP machines to your next appointment with the office.     Please keep all of your future appointments scheduled by [facility] [place] [facility] office. Out of respect for other patients and providers, you may be asked to reschedule your appointment if you arrive later than your scheduled appointment time. Appointments cancelled less than 24hrs in advance will be considered a no show. Patients with three missed appointments within 1 year or four missed appointments within 2 years can be dismissed from the practice.     Please be aware that our physicians are required to work in the Intensive Care Unit at [facility].  Your appointment may need to be rescheduled if they are designated to work during your appointment time.      You may receive a survey regarding the care you received during your visit.  Your input is valuable to us.  We encourage you to complete and return your survey.  We hope you will choose us in the future for your healthcare needs.     Pt instructed of all future appointment dates & times, including radiology, labs, procedures & referrals. If procedures were scheduled preparation instructions provided. Instructions on future appointments with [facility] were given.     In the next few weeks, you will be receiving a survey from [facility] regarding your visit today.  We would greatly appreciate it if you would take just a few minutes to fill that out.  It is very important to us that our patients receive top notch care and our surveys help keep us accountable. However, if your experience was not a good one, we want to hear about that as well. This is a key way we can keep track of problems and strive to correct any for future visits.    Again, we appreciate your time and thank you for choosing [facility]!    Britany SOUSA     Learning About Lung Cancer Screening  What is screening for lung

## 2024-08-22 NOTE — PROGRESS NOTES
[facility] Follow-up  [address] [place] [address]  [phone]        Kam Nguyen (: 1965 ) is a 59 y.o. male here for an evaluation of   Chief Complaint   Patient presents with    Follow-up    COPD    Results     6 min walk and ONPO         SUBJECTIVE/OBJECTIVE:    Patient is a 59-year-old male with significant past medical history of severe COPD, lung nodules, chronic hypoxic respiratory failure, emphysema, tobacco dependence that presents to [facility] pulmonary clinic for follow-up visit.  Has no complaints today.  He was tested to see if he needs oxygen therapy throughout the day.  He has been using his nebulizer twice a day and says that his breathing is better.  He is still smoking up to half a pack per day      Review of Systems   Constitutional:  Negative for activity change, appetite change, chills, diaphoresis and fatigue.   HENT:  Negative for congestion, sore throat and trouble swallowing.    Eyes:  Negative for pain, discharge, redness and itching.   Respiratory:  Negative for cough, shortness of breath, wheezing and stridor.    Cardiovascular:  Negative for chest pain, palpitations and leg swelling.   Gastrointestinal:  Negative for abdominal distention, abdominal pain, constipation, diarrhea, nausea and vomiting.   Endocrine: Negative for polydipsia, polyphagia and polyuria.   Genitourinary:  Negative for difficulty urinating.   Musculoskeletal:  Negative for back pain, myalgias and neck pain.   Skin:  Negative for color change.   Neurological:  Negative for dizziness, weakness and light-headedness.   Psychiatric/Behavioral:  Negative for agitation and behavioral problems.          Vitals:    24 1432   BP: 133/79   Pulse: 69   Resp: 16   Temp: 98.2 °F (36.8 °C)   TempSrc: Oral   SpO2: 94%   Weight: 105 kg (231 lb 8 oz)   Height: 1.829 m (6')        Physical Exam  Constitutional:       General: He is not in acute distress.     Appearance: He is not

## 2024-08-22 NOTE — PROGRESS NOTES
MA Communication:  The following orders are received by verbal communication from   Brian Gurrola MD    Orders include:  CTLS 3/25       FU 3/25       Prev 20

## 2024-09-10 ENCOUNTER — HOSPITAL ENCOUNTER (OUTPATIENT)
Dept: PHYSICAL THERAPY | Age: 59
Setting detail: THERAPIES SERIES
Discharge: HOME | End: 2024-09-10
Payer: COMMERCIAL

## 2024-09-10 DIAGNOSIS — M54.89 BACK PAIN WITHOUT SCIATICA: Primary | ICD-10-CM

## 2024-09-10 DIAGNOSIS — M53.3 SACROILIAC DYSFUNCTION: ICD-10-CM

## 2024-09-10 DIAGNOSIS — R26.2 DIFFICULTY IN WALKING: ICD-10-CM

## 2024-09-10 PROCEDURE — 97140 MANUAL THERAPY 1/> REGIONS: CPT

## 2024-09-10 PROCEDURE — 97110 THERAPEUTIC EXERCISES: CPT

## 2024-09-10 PROCEDURE — 97161 PT EVAL LOW COMPLEX 20 MIN: CPT

## 2024-09-18 ENCOUNTER — APPOINTMENT (OUTPATIENT)
Dept: PHYSICAL THERAPY | Age: 59
End: 2024-09-18
Payer: COMMERCIAL

## 2024-09-23 ENCOUNTER — HOSPITAL ENCOUNTER (OUTPATIENT)
Dept: PHYSICAL THERAPY | Age: 59
Setting detail: THERAPIES SERIES
End: 2024-09-23
Payer: COMMERCIAL

## 2024-09-25 ENCOUNTER — APPOINTMENT (OUTPATIENT)
Dept: PHYSICAL THERAPY | Age: 59
End: 2024-09-25
Payer: COMMERCIAL

## 2024-09-28 DIAGNOSIS — I10 PRIMARY HYPERTENSION: ICD-10-CM

## 2024-09-30 ENCOUNTER — APPOINTMENT (OUTPATIENT)
Dept: PHYSICAL THERAPY | Age: 59
End: 2024-09-30
Payer: COMMERCIAL

## 2024-09-30 RX ORDER — LISINOPRIL 10 MG/1
TABLET ORAL
Qty: 30 TABLET | Refills: 0 | Status: SHIPPED | OUTPATIENT
Start: 2024-09-30

## 2024-10-02 DIAGNOSIS — E78.2 MIXED HYPERLIPIDEMIA: ICD-10-CM

## 2024-10-02 DIAGNOSIS — E78.00 PURE HYPERCHOLESTEROLEMIA: ICD-10-CM

## 2024-10-02 RX ORDER — RANOLAZINE 1000 MG/1
TABLET, EXTENDED RELEASE ORAL
Qty: 60 TABLET | Refills: 0 | Status: SHIPPED | OUTPATIENT
Start: 2024-10-02

## 2024-10-03 RX ORDER — ATORVASTATIN CALCIUM 40 MG/1
TABLET, FILM COATED ORAL
Qty: 90 TABLET | Refills: 0 | Status: SHIPPED | OUTPATIENT
Start: 2024-10-03

## 2024-10-03 NOTE — TELEPHONE ENCOUNTER
LOV 5/18/2023  NOV 10/31/2024  [facility] 2022 in care everywhere

## 2024-10-06 ENCOUNTER — HOSPITAL ENCOUNTER (EMERGENCY)
Age: 59
Discharge: HOME | End: 2024-10-06
Payer: COMMERCIAL

## 2024-10-06 ENCOUNTER — APPOINTMENT (OUTPATIENT)
Dept: GENERAL RADIOLOGY | Age: 59
End: 2024-10-06
Payer: COMMERCIAL

## 2024-10-06 ENCOUNTER — APPOINTMENT (OUTPATIENT)
Dept: CT IMAGING | Age: 59
End: 2024-10-06
Payer: COMMERCIAL

## 2024-10-06 VITALS
HEIGHT: 72 IN | OXYGEN SATURATION: 99 % | SYSTOLIC BLOOD PRESSURE: 150 MMHG | RESPIRATION RATE: 18 BRPM | DIASTOLIC BLOOD PRESSURE: 90 MMHG | HEART RATE: 85 BPM | BODY MASS INDEX: 31.35 KG/M2 | TEMPERATURE: 98.6 F | WEIGHT: 231.48 LBS

## 2024-10-06 DIAGNOSIS — R13.10 DYSPHAGIA, UNSPECIFIED TYPE: Primary | ICD-10-CM

## 2024-10-06 DIAGNOSIS — B37.0 THRUSH, ORAL: ICD-10-CM

## 2024-10-06 DIAGNOSIS — E87.1 HYPONATREMIA: ICD-10-CM

## 2024-10-06 LAB
ALBUMIN SERPL-MCNC: 3.3 G/DL (ref 3.4–5)
ALBUMIN/GLOB SERPL: 0.8 {RATIO} (ref 1.1–2.2)
ALP SERPL-CCNC: 66 U/L (ref 40–129)
ALT SERPL-CCNC: 15 U/L (ref 10–40)
ANION GAP SERPL CALCULATED.3IONS-SCNC: 16 MMOL/L (ref 3–16)
AST SERPL-CCNC: 14 U/L (ref 15–37)
BASE EXCESS BLDV CALC-SCNC: -2 MMOL/L (ref -3–3)
BASOPHILS # BLD: 0 K/UL (ref 0–0.2)
BASOPHILS NFR BLD: 0 %
BILIRUB SERPL-MCNC: 0.5 MG/DL (ref 0–1)
BUN SERPL-MCNC: 7 MG/DL (ref 7–20)
CALCIUM SERPL-MCNC: 8.7 MG/DL (ref 8.3–10.6)
CHLORIDE SERPL-SCNC: 87 MMOL/L (ref 99–110)
CO2 BLDV-SCNC: 20 MMOL/L
CO2 SERPL-SCNC: 20 MMOL/L (ref 21–32)
COHGB MFR BLDV: 2.2 % (ref 0–1.5)
CREAT SERPL-MCNC: <0.5 MG/DL (ref 0.9–1.3)
DEPRECATED RDW RBC AUTO: 15.3 % (ref 12.4–15.4)
EOSINOPHIL # BLD: 0 K/UL (ref 0–0.6)
EOSINOPHIL NFR BLD: 0 %
FLUAV RNA RESP QL NAA+PROBE: NOT DETECTED
FLUBV RNA RESP QL NAA+PROBE: NOT DETECTED
GFR SERPLBLD CREATININE-BSD FMLA CKD-EPI: >90 ML/MIN/{1.73_M2}
GLUCOSE SERPL-MCNC: 93 MG/DL (ref 70–99)
HCO3 BLDV-SCNC: 19.6 MMOL/L (ref 23–29)
HCT VFR BLD AUTO: 37 % (ref 40.5–52.5)
HGB BLD-MCNC: 12.9 G/DL (ref 13.5–17.5)
LYMPHOCYTES # BLD: 1.2 K/UL (ref 1–5.1)
LYMPHOCYTES NFR BLD: 10 %
MCH RBC QN AUTO: 28.7 PG (ref 26–34)
MCHC RBC AUTO-ENTMCNC: 34.7 G/DL (ref 31–36)
MCV RBC AUTO: 82.6 FL (ref 80–100)
METHGB MFR BLDV: 0.3 %
MONOCYTES # BLD: 0.6 K/UL (ref 0–1.3)
MONOCYTES NFR BLD: 5 %
NEUTROPHILS # BLD: 10.5 K/UL (ref 1.7–7.7)
NEUTROPHILS NFR BLD: 85 %
NT-PROBNP SERPL-MCNC: 146 PG/ML (ref 0–124)
O2 CT VFR BLDV CALC: 12 VOL %
O2 THERAPY: ABNORMAL
PATH INTERP BLD-IMP: YES
PCO2 BLDV: 25.9 MMHG (ref 40–50)
PH BLDV: 7.5 [PH] (ref 7.35–7.45)
PLATELET # BLD AUTO: 321 K/UL (ref 135–450)
PLATELET BLD QL SMEAR: ADEQUATE
PMV BLD AUTO: 8.2 FL (ref 5–10.5)
PO2 BLDV: 27.9 MMHG (ref 25–40)
POTASSIUM SERPL-SCNC: 3.7 MMOL/L (ref 3.5–5.1)
PROT SERPL-MCNC: 7.2 G/DL (ref 6.4–8.2)
RBC # BLD AUTO: 4.48 M/UL (ref 4.2–5.9)
SAO2 % BLDV: 60 %
SARS-COV-2 RNA RESP QL NAA+PROBE: NOT DETECTED
SLIDE REVIEW: ABNORMAL
SODIUM SERPL-SCNC: 123 MMOL/L (ref 136–145)
TROPONIN, HIGH SENSITIVITY: 8 NG/L (ref 0–22)
TROPONIN, HIGH SENSITIVITY: 9 NG/L (ref 0–22)
WBC # BLD AUTO: 12.4 K/UL (ref 4–11)

## 2024-10-06 PROCEDURE — 36415 COLL VENOUS BLD VENIPUNCTURE: CPT

## 2024-10-06 PROCEDURE — 82803 BLOOD GASES ANY COMBINATION: CPT

## 2024-10-06 PROCEDURE — 6360000004 HC RX CONTRAST MEDICATION

## 2024-10-06 PROCEDURE — 83880 ASSAY OF NATRIURETIC PEPTIDE: CPT

## 2024-10-06 PROCEDURE — 6360000002 HC RX W HCPCS

## 2024-10-06 PROCEDURE — 70491 CT SOFT TISSUE NECK W/DYE: CPT

## 2024-10-06 PROCEDURE — 71046 X-RAY EXAM CHEST 2 VIEWS: CPT

## 2024-10-06 PROCEDURE — 6370000000 HC RX 637 (ALT 250 FOR IP)

## 2024-10-06 PROCEDURE — 96372 THER/PROPH/DIAG INJ SC/IM: CPT

## 2024-10-06 PROCEDURE — 93005 ELECTROCARDIOGRAM TRACING: CPT | Performed by: STUDENT IN AN ORGANIZED HEALTH CARE EDUCATION/TRAINING PROGRAM

## 2024-10-06 PROCEDURE — 99285 EMERGENCY DEPT VISIT HI MDM: CPT

## 2024-10-06 PROCEDURE — 80053 COMPREHEN METABOLIC PANEL: CPT

## 2024-10-06 PROCEDURE — 87636 SARSCOV2 & INF A&B AMP PRB: CPT

## 2024-10-06 PROCEDURE — 84484 ASSAY OF TROPONIN QUANT: CPT

## 2024-10-06 PROCEDURE — 85025 COMPLETE CBC W/AUTO DIFF WBC: CPT

## 2024-10-06 RX ORDER — HYDROXYZINE HYDROCHLORIDE 50 MG/1
25 TABLET, FILM COATED ORAL ONCE
Status: COMPLETED | OUTPATIENT
Start: 2024-10-06 | End: 2024-10-06

## 2024-10-06 RX ORDER — NYSTATIN 100000 [USP'U]/ML
5 SUSPENSION ORAL ONCE
Status: COMPLETED | OUTPATIENT
Start: 2024-10-06 | End: 2024-10-06

## 2024-10-06 RX ORDER — IOPAMIDOL 755 MG/ML
75 INJECTION, SOLUTION INTRAVASCULAR
Status: COMPLETED | OUTPATIENT
Start: 2024-10-06 | End: 2024-10-06

## 2024-10-06 RX ORDER — DEXAMETHASONE SODIUM PHOSPHATE 10 MG/ML
10 INJECTION, SOLUTION INTRAMUSCULAR; INTRAVENOUS ONCE
Status: COMPLETED | OUTPATIENT
Start: 2024-10-06 | End: 2024-10-06

## 2024-10-06 RX ORDER — NYSTATIN 100000 [USP'U]/ML
500000 SUSPENSION ORAL 4 TIMES DAILY
Qty: 280 ML | Refills: 0 | Status: SHIPPED | OUTPATIENT
Start: 2024-10-06 | End: 2024-10-20

## 2024-10-06 RX ORDER — HYDROXYZINE HYDROCHLORIDE 25 MG/1
25 TABLET, FILM COATED ORAL EVERY 8 HOURS PRN
Qty: 30 TABLET | Refills: 0 | Status: SHIPPED | OUTPATIENT
Start: 2024-10-06 | End: 2024-10-16

## 2024-10-06 RX ADMIN — DEXAMETHASONE SODIUM PHOSPHATE 10 MG: 10 INJECTION INTRAMUSCULAR; INTRAVENOUS at 15:17

## 2024-10-06 RX ADMIN — IOPAMIDOL 75 ML: 755 INJECTION, SOLUTION INTRAVENOUS at 15:56

## 2024-10-06 RX ADMIN — Medication 500000 UNITS: at 15:20

## 2024-10-06 RX ADMIN — HYDROXYZINE HYDROCHLORIDE 25 MG: 50 TABLET, FILM COATED ORAL at 18:23

## 2024-10-06 ASSESSMENT — PAIN - FUNCTIONAL ASSESSMENT: PAIN_FUNCTIONAL_ASSESSMENT: 0-10

## 2024-10-06 ASSESSMENT — LIFESTYLE VARIABLES
HOW MANY STANDARD DRINKS CONTAINING ALCOHOL DO YOU HAVE ON A TYPICAL DAY: PATIENT DOES NOT DRINK
HOW OFTEN DO YOU HAVE A DRINK CONTAINING ALCOHOL: NEVER

## 2024-10-06 ASSESSMENT — PAIN DESCRIPTION - LOCATION: LOCATION: KNEE

## 2024-10-06 ASSESSMENT — PAIN DESCRIPTION - ORIENTATION: ORIENTATION: LEFT

## 2024-10-06 ASSESSMENT — PAIN SCALES - GENERAL: PAINLEVEL_OUTOF10: 4

## 2024-10-06 ASSESSMENT — PAIN DESCRIPTION - DESCRIPTORS: DESCRIPTORS: SHOOTING

## 2024-10-06 NOTE — ED PROVIDER NOTES
The Ekg interpreted by me shows  normal sinus rhythm with a rate of 85  Axis is   Normal  QTc is  within an acceptable range  Intervals and Durations are unremarkable.      ST Segments: normal  No significant change from prior EKG dated 12/1/2023         Briana Lindsay MD  10/06/24 1501

## 2024-10-06 NOTE — ED NOTES
Discharge instructions reviewed with patient and he verbalizes understanding. PIV removed without complications. Patient wheeled to private car by this RN.

## 2024-10-06 NOTE — ED PROVIDER NOTES
[facility] ED  EMERGENCY DEPARTMENT ENCOUNTER        Pt Name: Kam Nguyen  MRN: [id number]  Birthdate 1965  Date of evaluation: 10/6/2024  Provider: JOSE Mills CNP  PCP: Candelaria Dover APRN - NP  Note Started: 6:56 PM EDT 10/6/24      BISHOP. I have evaluated this patient.        CHIEF COMPLAINT       Chief Complaint   Patient presents with    Shortness of Breath     Pt presents to ED with SOB, fatigue, and weakness that has worsened today. Pt states he's also having trouble swallowing, so he hasn't been taking home medications.       HISTORY OF PRESENT ILLNESS: 1 or more Elements     History From: Patient, family member at bedside    Chief Complaint: Dysphagia    Kam Nguyen is a 59 y.o. male with a past medical history notable for affective bipolar disorder, depression with anxiety, fatty liver, GERD, COPD, class II obesity, chronic stable angina, tobacco use disorder, hypertension, hyperlipidemia, claudication who presents to the emergency department for evaluation of shortness of breath, fatigue, dysphagia.  States that these have been ongoing issues which he has discussed with his primary care doctor however dysphagia has been worse today.  States that his primary care recommended outpatient barium swallow that is yet to be completed.  States that he has been taking his pills in applesauce or pudding to help them slide down, otherwise they get stuck.  He is tolerating nutritional intake by making foods softer by soaking them in water.  Denies any pocketing or regurgitation.  No abdominal pain, nausea, vomiting.  States that he has severe anxiety and this is making him very anxious which worsens his ability to swallow.  Regarding shortness of breath this is chronic in nature and not new.  Intermittent dry cough without production or hemoptysis.  No chest pain.  No lower leg edema.  Of note, he does believe that his voice is somewhat deeper than what it typically is.

## 2024-10-06 NOTE — DISCHARGE INSTRUCTIONS
Soft tissues of the neck do not show any significant cellulitis or abscess.    Examination is consistent with thrush.  You were given your first dose of nystatin in the emergency department.  I did send a prescription for the nystatin swish and swallow to your pharmacy.   and take as directed.    Please follow-up with your primary care doctor.  I do believe it is worthwhile to pursue nonemergent outpatient barium swallow as her primary care doctor previously discussed with you.

## 2024-10-07 LAB
EKG ATRIAL RATE: 85 BPM
EKG DIAGNOSIS: NORMAL
EKG P AXIS: 43 DEGREES
EKG P-R INTERVAL: 158 MS
EKG Q-T INTERVAL: 398 MS
EKG QRS DURATION: 92 MS
EKG QTC CALCULATION (BAZETT): 473 MS
EKG R AXIS: 7 DEGREES
EKG T AXIS: 27 DEGREES
EKG VENTRICULAR RATE: 85 BPM
PATH INTERP BLD-IMP: NORMAL

## 2024-10-07 PROCEDURE — 93010 ELECTROCARDIOGRAM REPORT: CPT | Performed by: INTERNAL MEDICINE

## 2024-10-22 ENCOUNTER — TELEPHONE (OUTPATIENT)
Dept: CARDIOLOGY CLINIC | Age: 59
End: 2024-10-22

## 2024-10-22 DIAGNOSIS — I10 PRIMARY HYPERTENSION: ICD-10-CM

## 2024-10-22 NOTE — TELEPHONE ENCOUNTER
[facility] does pt need to wait until upcoming ov for clearance    Kam Nguyen, 1965    Cardiac Risk Assessment    What type of procedure are you having?  EGD    When is your procedure scheduled for?  TBD    Medications to be stopped.  Plavix x5 days    What physician is performing your procedure?  Dr. Butler    Phone Number:   [phone]    Fax number to send the letter:   [phone]    Cardiologist:   melo    Last Appointment:   5/18/23 [facility]    Next Appointment:   10/31/24

## 2024-10-22 NOTE — TELEPHONE ENCOUNTER
The patient's cardiac condition is not prohibitory to undergo surgery. The patient's cardiac risk is moderate based upon my evaluation and testing. Stable to proceed.     Okay to hold plavix 5 days.    Does not to see us prior to EGD

## 2024-10-24 RX ORDER — LISINOPRIL 10 MG/1
TABLET ORAL
Qty: 30 TABLET | Refills: 0 | Status: SHIPPED | OUTPATIENT
Start: 2024-10-24

## 2024-10-24 NOTE — TELEPHONE ENCOUNTER
Requested Prescriptions     Pending Prescriptions Disp Refills    lisinopril (PRINIVIL;ZESTRIL) 10 MG tablet [Pharmacy Med Name: LISINOPRIL 10MG TABLET] 30 tablet 0     Sig: TAKE ONE (1) TABLET BY MOUTH DAILY          Last Office Visit: 5/18/2023     Next Office Visit: 10/31/2024     LAST LABS: 10/06/24

## 2024-11-04 ENCOUNTER — HOSPITAL ENCOUNTER (INPATIENT)
Age: 59
LOS: 2 days | Discharge: HOME | DRG: 756 | End: 2024-11-06
Attending: EMERGENCY MEDICINE | Admitting: INTERNAL MEDICINE
Payer: COMMERCIAL

## 2024-11-04 DIAGNOSIS — F41.9 ANXIETY: ICD-10-CM

## 2024-11-04 DIAGNOSIS — E87.1 HYPONATREMIA: ICD-10-CM

## 2024-11-04 DIAGNOSIS — R45.851 SUICIDAL IDEATION: Primary | ICD-10-CM

## 2024-11-04 DIAGNOSIS — E83.42 HYPOMAGNESEMIA: ICD-10-CM

## 2024-11-04 LAB
ALBUMIN SERPL-MCNC: 3 G/DL (ref 3.4–5)
ALBUMIN/GLOB SERPL: 1.1 {RATIO} (ref 1.1–2.2)
ALP SERPL-CCNC: 75 U/L (ref 40–129)
ALT SERPL-CCNC: 13 U/L (ref 10–40)
AMPHETAMINES UR QL SCN>1000 NG/ML: ABNORMAL
ANION GAP SERPL CALCULATED.3IONS-SCNC: 10 MMOL/L (ref 3–16)
APAP SERPL-MCNC: <5 UG/ML (ref 10–30)
AST SERPL-CCNC: 10 U/L (ref 15–37)
BARBITURATES UR QL SCN>200 NG/ML: ABNORMAL
BASOPHILS # BLD: 0.1 K/UL (ref 0–0.2)
BASOPHILS NFR BLD: 1.4 %
BENZODIAZ UR QL SCN>200 NG/ML: ABNORMAL
BILIRUB SERPL-MCNC: 0.5 MG/DL (ref 0–1)
BILIRUB UR QL STRIP.AUTO: NEGATIVE
BUN SERPL-MCNC: 3 MG/DL (ref 7–20)
CALCIUM SERPL-MCNC: 8.9 MG/DL (ref 8.3–10.6)
CANNABINOIDS UR QL SCN>50 NG/ML: POSITIVE
CHLORIDE SERPL-SCNC: 89 MMOL/L (ref 99–110)
CLARITY UR: CLEAR
CO2 SERPL-SCNC: 25 MMOL/L (ref 21–32)
COCAINE UR QL SCN: ABNORMAL
COLOR UR: NORMAL
CREAT SERPL-MCNC: 0.4 MG/DL (ref 0.9–1.3)
DEPRECATED RDW RBC AUTO: 15.1 % (ref 12.4–15.4)
DRUG SCREEN COMMENT UR-IMP: ABNORMAL
EOSINOPHIL # BLD: 0.1 K/UL (ref 0–0.6)
EOSINOPHIL NFR BLD: 1.1 %
ETHANOLAMINE SERPL-MCNC: NORMAL MG/DL (ref 0–0.08)
FENTANYL SCREEN, URINE: ABNORMAL
GFR SERPLBLD CREATININE-BSD FMLA CKD-EPI: >90 ML/MIN/{1.73_M2}
GLUCOSE SERPL-MCNC: 99 MG/DL (ref 70–99)
GLUCOSE UR STRIP.AUTO-MCNC: NEGATIVE MG/DL
HCT VFR BLD AUTO: 34.1 % (ref 40.5–52.5)
HGB BLD-MCNC: 11.5 G/DL (ref 13.5–17.5)
HGB UR QL STRIP.AUTO: NEGATIVE
KETONES UR STRIP.AUTO-MCNC: NEGATIVE MG/DL
LEUKOCYTE ESTERASE UR QL STRIP.AUTO: NEGATIVE
LYMPHOCYTES # BLD: 1.7 K/UL (ref 1–5.1)
LYMPHOCYTES NFR BLD: 21.1 %
MAGNESIUM SERPL-MCNC: 1.58 MG/DL (ref 1.8–2.4)
MCH RBC QN AUTO: 27.8 PG (ref 26–34)
MCHC RBC AUTO-ENTMCNC: 33.6 G/DL (ref 31–36)
MCV RBC AUTO: 82.9 FL (ref 80–100)
METHADONE UR QL SCN>300 NG/ML: ABNORMAL
MONOCYTES # BLD: 0.9 K/UL (ref 0–1.3)
MONOCYTES NFR BLD: 11.8 %
NEUTROPHILS # BLD: 5.1 K/UL (ref 1.7–7.7)
NEUTROPHILS NFR BLD: 64.6 %
NITRITE UR QL STRIP.AUTO: NEGATIVE
OPIATES UR QL SCN>300 NG/ML: ABNORMAL
OXYCODONE UR QL SCN: ABNORMAL
PCP UR QL SCN>25 NG/ML: ABNORMAL
PH UR STRIP.AUTO: 7 [PH] (ref 5–8)
PH UR STRIP: 6 [PH]
PLATELET # BLD AUTO: 184 K/UL (ref 135–450)
PMV BLD AUTO: 8.4 FL (ref 5–10.5)
POTASSIUM SERPL-SCNC: 3.3 MMOL/L (ref 3.5–5.1)
PROT SERPL-MCNC: 5.8 G/DL (ref 6.4–8.2)
PROT UR STRIP.AUTO-MCNC: NEGATIVE MG/DL
RBC # BLD AUTO: 4.12 M/UL (ref 4.2–5.9)
SALICYLATES SERPL-MCNC: 0.5 MG/DL (ref 15–30)
SODIUM SERPL-SCNC: 124 MMOL/L (ref 136–145)
SODIUM UR-SCNC: <20 MMOL/L
SP GR UR STRIP.AUTO: <=1.005 (ref 1–1.03)
TROPONIN, HIGH SENSITIVITY: 25 NG/L (ref 0–22)
UA COMPLETE W REFLEX CULTURE PNL UR: NORMAL
UA DIPSTICK W REFLEX MICRO PNL UR: NORMAL
URN SPEC COLLECT METH UR: NORMAL
UROBILINOGEN UR STRIP-ACNC: 1 E.U./DL
WBC # BLD AUTO: 7.9 K/UL (ref 4–11)

## 2024-11-04 PROCEDURE — 84300 ASSAY OF URINE SODIUM: CPT

## 2024-11-04 PROCEDURE — 93005 ELECTROCARDIOGRAM TRACING: CPT | Performed by: EMERGENCY MEDICINE

## 2024-11-04 PROCEDURE — 90791 PSYCH DIAGNOSTIC EVALUATION: CPT

## 2024-11-04 PROCEDURE — 80053 COMPREHEN METABOLIC PANEL: CPT

## 2024-11-04 PROCEDURE — 1200000000 HC SEMI PRIVATE

## 2024-11-04 PROCEDURE — 83735 ASSAY OF MAGNESIUM: CPT

## 2024-11-04 PROCEDURE — 82077 ASSAY SPEC XCP UR&BREATH IA: CPT

## 2024-11-04 PROCEDURE — 80307 DRUG TEST PRSMV CHEM ANLYZR: CPT

## 2024-11-04 PROCEDURE — 99285 EMERGENCY DEPT VISIT HI MDM: CPT

## 2024-11-04 PROCEDURE — 85025 COMPLETE CBC W/AUTO DIFF WBC: CPT

## 2024-11-04 PROCEDURE — 83935 ASSAY OF URINE OSMOLALITY: CPT

## 2024-11-04 PROCEDURE — 81003 URINALYSIS AUTO W/O SCOPE: CPT

## 2024-11-04 PROCEDURE — 80143 DRUG ASSAY ACETAMINOPHEN: CPT

## 2024-11-04 PROCEDURE — 84484 ASSAY OF TROPONIN QUANT: CPT

## 2024-11-04 PROCEDURE — 80179 DRUG ASSAY SALICYLATE: CPT

## 2024-11-04 RX ORDER — GABAPENTIN 300 MG/1
300 CAPSULE ORAL 4 TIMES DAILY
Status: DISCONTINUED | OUTPATIENT
Start: 2024-11-04 | End: 2024-11-06 | Stop reason: HOSPADM

## 2024-11-04 RX ORDER — SODIUM CHLORIDE 9 MG/ML
INJECTION, SOLUTION INTRAVENOUS PRN
Status: DISCONTINUED | OUTPATIENT
Start: 2024-11-04 | End: 2024-11-06 | Stop reason: HOSPADM

## 2024-11-04 RX ORDER — ONDANSETRON 2 MG/ML
4 INJECTION INTRAMUSCULAR; INTRAVENOUS EVERY 6 HOURS PRN
Status: DISCONTINUED | OUTPATIENT
Start: 2024-11-04 | End: 2024-11-06 | Stop reason: HOSPADM

## 2024-11-04 RX ORDER — 0.9 % SODIUM CHLORIDE 0.9 %
1000 INTRAVENOUS SOLUTION INTRAVENOUS ONCE
Status: DISCONTINUED | OUTPATIENT
Start: 2024-11-04 | End: 2024-11-04

## 2024-11-04 RX ORDER — AMLODIPINE BESYLATE 2.5 MG/1
2.5 TABLET ORAL DAILY
Status: DISCONTINUED | OUTPATIENT
Start: 2024-11-05 | End: 2024-11-06 | Stop reason: HOSPADM

## 2024-11-04 RX ORDER — ISOSORBIDE MONONITRATE 30 MG/1
30 TABLET, EXTENDED RELEASE ORAL DAILY
Status: DISCONTINUED | OUTPATIENT
Start: 2024-11-05 | End: 2024-11-05

## 2024-11-04 RX ORDER — IPRATROPIUM BROMIDE AND ALBUTEROL SULFATE 2.5; .5 MG/3ML; MG/3ML
1 SOLUTION RESPIRATORY (INHALATION) EVERY 4 HOURS PRN
Status: DISCONTINUED | OUTPATIENT
Start: 2024-11-04 | End: 2024-11-06 | Stop reason: HOSPADM

## 2024-11-04 RX ORDER — SODIUM CHLORIDE 0.9 % (FLUSH) 0.9 %
10 SYRINGE (ML) INJECTION PRN
Status: DISCONTINUED | OUTPATIENT
Start: 2024-11-04 | End: 2024-11-06 | Stop reason: HOSPADM

## 2024-11-04 RX ORDER — ENOXAPARIN SODIUM 100 MG/ML
40 INJECTION SUBCUTANEOUS DAILY
Status: DISCONTINUED | OUTPATIENT
Start: 2024-11-05 | End: 2024-11-06 | Stop reason: HOSPADM

## 2024-11-04 RX ORDER — BUDESONIDE AND FORMOTEROL FUMARATE DIHYDRATE 160; 4.5 UG/1; UG/1
2 AEROSOL RESPIRATORY (INHALATION) 2 TIMES DAILY
Status: DISCONTINUED | OUTPATIENT
Start: 2024-11-04 | End: 2024-11-06 | Stop reason: HOSPADM

## 2024-11-04 RX ORDER — PANTOPRAZOLE SODIUM 40 MG/1
40 TABLET, DELAYED RELEASE ORAL DAILY
Status: DISCONTINUED | OUTPATIENT
Start: 2024-11-05 | End: 2024-11-05

## 2024-11-04 RX ORDER — ATORVASTATIN CALCIUM 40 MG/1
40 TABLET, FILM COATED ORAL DAILY
Status: DISCONTINUED | OUTPATIENT
Start: 2024-11-05 | End: 2024-11-06 | Stop reason: HOSPADM

## 2024-11-04 RX ORDER — LANOLIN ALCOHOL/MO/W.PET/CERES
3 CREAM (GRAM) TOPICAL NIGHTLY PRN
Status: DISCONTINUED | OUTPATIENT
Start: 2024-11-04 | End: 2024-11-06 | Stop reason: HOSPADM

## 2024-11-04 RX ORDER — NICOTINE 21 MG/24HR
1 PATCH, TRANSDERMAL 24 HOURS TRANSDERMAL DAILY PRN
Status: DISCONTINUED | OUTPATIENT
Start: 2024-11-04 | End: 2024-11-06 | Stop reason: HOSPADM

## 2024-11-04 RX ORDER — ACETAMINOPHEN 650 MG/1
650 SUPPOSITORY RECTAL EVERY 6 HOURS PRN
Status: DISCONTINUED | OUTPATIENT
Start: 2024-11-04 | End: 2024-11-06 | Stop reason: HOSPADM

## 2024-11-04 RX ORDER — ASPIRIN 325 MG
325 TABLET ORAL DAILY
Status: DISCONTINUED | OUTPATIENT
Start: 2024-11-05 | End: 2024-11-05

## 2024-11-04 RX ORDER — SODIUM CHLORIDE 0.9 % (FLUSH) 0.9 %
10 SYRINGE (ML) INJECTION EVERY 12 HOURS SCHEDULED
Status: DISCONTINUED | OUTPATIENT
Start: 2024-11-04 | End: 2024-11-06 | Stop reason: HOSPADM

## 2024-11-04 RX ORDER — MAGNESIUM SULFATE IN WATER 40 MG/ML
2000 INJECTION, SOLUTION INTRAVENOUS ONCE
Status: COMPLETED | OUTPATIENT
Start: 2024-11-04 | End: 2024-11-05

## 2024-11-04 RX ORDER — RANOLAZINE 500 MG/1
1000 TABLET, EXTENDED RELEASE ORAL 2 TIMES DAILY
Status: DISCONTINUED | OUTPATIENT
Start: 2024-11-04 | End: 2024-11-06 | Stop reason: HOSPADM

## 2024-11-04 RX ORDER — LISINOPRIL 10 MG/1
10 TABLET ORAL DAILY
Status: DISCONTINUED | OUTPATIENT
Start: 2024-11-05 | End: 2024-11-06 | Stop reason: HOSPADM

## 2024-11-04 RX ORDER — SODIUM CHLORIDE 9 MG/ML
INJECTION, SOLUTION INTRAVENOUS CONTINUOUS
Status: DISCONTINUED | OUTPATIENT
Start: 2024-11-04 | End: 2024-11-05

## 2024-11-04 RX ORDER — METOPROLOL SUCCINATE 50 MG/1
100 TABLET, EXTENDED RELEASE ORAL DAILY
Status: DISCONTINUED | OUTPATIENT
Start: 2024-11-05 | End: 2024-11-06 | Stop reason: HOSPADM

## 2024-11-04 RX ORDER — CLOPIDOGREL BISULFATE 75 MG/1
75 TABLET ORAL DAILY
Status: DISCONTINUED | OUTPATIENT
Start: 2024-11-05 | End: 2024-11-06 | Stop reason: HOSPADM

## 2024-11-04 RX ORDER — PROMETHAZINE HYDROCHLORIDE 25 MG/1
12.5 TABLET ORAL EVERY 6 HOURS PRN
Status: DISCONTINUED | OUTPATIENT
Start: 2024-11-04 | End: 2024-11-06 | Stop reason: HOSPADM

## 2024-11-04 RX ORDER — ACETAMINOPHEN 325 MG/1
650 TABLET ORAL EVERY 6 HOURS PRN
Status: DISCONTINUED | OUTPATIENT
Start: 2024-11-04 | End: 2024-11-06 | Stop reason: HOSPADM

## 2024-11-04 ASSESSMENT — PAIN SCALES - GENERAL: PAINLEVEL_OUTOF10: 0

## 2024-11-04 ASSESSMENT — LIFESTYLE VARIABLES
HOW OFTEN DO YOU HAVE A DRINK CONTAINING ALCOHOL: NEVER
HOW MANY STANDARD DRINKS CONTAINING ALCOHOL DO YOU HAVE ON A TYPICAL DAY: PATIENT DOES NOT DRINK

## 2024-11-04 ASSESSMENT — PAIN - FUNCTIONAL ASSESSMENT: PAIN_FUNCTIONAL_ASSESSMENT: 0-10

## 2024-11-05 ENCOUNTER — APPOINTMENT (OUTPATIENT)
Dept: GENERAL RADIOLOGY | Age: 59
DRG: 756 | End: 2024-11-05
Payer: COMMERCIAL

## 2024-11-05 PROBLEM — E83.42 HYPOMAGNESEMIA: Status: ACTIVE | Noted: 2024-11-05

## 2024-11-05 PROBLEM — R13.10 DYSPHAGIA: Status: ACTIVE | Noted: 2024-11-05

## 2024-11-05 PROBLEM — F41.9 ANXIETY: Status: ACTIVE | Noted: 2024-11-05

## 2024-11-05 PROBLEM — R45.851 SUICIDAL IDEATION: Status: ACTIVE | Noted: 2024-11-05

## 2024-11-05 LAB
ALBUMIN SERPL-MCNC: 2.9 G/DL (ref 3.4–5)
ALBUMIN/GLOB SERPL: 1.2 {RATIO} (ref 1.1–2.2)
ALP SERPL-CCNC: 73 U/L (ref 40–129)
ALT SERPL-CCNC: 12 U/L (ref 10–40)
ANION GAP SERPL CALCULATED.3IONS-SCNC: 11 MMOL/L (ref 3–16)
AST SERPL-CCNC: 10 U/L (ref 15–37)
BASOPHILS # BLD: 0.1 K/UL (ref 0–0.2)
BASOPHILS NFR BLD: 0.8 %
BILIRUB SERPL-MCNC: 0.3 MG/DL (ref 0–1)
BUN SERPL-MCNC: 3 MG/DL (ref 7–20)
CALCIUM SERPL-MCNC: 8.6 MG/DL (ref 8.3–10.6)
CHLORIDE SERPL-SCNC: 94 MMOL/L (ref 99–110)
CO2 SERPL-SCNC: 25 MMOL/L (ref 21–32)
CREAT SERPL-MCNC: 0.4 MG/DL (ref 0.9–1.3)
DEPRECATED RDW RBC AUTO: 14.9 % (ref 12.4–15.4)
EKG ATRIAL RATE: 84 BPM
EKG DIAGNOSIS: NORMAL
EKG P AXIS: 90 DEGREES
EKG P-R INTERVAL: 166 MS
EKG Q-T INTERVAL: 390 MS
EKG QRS DURATION: 82 MS
EKG QTC CALCULATION (BAZETT): 464 MS
EKG R AXIS: -1 DEGREES
EKG T AXIS: 27 DEGREES
EKG VENTRICULAR RATE: 85 BPM
EOSINOPHIL # BLD: 0.1 K/UL (ref 0–0.6)
EOSINOPHIL NFR BLD: 0.9 %
GFR SERPLBLD CREATININE-BSD FMLA CKD-EPI: >90 ML/MIN/{1.73_M2}
GLUCOSE SERPL-MCNC: 92 MG/DL (ref 70–99)
HCT VFR BLD AUTO: 32.6 % (ref 40.5–52.5)
HGB BLD-MCNC: 11.2 G/DL (ref 13.5–17.5)
LYMPHOCYTES # BLD: 1.4 K/UL (ref 1–5.1)
LYMPHOCYTES NFR BLD: 22.2 %
MAGNESIUM SERPL-MCNC: 2.07 MG/DL (ref 1.8–2.4)
MCH RBC QN AUTO: 28.8 PG (ref 26–34)
MCHC RBC AUTO-ENTMCNC: 34.4 G/DL (ref 31–36)
MCV RBC AUTO: 83.7 FL (ref 80–100)
MONOCYTES # BLD: 0.8 K/UL (ref 0–1.3)
MONOCYTES NFR BLD: 12.6 %
NEUTROPHILS # BLD: 4.1 K/UL (ref 1.7–7.7)
NEUTROPHILS NFR BLD: 63.5 %
OSMOLALITY UR: 93 MOSM/KG (ref 390–1070)
PLATELET # BLD AUTO: 178 K/UL (ref 135–450)
PMV BLD AUTO: 8.4 FL (ref 5–10.5)
POTASSIUM SERPL-SCNC: 3.5 MMOL/L (ref 3.5–5.1)
PROT SERPL-MCNC: 5.4 G/DL (ref 6.4–8.2)
RBC # BLD AUTO: 3.9 M/UL (ref 4.2–5.9)
SODIUM SERPL-SCNC: 130 MMOL/L (ref 136–145)
TROPONIN, HIGH SENSITIVITY: 21 NG/L (ref 0–22)
TROPONIN, HIGH SENSITIVITY: 21 NG/L (ref 0–22)
WBC # BLD AUTO: 6.5 K/UL (ref 4–11)

## 2024-11-05 PROCEDURE — 6370000000 HC RX 637 (ALT 250 FOR IP): Performed by: INTERNAL MEDICINE

## 2024-11-05 PROCEDURE — 2700000000 HC OXYGEN THERAPY PER DAY

## 2024-11-05 PROCEDURE — 6360000002 HC RX W HCPCS

## 2024-11-05 PROCEDURE — 84484 ASSAY OF TROPONIN QUANT: CPT

## 2024-11-05 PROCEDURE — 93010 ELECTROCARDIOGRAM REPORT: CPT | Performed by: INTERNAL MEDICINE

## 2024-11-05 PROCEDURE — 97116 GAIT TRAINING THERAPY: CPT

## 2024-11-05 PROCEDURE — 83735 ASSAY OF MAGNESIUM: CPT

## 2024-11-05 PROCEDURE — 92526 ORAL FUNCTION THERAPY: CPT

## 2024-11-05 PROCEDURE — 94640 AIRWAY INHALATION TREATMENT: CPT

## 2024-11-05 PROCEDURE — 73502 X-RAY EXAM HIP UNI 2-3 VIEWS: CPT

## 2024-11-05 PROCEDURE — 6360000002 HC RX W HCPCS: Performed by: INTERNAL MEDICINE

## 2024-11-05 PROCEDURE — 97162 PT EVAL MOD COMPLEX 30 MIN: CPT

## 2024-11-05 PROCEDURE — 99223 1ST HOSP IP/OBS HIGH 75: CPT

## 2024-11-05 PROCEDURE — 85025 COMPLETE CBC W/AUTO DIFF WBC: CPT

## 2024-11-05 PROCEDURE — 97166 OT EVAL MOD COMPLEX 45 MIN: CPT

## 2024-11-05 PROCEDURE — 94761 N-INVAS EAR/PLS OXIMETRY MLT: CPT

## 2024-11-05 PROCEDURE — 73562 X-RAY EXAM OF KNEE 3: CPT

## 2024-11-05 PROCEDURE — 2580000003 HC RX 258

## 2024-11-05 PROCEDURE — 6370000000 HC RX 637 (ALT 250 FOR IP)

## 2024-11-05 PROCEDURE — 97535 SELF CARE MNGMENT TRAINING: CPT

## 2024-11-05 PROCEDURE — 97530 THERAPEUTIC ACTIVITIES: CPT

## 2024-11-05 PROCEDURE — 71045 X-RAY EXAM CHEST 1 VIEW: CPT

## 2024-11-05 PROCEDURE — 80053 COMPREHEN METABOLIC PANEL: CPT

## 2024-11-05 PROCEDURE — 92610 EVALUATE SWALLOWING FUNCTION: CPT

## 2024-11-05 PROCEDURE — 2580000003 HC RX 258: Performed by: INTERNAL MEDICINE

## 2024-11-05 PROCEDURE — 1200000000 HC SEMI PRIVATE

## 2024-11-05 PROCEDURE — 36415 COLL VENOUS BLD VENIPUNCTURE: CPT

## 2024-11-05 RX ORDER — TRAZODONE HYDROCHLORIDE 50 MG/1
50 TABLET, FILM COATED ORAL NIGHTLY
Status: DISCONTINUED | OUTPATIENT
Start: 2024-11-05 | End: 2024-11-06 | Stop reason: HOSPADM

## 2024-11-05 RX ORDER — LORAZEPAM 0.5 MG/1
0.5 TABLET ORAL 2 TIMES DAILY
Status: ON HOLD | COMMUNITY
End: 2024-11-06

## 2024-11-05 RX ORDER — POTASSIUM CHLORIDE 1500 MG/1
40 TABLET, EXTENDED RELEASE ORAL PRN
Status: DISCONTINUED | OUTPATIENT
Start: 2024-11-05 | End: 2024-11-06 | Stop reason: HOSPADM

## 2024-11-05 RX ORDER — MELOXICAM 15 MG/1
15 TABLET ORAL DAILY
Status: ON HOLD | COMMUNITY
End: 2024-11-06 | Stop reason: HOSPADM

## 2024-11-05 RX ORDER — LORATADINE 10 MG/1
10 TABLET ORAL DAILY
COMMUNITY

## 2024-11-05 RX ORDER — NITROGLYCERIN 0.4 MG/1
0.4 TABLET SUBLINGUAL EVERY 5 MIN PRN
Status: DISCONTINUED | OUTPATIENT
Start: 2024-11-05 | End: 2024-11-06 | Stop reason: HOSPADM

## 2024-11-05 RX ORDER — HYDROXYZINE HYDROCHLORIDE 25 MG/1
25 TABLET, FILM COATED ORAL 4 TIMES DAILY
Status: DISCONTINUED | OUTPATIENT
Start: 2024-11-05 | End: 2024-11-06 | Stop reason: HOSPADM

## 2024-11-05 RX ORDER — BUSPIRONE HYDROCHLORIDE 10 MG/1
10 TABLET ORAL 2 TIMES DAILY
Status: DISCONTINUED | OUTPATIENT
Start: 2024-11-05 | End: 2024-11-06 | Stop reason: HOSPADM

## 2024-11-05 RX ORDER — POTASSIUM CHLORIDE 7.45 MG/ML
10 INJECTION INTRAVENOUS PRN
Status: DISCONTINUED | OUTPATIENT
Start: 2024-11-05 | End: 2024-11-06 | Stop reason: HOSPADM

## 2024-11-05 RX ORDER — GABAPENTIN 300 MG/1
CAPSULE ORAL
Qty: 120 CAPSULE | Refills: 2 | Status: SHIPPED | OUTPATIENT
Start: 2024-11-05 | End: 2025-03-05

## 2024-11-05 RX ORDER — MAGNESIUM SULFATE IN WATER 40 MG/ML
2000 INJECTION, SOLUTION INTRAVENOUS PRN
Status: DISCONTINUED | OUTPATIENT
Start: 2024-11-05 | End: 2024-11-06 | Stop reason: HOSPADM

## 2024-11-05 RX ORDER — ASPIRIN 81 MG/1
81 TABLET, CHEWABLE ORAL DAILY
Status: DISCONTINUED | OUTPATIENT
Start: 2024-11-05 | End: 2024-11-06 | Stop reason: HOSPADM

## 2024-11-05 RX ORDER — FLUCONAZOLE 100 MG/1
100 TABLET ORAL DAILY
Status: DISCONTINUED | OUTPATIENT
Start: 2024-11-05 | End: 2024-11-06 | Stop reason: HOSPADM

## 2024-11-05 RX ORDER — ALBUTEROL SULFATE 90 UG/1
2 INHALANT RESPIRATORY (INHALATION) EVERY 4 HOURS PRN
Status: DISCONTINUED | OUTPATIENT
Start: 2024-11-05 | End: 2024-11-06 | Stop reason: HOSPADM

## 2024-11-05 RX ORDER — ERGOCALCIFEROL 1.25 MG/1
50000 CAPSULE, LIQUID FILLED ORAL WEEKLY
COMMUNITY

## 2024-11-05 RX ORDER — BUSPIRONE HYDROCHLORIDE 10 MG/1
10 TABLET ORAL 2 TIMES DAILY
Status: ON HOLD | COMMUNITY
End: 2024-11-06 | Stop reason: HOSPADM

## 2024-11-05 RX ORDER — LORAZEPAM 0.5 MG/1
0.5 TABLET ORAL 2 TIMES DAILY PRN
Status: DISCONTINUED | OUTPATIENT
Start: 2024-11-05 | End: 2024-11-06 | Stop reason: HOSPADM

## 2024-11-05 RX ORDER — TRAZODONE HYDROCHLORIDE 50 MG/1
50 TABLET, FILM COATED ORAL NIGHTLY
COMMUNITY

## 2024-11-05 RX ORDER — SODIUM CHLORIDE 9 MG/ML
INJECTION, SOLUTION INTRAVENOUS CONTINUOUS
Status: DISCONTINUED | OUTPATIENT
Start: 2024-11-05 | End: 2024-11-06 | Stop reason: HOSPADM

## 2024-11-05 RX ORDER — HYDROXYZINE HYDROCHLORIDE 25 MG/1
25 TABLET, FILM COATED ORAL 4 TIMES DAILY
Status: ON HOLD | COMMUNITY
End: 2024-11-06 | Stop reason: HOSPADM

## 2024-11-05 RX ORDER — PANTOPRAZOLE SODIUM 40 MG/1
40 TABLET, DELAYED RELEASE ORAL 2 TIMES DAILY
COMMUNITY

## 2024-11-05 RX ADMIN — METOPROLOL SUCCINATE 100 MG: 50 TABLET, EXTENDED RELEASE ORAL at 09:21

## 2024-11-05 RX ADMIN — PANTOPRAZOLE SODIUM 40 MG: 40 INJECTION, POWDER, FOR SOLUTION INTRAVENOUS at 15:19

## 2024-11-05 RX ADMIN — ASPIRIN 81 MG: 81 TABLET, CHEWABLE ORAL at 17:11

## 2024-11-05 RX ADMIN — FLUCONAZOLE 100 MG: 100 TABLET ORAL at 17:07

## 2024-11-05 RX ADMIN — Medication 3 MG: at 00:48

## 2024-11-05 RX ADMIN — SODIUM CHLORIDE: 9 INJECTION, SOLUTION INTRAVENOUS at 16:57

## 2024-11-05 RX ADMIN — BUDESONIDE AND FORMOTEROL FUMARATE DIHYDRATE 2 PUFF: 160; 4.5 AEROSOL RESPIRATORY (INHALATION) at 19:25

## 2024-11-05 RX ADMIN — SODIUM CHLORIDE: 9 INJECTION, SOLUTION INTRAVENOUS at 01:13

## 2024-11-05 RX ADMIN — LORAZEPAM 0.5 MG: 0.5 TABLET ORAL at 14:40

## 2024-11-05 RX ADMIN — MAGNESIUM SULFATE HEPTAHYDRATE 2000 MG: 40 INJECTION, SOLUTION INTRAVENOUS at 01:10

## 2024-11-05 RX ADMIN — CARIPRAZINE 4.5 MG: 4.5 CAPSULE, GELATIN COATED ORAL at 15:19

## 2024-11-05 RX ADMIN — ENOXAPARIN SODIUM 40 MG: 100 INJECTION SUBCUTANEOUS at 09:23

## 2024-11-05 RX ADMIN — LORAZEPAM 0.5 MG: 0.5 TABLET ORAL at 21:22

## 2024-11-05 RX ADMIN — SODIUM CHLORIDE, PRESERVATIVE FREE 10 ML: 5 INJECTION INTRAVENOUS at 09:37

## 2024-11-05 NOTE — ED PROVIDER NOTES
[facility] ED  EMERGENCY DEPARTMENT ENCOUNTER        Pt Name: Kam Nguyen  MRN: [id number]  Birthdate 1965  Date of evaluation: 11/4/2024  Provider: Ange Cabrera MD  PCP: Candelaria Dover APRN - NP  Note Started: 8:39 PM EST 11/4/24    CHIEF COMPLAINT       Chief Complaint   Patient presents with    Suicidal     Pt. Reports increased health issues, cannot eat solid foods. Pt. Reports his anxiety and depression has been very bad today. Pt. Reports thoughts of committing suicide by overdosing on his home medications.        HISTORY OF PRESENT ILLNESS: 1 or more Elements     History from : Patient    Limitations to history : None    Kam Nguyen is a 59 y.o. male who presents to the [place] department with suicidal ideation.  Patient states he has significant anxiety.  He has been having difficulty swallowing for over a month.  He actually had a scope done and was told by the doctor that he should not have any problems swallowing.  He thought it might have been due to his anxiety.  He ran out of Ativan yesterday.  He tried to call the doctor to get a refill but could not do this until tomorrow.  He states today he started getting severely anxious and started having suicidal thoughts.  He states that he has thought about overdosing on his home medications because that is what he is done previously.  He states \"I made a promise to my mother I would not kill myself and I really want to keep that promise.\"  He states that if he were to go home he is afraid that he actually would kill himself    Nursing Notes were all reviewed and agreed with or any disagreements were addressed in the HPI.    REVIEW OF SYSTEMS :      Review of Systems    10 systems reviewed and negative except as in HPI/MDM    SURGICAL HISTORY     Past Surgical History:   Procedure Laterality Date    CORONARY ANGIOPLASTY WITH STENT PLACEMENT  2006, 2012, 2013    [facility]       CURRENTMEDICATIONS       Previous

## 2024-11-05 NOTE — PLAN OF CARE
Problem: SLP Adult - Impaired Swallowing  Goal: By Discharge: Advance to least restrictive diet without signs or symptoms of aspiration for planned discharge setting.  See evaluation for individualized goals.  Note: SLP completed evaluation. Please refer to notes in EMR.      Minal Patel, Graduate Speech Therapy Student

## 2024-11-05 NOTE — CONSULTS
(RANEXA) extended release tablet 1,000 mg, 1,000 mg, Oral, BID     Social History:   Social History       Tobacco History       Smoking Status  Every Day Smoking Start Date  8/17/1977 Current Packs/Day  1 pack/day Average Packs/Day  1 pack/day for 47.2 years (47.2 ttl pk-yrs) Smoking Tobacco Type  Cigarettes since 8/17/1977   Pack Year History     Packs/Day From To Years    1 8/17/1977  47.2      Smokeless Tobacco Use  Never      Tobacco Comments  1/2 pack day               Alcohol History       Alcohol Use Status  Not Currently Drinks/Week  0 Standard drinks or equivalent per week Comment  states \"a few beers monthly\"              Drug Use       Drug Use Status  Not Currently Types  Marijuana (Weed) Comment  took friend's suboxone               Sexual Activity       Sexually Active  Never                     Family History:  Family History   Problem Relation Age of Onset    Other Mother     Arthritis Mother     Mental Illness Mother     Depression Mother     Diabetes Father     Heart Disease Father     Substance Abuse Father     Heart Disease Sister     High Blood Pressure Sister     Mental Retardation Brother     Other Brother     Learning Disabilities Brother     Early Death Sister     Cancer Sister     Substance Abuse Sister     Cancer Sister     Diabetes Brother     High Blood Pressure Brother         Allergies:  Allergies   Allergen Reactions    Pcn [Penicillins] Other (See Comments) and Hives     Other reaction(s): Light Headed  Patient passed out.  syncope        ROS:   General: No fever or weight change  Hematologic: No unexpected submucosal bleeding or bruising  HEENT: No sore throat or facial pain  Respiratory: No cough or dyspnea  Cardiovascular: No angina or dependent edema  Gastrointestinal: See HPI  Musculoskeletal: No usual joint pain or stiffness  Skin: No skin eruptions or changing lesions  Neurologic: No focal weakness or numbness  Psychiatric: No anxiety or sleep disturbance    Physical

## 2024-11-05 NOTE — ED NOTES
Pt placed on is baseline  2.5 liters per nasal canula  O2.  Attempted to give his night time meds pt reporting  trouble swallowing at this time and I not eager to swallow  a lot of pills/  Pt only wants to take  melatonin as it is small and  he wants it crashed in small amount of apple sauce.

## 2024-11-05 NOTE — ED NOTES
Pt getting admitted, pt aware and updated on POC. Sitter still at bed side. Pt resting in bed with eye open, in no acute distress. No needs voiced at this time.

## 2024-11-05 NOTE — H&P
XL   -not filling imdur   -CXR pending   - takes nitro prn at home   -monitor on tele    #Hypomagnesemia   #Hypokalemia   -replace  -EKG showed NSR + PACs   -continue to monitor on tele     #Dysphagia vs pill aversion?  #Hiatal hernia   #GERD  #Chaudhari's esophagus   #Candida esophagitis  -speech and GI consulted   -reportedly completed treatment for candida esophagitis   -CT neck noted above   -EGD noted above   -diet pending speech eval   -protonix ordered     #Recent injury to left hip and knee s/p fall   -left hip and knee xrays ordered   -PT/OT    #COPD  #Chronic nocturnal hypoxia  -wears 2 L nightly   -Duoneb PRN   -continue symbicort PRN     #HTN   -continue lisinopril, toprol XL, and norvasc   -monitor BP     #HLD   -continue statin     #Neuropathy   -continue gabapentin     #Hx of tobacco use   -nicotine patch ordered     #Bipolar affective disorder   -continue home meds        Note above makes patient higher risk for morbidity and mortality requiring testing and treatment.       DVT Prophylaxis: Lovenox   Diet: ADULT DIET; Regular; 3 carb choices (45 gm/meal); Low Fat/Low Chol/High Fiber/2 gm Na  Code Status: Full Code    Sailaja Singer   11/05/24  11:29 AM    DEMETRI Aguilar  11/05/24  12:32 PM

## 2024-11-05 NOTE — PLAN OF CARE
Suicidal ideation telepsych consulted in the ED with agreement of psych admission.  Patient needs hyponatremia correction and medical clearance prior to admission.  Jessica, suicide precaution in place    Acute hyponatremia: Nephrology consulted,

## 2024-11-05 NOTE — CARE COORDINATION
Case Management Assessment  Initial Evaluation    Date/Time of Evaluation: 11/5/2024 10:24 AM  Assessment Completed by: Ale Gordon    If patient is discharged prior to next notation, then this note serves as note for discharge by case management.    Patient Name: Kam Nguyen                   YOB: 1965  Diagnosis: Hyponatremia [E87.1]                   Date / Time: 11/4/2024  6:55 PM    Patient Admission Status: Inpatient   Readmission Risk (Low < 19, Mod (19-27), High > 27): Readmission Risk Score: 14    Current PCP: Candelaria Dover APRN - NP  PCP verified by CM? Yes    Chart Reviewed: Yes      History Provided by: Patient, Medical Record  Patient Orientation: Alert and Oriented    Patient Cognition: Alert    Hospitalization in the last 30 days (Readmission):  No    If yes, Readmission Assessment in  Navigator will be completed.    Advance Directives:      Code Status: Full Code   Patient's Primary Decision Maker is: Legal Next of Kin    Primary Decision Maker: Annmarie Gary - Brother/Sister - [phone]    Discharge Planning:    Patient lives with: Alone Type of Home: House  Primary Care Giver: Self  Patient Support Systems include: Family Members, Spouse/Significant Other   Current Financial resources: Medicaid  Current community resources: None  Current services prior to admission: Durable Medical Equipment, Oxygen Therapy            Current DME: Oxygen Therapy (Comment), Walker (2.5L O2 via [employer] at night.)            Type of Home Care services:  None    ADLS  Prior functional level: Independent in ADLs/IADLs  Current functional level: Independent in ADLs/IADLs    PT AM-PAC:   /24  OT AM-PAC:   /24    Family can provide assistance at DC: No  Would you like Case Management to discuss the discharge plan with any other family members/significant others, and if so, who? No  Plans to Return to Present Housing: Yes  Other Identified Issues/Barriers to RETURNING to current

## 2024-11-05 NOTE — VIRTUAL HEALTH
consent. Patient identification was verified, and a caregiver was present when appropriate.  The patient was located at Facility (Appt Department): [facility] ED  [address]  [place] [id number]  Loc: [phone]  The provider was located at Home (City/State): [place], [place]   Confirm you are appropriately licensed, registered, or certified to deliver care in the state where the patient is located as indicated above. If you are not or unsure, please re-schedule the visit: Yes, I confirm.   [place] Consult to Tele-Psych  Consult performed by: Kendra Clarke LISW  Consult ordered by: Ange Cabrera MD           Total time spent on this encounter: Not billed by time    --KY Oquendo on 11/4/2024 at 9:51 PM    An electronic signature was used to authenticate this note.

## 2024-11-05 NOTE — ED NOTES
Patient presented to ED via EMS  for evaluation. Explained process of securing patient belongings for patient/staff safety, patient verbalized understanding. Security at bedside, metal detector wanding completed. Patient changed into safety gown. All belongings itemized by  unknown, this happened on nights, writer called security and Jerry stated they did have his belongings, placed in labeled bag, removed from the patient care area, and taken to the security locker. Itemized list placed with belongings, in patient record, and a copy given to the patient.

## 2024-11-05 NOTE — ED NOTES
Pt refused all med this AM except for the metoprolol.  States either it was the wrong time of the day for the med or he did not take that med.  All reasons listed on the med in the MAR.

## 2024-11-05 NOTE — ED NOTES
Resumed care from out going RN, introduce self to pt and updated on POC. Sitter at  bedside at this time. Pt in hospital provided safety gown.

## 2024-11-06 VITALS
SYSTOLIC BLOOD PRESSURE: 116 MMHG | DIASTOLIC BLOOD PRESSURE: 72 MMHG | OXYGEN SATURATION: 97 % | WEIGHT: 192.6 LBS | HEART RATE: 78 BPM | TEMPERATURE: 98 F | BODY MASS INDEX: 26.09 KG/M2 | RESPIRATION RATE: 16 BRPM | HEIGHT: 72 IN

## 2024-11-06 LAB
ALBUMIN SERPL-MCNC: 2.7 G/DL (ref 3.4–5)
ALBUMIN/GLOB SERPL: 1 {RATIO} (ref 1.1–2.2)
ALP SERPL-CCNC: 68 U/L (ref 40–129)
ALT SERPL-CCNC: 10 U/L (ref 10–40)
ANION GAP SERPL CALCULATED.3IONS-SCNC: 11 MMOL/L (ref 3–16)
AST SERPL-CCNC: 9 U/L (ref 15–37)
BASOPHILS # BLD: 0 K/UL (ref 0–0.2)
BASOPHILS NFR BLD: 0.3 %
BILIRUB SERPL-MCNC: 0.3 MG/DL (ref 0–1)
BUN SERPL-MCNC: <2 MG/DL (ref 7–20)
CALCIUM SERPL-MCNC: 8.3 MG/DL (ref 8.3–10.6)
CHLORIDE SERPL-SCNC: 97 MMOL/L (ref 99–110)
CO2 SERPL-SCNC: 23 MMOL/L (ref 21–32)
CREAT SERPL-MCNC: 0.4 MG/DL (ref 0.9–1.3)
DEPRECATED RDW RBC AUTO: 15.3 % (ref 12.4–15.4)
EOSINOPHIL # BLD: 0.1 K/UL (ref 0–0.6)
EOSINOPHIL NFR BLD: 1.4 %
GFR SERPLBLD CREATININE-BSD FMLA CKD-EPI: >90 ML/MIN/{1.73_M2}
GLUCOSE SERPL-MCNC: 96 MG/DL (ref 70–99)
HCT VFR BLD AUTO: 30.8 % (ref 40.5–52.5)
HGB BLD-MCNC: 10.8 G/DL (ref 13.5–17.5)
LYMPHOCYTES # BLD: 1.2 K/UL (ref 1–5.1)
LYMPHOCYTES NFR BLD: 22.8 %
MAGNESIUM SERPL-MCNC: 1.91 MG/DL (ref 1.8–2.4)
MCH RBC QN AUTO: 30 PG (ref 26–34)
MCHC RBC AUTO-ENTMCNC: 35.1 G/DL (ref 31–36)
MCV RBC AUTO: 85.5 FL (ref 80–100)
MONOCYTES # BLD: 0.7 K/UL (ref 0–1.3)
MONOCYTES NFR BLD: 13.3 %
NEUTROPHILS # BLD: 3.4 K/UL (ref 1.7–7.7)
NEUTROPHILS NFR BLD: 62.2 %
PLATELET # BLD AUTO: 164 K/UL (ref 135–450)
PMV BLD AUTO: 8.6 FL (ref 5–10.5)
POTASSIUM SERPL-SCNC: 3.3 MMOL/L (ref 3.5–5.1)
PROT SERPL-MCNC: 5.3 G/DL (ref 6.4–8.2)
RBC # BLD AUTO: 3.6 M/UL (ref 4.2–5.9)
SODIUM SERPL-SCNC: 131 MMOL/L (ref 136–145)
WBC # BLD AUTO: 5.5 K/UL (ref 4–11)

## 2024-11-06 PROCEDURE — 6360000002 HC RX W HCPCS: Performed by: INTERNAL MEDICINE

## 2024-11-06 PROCEDURE — 6370000000 HC RX 637 (ALT 250 FOR IP)

## 2024-11-06 PROCEDURE — 99223 1ST HOSP IP/OBS HIGH 75: CPT

## 2024-11-06 PROCEDURE — 80053 COMPREHEN METABOLIC PANEL: CPT

## 2024-11-06 PROCEDURE — 6370000000 HC RX 637 (ALT 250 FOR IP): Performed by: INTERNAL MEDICINE

## 2024-11-06 PROCEDURE — 36415 COLL VENOUS BLD VENIPUNCTURE: CPT

## 2024-11-06 PROCEDURE — 85025 COMPLETE CBC W/AUTO DIFF WBC: CPT

## 2024-11-06 PROCEDURE — 83735 ASSAY OF MAGNESIUM: CPT

## 2024-11-06 PROCEDURE — 2580000003 HC RX 258

## 2024-11-06 PROCEDURE — 6360000002 HC RX W HCPCS

## 2024-11-06 RX ORDER — FLUCONAZOLE 100 MG/1
100 TABLET ORAL DAILY
Qty: 7 TABLET | Refills: 0 | Status: SHIPPED | OUTPATIENT
Start: 2024-11-07 | End: 2024-11-14

## 2024-11-06 RX ORDER — LORAZEPAM 0.5 MG/1
0.5 TABLET ORAL EVERY 12 HOURS
Qty: 60 TABLET | Refills: 0 | Status: SHIPPED | OUTPATIENT
Start: 2024-11-06 | End: 2024-12-06

## 2024-11-06 RX ADMIN — CARIPRAZINE 4.5 MG: 4.5 CAPSULE, GELATIN COATED ORAL at 10:01

## 2024-11-06 RX ADMIN — FLUCONAZOLE 100 MG: 100 TABLET ORAL at 09:42

## 2024-11-06 RX ADMIN — PANTOPRAZOLE SODIUM 40 MG: 40 INJECTION, POWDER, FOR SOLUTION INTRAVENOUS at 09:42

## 2024-11-06 RX ADMIN — ENOXAPARIN SODIUM 40 MG: 100 INJECTION SUBCUTANEOUS at 09:41

## 2024-11-06 RX ADMIN — LORAZEPAM 0.5 MG: 0.5 TABLET ORAL at 09:42

## 2024-11-06 RX ADMIN — METOPROLOL SUCCINATE 100 MG: 50 TABLET, EXTENDED RELEASE ORAL at 09:42

## 2024-11-06 RX ADMIN — POTASSIUM BICARBONATE 40 MEQ: 782 TABLET, EFFERVESCENT ORAL at 09:42

## 2024-11-06 ASSESSMENT — PAIN SCALES - GENERAL: PAINLEVEL_OUTOF10: 0

## 2024-11-06 NOTE — DISCHARGE INSTR - DIET
Good nutrition is important when healing from an illness, injury, or surgery.  Follow any nutrition recommendations given to you during your hospital stay.   If you were given an oral nutrition supplement while in the hospital, continue to take this supplement at home.  You can take it with meals, in-between meals, and/or before bedtime. These supplements can be purchased at most local grocery stores, pharmacies, and chain [employer]-stores.   If you have any questions about your diet or nutrition, call the hospital and ask for the dietitian.     For information on Fall & Injury Prevention, visit [email or web address]

## 2024-11-06 NOTE — DISCHARGE INSTR - COC
failure with hypoxia J96.01    Hyponatremia E87.1    Affective bipolar disorder (HCC) F31.9    Depression with anxiety F41.8    Fatty liver K76.0    GERD (gastroesophageal reflux disease) K21.9    COPD, severe (HCC) J44.9    Class 2 obesity due to excess calories with body mass index (BMI) of 39.0 to 39.9 in adult E66.812, E66.09, Z68.39    Chronic stable angina (HCC) I20.89    Suicidal ideation R45.851    Hypomagnesemia E83.42    Anxiety F41.9    Dysphagia R13.10       Isolation/Infection:   Isolation            No Isolation          Patient Infection Status       None to display                     Nurse Assessment:  Last Vital Signs: /77   Pulse 78   Temp 98.1 °F (36.7 °C) (Oral)   Resp 18   Ht 1.829 m (6')   Wt 87.4 kg (192 lb 9.6 oz)   SpO2 97%   BMI 26.12 kg/m²     Last documented pain score (0-10 scale): Pain Level: 0  Last Weight:   Wt Readings from Last 1 Encounters:   11/06/24 87.4 kg (192 lb 9.6 oz)     Mental Status:  oriented and alert    IV Access:  - None    Nursing Mobility/ADLs:  Walking   Assisted  Transfer  Assisted  Bathing  Independent  Dressing  Independent  Toileting  Independent  Feeding  Independent  Med Admin  Assisted  Med Delivery   whole and crushed    Wound Care Documentation and Therapy:        Elimination:  Continence:   Bowel: Yes  Bladder: Yes  Urinary Catheter: None   Colostomy/Ileostomy/Ileal Conduit: No       Date of Last BM: 11/4/2024    Intake/Output Summary (Last 24 hours) at 11/6/2024 1226  Last data filed at 11/6/2024 1122  Gross per 24 hour   Intake 2499 ml   Output 1550 ml   Net 949 ml     I/O last 3 completed shifts:  In: 1999 [I.V.:1999]  Out: 400 [Urine:400]    Safety Concerns:     History of Falls (last 30 days) and At Risk for Falls    Impairments/Disabilities:      None    Nutrition Therapy:  Current Nutrition Therapy:   - Oral Diet:  General    Routes of Feeding: Oral  Liquids: Thin Liquids  Daily Fluid Restriction: no  Last Modified Barium Swallow with

## 2024-11-06 NOTE — CONSULTS
% Final    Lymphocytes % 11/04/2024 21.1  % Final    Monocytes % 11/04/2024 11.8  % Final    Eosinophils % 11/04/2024 1.1  % Final    Basophils % 11/04/2024 1.4  % Final    Neutrophils Absolute 11/04/2024 5.1  1.7 - 7.7 K/uL Final    Lymphocytes Absolute 11/04/2024 1.7  1.0 - 5.1 K/uL Final    Monocytes Absolute 11/04/2024 0.9  0.0 - 1.3 K/uL Final    Eosinophils Absolute 11/04/2024 0.1  0.0 - 0.6 K/uL Final    Basophils Absolute 11/04/2024 0.1  0.0 - 0.2 K/uL Final    Sodium 11/04/2024 124 (L)  136 - 145 mmol/L Final    Potassium reflex Magnesium 11/04/2024 3.3 (L)  3.5 - 5.1 mmol/L Final    Chloride 11/04/2024 89 (L)  99 - 110 mmol/L Final    CO2 11/04/2024 25  21 - 32 mmol/L Final    Anion Gap 11/04/2024 10  3 - 16 Final    Glucose 11/04/2024 99  70 - 99 mg/dL Final    BUN 11/04/2024 3 (L)  7 - 20 mg/dL Final    Creatinine 11/04/2024 0.4 (L)  0.9 - 1.3 mg/dL Final    Est, Glom Filt Rate 11/04/2024 >90  >60 Final    Comment: Pediatric calculator link  https://www.kidney.org/professionals/kdoqi/gfr_calculatorped  Effective Oct 3, 2022  These results are not intended for use in patients  <18 years of age.  eGFR results are calculated without  a race factor using the 2021 CKD-EPI equation.  Careful  clinical correlation is recommended, particularly when  comparing to results calculated using previous equations.  The CKD-EPI equation is less accurate in patients with  extremes of muscle mass, extra-renal metabolism of  creatinine, excessive creatinine ingestion, or following  therapy that affects renal tubular secretion.      Calcium 11/04/2024 8.9  8.3 - 10.6 mg/dL Final    Total Protein 11/04/2024 5.8 (L)  6.4 - 8.2 g/dL Final    Albumin 11/04/2024 3.0 (L)  3.4 - 5.0 g/dL Final    Albumin/Globulin Ratio 11/04/2024 1.1  1.1 - 2.2 Final    Total Bilirubin 11/04/2024 0.5  0.0 - 1.0 mg/dL Final    Alkaline Phosphatase 11/04/2024 75  40 - 129 U/L Final    ALT 11/04/2024 13  10 - 40 U/L Final    AST 11/04/2024 10 (L)  15

## 2024-11-06 NOTE — PLAN OF CARE
Problem: Discharge Planning  Goal: Discharge to home or other facility with appropriate resources  Outcome: Progressing     Problem: Pain  Goal: Verbalizes/displays adequate comfort level or baseline comfort level  Outcome: Progressing     Problem: ABCDS Injury Assessment  Goal: Absence of physical injury  Outcome: Progressing     Problem: Risk for Elopement  Goal: Patient will not exit the unit/facility without proper excort  11/6/2024 0758 by Solitario Ruiz RN  Outcome: Progressing  11/6/2024 0042 by Carloine Hsieh RN  Outcome: Progressing     Problem: Safety - Adult  Goal: Free from fall injury  11/6/2024 0758 by Solitario Ruiz RN  Outcome: Progressing  11/6/2024 0042 by Caroline Hsieh RN  Outcome: Progressing     Problem: Self Harm/Suicidality  Goal: Will have no self-injury during hospital stay  Description: INTERVENTIONS:  1.  Ensure constant observer at bedside with Q15M safety checks  2.  Maintain a safe environment  3.  Secure patient belongings  4.  Ensure family/visitors adhere to safety recommendations  5.  Ensure safety tray has been added to patient's diet order  6.  Every shift and PRN: Re-assess suicidal risk via Frequent Screener    Outcome: Progressing

## 2024-11-06 NOTE — PROGRESS NOTES
Speech Language Pathology  Swallowing Disorders and Dysphagia  Clinical Bedside Swallow Assessment  Facility/Department: [facility] ED    Instrumentation: Not clinically indicated at this time. Will continue to monitor  Diet recommendation: IDDSI 7 Regular Solids; IDDSI 0 Thin Liquids; Meds crushed in puree as able  Risk management: upright for all intake, stay upright for at least 30 mins after intake, small bites/sips, slow rate of intake, general GERD precautions, general aspiration precautions, and hold PO and contact SLP if s/s of aspiration or worsening respiratory status develop.    NAME:Kam Nguyen  : 1965 (59 y.o.)   MRN: [id number]  ROOM:   ADMISSION DATE: 2024  Chief Complaint   Patient presents with    Suicidal     Pt. Reports increased health issues, cannot eat solid foods. Pt. Reports his anxiety and depression has been very bad today. Pt. Reports thoughts of committing suicide by overdosing on his home medications.      Past Medical History:   Diagnosis Date    Affective bipolar disorder (HCC) 2010    Anxiety     Bipolar affective (HCC)     CAD (coronary artery disease)     Chronic back pain     Class 2 obesity due to excess calories with body mass index (BMI) of 39.0 to 39.9 in adult 2022    COPD (chronic obstructive pulmonary disease) (HCC)     DDD (degenerative disc disease), cervical     Depression     Fatty liver 2021    GERD (gastroesophageal reflux disease)     Hyperlipidemia     Hypertension     MI (myocardial infarction) (HCC)     Pancreatitis     Recovering alcoholic (HCC)     Tobacco abuse 2016     Past Surgical History:   Procedure Laterality Date    CORONARY ANGIOPLASTY WITH STENT PLACEMENT  , ,     [facility]         DATE ONSET: 2024    Date of Evaluation: 2024   Evaluating Therapist: Minal Patel    Chart Reviewed: : [x] Yes [] No     Pain: The patient reported pain in his left leg    RN Ok'd SLP Entry: 
Bedside Mobility Assessment Tool (BMAT):     Assessment Level 1- Sit and Shake    1. From a semi-reclined position, ask patient to sit up and rotate to a seated position at the side of the bed. Can use the bedrail.    2. Ask patient to reach out and grab your hand and shake making sure patient reaches across his/her midline.   Pass- Patient is able to come to a seated position, maintain core strength. Maintains seated balance while reaching across midline. Move on to Assessment Level 2.     Assessment Level 2- Stretch and Point   1. With patient in seated position at the side of the bed, have patient place both feet on the floor (or stool) with knees no higher than hips.    2. Ask patient to stretch one leg and straighten the knee, then bend the ankle/flex and point the toes. If appropriate, repeat with the other leg.   Pass- Patient is able to demonstrate appropriate quad strength on intended weight bearing limb(s). Move onto Assessment Level 3.     Assessment Level 3- Stand   1. Ask patient to elevate off the bed or chair (seated to standing) using an assistive device (cane, bedrail).    2. Patient should be able to raise buttocks off be and hold for a count of five. May repeat once.   Pass- Patient maintains standing stability for at least 5 seconds, proceed to assessment level 4.    Assessment Level 4- Walk   1. Ask patient to march in place at bedside.    2. Then ask patient to advance step and return each foot. Some medical conditions may render a patient from stepping backwards, use your best clinical judgement.   Pass- Patient demonstrates balance while shifting weight and ability to step, takes independent steps, does not use assistive device patient is MOBILITY LEVEL 4.      Mobility Level- 4    
GI consult called to Dr. Mix on 11/5/24. Spoke to Lorri Mak  
Nephrology consult called to Dr. Lazar on 11/5/24. Spoke to Sincere Mak  
Patient has some sort of anxiety issue that prevents him from doing daily activities. Patient stated that he is not ambulatory and unable to walk on his left leg. Once patient got to the unit he walked to the bathroom once distracted.  
Patient showing signs of anxiety and would not take other medication without taking ativan first. Patient very anxious that he can not swallow. Speech evaluation completed showing no physical signs of why.  
Pharmacy Medication History Note     List of current medications patient is taking is complete.     Source of information:  RX navigator    Medications notes: added  1.buspar 10mg bid  2. Vraylar 4.5mg daily  3. Vit D 50,000 units qweek  4. Hydroxyzine HCL 25mg qid  5. Loratadine 10mg daily  6. Lorazempam 0.5mg bid  7. Mobic 15mg daily  8. Protonix 40mg bid- both prilosec and protonix filled in OCT 2024  9. Trazodone 50mg Hs  10. No recent refills of Imdur 30mg daily    Susan Torres Pharm D 11/5/20242:06 PM  .      
Provided pt with discharge instruction. Questions answered. IV removed with no complications. No new concerns at this time. Pt transported to [facility] by wheel chair. Vitals stable.     Vitals:    11/06/24 1315   BP: 116/72   Pulse: 78   Resp: 16   Temp: 98 °F (36.7 °C)   SpO2: 97%      
Psych consult called to Gabrielle Hoffmann on 11/5/24, spoke to Marycruz.    Amie Mak  
Pt in bed during morning assessment. Pt is alert and oriented. Appears anxious and requesting morning dose of Ativan. He is also asking when the physiatrist will be in because he does not to be in suicide watch anymore and want to have his clothes and phone  back. Explained to pt that he had to wait for physiatry to see him before he could have his phone  or clothes back  Pt only accepting of some morning medication scheduled. Documented on MAR medications given and not given.Pt states his girlfriend will be in at 1800 with his medications he takes at home to get this organized. Pt states he does not have suicidal ideations at this time. Sitter at bedside.     Bedside Mobility Assessment Tool (BMAT):     Assessment Level 1- Sit and Shake    1. From a semi-reclined position, ask patient to sit up and rotate to a seated position at the side of the bed. Can use the bedrail.    2. Ask patient to reach out and grab your hand and shake making sure patient reaches across his/her midline.   Pass- Patient is able to come to a seated position, maintain core strength. Maintains seated balance while reaching across midline. Move on to Assessment Level 2.     Assessment Level 2- Stretch and Point   1. With patient in seated position at the side of the bed, have patient place both feet on the floor (or stool) with knees no higher than hips.    2. Ask patient to stretch one leg and straighten the knee, then bend the ankle/flex and point the toes. If appropriate, repeat with the other leg.   Pass- Patient is able to demonstrate appropriate quad strength on intended weight bearing limb(s). Move onto Assessment Level 3.     Assessment Level 3- Stand   1. Ask patient to elevate off the bed or chair (seated to standing) using an assistive device (cane, bedrail).    2. Patient should be able to raise buttocks off be and hold for a count of five. May repeat once.   Pass- Patient maintains standing stability for at least 5 
RT Inhaler-Nebulizer Bronchodilator Protocol Note    There is a bronchodilator order in the chart from a provider indicating to follow the RT Bronchodilator Protocol and there is an “Initiate RT Inhaler-Nebulizer Bronchodilator Protocol” order as well (see protocol at bottom of note).    CXR Findings:  XR CHEST PORTABLE    Result Date: 11/5/2024  No active cardiopulmonary disease.       The findings from the last RT Protocol Assessment were as follows:   History Pulmonary Disease: (P) Chronic pulmonary disease  Respiratory Pattern: (P) Regular pattern and RR 12-20 bpm  Breath Sounds: (P) Clear breath sounds  Cough: (P) Strong, spontaneous, non-productive  Indication for Bronchodilator Therapy: (P) None  Bronchodilator Assessment Score: (P) 2    Aerosolized bronchodilator medication orders have been revised according to the RT Inhaler-Nebulizer Bronchodilator Protocol below.    Respiratory Therapist to perform RT Therapy Protocol Assessment initially then follow the protocol.  Repeat RT Therapy Protocol Assessment PRN for score 0-3 or on second treatment, BID, and PRN for scores above 3.    No Indications - adjust the frequency to every 6 hours PRN wheezing or bronchospasm, if no treatments needed after 48 hours then discontinue using Per Protocol order mode.     If indication present, adjust the RT bronchodilator orders based on the Bronchodilator Assessment Score as indicated below.  Use Inhaler orders unless patient has one or more of the following: on home nebulizer, not able to hold breath for 10 seconds, is not alert and oriented, cannot activate and use MDI correctly, or respiratory rate 25 breaths per minute or more, then use the equivalent nebulizer order(s) with same Frequency and PRN reasons based on the score.  If a patient is on this medication at home then do not decrease Frequency below that used at home.    0-3 - enter or revise RT bronchodilator order(s) to equivalent RT Bronchodilator order with 
See admission head to toe; a/o; pleasant.  States that he is no longer suicidal because the ativan he received earlier has helped.  Refusing PM meds stating that he no longer takes them.  Sig other will be bringing patients med bottles in tomorrow.  Sitter at bedside; call light in reach.    
Shift report given to ARLEY Valdes  
Shift report received from ARLEY Jerez  
comfort with this technique.    Gait gait completed as indicated below  Distance:      50 ft  Deviations (firm surface/linoleum):  decreased purvi, forward flexed posture, and decreased step length bilaterally, mixed 2point and 3 point gait pattern with RW.  Assistive Device Used:    gait belt and rolling walker (RW)  Level of Assist:    CGA   Comment: Initial verbal cues for sequencing RW with left hip/leg to improve pain. Slow movement overall.    Stair Training stairs completed as indicated below  # of Steps:   1  Level of Assist:  CGA   UE Support:  NA  Assistive Device:  RW  Pattern:   non-reciprocal pattern  Comments: Pt able to complete single step with RW over step. Verbal cues for sequencing LE.     Therapeutic Exercises Initiated  deferred secondary to treatment focus on functional mobility    Positioning Needs   Pt in bed and alarm set  Call light provided and all needs within reach  RN aware of pt position/status  1:1 sitter present    Other Activities  Refer to OT note.    Patient/Family Education   Pt educated on role of inpatient PT, POC, importance of continued activity, DC recommendations, functional transfer/mobility safety, transfer techniques, and calling for assist with mobility. Pt education on sequencing and use of RW, sequencing stairs with LE's    Assessment  Pt seen today for physical therapy Evaluation & Treatment. Pt demonstrated decreased Activity tolerance, Balance, Safety, and Strength as well as decreased independence with Ambulation and Transfers.     Pt presents close to his baseline function. Pt reported improved pain and mobility with use of his RW. VC required for sequencing LE on stairs with ascending with strong R leg and descending with painful L leg. Pt appeared motivated to improve. Overall he requried SBA to CGA with RW for mobility. Pt's biggest complaint is the left leg pain from knee to hip. Pt would benefit from continued PT to address the noted deficits in order to 
importance of continued activity, DC recommendations, functional transfer/mobility safety, pursed lip breathing, and calling for assist with mobility    CHF Education  N/A    Assessment:  Pt seen for occupational therapy evaluation in the acute care setting this date.  Pt demonstrated decreased Activity tolerance, ADLs, IADLs, Bed mobility, Transfers, and Coping Skills. Pt functioning below baseline and will likely benefit from skilled occupational therapy services to maximize safety and independence.     Recommending Home 24 hr initial assist and with home OT upon discharge as patient functioning below baseline level    Goal(s) :   To be met in 3 Visits:  Bed to toilet/BSC:       Supervision  Pt will verbalize 2 coping skills       To be met in 5 Visits:  Supine to/from Sit in preparation for ADL task:   Independent  Toileting        Modified Independent  Grooming       Independent  Upper Body Dressing:      Independent  Lower Body Dressing:      Modified Independent  Pt to demonstrate UE therapeutic exs x 15 reps with minimal cues    Rehabilitation Potential: Good  Strengths for achieving goals include: Pt motivated, Family Support, and Pt cooperative   Barriers to achieving goals include:  Complexity of condition, Pain, and Weakness    Plan:  To be seen 3-5 x/ week while in acute care setting for therapeutic exercises/activities, bed mobility training, functional transfer training, family/patient education, ADL/IADL retraining, and energy conservation retraining.    Electronically signed by Mary Ware OT on 11/5/2024 at 2:54 PM      If patient discharges from this facility prior to next visit, this note will serve as the Discharge Summary

## 2024-11-06 NOTE — DISCHARGE SUMMARY
Physician Discharge Summary     Patient ID:  Kam Nguyen  [id number]  59 y.o.  1965    Admit date: 11/4/2024    Discharge date and time: No discharge date for patient encounter.     Admitting Physician: Edwardo Beahc DO     Discharge Physician: Endy Castaneda MD    Admission Diagnoses: Suicidal ideation [R45.851]  Hypomagnesemia [E83.42]  Hyponatremia [E87.1]    Discharge Diagnoses: Principal Problem:    Hyponatremia  Active Problems:    Coronary artery disease due to lipid rich plaque    Suicidal ideation    Hypomagnesemia    Anxiety    Dysphagia  Resolved Problems:    * No resolved hospital problems. *      Admission Condition: {condition:43267}    Discharged Condition: {condition:64527}    Indication for Admission: ***  sent Illness:       The patient is a 59 y.o. male with pmhx of anxiety, HTN, CAD, HLD, COPD, fatty liver disease and stable angina who presented to [facility] ED with complaint of suicidal ideation. States that he ran out of his Ativan and is having increased anxiety which led to suicidal ideation on 11/04/2024. He denies acting on thoughts. Pt shares that he is unable to take most of his medications due to his dysphagia. He recently had an EGD done that found candida esophagitis and was treated with fluconazole. However, he is still having an aversion to eating, drinking or swallowing his pills. He has lost almost 80 lbs, unintentionally unsure time frame. Denies any nausea, vomiting, abdominal pain, diarrhea, black or bloody stools.      Pt shares that he was having a dull chest pain during the exam, but states it was due to his increased anxiety. Denies any shortness of breath, productive cough, fever, chills, recent illnesses, dizziness or headaches.      Of note pt had a fall about a week ago and endorses left leg/hip pain. He shares that most of his pain is in his left knee and when he weight bears it shoots up into his hip. He has to get around with a walker at home. States

## 2024-11-06 NOTE — PLAN OF CARE
Problem: Discharge Planning  Goal: Discharge to home or other facility with appropriate resources  11/6/2024 0925 by Keisha Hunter RN  Outcome: Progressing  Flowsheets (Taken 11/6/2024 0925)  Discharge to home or other facility with appropriate resources:   Identify barriers to discharge with patient and caregiver   Arrange for needed discharge resources and transportation as appropriate   Identify discharge learning needs (meds, wound care, etc)  11/6/2024 0758 by Solitario Ruiz RN  Outcome: Progressing     Problem: Pain  Goal: Verbalizes/displays adequate comfort level or baseline comfort level  11/6/2024 0925 by Keisha Hunter RN  Outcome: Progressing  Flowsheets (Taken 11/6/2024 0925)  Verbalizes/displays adequate comfort level or baseline comfort level:   Encourage patient to monitor pain and request assistance   Assess pain using appropriate pain scale   Administer analgesics based on type and severity of pain and evaluate response   Implement non-pharmacological measures as appropriate and evaluate response  11/6/2024 0758 by Solitario Ruiz RN  Outcome: Progressing     Problem: ABCDS Injury Assessment  Goal: Absence of physical injury  11/6/2024 0925 by Keisha Hunter RN  Outcome: Progressing  Flowsheets (Taken 11/6/2024 0925)  Absence of Physical Injury: Implement safety measures based on patient assessment  11/6/2024 0758 by Solitario Ruiz RN  Outcome: Progressing     Problem: Risk for Elopement  Goal: Patient will not exit the unit/facility without proper excort  11/6/2024 0925 by Keisha Hunter RN  Outcome: Progressing  Flowsheets (Taken 11/6/2024 0925)  Nursing Interventions for Elopement Risk:   Assist with personal care needs such as toileting, eating, dressing, as needed to reduce the risk of wandering   Collaborate with family members/caregivers to mitigate the elopement risk   Collaborate with treatment team for drug withdrawal symptoms treatment   Collaborate with treatment team

## 2024-11-06 NOTE — CARE COORDINATION
Met with pt at bedside. Pt agreeable to [facility]. No preference. Referral called to [facility]. Spoke with Faviola. Unable to accept due to insurance. '    1040- referral called to [facility]. Spoke with Renuka. Able to accept pt for SN, PT, OT.    1437- pt to DC home with [facility]. Called and udpated Jazlyn at [facility]. Agency to call to schedule pt SOC for tmrw. Family to transport pt home. Walker and BSC given to pt as ordered. Paper filled out and filed. No further DC or DME needs identified.     IMM- na    O2- 97% RA

## 2024-11-06 NOTE — DISCHARGE INSTRUCTIONS
Your information:  Name: Kam Nguyen  : 1965    Your instructions:    Follow up with your PCP with in one week.    Follow up with [facility] for psychiatry with in one week.     Follow up with Gastroenterology with one week.        What to do after you leave the hospital:    Recommended diet: regular diet    Recommended activity: activity as tolerated        The following personal items were collected during your admission and were returned to you:    Belongings  Dental Appliances: None  Vision - Corrective Lenses: Eyeglasses  Hearing Aid: None  Clothing: Pants, Shirt, Socks  Jewelry: Necklace  Body Piercings Removed: N/A  Electronic Devices: Cell Phone  Weapons (Notify Protective Services/Security): None  Home Medications: Sent home  Valuables Given To: Patient  Provide Name(s) of Who Valuable(s) Were Given To: Patient  Patient approves for provider to speak to responsible person post operatively: Yes    Information obtained by:  By signing below, I understand that if any problems occur once I leave the hospital I am to contact PCP.  I understand and acknowledge receipt of the instructions indicated above.

## 2024-11-12 ENCOUNTER — APPOINTMENT (OUTPATIENT)
Dept: GENERAL RADIOLOGY | Age: 59
End: 2024-11-12
Payer: COMMERCIAL

## 2024-11-12 ENCOUNTER — HOSPITAL ENCOUNTER (EMERGENCY)
Age: 59
Discharge: HOME | End: 2024-11-12
Attending: EMERGENCY MEDICINE
Payer: COMMERCIAL

## 2024-11-12 VITALS
TEMPERATURE: 97.7 F | DIASTOLIC BLOOD PRESSURE: 77 MMHG | BODY MASS INDEX: 25.73 KG/M2 | RESPIRATION RATE: 18 BRPM | HEIGHT: 72 IN | OXYGEN SATURATION: 99 % | HEART RATE: 67 BPM | WEIGHT: 190 LBS | SYSTOLIC BLOOD PRESSURE: 132 MMHG

## 2024-11-12 DIAGNOSIS — M25.562 ACUTE PAIN OF LEFT KNEE: Primary | ICD-10-CM

## 2024-11-12 PROCEDURE — 73562 X-RAY EXAM OF KNEE 3: CPT

## 2024-11-12 PROCEDURE — 99283 EMERGENCY DEPT VISIT LOW MDM: CPT

## 2024-11-12 PROCEDURE — 6370000000 HC RX 637 (ALT 250 FOR IP): Performed by: EMERGENCY MEDICINE

## 2024-11-12 RX ORDER — HYDROCODONE BITARTRATE AND ACETAMINOPHEN 5; 325 MG/1; MG/1
1 TABLET ORAL EVERY 8 HOURS PRN
Qty: 9 TABLET | Refills: 0 | Status: SHIPPED | OUTPATIENT
Start: 2024-11-12 | End: 2024-11-15

## 2024-11-12 RX ORDER — HYDROCODONE BITARTRATE AND ACETAMINOPHEN 5; 325 MG/1; MG/1
1 TABLET ORAL
Status: COMPLETED | OUTPATIENT
Start: 2024-11-12 | End: 2024-11-12

## 2024-11-12 RX ADMIN — HYDROCODONE BITARTRATE AND ACETAMINOPHEN 1 TABLET: 5; 325 TABLET ORAL at 15:19

## 2024-11-12 ASSESSMENT — PAIN DESCRIPTION - LOCATION
LOCATION: KNEE
LOCATION: KNEE

## 2024-11-12 ASSESSMENT — PAIN DESCRIPTION - ORIENTATION
ORIENTATION: LEFT
ORIENTATION: RIGHT

## 2024-11-12 ASSESSMENT — PAIN DESCRIPTION - ONSET: ONSET: PROGRESSIVE

## 2024-11-12 ASSESSMENT — PAIN DESCRIPTION - FREQUENCY: FREQUENCY: CONTINUOUS

## 2024-11-12 ASSESSMENT — PAIN DESCRIPTION - PAIN TYPE: TYPE: ACUTE PAIN

## 2024-11-12 ASSESSMENT — PAIN - FUNCTIONAL ASSESSMENT: PAIN_FUNCTIONAL_ASSESSMENT: 0-10

## 2024-11-12 ASSESSMENT — PAIN SCALES - GENERAL
PAINLEVEL_OUTOF10: 8
PAINLEVEL_OUTOF10: 7

## 2024-11-12 NOTE — DISCHARGE INSTRUCTIONS
Please return the emergency department with any new or worsening symptoms including but not limited to: Worsening pain, numbness or weakness of affected extremity.  Please apply ice to affected area for up to 15 minutes at a time to reduce swelling.  Please elevate to reduce swelling.  Please use knee brace at home if it helps symptoms.  A referral has been provided to orthopedics.  Please contact them at the number provided above within the next 1 week to schedule an appointment.  Please use caution when taking prescribed pain medication as it can be sedating.

## 2024-11-12 NOTE — ED NOTES
Discharge instructions reviewed with patient and family and they verbalize understanding. Patient wheeled to private car via wheelchair by family member.

## 2024-11-18 DIAGNOSIS — I10 PRIMARY HYPERTENSION: ICD-10-CM

## 2024-11-18 DIAGNOSIS — I25.10 CORONARY ARTERY DISEASE INVOLVING NATIVE CORONARY ARTERY OF NATIVE HEART WITHOUT ANGINA PECTORIS: ICD-10-CM

## 2024-11-18 RX ORDER — METOPROLOL SUCCINATE 100 MG/1
TABLET, EXTENDED RELEASE ORAL
Qty: 90 TABLET | Refills: 1 | Status: ON HOLD | OUTPATIENT
Start: 2024-11-18

## 2024-11-18 NOTE — TELEPHONE ENCOUNTER
Last Office Visit: 5/18/2023 Provider: MICHELET  Is provider OOT? No    Next Office Visit: 1/16/2025 Provider: MICHELET    LAST LABS:   BMP:   Lab Results   Component Value Date/Time     11/06/2024 05:48 AM    K 3.3 11/06/2024 05:48 AM    CL 97 11/06/2024 05:48 AM    CO2 23 11/06/2024 05:48 AM    BUN <2 11/06/2024 05:48 AM    CREATININE 0.4 11/06/2024 05:48 AM    GLUCOSE 96 11/06/2024 05:48 AM    CALCIUM 8.3 11/06/2024 05:48 AM    LABGLOM >90 11/06/2024 05:48 AM    LABGLOM >60 12/01/2023 04:00 PM     EKG:   Encounter Date: 11/04/24   EKG 12 Lead   Result Value    Ventricular Rate 85    Atrial Rate 84    P-R Interval 166    QRS Duration 82    Q-T Interval 390    QTc Calculation (Bazett) 464    P Axis 90    R Axis -1    T Axis 27    Diagnosis      Normal sinus rhythmPACBaseline artifactNonspecific ST abnormalityAbnormal ECGWhen compared with ECG of 06-OCT-2024 14:30,artifact newConfirmed by MARY CAO MD ([id number]) on 11/5/2024 7:43:21 AM     Is encounter provider correct?   Yes  Does refill dosage match last filled?   Yes  Changes to script from Tele Encounters or LOV Plan?   no  Did you adjust dispensed amount or refills to reflect last and upcoming OV?  No    Requested Prescriptions     Pending Prescriptions Disp Refills    metoprolol succinate (TOPROL XL) 100 MG extended release tablet [Pharmacy Med Name: METOPROLOL SUCCINATE ER 100MG ER TABLET ER 24HR] 90 tablet 1     Sig: TAKE ONE (1) TABLET ONCE DAILY

## 2024-11-19 ENCOUNTER — HOSPITAL ENCOUNTER (INPATIENT)
Age: 59
LOS: 6 days | Discharge: ANOTHER HOSPITAL | DRG: 426 | End: 2024-11-25
Attending: EMERGENCY MEDICINE | Admitting: INTERNAL MEDICINE
Payer: COMMERCIAL

## 2024-11-19 ENCOUNTER — APPOINTMENT (OUTPATIENT)
Dept: GENERAL RADIOLOGY | Age: 59
DRG: 426 | End: 2024-11-19
Payer: COMMERCIAL

## 2024-11-19 DIAGNOSIS — I25.10 CORONARY ARTERY DISEASE INVOLVING NATIVE CORONARY ARTERY OF NATIVE HEART, UNSPECIFIED WHETHER ANGINA PRESENT: ICD-10-CM

## 2024-11-19 DIAGNOSIS — R07.9 CHEST PAIN, UNSPECIFIED TYPE: Primary | ICD-10-CM

## 2024-11-19 DIAGNOSIS — E87.1 HYPONATREMIA: ICD-10-CM

## 2024-11-19 PROBLEM — J44.9 STAGE 3 SEVERE COPD BY GOLD CLASSIFICATION (HCC): Status: ACTIVE | Noted: 2024-11-19

## 2024-11-19 PROBLEM — R91.8 PULMONARY NODULES: Status: ACTIVE | Noted: 2024-11-19

## 2024-11-19 LAB
ALBUMIN SERPL-MCNC: 2.8 G/DL (ref 3.4–5)
ALBUMIN/GLOB SERPL: 0.7 {RATIO} (ref 1.1–2.2)
ALP SERPL-CCNC: 81 U/L (ref 40–129)
ALT SERPL-CCNC: 8 U/L (ref 10–40)
ANION GAP SERPL CALCULATED.3IONS-SCNC: 12 MMOL/L (ref 3–16)
ANION GAP SERPL CALCULATED.3IONS-SCNC: 14 MMOL/L (ref 3–16)
ANION GAP SERPL CALCULATED.3IONS-SCNC: 14 MMOL/L (ref 3–16)
ANISOCYTOSIS BLD QL SMEAR: ABNORMAL
AST SERPL-CCNC: 13 U/L (ref 15–37)
BASOPHILS # BLD: 0 K/UL (ref 0–0.2)
BASOPHILS NFR BLD: 0 %
BILIRUB SERPL-MCNC: 0.6 MG/DL (ref 0–1)
BILIRUB UR QL STRIP.AUTO: NEGATIVE
BUN SERPL-MCNC: 3 MG/DL (ref 7–20)
BUN SERPL-MCNC: 3 MG/DL (ref 7–20)
BUN SERPL-MCNC: 4 MG/DL (ref 7–20)
CALCIUM SERPL-MCNC: 7.7 MG/DL (ref 8.3–10.6)
CALCIUM SERPL-MCNC: 7.7 MG/DL (ref 8.3–10.6)
CALCIUM SERPL-MCNC: 7.9 MG/DL (ref 8.3–10.6)
CHLORIDE SERPL-SCNC: 80 MMOL/L (ref 99–110)
CHLORIDE SERPL-SCNC: 80 MMOL/L (ref 99–110)
CHLORIDE SERPL-SCNC: 85 MMOL/L (ref 99–110)
CHLORIDE UR-SCNC: <20 MMOL/L
CLARITY UR: CLEAR
CO2 SERPL-SCNC: 21 MMOL/L (ref 21–32)
CO2 SERPL-SCNC: 21 MMOL/L (ref 21–32)
CO2 SERPL-SCNC: 23 MMOL/L (ref 21–32)
COLOR UR: YELLOW
CREAT SERPL-MCNC: 0.3 MG/DL (ref 0.9–1.3)
DEPRECATED RDW RBC AUTO: 15.4 % (ref 12.4–15.4)
EKG ATRIAL RATE: 106 BPM
EKG DIAGNOSIS: NORMAL
EKG P AXIS: 63 DEGREES
EKG P-R INTERVAL: 166 MS
EKG Q-T INTERVAL: 354 MS
EKG QRS DURATION: 86 MS
EKG QTC CALCULATION (BAZETT): 470 MS
EKG R AXIS: 36 DEGREES
EKG T AXIS: 46 DEGREES
EKG VENTRICULAR RATE: 106 BPM
EOSINOPHIL # BLD: 0 K/UL (ref 0–0.6)
EOSINOPHIL NFR BLD: 0 %
FLUAV RNA RESP QL NAA+PROBE: NOT DETECTED
FLUBV RNA RESP QL NAA+PROBE: NOT DETECTED
GFR SERPLBLD CREATININE-BSD FMLA CKD-EPI: >90 ML/MIN/{1.73_M2}
GLUCOSE SERPL-MCNC: 82 MG/DL (ref 70–99)
GLUCOSE SERPL-MCNC: 87 MG/DL (ref 70–99)
GLUCOSE SERPL-MCNC: 95 MG/DL (ref 70–99)
GLUCOSE UR STRIP.AUTO-MCNC: NEGATIVE MG/DL
HCT VFR BLD AUTO: 33.8 % (ref 40.5–52.5)
HGB BLD-MCNC: 11.6 G/DL (ref 13.5–17.5)
HGB UR QL STRIP.AUTO: NEGATIVE
KETONES UR STRIP.AUTO-MCNC: 40 MG/DL
LACTATE BLDV-SCNC: 0.8 MMOL/L (ref 0.4–1.9)
LEUKOCYTE ESTERASE UR QL STRIP.AUTO: NEGATIVE
LYMPHOCYTES # BLD: 1 K/UL (ref 1–5.1)
LYMPHOCYTES NFR BLD: 6 %
MAGNESIUM SERPL-MCNC: 1.32 MG/DL (ref 1.8–2.4)
MAGNESIUM SERPL-MCNC: 2.07 MG/DL (ref 1.8–2.4)
MCH RBC QN AUTO: 27.7 PG (ref 26–34)
MCHC RBC AUTO-ENTMCNC: 34.5 G/DL (ref 31–36)
MCV RBC AUTO: 80.5 FL (ref 80–100)
MONOCYTES # BLD: 1 K/UL (ref 0–1.3)
MONOCYTES NFR BLD: 6 %
NEUTROPHILS # BLD: 14.8 K/UL (ref 1.7–7.7)
NEUTROPHILS NFR BLD: 86 %
NEUTS BAND NFR BLD MANUAL: 2 % (ref 0–7)
NITRITE UR QL STRIP.AUTO: NEGATIVE
NT-PROBNP SERPL-MCNC: 907 PG/ML (ref 0–124)
PH UR STRIP.AUTO: 7 [PH] (ref 5–8)
PLATELET # BLD AUTO: 297 K/UL (ref 135–450)
PLATELET BLD QL SMEAR: ADEQUATE
PMV BLD AUTO: 7.9 FL (ref 5–10.5)
POIKILOCYTOSIS BLD QL SMEAR: ABNORMAL
POTASSIUM SERPL-SCNC: 3 MMOL/L (ref 3.5–5.1)
POTASSIUM SERPL-SCNC: 3.5 MMOL/L (ref 3.5–5.1)
POTASSIUM SERPL-SCNC: 3.6 MMOL/L (ref 3.5–5.1)
POTASSIUM UR-SCNC: 21.7 MMOL/L
PROT SERPL-MCNC: 6.8 G/DL (ref 6.4–8.2)
PROT UR STRIP.AUTO-MCNC: NEGATIVE MG/DL
RBC # BLD AUTO: 4.2 M/UL (ref 4.2–5.9)
SARS-COV-2 RNA RESP QL NAA+PROBE: NOT DETECTED
SLIDE REVIEW: ABNORMAL
SODIUM SERPL-SCNC: 115 MMOL/L (ref 136–145)
SODIUM SERPL-SCNC: 115 MMOL/L (ref 136–145)
SODIUM SERPL-SCNC: 120 MMOL/L (ref 136–145)
SODIUM UR-SCNC: <20 MMOL/L
SODIUM UR-SCNC: <20 MMOL/L
SP GR UR STRIP.AUTO: 1.01 (ref 1–1.03)
TROPONIN, HIGH SENSITIVITY: 9 NG/L (ref 0–22)
TSH SERPL DL<=0.005 MIU/L-ACNC: 1.09 UIU/ML (ref 0.27–4.2)
UA COMPLETE W REFLEX CULTURE PNL UR: ABNORMAL
UA DIPSTICK W REFLEX MICRO PNL UR: ABNORMAL
URATE SERPL-MCNC: 1.6 MG/DL (ref 3.5–7.2)
URN SPEC COLLECT METH UR: ABNORMAL
UROBILINOGEN UR STRIP-ACNC: 1 E.U./DL
WBC # BLD AUTO: 16.8 K/UL (ref 4–11)

## 2024-11-19 PROCEDURE — 83605 ASSAY OF LACTIC ACID: CPT

## 2024-11-19 PROCEDURE — 83880 ASSAY OF NATRIURETIC PEPTIDE: CPT

## 2024-11-19 PROCEDURE — 94761 N-INVAS EAR/PLS OXIMETRY MLT: CPT

## 2024-11-19 PROCEDURE — 2580000003 HC RX 258: Performed by: NURSE PRACTITIONER

## 2024-11-19 PROCEDURE — 84484 ASSAY OF TROPONIN QUANT: CPT

## 2024-11-19 PROCEDURE — 2580000003 HC RX 258: Performed by: PHYSICIAN ASSISTANT

## 2024-11-19 PROCEDURE — 93005 ELECTROCARDIOGRAM TRACING: CPT | Performed by: PHYSICIAN ASSISTANT

## 2024-11-19 PROCEDURE — 87150 DNA/RNA AMPLIFIED PROBE: CPT

## 2024-11-19 PROCEDURE — 6370000000 HC RX 637 (ALT 250 FOR IP): Performed by: NURSE PRACTITIONER

## 2024-11-19 PROCEDURE — 6360000002 HC RX W HCPCS: Performed by: INTERNAL MEDICINE

## 2024-11-19 PROCEDURE — 99285 EMERGENCY DEPT VISIT HI MDM: CPT

## 2024-11-19 PROCEDURE — 87186 SC STD MICRODIL/AGAR DIL: CPT

## 2024-11-19 PROCEDURE — 85025 COMPLETE CBC W/AUTO DIFF WBC: CPT

## 2024-11-19 PROCEDURE — 2000000000 HC ICU R&B

## 2024-11-19 PROCEDURE — 82436 ASSAY OF URINE CHLORIDE: CPT

## 2024-11-19 PROCEDURE — 6370000000 HC RX 637 (ALT 250 FOR IP): Performed by: INTERNAL MEDICINE

## 2024-11-19 PROCEDURE — 99255 IP/OBS CONSLTJ NEW/EST HI 80: CPT | Performed by: INTERNAL MEDICINE

## 2024-11-19 PROCEDURE — 93010 ELECTROCARDIOGRAM REPORT: CPT | Performed by: INTERNAL MEDICINE

## 2024-11-19 PROCEDURE — 36415 COLL VENOUS BLD VENIPUNCTURE: CPT

## 2024-11-19 PROCEDURE — 83735 ASSAY OF MAGNESIUM: CPT

## 2024-11-19 PROCEDURE — 81003 URINALYSIS AUTO W/O SCOPE: CPT

## 2024-11-19 PROCEDURE — 84300 ASSAY OF URINE SODIUM: CPT

## 2024-11-19 PROCEDURE — 84550 ASSAY OF BLOOD/URIC ACID: CPT

## 2024-11-19 PROCEDURE — 84133 ASSAY OF URINE POTASSIUM: CPT

## 2024-11-19 PROCEDURE — 94669 MECHANICAL CHEST WALL OSCILL: CPT

## 2024-11-19 PROCEDURE — 94640 AIRWAY INHALATION TREATMENT: CPT

## 2024-11-19 PROCEDURE — 6360000002 HC RX W HCPCS: Performed by: NURSE PRACTITIONER

## 2024-11-19 PROCEDURE — 83935 ASSAY OF URINE OSMOLALITY: CPT

## 2024-11-19 PROCEDURE — 71045 X-RAY EXAM CHEST 1 VIEW: CPT

## 2024-11-19 PROCEDURE — 87636 SARSCOV2 & INF A&B AMP PRB: CPT

## 2024-11-19 PROCEDURE — 84443 ASSAY THYROID STIM HORMONE: CPT

## 2024-11-19 PROCEDURE — 87040 BLOOD CULTURE FOR BACTERIA: CPT

## 2024-11-19 PROCEDURE — 80053 COMPREHEN METABOLIC PANEL: CPT

## 2024-11-19 PROCEDURE — 99223 1ST HOSP IP/OBS HIGH 75: CPT | Performed by: INTERNAL MEDICINE

## 2024-11-19 RX ORDER — ONDANSETRON 2 MG/ML
4 INJECTION INTRAMUSCULAR; INTRAVENOUS EVERY 6 HOURS PRN
Status: DISCONTINUED | OUTPATIENT
Start: 2024-11-19 | End: 2024-11-25 | Stop reason: HOSPADM

## 2024-11-19 RX ORDER — ACETAMINOPHEN 325 MG/1
650 TABLET ORAL EVERY 6 HOURS PRN
Status: DISCONTINUED | OUTPATIENT
Start: 2024-11-19 | End: 2024-11-25 | Stop reason: HOSPADM

## 2024-11-19 RX ORDER — POLYETHYLENE GLYCOL 3350 17 G/17G
17 POWDER, FOR SOLUTION ORAL DAILY PRN
Status: DISCONTINUED | OUTPATIENT
Start: 2024-11-19 | End: 2024-11-25 | Stop reason: HOSPADM

## 2024-11-19 RX ORDER — SODIUM CHLORIDE 9 MG/ML
1000 INJECTION, SOLUTION INTRAVENOUS CONTINUOUS
Status: DISCONTINUED | OUTPATIENT
Start: 2024-11-19 | End: 2024-11-19

## 2024-11-19 RX ORDER — MUPIROCIN 20 MG/G
OINTMENT TOPICAL 2 TIMES DAILY
Status: COMPLETED | OUTPATIENT
Start: 2024-11-19 | End: 2024-11-24

## 2024-11-19 RX ORDER — POTASSIUM CHLORIDE 7.45 MG/ML
10 INJECTION INTRAVENOUS PRN
Status: DISCONTINUED | OUTPATIENT
Start: 2024-11-19 | End: 2024-11-25 | Stop reason: HOSPADM

## 2024-11-19 RX ORDER — TRAZODONE HYDROCHLORIDE 50 MG/1
50 TABLET, FILM COATED ORAL NIGHTLY
Status: DISCONTINUED | OUTPATIENT
Start: 2024-11-19 | End: 2024-11-25 | Stop reason: HOSPADM

## 2024-11-19 RX ORDER — ACETAMINOPHEN 650 MG/1
650 SUPPOSITORY RECTAL EVERY 6 HOURS PRN
Status: DISCONTINUED | OUTPATIENT
Start: 2024-11-19 | End: 2024-11-25 | Stop reason: HOSPADM

## 2024-11-19 RX ORDER — ATORVASTATIN CALCIUM 40 MG/1
40 TABLET, FILM COATED ORAL NIGHTLY
Status: DISCONTINUED | OUTPATIENT
Start: 2024-11-19 | End: 2024-11-19

## 2024-11-19 RX ORDER — NICOTINE 21 MG/24HR
1 PATCH, TRANSDERMAL 24 HOURS TRANSDERMAL DAILY
Status: DISCONTINUED | OUTPATIENT
Start: 2024-11-19 | End: 2024-11-25 | Stop reason: HOSPADM

## 2024-11-19 RX ORDER — SODIUM CHLORIDE 9 MG/ML
INJECTION, SOLUTION INTRAVENOUS PRN
Status: DISCONTINUED | OUTPATIENT
Start: 2024-11-19 | End: 2024-11-25 | Stop reason: HOSPADM

## 2024-11-19 RX ORDER — IPRATROPIUM BROMIDE AND ALBUTEROL SULFATE 2.5; .5 MG/3ML; MG/3ML
1 SOLUTION RESPIRATORY (INHALATION) 2 TIMES DAILY
Status: DISCONTINUED | OUTPATIENT
Start: 2024-11-19 | End: 2024-11-25 | Stop reason: HOSPADM

## 2024-11-19 RX ORDER — LORAZEPAM 2 MG/ML
0.5 INJECTION INTRAMUSCULAR 2 TIMES DAILY PRN
Status: DISCONTINUED | OUTPATIENT
Start: 2024-11-19 | End: 2024-11-25 | Stop reason: HOSPADM

## 2024-11-19 RX ORDER — SODIUM CHLORIDE 0.9 % (FLUSH) 0.9 %
5-40 SYRINGE (ML) INJECTION PRN
Status: DISCONTINUED | OUTPATIENT
Start: 2024-11-19 | End: 2024-11-25 | Stop reason: HOSPADM

## 2024-11-19 RX ORDER — MAGNESIUM SULFATE IN WATER 40 MG/ML
2000 INJECTION, SOLUTION INTRAVENOUS PRN
Status: DISCONTINUED | OUTPATIENT
Start: 2024-11-19 | End: 2024-11-25 | Stop reason: HOSPADM

## 2024-11-19 RX ORDER — SODIUM CHLORIDE 9 MG/ML
1000 INJECTION, SOLUTION INTRAVENOUS CONTINUOUS
Status: DISCONTINUED | OUTPATIENT
Start: 2024-11-19 | End: 2024-11-20

## 2024-11-19 RX ORDER — ONDANSETRON 4 MG/1
4 TABLET, ORALLY DISINTEGRATING ORAL EVERY 8 HOURS PRN
Status: DISCONTINUED | OUTPATIENT
Start: 2024-11-19 | End: 2024-11-25 | Stop reason: HOSPADM

## 2024-11-19 RX ORDER — POTASSIUM CHLORIDE 29.8 MG/ML
20 INJECTION INTRAVENOUS PRN
Status: DISCONTINUED | OUTPATIENT
Start: 2024-11-19 | End: 2024-11-25 | Stop reason: HOSPADM

## 2024-11-19 RX ORDER — PANTOPRAZOLE SODIUM 40 MG/1
40 TABLET, DELAYED RELEASE ORAL
Status: DISCONTINUED | OUTPATIENT
Start: 2024-11-20 | End: 2024-11-25 | Stop reason: HOSPADM

## 2024-11-19 RX ORDER — METOPROLOL SUCCINATE 25 MG/1
25 TABLET, EXTENDED RELEASE ORAL DAILY
Status: DISCONTINUED | OUTPATIENT
Start: 2024-11-19 | End: 2024-11-21

## 2024-11-19 RX ORDER — ASPIRIN 81 MG/1
81 TABLET, CHEWABLE ORAL DAILY
Status: DISCONTINUED | OUTPATIENT
Start: 2024-11-19 | End: 2024-11-23

## 2024-11-19 RX ORDER — ATORVASTATIN CALCIUM 40 MG/1
40 TABLET, FILM COATED ORAL DAILY
Status: DISCONTINUED | OUTPATIENT
Start: 2024-11-19 | End: 2024-11-25 | Stop reason: HOSPADM

## 2024-11-19 RX ORDER — GUAIFENESIN 600 MG/1
600 TABLET, EXTENDED RELEASE ORAL 2 TIMES DAILY
Status: DISCONTINUED | OUTPATIENT
Start: 2024-11-19 | End: 2024-11-25 | Stop reason: HOSPADM

## 2024-11-19 RX ORDER — SODIUM CHLORIDE 0.9 % (FLUSH) 0.9 %
5-40 SYRINGE (ML) INJECTION EVERY 12 HOURS SCHEDULED
Status: DISCONTINUED | OUTPATIENT
Start: 2024-11-19 | End: 2024-11-25 | Stop reason: HOSPADM

## 2024-11-19 RX ORDER — ENOXAPARIN SODIUM 100 MG/ML
40 INJECTION SUBCUTANEOUS DAILY
Status: DISCONTINUED | OUTPATIENT
Start: 2024-11-19 | End: 2024-11-20

## 2024-11-19 RX ORDER — IPRATROPIUM BROMIDE AND ALBUTEROL SULFATE 2.5; .5 MG/3ML; MG/3ML
1 SOLUTION RESPIRATORY (INHALATION)
Status: DISCONTINUED | OUTPATIENT
Start: 2024-11-19 | End: 2024-11-19

## 2024-11-19 RX ADMIN — IPRATROPIUM BROMIDE AND ALBUTEROL SULFATE 1 DOSE: 2.5; .5 SOLUTION RESPIRATORY (INHALATION) at 19:25

## 2024-11-19 RX ADMIN — METOPROLOL SUCCINATE 25 MG: 25 TABLET, EXTENDED RELEASE ORAL at 17:00

## 2024-11-19 RX ADMIN — LORAZEPAM 0.5 MG: 2 INJECTION, SOLUTION INTRAMUSCULAR; INTRAVENOUS at 19:52

## 2024-11-19 RX ADMIN — MUPIROCIN: 20 OINTMENT TOPICAL at 19:52

## 2024-11-19 RX ADMIN — ENOXAPARIN SODIUM 40 MG: 100 INJECTION SUBCUTANEOUS at 14:58

## 2024-11-19 RX ADMIN — ASPIRIN 81 MG: 81 TABLET, CHEWABLE ORAL at 17:00

## 2024-11-19 RX ADMIN — SODIUM CHLORIDE 1000 ML: 9 INJECTION, SOLUTION INTRAVENOUS at 11:57

## 2024-11-19 RX ADMIN — SODIUM CHLORIDE, PRESERVATIVE FREE 10 ML: 5 INJECTION INTRAVENOUS at 19:52

## 2024-11-19 RX ADMIN — SODIUM CHLORIDE 1000 ML: 9 INJECTION, SOLUTION INTRAVENOUS at 14:49

## 2024-11-19 RX ADMIN — MAGNESIUM SULFATE HEPTAHYDRATE 2000 MG: 40 INJECTION, SOLUTION INTRAVENOUS at 18:56

## 2024-11-19 RX ADMIN — GUAIFENESIN 600 MG: 600 TABLET, EXTENDED RELEASE ORAL at 19:52

## 2024-11-19 RX ADMIN — MAGNESIUM SULFATE HEPTAHYDRATE 2000 MG: 40 INJECTION, SOLUTION INTRAVENOUS at 22:40

## 2024-11-19 RX ADMIN — ATORVASTATIN CALCIUM 40 MG: 40 TABLET, FILM COATED ORAL at 17:00

## 2024-11-19 ASSESSMENT — PAIN DESCRIPTION - PAIN TYPE: TYPE: ACUTE PAIN;CHRONIC PAIN

## 2024-11-19 ASSESSMENT — PAIN DESCRIPTION - ORIENTATION
ORIENTATION: LEFT
ORIENTATION: LEFT

## 2024-11-19 ASSESSMENT — PAIN DESCRIPTION - LOCATION
LOCATION: LEG
LOCATION: CHEST
LOCATION: LEG

## 2024-11-19 ASSESSMENT — PAIN SCALES - GENERAL
PAINLEVEL_OUTOF10: 5
PAINLEVEL_OUTOF10: 1
PAINLEVEL_OUTOF10: 7

## 2024-11-19 ASSESSMENT — PAIN - FUNCTIONAL ASSESSMENT: PAIN_FUNCTIONAL_ASSESSMENT: 0-10

## 2024-11-19 NOTE — H&P
[facility] Medicine History & Physical      PCP: Candelaria Dover APRN - NP    Date of Admission: 11/19/2024    Date of Service: Pt seen/examined on 11/19/24      Chief Complaint:    Chief Complaint   Patient presents with    Chest Pain    Shortness of Breath         History Of Present Illness:      The patient is a 59 y.o. male with PMH of bipolar disorder, CAD, s/p PCI, chronic pain, COPD, depression, GERD, HLD, pancreatitis, tobacco abuse, hx of alcohol use who presents to [facility] with chest pain and tremors    . A month ago he had candidal esophagitis and had some chest pain related to this and also he ran out of ativan causing him anxiety and chest pains  He was appropriately tx for above last admission and now he comes back for recurrent ongoing mid sternal chest pains that come and go . No relation to diet or swallowing . No dysphagia  No relation to activity , chest pain resolved with nitro in ER and reports it could be anxiety     he has been drinking upto 120 ounces of water daily due to dry mouth and recent thrush and reports he has quit alcohol 6 months ago , no recent change in bladder or bowel habits .     Workup in ER showed sinus tachycardia, severe hyponatremia and normal troponin. Admitted for chest pain eval and correction of hyponatremia    Pt denies any recent new psychiatry meds and denies any new n/v or diarrhea       History obtained from the patient and review of EMR.     Past Medical History:        Diagnosis Date    Affective bipolar disorder (HCC) 11/18/2010    Anxiety     Bipolar affective (HCC)     CAD (coronary artery disease)     Chronic back pain     Class 2 obesity due to excess calories with body mass index (BMI) of 39.0 to 39.9 in adult 2/2/2022    COPD (chronic obstructive pulmonary disease) (HCC)     DDD (degenerative disc disease), cervical     Depression     Fatty liver 2/2/2021    GERD (gastroesophageal reflux disease)     Hyperlipidemia     Hypertension     MI

## 2024-11-19 NOTE — ED NOTES
1143 - Call placed to Nephrology for consult. Dr. Mustafa to call back.    1150 - Dr. Mustafa called back and spoke with DEMETRI Vallejo at this time.   CHIEF COMPLAINT:    Chief Complaint   Patient presents with   • Follow-up     hypertension, sometimes takes bp at home its around 130/80 usually, no concerns       SUBJECTIVE:  Ari Saldana is a 30 year old male     Patient presents today for 6 month follow up on hypertension. Patient reports that he has been taking the medications without any problems or side effects.  Patient reports that he checks his blood pressure occasionally and reports that it has been good when he checks it.  Patient denies any dizziness/lightheadedness, headaches, any increased fatigue other concerns.  Patient's blood pressure is slightly above goal at today's visit.           REVIEW OF SYSTEMS:   Review of Systems   Constitutional: Negative for activity change, appetite change, fatigue and fever.   HENT: Negative for congestion, postnasal drip, sinus pressure and sore throat.    Respiratory: Negative for cough, chest tightness and shortness of breath.    Cardiovascular: Negative for chest pain and leg swelling.   Gastrointestinal: Negative for abdominal pain, diarrhea, nausea and vomiting.   Genitourinary: Negative for dysuria.   Skin: Negative for rash.   Neurological: Negative for dizziness, weakness and light-headedness.          OBJECTIVE:    PROBLEM LIST:    Patient Active Problem List   Diagnosis   • Essential hypertension       PAST HISTORIES:  I have reviewed the past medical history, family history, social history, medications and allergies listed in the medical record as obtained by my nursing staff and support staff and agree with their documentation.  Allergies, medications, medical history, surgical history, social history and family history were reviewed and updated.    PHYSICAL EXAM:    Vital Signs:    Visit Vitals  BP (!) 140/78 (BP Location: RUE - Right upper extremity, Patient Position: Sitting, Cuff Size: Regular)   Pulse 68   Ht 6' 4\" (1.93 m)   Wt 93.3 kg (205 lb 11 oz)   SpO2 98%   BMI 25.04 kg/m²     Physical  Exam  Vitals reviewed.   Constitutional:       General: He is awake. He is not in acute distress.     Appearance: Normal appearance. He is not ill-appearing.   HENT:      Head: Normocephalic.      Right Ear: Tympanic membrane and ear canal normal.      Left Ear: Tympanic membrane and ear canal normal.      Nose: Nose normal. No congestion.      Mouth/Throat:      Lips: Pink.      Mouth: Mucous membranes are moist.      Pharynx: Oropharynx is clear. No pharyngeal swelling, oropharyngeal exudate or posterior oropharyngeal erythema.      Neck: Normal range of motion. No muscular tenderness.   Eyes:      Conjunctiva/sclera: Conjunctivae normal.      Pupils: Pupils are equal, round, and reactive to light.   Neck:      Thyroid: No thyroid mass or thyroid tenderness.      Trachea: Trachea normal.   Cardiovascular:      Rate and Rhythm: Normal rate and regular rhythm.      Heart sounds: Normal heart sounds.   Pulmonary:      Effort: Pulmonary effort is normal. No respiratory distress.      Breath sounds: Normal breath sounds. No stridor, decreased air movement or transmitted upper airway sounds. No decreased breath sounds, wheezing, rhonchi or rales.   Abdominal:      General: Bowel sounds are normal.      Palpations: Abdomen is soft.      Tenderness: There is no abdominal tenderness. There is no guarding or rebound. Negative signs include Lopez's sign and McBurney's sign.   Musculoskeletal:         General: Normal range of motion.   Lymphadenopathy:      Cervical: No cervical adenopathy.   Skin:     General: Skin is warm and dry.      Capillary Refill: Capillary refill takes less than 2 seconds.   Neurological:      General: No focal deficit present.      Mental Status: He is alert and oriented to person, place, and time.      GCS: GCS eye subscore is 4. GCS verbal subscore is 5. GCS motor subscore is 6.      Motor: Motor function is intact.      Coordination: Coordination is intact.      Gait: Gait is intact.    Psychiatric:         Mood and Affect: Mood normal.         Speech: Speech normal.         Behavior: Behavior normal. Behavior is cooperative.         LAB RESULTS:  Hemoglobin A1C (%)   Date Value   01/05/2021 5.6     Cholesterol (mg/dL)   Date Value   01/05/2021 139     HDL (mg/dL)   Date Value   01/05/2021 56     LDL (mg/dL)   Date Value   01/05/2021 60     Triglycerides (mg/dL)   Date Value   01/05/2021 113     No components found for: DLDL  Creatinine (mg/dL)   Date Value   01/05/2021 0.73     No results found for: URMIC  No results found for: MALBCR      ASSESSMENT/PLAN:    1. Essential hypertension    - lisinopril (ZESTRIL) 20 MG tablet; Take 1 tablet by mouth daily.  Dispense: 90 tablet; Refill: 1  - hydrochlorothiazide (MICROZIDE) 12.5 MG capsule; Take 1 capsule by mouth daily.  Dispense: 90 capsule; Refill: 1  Patient to continue closely monitoring home blood pressure and update if blood pressure is high at home.    Patient to follow up with office in 6 months, sooner for any other concerns. Will do labs at next office visit.    Return in about 6 months (around 9/22/2022).    Instructions provided as documented in the AVS (after visit summary).    The patient indicated understanding of the diagnosis and agreed with the plan of care.   Treatment options discussed with patient and explained in detail. The risks, benefits and potential side effects of possible medications were reviewed. Alternatives were discussed. Medication instructions and consequences of not taking the medications were discussed. Patient's understanding was assessed and patient agreed with the plan. Monitoring parameters and expected course outlined. Patient to call or come in if symptoms fail to respond as outlined or worsen in any way.    JOHN Alfaro

## 2024-11-19 NOTE — PLAN OF CARE
Problem: Discharge Planning  Goal: Discharge to home or other facility with appropriate resources  Outcome: Progressing  Flowsheets (Taken 11/19/2024 1515)  Discharge to home or other facility with appropriate resources: Identify barriers to discharge with patient and caregiver     Problem: Pain  Goal: Verbalizes/displays adequate comfort level or baseline comfort level  Outcome: Progressing     Problem: Safety - Adult  Goal: Free from fall injury  Outcome: Progressing     Problem: Skin/Tissue Integrity  Goal: Absence of new skin breakdown  Description: 1.  Monitor for areas of redness and/or skin breakdown  2.  Assess vascular access sites hourly  3.  Every 4-6 hours minimum:  Change oxygen saturation probe site  4.  Every 4-6 hours:  If on nasal continuous positive airway pressure, respiratory therapy assess nares and determine need for appliance change or resting period.  Outcome: Progressing

## 2024-11-19 NOTE — ED PROVIDER NOTES
ED Attending Attestation Note    I personally saw the patient and made/approved the management plan and take responsibility for patient management.     Briefly, 59 y.o. male presents with chest discomfort.  Episode occurred initially last night.  He has had some congestion associated with this as well..     Focused exam:   Gen: 59 y.o. male, NAD  Tremor noted.  Heart noted to be regular.  Lungs grossly clear.  He is speaking comfortably in full sentences.  GCS 15.    Imaging:   XR CHEST PORTABLE   Final Result   No significant findings in the chest.            The Ekg interpreted by me shows  sinus tachycardia, hhxz=770    Axis is   Normal  QTc is  within an acceptable range  Intervals and Durations are unremarkable.      ST Segments: nonspecific changes  No significant change from prior EKG dated 11/4/24        MDM:   Patient presenting for evaluation of chest discomfort.  Certainly concern for possible cardiac etiology.  EKG showed sinus tachycardia but no acute ischemic changes.  Initial troponin negative.  However, on evaluation and workup, he was noted to have sodium acutely of 115.  He has noted to be hyponatremic in the past, with value as low as 123 in October.  Possibly some degree of dehydration.  Nephrology consulted and plan at this time is to gently hydrate and reassess.  Regarding his chest discomfort, based on restratification we did feel that inpatient admission for evaluation of then as well to be most appropriate.  That in conjunction with the significant hyponatremia, I felt that inpatient criteria and admission plan was discussed with patient who is in agreement.  Hospitalist has been consulted for overall management        For further details of the patient's emergency department visit, please see the advanced practice provider's documentation.    Linsey Billings MD     This report has been produced using speech recognition software and may contain errors related to that system including errors

## 2024-11-19 NOTE — ED PROVIDER NOTES
[facility] [place] ICU  Emergency Department Encounter    Patient Name: Kam Nguyen  MRN: [id number]  YOB: 1965  Date of Evaluation: 11/19/2024  Provider: Candelaria Dover APRN - NP  Note Started: 10:51 AM EST 11/19/24    CHIEF COMPLAINT  Chest Pain and Shortness of Breath    SHARED SERVICE VISIT  I have seen and evaluated this patient in collaboration with attending physician, Dr. Billings.    HISTORY OF PRESENT ILLNESS  Kam Nguyen is a 59 y.o. male who presents to the ED for evaluation of chest pain.  Patient states that he had episode of chest discomfort yesterday relieved with nitro.  Does have history of CAD with stents in place.  Reports that this does feel different from anginal equivalent.  Does wear oxygen at night.  Denies feeling short of breath.  Mild cough.  Nonproductive.  No hemoptysis.  Mild nasal congestion without sore throat.  Denies fevers or chills.  No headaches, lightheadedness or dizziness.  Has had no leg pain or swelling.  Denies abdominal discomfort.  No nausea, vomiting or diarrhea.  Reports current pain level is a 1 out of 10.    No other complaints, modifying factors or associated symptoms.     Nursing notes reviewed were all reviewed and agreed with or any disagreements were addressed in the HPI.    PMH:  Past Medical History:   Diagnosis Date    Affective bipolar disorder (HCC) 11/18/2010    Anxiety     Bipolar affective (HCC)     CAD (coronary artery disease)     Chronic back pain     Class 2 obesity due to excess calories with body mass index (BMI) of 39.0 to 39.9 in adult 2/2/2022    COPD (chronic obstructive pulmonary disease) (HCC)     DDD (degenerative disc disease), cervical     Depression     Fatty liver 2/2/2021    GERD (gastroesophageal reflux disease)     Hyperlipidemia     Hypertension     MI (myocardial infarction) (HCC)     Pancreatitis     Recovering alcoholic (HCC)     Tobacco abuse 12/16/2016     Surgical History:  Past Surgical History:   Procedure

## 2024-11-19 NOTE — ED TRIAGE NOTES
Patient presents to the ED from home with c/o chest pain, shortness of breath, nausea since last night. EMS gave 1in nitro paste, 4 zofran. Patient also reporting nasal congestion, cough, sore throat.

## 2024-11-20 ENCOUNTER — APPOINTMENT (OUTPATIENT)
Dept: CT IMAGING | Age: 59
DRG: 426 | End: 2024-11-20
Payer: COMMERCIAL

## 2024-11-20 ENCOUNTER — APPOINTMENT (OUTPATIENT)
Age: 59
DRG: 426 | End: 2024-11-20
Attending: INTERNAL MEDICINE
Payer: COMMERCIAL

## 2024-11-20 PROBLEM — R78.81 BACTEREMIA: Status: ACTIVE | Noted: 2024-11-20

## 2024-11-20 PROBLEM — I48.91 NEW ONSET A-FIB (HCC): Status: ACTIVE | Noted: 2024-11-20

## 2024-11-20 PROBLEM — I48.91 ATRIAL FIBRILLATION WITH RAPID VENTRICULAR RESPONSE (HCC): Status: ACTIVE | Noted: 2024-11-20

## 2024-11-20 LAB
ANION GAP SERPL CALCULATED.3IONS-SCNC: 10 MMOL/L (ref 3–16)
ANION GAP SERPL CALCULATED.3IONS-SCNC: 10 MMOL/L (ref 3–16)
ANION GAP SERPL CALCULATED.3IONS-SCNC: 11 MMOL/L (ref 3–16)
ANION GAP SERPL CALCULATED.3IONS-SCNC: 12 MMOL/L (ref 3–16)
BASOPHILS # BLD: 0 K/UL (ref 0–0.2)
BASOPHILS NFR BLD: 0.4 %
BUN SERPL-MCNC: 3 MG/DL (ref 7–20)
BUN SERPL-MCNC: 3 MG/DL (ref 7–20)
BUN SERPL-MCNC: 4 MG/DL (ref 7–20)
BUN SERPL-MCNC: 4 MG/DL (ref 7–20)
CALCIUM SERPL-MCNC: 7.3 MG/DL (ref 8.3–10.6)
CALCIUM SERPL-MCNC: 7.4 MG/DL (ref 8.3–10.6)
CALCIUM SERPL-MCNC: 7.4 MG/DL (ref 8.3–10.6)
CALCIUM SERPL-MCNC: 7.6 MG/DL (ref 8.3–10.6)
CHLORIDE SERPL-SCNC: 86 MMOL/L (ref 99–110)
CHLORIDE SERPL-SCNC: 86 MMOL/L (ref 99–110)
CHLORIDE SERPL-SCNC: 89 MMOL/L (ref 99–110)
CHLORIDE SERPL-SCNC: 89 MMOL/L (ref 99–110)
CO2 SERPL-SCNC: 22 MMOL/L (ref 21–32)
CO2 SERPL-SCNC: 23 MMOL/L (ref 21–32)
CO2 SERPL-SCNC: 24 MMOL/L (ref 21–32)
CO2 SERPL-SCNC: 24 MMOL/L (ref 21–32)
CREAT SERPL-MCNC: 0.3 MG/DL (ref 0.9–1.3)
CREAT SERPL-MCNC: 0.4 MG/DL (ref 0.9–1.3)
DEPRECATED RDW RBC AUTO: 15.4 % (ref 12.4–15.4)
EKG ATRIAL RATE: 141 BPM
EKG DIAGNOSIS: NORMAL
EKG Q-T INTERVAL: 320 MS
EKG QRS DURATION: 90 MS
EKG QTC CALCULATION (BAZETT): 502 MS
EKG R AXIS: 39 DEGREES
EKG T AXIS: -29 DEGREES
EKG VENTRICULAR RATE: 148 BPM
EOSINOPHIL # BLD: 0 K/UL (ref 0–0.6)
EOSINOPHIL NFR BLD: 0.1 %
GFR SERPLBLD CREATININE-BSD FMLA CKD-EPI: >90 ML/MIN/{1.73_M2}
GLUCOSE SERPL-MCNC: 115 MG/DL (ref 70–99)
GLUCOSE SERPL-MCNC: 123 MG/DL (ref 70–99)
GLUCOSE SERPL-MCNC: 138 MG/DL (ref 70–99)
GLUCOSE SERPL-MCNC: 142 MG/DL (ref 70–99)
HCT VFR BLD AUTO: 32.3 % (ref 40.5–52.5)
HGB BLD-MCNC: 11.1 G/DL (ref 13.5–17.5)
LYMPHOCYTES # BLD: 0.9 K/UL (ref 1–5.1)
LYMPHOCYTES NFR BLD: 7.7 %
MAGNESIUM SERPL-MCNC: 1.86 MG/DL (ref 1.8–2.4)
MAGNESIUM SERPL-MCNC: 1.98 MG/DL (ref 1.8–2.4)
MAGNESIUM SERPL-MCNC: 2.03 MG/DL (ref 1.8–2.4)
MCH RBC QN AUTO: 27.5 PG (ref 26–34)
MCHC RBC AUTO-ENTMCNC: 34.3 G/DL (ref 31–36)
MCV RBC AUTO: 80.1 FL (ref 80–100)
MONOCYTES # BLD: 1.1 K/UL (ref 0–1.3)
MONOCYTES NFR BLD: 9.7 %
NEUTROPHILS # BLD: 9.1 K/UL (ref 1.7–7.7)
NEUTROPHILS NFR BLD: 82.1 %
OSMOLALITY UR: 387 MOSM/KG (ref 390–1070)
PLATELET # BLD AUTO: 246 K/UL (ref 135–450)
PMV BLD AUTO: 7.8 FL (ref 5–10.5)
POTASSIUM SERPL-SCNC: 3 MMOL/L (ref 3.5–5.1)
POTASSIUM SERPL-SCNC: 3 MMOL/L (ref 3.5–5.1)
POTASSIUM SERPL-SCNC: 3.3 MMOL/L (ref 3.5–5.1)
POTASSIUM SERPL-SCNC: 3.6 MMOL/L (ref 3.5–5.1)
RBC # BLD AUTO: 4.03 M/UL (ref 4.2–5.9)
REPORT: NORMAL
SODIUM SERPL-SCNC: 120 MMOL/L (ref 136–145)
SODIUM SERPL-SCNC: 121 MMOL/L (ref 136–145)
SODIUM SERPL-SCNC: 122 MMOL/L (ref 136–145)
SODIUM SERPL-SCNC: 122 MMOL/L (ref 136–145)
SODIUM SERPL-SCNC: 123 MMOL/L (ref 136–145)
WBC # BLD AUTO: 11.1 K/UL (ref 4–11)

## 2024-11-20 PROCEDURE — 2580000003 HC RX 258: Performed by: STUDENT IN AN ORGANIZED HEALTH CARE EDUCATION/TRAINING PROGRAM

## 2024-11-20 PROCEDURE — 51798 US URINE CAPACITY MEASURE: CPT

## 2024-11-20 PROCEDURE — 2580000003 HC RX 258: Performed by: NURSE PRACTITIONER

## 2024-11-20 PROCEDURE — 93010 ELECTROCARDIOGRAM REPORT: CPT | Performed by: INTERNAL MEDICINE

## 2024-11-20 PROCEDURE — 94669 MECHANICAL CHEST WALL OSCILL: CPT

## 2024-11-20 PROCEDURE — 2580000003 HC RX 258: Performed by: INTERNAL MEDICINE

## 2024-11-20 PROCEDURE — 6360000002 HC RX W HCPCS: Performed by: INTERNAL MEDICINE

## 2024-11-20 PROCEDURE — 99233 SBSQ HOSP IP/OBS HIGH 50: CPT | Performed by: INTERNAL MEDICINE

## 2024-11-20 PROCEDURE — 84295 ASSAY OF SERUM SODIUM: CPT

## 2024-11-20 PROCEDURE — 6370000000 HC RX 637 (ALT 250 FOR IP): Performed by: INTERNAL MEDICINE

## 2024-11-20 PROCEDURE — 2500000003 HC RX 250 WO HCPCS: Performed by: INTERNAL MEDICINE

## 2024-11-20 PROCEDURE — 94640 AIRWAY INHALATION TREATMENT: CPT

## 2024-11-20 PROCEDURE — 80048 BASIC METABOLIC PNL TOTAL CA: CPT

## 2024-11-20 PROCEDURE — 6370000000 HC RX 637 (ALT 250 FOR IP): Performed by: PSYCHIATRY & NEUROLOGY

## 2024-11-20 PROCEDURE — 36415 COLL VENOUS BLD VENIPUNCTURE: CPT

## 2024-11-20 PROCEDURE — 2000000000 HC ICU R&B

## 2024-11-20 PROCEDURE — 70450 CT HEAD/BRAIN W/O DYE: CPT

## 2024-11-20 PROCEDURE — 6360000002 HC RX W HCPCS: Performed by: NURSE PRACTITIONER

## 2024-11-20 PROCEDURE — 95816 EEG AWAKE AND DROWSY: CPT

## 2024-11-20 PROCEDURE — 83735 ASSAY OF MAGNESIUM: CPT

## 2024-11-20 PROCEDURE — 99223 1ST HOSP IP/OBS HIGH 75: CPT | Performed by: PSYCHIATRY & NEUROLOGY

## 2024-11-20 PROCEDURE — 93005 ELECTROCARDIOGRAM TRACING: CPT | Performed by: INTERNAL MEDICINE

## 2024-11-20 PROCEDURE — 95816 EEG AWAKE AND DROWSY: CPT | Performed by: PSYCHIATRY & NEUROLOGY

## 2024-11-20 PROCEDURE — 85025 COMPLETE CBC W/AUTO DIFF WBC: CPT

## 2024-11-20 PROCEDURE — 99291 CRITICAL CARE FIRST HOUR: CPT | Performed by: INTERNAL MEDICINE

## 2024-11-20 PROCEDURE — 6370000000 HC RX 637 (ALT 250 FOR IP): Performed by: NURSE PRACTITIONER

## 2024-11-20 RX ORDER — GABAPENTIN 100 MG/1
200 CAPSULE ORAL 2 TIMES DAILY
Status: DISCONTINUED | OUTPATIENT
Start: 2024-11-20 | End: 2024-11-23

## 2024-11-20 RX ORDER — ENOXAPARIN SODIUM 100 MG/ML
1 INJECTION SUBCUTANEOUS EVERY 12 HOURS
Status: DISCONTINUED | OUTPATIENT
Start: 2024-11-20 | End: 2024-11-21

## 2024-11-20 RX ORDER — METOPROLOL TARTRATE 1 MG/ML
INJECTION, SOLUTION INTRAVENOUS
Status: DISPENSED
Start: 2024-11-20 | End: 2024-11-20

## 2024-11-20 RX ORDER — SODIUM CHLORIDE 9 MG/ML
INJECTION, SOLUTION INTRAVENOUS CONTINUOUS
Status: DISCONTINUED | OUTPATIENT
Start: 2024-11-20 | End: 2024-11-21

## 2024-11-20 RX ORDER — FOLIC ACID 1 MG/1
1 TABLET ORAL DAILY
Status: DISCONTINUED | OUTPATIENT
Start: 2024-11-20 | End: 2024-11-25 | Stop reason: HOSPADM

## 2024-11-20 RX ORDER — POTASSIUM CHLORIDE 750 MG/1
40 TABLET, EXTENDED RELEASE ORAL ONCE
Status: COMPLETED | OUTPATIENT
Start: 2024-11-20 | End: 2024-11-20

## 2024-11-20 RX ORDER — LANOLIN ALCOHOL/MO/W.PET/CERES
100 CREAM (GRAM) TOPICAL DAILY
Status: DISCONTINUED | OUTPATIENT
Start: 2024-11-20 | End: 2024-11-25 | Stop reason: HOSPADM

## 2024-11-20 RX ORDER — LORAZEPAM 2 MG/ML
1 INJECTION INTRAMUSCULAR ONCE
Status: DISCONTINUED | OUTPATIENT
Start: 2024-11-20 | End: 2024-11-21

## 2024-11-20 RX ORDER — METOPROLOL TARTRATE 1 MG/ML
10 INJECTION, SOLUTION INTRAVENOUS EVERY 5 MIN PRN
Status: DISCONTINUED | OUTPATIENT
Start: 2024-11-20 | End: 2024-11-25 | Stop reason: HOSPADM

## 2024-11-20 RX ORDER — DEXTROSE MONOHYDRATE 50 MG/ML
INJECTION, SOLUTION INTRAVENOUS CONTINUOUS
Status: DISCONTINUED | OUTPATIENT
Start: 2024-11-20 | End: 2024-11-20

## 2024-11-20 RX ORDER — DIGOXIN 0.25 MG/ML
250 INJECTION INTRAMUSCULAR; INTRAVENOUS EVERY 6 HOURS
Status: COMPLETED | OUTPATIENT
Start: 2024-11-20 | End: 2024-11-20

## 2024-11-20 RX ORDER — ENOXAPARIN SODIUM 100 MG/ML
40 INJECTION SUBCUTANEOUS ONCE
Status: COMPLETED | OUTPATIENT
Start: 2024-11-20 | End: 2024-11-20

## 2024-11-20 RX ADMIN — POTASSIUM CHLORIDE 40 MEQ: 750 TABLET, EXTENDED RELEASE ORAL at 12:58

## 2024-11-20 RX ADMIN — FOLIC ACID 1 MG: 1 TABLET ORAL at 10:36

## 2024-11-20 RX ADMIN — ASPIRIN 81 MG: 81 TABLET, CHEWABLE ORAL at 08:06

## 2024-11-20 RX ADMIN — DEXTROSE MONOHYDRATE: 50 INJECTION, SOLUTION INTRAVENOUS at 02:30

## 2024-11-20 RX ADMIN — DIGOXIN 250 MCG: 0.25 INJECTION INTRAMUSCULAR; INTRAVENOUS at 20:53

## 2024-11-20 RX ADMIN — LORAZEPAM 0.5 MG: 2 INJECTION, SOLUTION INTRAMUSCULAR; INTRAVENOUS at 10:39

## 2024-11-20 RX ADMIN — ENOXAPARIN SODIUM 80 MG: 100 INJECTION SUBCUTANEOUS at 20:53

## 2024-11-20 RX ADMIN — SODIUM CHLORIDE, PRESERVATIVE FREE 10 ML: 5 INJECTION INTRAVENOUS at 20:54

## 2024-11-20 RX ADMIN — LORAZEPAM 0.5 MG: 2 INJECTION, SOLUTION INTRAMUSCULAR; INTRAVENOUS at 22:47

## 2024-11-20 RX ADMIN — CARIPRAZINE 4.5 MG: 4.5 CAPSULE, GELATIN COATED ORAL at 08:09

## 2024-11-20 RX ADMIN — DIGOXIN 250 MCG: 0.25 INJECTION INTRAMUSCULAR; INTRAVENOUS at 08:54

## 2024-11-20 RX ADMIN — POTASSIUM CHLORIDE 40 MEQ: 750 TABLET, EXTENDED RELEASE ORAL at 08:46

## 2024-11-20 RX ADMIN — MUPIROCIN: 20 OINTMENT TOPICAL at 08:06

## 2024-11-20 RX ADMIN — GABAPENTIN 200 MG: 100 CAPSULE ORAL at 12:58

## 2024-11-20 RX ADMIN — Medication 100 MG: at 10:36

## 2024-11-20 RX ADMIN — GUAIFENESIN 600 MG: 600 TABLET, EXTENDED RELEASE ORAL at 08:06

## 2024-11-20 RX ADMIN — CEFAZOLIN 2000 MG: 2 INJECTION, POWDER, FOR SOLUTION INTRAVENOUS at 17:50

## 2024-11-20 RX ADMIN — DIGOXIN 250 MCG: 0.25 INJECTION INTRAMUSCULAR; INTRAVENOUS at 16:09

## 2024-11-20 RX ADMIN — POTASSIUM BICARBONATE 40 MEQ: 782 TABLET, EFFERVESCENT ORAL at 16:08

## 2024-11-20 RX ADMIN — MUPIROCIN: 20 OINTMENT TOPICAL at 20:53

## 2024-11-20 RX ADMIN — SODIUM CHLORIDE: 9 INJECTION, SOLUTION INTRAVENOUS at 13:43

## 2024-11-20 RX ADMIN — DILTIAZEM HYDROCHLORIDE 5 MG/HR: 5 INJECTION, SOLUTION INTRAVENOUS at 09:01

## 2024-11-20 RX ADMIN — METOPROLOL SUCCINATE 25 MG: 25 TABLET, EXTENDED RELEASE ORAL at 08:06

## 2024-11-20 RX ADMIN — IPRATROPIUM BROMIDE AND ALBUTEROL SULFATE 1 DOSE: 2.5; .5 SOLUTION RESPIRATORY (INHALATION) at 19:18

## 2024-11-20 RX ADMIN — CEFAZOLIN 2000 MG: 2 INJECTION, POWDER, FOR SOLUTION INTRAVENOUS at 10:34

## 2024-11-20 RX ADMIN — SODIUM CHLORIDE, PRESERVATIVE FREE 10 ML: 5 INJECTION INTRAVENOUS at 08:06

## 2024-11-20 RX ADMIN — ATORVASTATIN CALCIUM 40 MG: 40 TABLET, FILM COATED ORAL at 08:06

## 2024-11-20 RX ADMIN — METOPROLOL TARTRATE 10 MG: 1 INJECTION, SOLUTION INTRAVENOUS at 04:12

## 2024-11-20 RX ADMIN — ENOXAPARIN SODIUM 40 MG: 100 INJECTION SUBCUTANEOUS at 08:06

## 2024-11-20 RX ADMIN — DILTIAZEM HYDROCHLORIDE 12.5 MG/HR: 5 INJECTION, SOLUTION INTRAVENOUS at 20:52

## 2024-11-20 RX ADMIN — METOPROLOL TARTRATE 10 MG: 1 INJECTION, SOLUTION INTRAVENOUS at 03:57

## 2024-11-20 RX ADMIN — ENOXAPARIN SODIUM 40 MG: 100 INJECTION SUBCUTANEOUS at 08:54

## 2024-11-20 NOTE — PLAN OF CARE
Problem: Discharge Planning  Goal: Discharge to home or other facility with appropriate resources  11/20/2024 0946 by Emilee Degroot RN  Outcome: Progressing  11/19/2024 2100 by Teresa Quiles RN  Outcome: Progressing  Flowsheets (Taken 11/19/2024 1947)  Discharge to home or other facility with appropriate resources: Identify barriers to discharge with patient and caregiver     Problem: Pain  Goal: Verbalizes/displays adequate comfort level or baseline comfort level  11/20/2024 0946 by Emilee Degroot RN  Outcome: Progressing  11/19/2024 2100 by Teresa Quiles, RN  Outcome: Progressing     Problem: Safety - Adult  Goal: Free from fall injury  11/20/2024 0946 by Emilee Degroot RN  Outcome: Progressing  11/19/2024 2100 by Teresa Quiles RN  Outcome: Progressing     Problem: Skin/Tissue Integrity  Goal: Absence of new skin breakdown  Description: 1.  Monitor for areas of redness and/or skin breakdown  2.  Assess vascular access sites hourly  3.  Every 4-6 hours minimum:  Change oxygen saturation probe site  4.  Every 4-6 hours:  If on nasal continuous positive airway pressure, respiratory therapy assess nares and determine need for appliance change or resting period.  11/20/2024 0946 by Emilee Degroot RN  Outcome: Progressing  11/19/2024 2100 by Teresa Quiles RN  Outcome: Progressing

## 2024-11-20 NOTE — PROCEDURES
INTERPRETATION:  This 25-minute, computer-assisted video EEG recording is abnormal due to excess beta activity.  No potentially epileptiform activity was present during the recording.       The EEG findings were consistent with medication effect.     CLASSIFICATION:  Dysrhythmia grade 1, excess beta activity,  EKG channel.     DESCRIPTION:     BACKGROUND:  The awake recording revealed 9 Hz alpha activity over the posterior head region.  There was intermittent generalized superimposed 11 - 14 Hz beta activity.  The sleep recording contained normal symmetric v-waves, sleep spindles, and K-complexes.  There was no significant change on the EEG background with photic stimulation.  Hyperventilation was omitted due to old age.      INTERICTAL DISCHARGES: None     CLINICAL EVENTS:  None     The EKG channel was unremarkable.

## 2024-11-20 NOTE — PLAN OF CARE
Problem: Pain  Goal: Verbalizes/displays adequate comfort level or baseline comfort level  11/19/2024 2100 by Teresa Quiles RN  Outcome: Progressing  11/19/2024 1517 by Kailey Todd RN  Outcome: Progressing     Problem: Safety - Adult  Goal: Free from fall injury  11/19/2024 2100 by Teresa Quiles RN  Outcome: Progressing  11/19/2024 1517 by Kailey Todd RN  Outcome: Progressing     Problem: Skin/Tissue Integrity  Goal: Absence of new skin breakdown  Description: 1.  Monitor for areas of redness and/or skin breakdown  2.  Assess vascular access sites hourly  3.  Every 4-6 hours minimum:  Change oxygen saturation probe site  4.  Every 4-6 hours:  If on nasal continuous positive airway pressure, respiratory therapy assess nares and determine need for appliance change or resting period.  11/19/2024 2100 by Teresa Quiles RN  Outcome: Progressing  11/19/2024 1517 by Kailey Todd RN  Outcome: Progressing

## 2024-11-21 ENCOUNTER — APPOINTMENT (OUTPATIENT)
Age: 59
DRG: 426 | End: 2024-11-21
Attending: INTERNAL MEDICINE
Payer: COMMERCIAL

## 2024-11-21 ENCOUNTER — APPOINTMENT (OUTPATIENT)
Dept: MRI IMAGING | Age: 59
DRG: 426 | End: 2024-11-21
Payer: COMMERCIAL

## 2024-11-21 PROBLEM — D49.7 PITUITARY TUMOR: Status: ACTIVE | Noted: 2024-11-21

## 2024-11-21 LAB
ANION GAP SERPL CALCULATED.3IONS-SCNC: 12 MMOL/L (ref 3–16)
ANION GAP SERPL CALCULATED.3IONS-SCNC: 9 MMOL/L (ref 3–16)
ANION GAP SERPL CALCULATED.3IONS-SCNC: 9 MMOL/L (ref 3–16)
BASOPHILS # BLD: 0.1 K/UL (ref 0–0.2)
BASOPHILS NFR BLD: 0.8 %
BUN SERPL-MCNC: 4 MG/DL (ref 7–20)
CALCIUM SERPL-MCNC: 7.3 MG/DL (ref 8.3–10.6)
CALCIUM SERPL-MCNC: 7.4 MG/DL (ref 8.3–10.6)
CALCIUM SERPL-MCNC: 7.7 MG/DL (ref 8.3–10.6)
CHLORIDE SERPL-SCNC: 89 MMOL/L (ref 99–110)
CHLORIDE SERPL-SCNC: 90 MMOL/L (ref 99–110)
CHLORIDE SERPL-SCNC: 95 MMOL/L (ref 99–110)
CO2 SERPL-SCNC: 23 MMOL/L (ref 21–32)
CO2 SERPL-SCNC: 23 MMOL/L (ref 21–32)
CO2 SERPL-SCNC: 25 MMOL/L (ref 21–32)
CREAT SERPL-MCNC: 0.3 MG/DL (ref 0.9–1.3)
CREAT SERPL-MCNC: 0.4 MG/DL (ref 0.9–1.3)
CREAT SERPL-MCNC: 0.4 MG/DL (ref 0.9–1.3)
DEPRECATED RDW RBC AUTO: 15.3 % (ref 12.4–15.4)
ECHO AO ROOT DIAM: 3.5 CM
ECHO AO ROOT INDEX: 1.72 CM/M2
ECHO AV CUSP MM: 2 CM
ECHO AV PEAK GRADIENT: 9 MMHG
ECHO AV PEAK VELOCITY: 1.5 M/S
ECHO BSA: 2.04 M2
ECHO LA AREA 2C: 19.7 CM2
ECHO LA AREA 4C: 17.2 CM2
ECHO LA DIAMETER INDEX: 1.52 CM/M2
ECHO LA DIAMETER: 3.1 CM
ECHO LA MAJOR AXIS: 4.6 CM
ECHO LA MINOR AXIS: 5.1 CM
ECHO LA TO AORTIC ROOT RATIO: 0.89
ECHO LA VOL BP: 57 ML (ref 18–58)
ECHO LA VOL MOD A2C: 60 ML (ref 18–58)
ECHO LA VOL MOD A4C: 49 ML (ref 18–58)
ECHO LA VOL/BSA BIPLANE: 28 ML/M2 (ref 16–34)
ECHO LA VOLUME INDEX MOD A2C: 29 ML/M2 (ref 16–34)
ECHO LA VOLUME INDEX MOD A4C: 24 ML/M2 (ref 16–34)
ECHO LV E' LATERAL VELOCITY: 10 CM/S
ECHO LV E' SEPTAL VELOCITY: 8.92 CM/S
ECHO LV EF PHYSICIAN: 68 %
ECHO LV FRACTIONAL SHORTENING: 49 % (ref 28–44)
ECHO LV INTERNAL DIMENSION DIASTOLE INDEX: 2.21 CM/M2
ECHO LV INTERNAL DIMENSION DIASTOLIC: 4.5 CM (ref 4.2–5.9)
ECHO LV INTERNAL DIMENSION SYSTOLIC INDEX: 1.13 CM/M2
ECHO LV INTERNAL DIMENSION SYSTOLIC: 2.3 CM
ECHO LV ISOVOLUMETRIC RELAXATION TIME (IVRT): 82 MS
ECHO LV IVSD: 1.1 CM (ref 0.6–1)
ECHO LV MASS 2D: 186.4 G (ref 88–224)
ECHO LV MASS INDEX 2D: 91.4 G/M2 (ref 49–115)
ECHO LV POSTERIOR WALL DIASTOLIC: 1.2 CM (ref 0.6–1)
ECHO LV RELATIVE WALL THICKNESS RATIO: 0.53
ECHO MV A VELOCITY: 0.55 M/S
ECHO MV E VELOCITY: 0.68 M/S
ECHO MV E/A RATIO: 1.24
ECHO MV E/E' LATERAL: 6.8
ECHO MV E/E' RATIO (AVERAGED): 7.21
ECHO MV E/E' SEPTAL: 7.62
ECHO PV MAX VELOCITY: 1.3 M/S
ECHO PV PEAK GRADIENT: 7 MMHG
ECHO RA AREA 4C: 15 CM2
ECHO RA END SYSTOLIC VOLUME APICAL 4 CHAMBER INDEX BSA: 19 ML/M2
ECHO RA VOLUME: 39 ML
ECHO RV BASAL DIMENSION: 3.1 CM
ECHO RV FREE WALL PEAK S': 17.3 CM/S
ECHO RV LONGITUDINAL DIMENSION: 8.6 CM
ECHO RV MID DIMENSION: 2.5 CM
ECHO RV TAPSE: 2.3 CM (ref 1.7–?)
ECHO TV PEAK GRADIENT: 2 MMHG
EOSINOPHIL # BLD: 0 K/UL (ref 0–0.6)
EOSINOPHIL NFR BLD: 0.2 %
GFR SERPLBLD CREATININE-BSD FMLA CKD-EPI: >90 ML/MIN/{1.73_M2}
GLUCOSE SERPL-MCNC: 103 MG/DL (ref 70–99)
GLUCOSE SERPL-MCNC: 107 MG/DL (ref 70–99)
GLUCOSE SERPL-MCNC: 93 MG/DL (ref 70–99)
HCT VFR BLD AUTO: 31.4 % (ref 40.5–52.5)
HGB BLD-MCNC: 10.6 G/DL (ref 13.5–17.5)
LYMPHOCYTES # BLD: 1.1 K/UL (ref 1–5.1)
LYMPHOCYTES NFR BLD: 9.2 %
MAGNESIUM SERPL-MCNC: 1.86 MG/DL (ref 1.8–2.4)
MCH RBC QN AUTO: 27.7 PG (ref 26–34)
MCHC RBC AUTO-ENTMCNC: 33.8 G/DL (ref 31–36)
MCV RBC AUTO: 81.9 FL (ref 80–100)
MONOCYTES # BLD: 1.2 K/UL (ref 0–1.3)
MONOCYTES NFR BLD: 10.4 %
NEUTROPHILS # BLD: 9.2 K/UL (ref 1.7–7.7)
NEUTROPHILS NFR BLD: 79.4 %
OSMOLALITY UR: 356 MOSM/KG (ref 390–1070)
PHOSPHATE SERPL-MCNC: 2.6 MG/DL (ref 2.5–4.9)
PLATELET # BLD AUTO: 232 K/UL (ref 135–450)
PMV BLD AUTO: 8.4 FL (ref 5–10.5)
POTASSIUM SERPL-SCNC: 3.3 MMOL/L (ref 3.5–5.1)
POTASSIUM SERPL-SCNC: 3.6 MMOL/L (ref 3.5–5.1)
POTASSIUM SERPL-SCNC: 4.2 MMOL/L (ref 3.5–5.1)
RBC # BLD AUTO: 3.83 M/UL (ref 4.2–5.9)
SODIUM SERPL-SCNC: 122 MMOL/L (ref 136–145)
SODIUM SERPL-SCNC: 124 MMOL/L (ref 136–145)
SODIUM SERPL-SCNC: 129 MMOL/L (ref 136–145)
SODIUM UR-SCNC: <20 MMOL/L
WBC # BLD AUTO: 11.6 K/UL (ref 4–11)

## 2024-11-21 PROCEDURE — 93306 TTE W/DOPPLER COMPLETE: CPT | Performed by: INTERNAL MEDICINE

## 2024-11-21 PROCEDURE — 6370000000 HC RX 637 (ALT 250 FOR IP): Performed by: PSYCHIATRY & NEUROLOGY

## 2024-11-21 PROCEDURE — 83735 ASSAY OF MAGNESIUM: CPT

## 2024-11-21 PROCEDURE — 6370000000 HC RX 637 (ALT 250 FOR IP): Performed by: INTERNAL MEDICINE

## 2024-11-21 PROCEDURE — 36415 COLL VENOUS BLD VENIPUNCTURE: CPT

## 2024-11-21 PROCEDURE — 94640 AIRWAY INHALATION TREATMENT: CPT

## 2024-11-21 PROCEDURE — 6370000000 HC RX 637 (ALT 250 FOR IP): Performed by: NURSE PRACTITIONER

## 2024-11-21 PROCEDURE — 83935 ASSAY OF URINE OSMOLALITY: CPT

## 2024-11-21 PROCEDURE — 70551 MRI BRAIN STEM W/O DYE: CPT

## 2024-11-21 PROCEDURE — 84100 ASSAY OF PHOSPHORUS: CPT

## 2024-11-21 PROCEDURE — 2580000003 HC RX 258: Performed by: INTERNAL MEDICINE

## 2024-11-21 PROCEDURE — 99233 SBSQ HOSP IP/OBS HIGH 50: CPT | Performed by: INTERNAL MEDICINE

## 2024-11-21 PROCEDURE — 6360000002 HC RX W HCPCS: Performed by: INTERNAL MEDICINE

## 2024-11-21 PROCEDURE — 94761 N-INVAS EAR/PLS OXIMETRY MLT: CPT

## 2024-11-21 PROCEDURE — 2580000003 HC RX 258: Performed by: NURSE PRACTITIONER

## 2024-11-21 PROCEDURE — 85025 COMPLETE CBC W/AUTO DIFF WBC: CPT

## 2024-11-21 PROCEDURE — 80048 BASIC METABOLIC PNL TOTAL CA: CPT

## 2024-11-21 PROCEDURE — 93306 TTE W/DOPPLER COMPLETE: CPT

## 2024-11-21 PROCEDURE — 94669 MECHANICAL CHEST WALL OSCILL: CPT

## 2024-11-21 PROCEDURE — 99233 SBSQ HOSP IP/OBS HIGH 50: CPT | Performed by: PSYCHIATRY & NEUROLOGY

## 2024-11-21 PROCEDURE — 2000000000 HC ICU R&B

## 2024-11-21 PROCEDURE — 84300 ASSAY OF URINE SODIUM: CPT

## 2024-11-21 RX ORDER — TOLVAPTAN 15 MG/1
7.5 TABLET ORAL ONCE
Status: COMPLETED | OUTPATIENT
Start: 2024-11-21 | End: 2024-11-21

## 2024-11-21 RX ORDER — METOPROLOL SUCCINATE 50 MG/1
50 TABLET, EXTENDED RELEASE ORAL DAILY
Status: DISCONTINUED | OUTPATIENT
Start: 2024-11-22 | End: 2024-11-24 | Stop reason: ALTCHOICE

## 2024-11-21 RX ORDER — REGADENOSON 0.08 MG/ML
0.4 INJECTION, SOLUTION INTRAVENOUS
Status: COMPLETED | OUTPATIENT
Start: 2024-11-21 | End: 2024-11-22

## 2024-11-21 RX ORDER — POTASSIUM CHLORIDE 750 MG/1
20 TABLET, EXTENDED RELEASE ORAL ONCE
Status: COMPLETED | OUTPATIENT
Start: 2024-11-21 | End: 2024-11-21

## 2024-11-21 RX ORDER — IPRATROPIUM BROMIDE AND ALBUTEROL SULFATE 2.5; .5 MG/3ML; MG/3ML
1 SOLUTION RESPIRATORY (INHALATION) EVERY 4 HOURS PRN
Status: DISCONTINUED | OUTPATIENT
Start: 2024-11-21 | End: 2024-11-25 | Stop reason: HOSPADM

## 2024-11-21 RX ORDER — LORAZEPAM 2 MG/ML
1 INJECTION INTRAMUSCULAR ONCE
Status: COMPLETED | OUTPATIENT
Start: 2024-11-21 | End: 2024-11-21

## 2024-11-21 RX ORDER — ENOXAPARIN SODIUM 100 MG/ML
1 INJECTION SUBCUTANEOUS EVERY 12 HOURS
Status: DISCONTINUED | OUTPATIENT
Start: 2024-11-21 | End: 2024-11-25 | Stop reason: HOSPADM

## 2024-11-21 RX ORDER — METOPROLOL SUCCINATE 25 MG/1
25 TABLET, EXTENDED RELEASE ORAL ONCE
Status: COMPLETED | OUTPATIENT
Start: 2024-11-21 | End: 2024-11-21

## 2024-11-21 RX ORDER — CLOPIDOGREL BISULFATE 75 MG/1
75 TABLET ORAL DAILY
Status: DISCONTINUED | OUTPATIENT
Start: 2024-11-21 | End: 2024-11-25 | Stop reason: HOSPADM

## 2024-11-21 RX ADMIN — POTASSIUM BICARBONATE 40 MEQ: 782 TABLET, EFFERVESCENT ORAL at 11:43

## 2024-11-21 RX ADMIN — LORAZEPAM 1 MG: 2 INJECTION, SOLUTION INTRAMUSCULAR; INTRAVENOUS at 11:47

## 2024-11-21 RX ADMIN — SODIUM CHLORIDE, PRESERVATIVE FREE 10 ML: 5 INJECTION INTRAVENOUS at 20:54

## 2024-11-21 RX ADMIN — CARIPRAZINE 4.5 MG: 4.5 CAPSULE, GELATIN COATED ORAL at 07:34

## 2024-11-21 RX ADMIN — ENOXAPARIN SODIUM 80 MG: 100 INJECTION SUBCUTANEOUS at 07:33

## 2024-11-21 RX ADMIN — TOLVAPTAN 7.5 MG: 15 TABLET ORAL at 11:19

## 2024-11-21 RX ADMIN — GUAIFENESIN 600 MG: 600 TABLET, EXTENDED RELEASE ORAL at 07:34

## 2024-11-21 RX ADMIN — METOPROLOL SUCCINATE 25 MG: 25 TABLET, EXTENDED RELEASE ORAL at 08:30

## 2024-11-21 RX ADMIN — FOLIC ACID 1 MG: 1 TABLET ORAL at 07:33

## 2024-11-21 RX ADMIN — GABAPENTIN 200 MG: 100 CAPSULE ORAL at 20:53

## 2024-11-21 RX ADMIN — MUPIROCIN: 20 OINTMENT TOPICAL at 20:54

## 2024-11-21 RX ADMIN — CEFAZOLIN 2000 MG: 2 INJECTION, POWDER, FOR SOLUTION INTRAVENOUS at 10:14

## 2024-11-21 RX ADMIN — IPRATROPIUM BROMIDE AND ALBUTEROL SULFATE 1 DOSE: 2.5; .5 SOLUTION RESPIRATORY (INHALATION) at 07:56

## 2024-11-21 RX ADMIN — PANTOPRAZOLE SODIUM 40 MG: 40 TABLET, DELAYED RELEASE ORAL at 07:34

## 2024-11-21 RX ADMIN — POTASSIUM CHLORIDE 20 MEQ: 750 TABLET, EXTENDED RELEASE ORAL at 08:30

## 2024-11-21 RX ADMIN — CEFAZOLIN 2000 MG: 2 INJECTION, POWDER, FOR SOLUTION INTRAVENOUS at 02:23

## 2024-11-21 RX ADMIN — GUAIFENESIN 600 MG: 600 TABLET, EXTENDED RELEASE ORAL at 20:53

## 2024-11-21 RX ADMIN — ENOXAPARIN SODIUM 80 MG: 100 INJECTION SUBCUTANEOUS at 20:53

## 2024-11-21 RX ADMIN — MUPIROCIN: 20 OINTMENT TOPICAL at 07:34

## 2024-11-21 RX ADMIN — Medication 100 MG: at 07:34

## 2024-11-21 RX ADMIN — LORAZEPAM 0.5 MG: 2 INJECTION, SOLUTION INTRAMUSCULAR; INTRAVENOUS at 20:53

## 2024-11-21 RX ADMIN — GABAPENTIN 200 MG: 100 CAPSULE ORAL at 07:34

## 2024-11-21 RX ADMIN — POTASSIUM BICARBONATE 40 MEQ: 782 TABLET, EFFERVESCENT ORAL at 10:59

## 2024-11-21 RX ADMIN — ATORVASTATIN CALCIUM 40 MG: 40 TABLET, FILM COATED ORAL at 07:33

## 2024-11-21 RX ADMIN — SODIUM CHLORIDE: 9 INJECTION, SOLUTION INTRAVENOUS at 09:30

## 2024-11-21 RX ADMIN — CEFAZOLIN 2000 MG: 2 INJECTION, POWDER, FOR SOLUTION INTRAVENOUS at 18:00

## 2024-11-21 RX ADMIN — CLOPIDOGREL BISULFATE 75 MG: 75 TABLET ORAL at 10:59

## 2024-11-21 RX ADMIN — METOPROLOL SUCCINATE 25 MG: 25 TABLET, EXTENDED RELEASE ORAL at 07:34

## 2024-11-21 RX ADMIN — ASPIRIN 81 MG: 81 TABLET, CHEWABLE ORAL at 07:34

## 2024-11-21 RX ADMIN — IPRATROPIUM BROMIDE AND ALBUTEROL SULFATE 1 DOSE: 2.5; .5 SOLUTION RESPIRATORY (INHALATION) at 19:08

## 2024-11-21 NOTE — PLAN OF CARE
Problem: Discharge Planning  Goal: Discharge to home or other facility with appropriate resources  11/21/2024 0736 by Emilee Degroot RN  Outcome: Progressing  11/21/2024 0338 by Shandra Beaulieu RN  Outcome: Progressing  Flowsheets (Taken 11/21/2024 0338)  Discharge to home or other facility with appropriate resources:   Identify barriers to discharge with patient and caregiver   Arrange for needed discharge resources and transportation as appropriate     Problem: Pain  Goal: Verbalizes/displays adequate comfort level or baseline comfort level  11/21/2024 0736 by Emilee Degroot RN  Outcome: Progressing  11/21/2024 0338 by Shandra Beaulieu RN  Outcome: Progressing  Flowsheets (Taken 11/21/2024 0338)  Verbalizes/displays adequate comfort level or baseline comfort level:   Encourage patient to monitor pain and request assistance   Assess pain using appropriate pain scale     Problem: Safety - Adult  Goal: Free from fall injury  Outcome: Progressing     Problem: Skin/Tissue Integrity  Goal: Absence of new skin breakdown  Description: 1.  Monitor for areas of redness and/or skin breakdown  2.  Assess vascular access sites hourly  3.  Every 4-6 hours minimum:  Change oxygen saturation probe site  4.  Every 4-6 hours:  If on nasal continuous positive airway pressure, respiratory therapy assess nares and determine need for appliance change or resting period.  11/21/2024 0736 by Emilee Degroot RN  Outcome: Progressing  11/21/2024 0338 by Shandra Beaulieu RN  Outcome: Progressing  Note: Turn patient q2h.

## 2024-11-21 NOTE — PLAN OF CARE
4 Eyes Skin Assessment     NAME:  Kam Nguyen  YOB: 1965  MEDICAL RECORD NUMBER:  [id number]    The patient is being assessed for  Shift Handoff    I agree that at least one RN has performed a thorough Head to Toe Skin Assessment on the patient. ALL assessment sites listed below have been assessed.      Areas assessed by both nurses:    Head, Face, Ears, Shoulders, Back, Chest, Arms, Elbows, Hands, Sacrum. Buttock, Coccyx, Ischium, Legs. Feet and Heels, and Under Medical Devices         Does the Patient have a Wound? Yes wound(s) were present on assessment. LDA wound assessment was Initiated and completed by RN         Lux Prevention initiated by RN: No  Wound Care Orders initiated by RN: Yes    Pressure Injury (Stage 3,4, Unstageable, DTI, NWPT, and Complex wounds) if present, place Wound referral order by RN under : No    New Ostomies, if present place, Ostomy referral order under : No     Nurse 1 eSignature: Electronically signed by Shandra Beaulieu RN on 11/21/24 at 12:51 AM EST    **SHARE this note so that the co-signing nurse can place an eSignature**    Nurse 2 eSignature: Electronically signed by Marybel Child RN on 11/21/24 at 1:04 AM EST

## 2024-11-21 NOTE — CONSULTS
Inpatient Neurology consult        [facility] Neurology            Kam Nguyen  1965    Date of Service: 11/20/2024    Referring Physician: Jak Martinez*      Reason for the consult and CC: Tremors     HPI:   The patient is a 59 y.o. male with a past medical history of bipolar disorder, anxiety, CAD, COPD, degenerative disc disease, GERD, hypertension, hyperlipidemia, and alcohol abuse (patient states sober for the past 6 months) originally presenting to the hospital for evaluation of chest pain and tremors.  Patient states for the past one month he has been experiencing bilateral upper and bilateral lower extremity tremors. Patient also reports brief episodes of memory loss over the last 2-3 months described as \"blanking out\" and not being able to recall event. During admission, patient noted to have positive blood cultures with MSSA of unclear etiology, new onset atrial fibrillation with RVR, and hyponatremia with a sodium of 115 on arrival. Patient states he has not consumed any form of alcohol in the last 6 months and denies any IV drug use. Patient does report candidal esophagitis for the past 2 months but denies any recurrent strep throat. Patient also endorses 8/10 aching pain in his left thigh/groin for the last 2 months and states he is unable to walk due to the pain. Patient reports having XR imaging of his leg and states he was told it was arthritis. Neurology has been consulted for further evaluation and management of tremors.        Constitutional:   Vitals:    11/20/24 0800 11/20/24 0900 11/20/24 1002 11/20/24 1100   BP: 103/78 95/68 94/73 120/81   Pulse: (!) 124 (!) 145 (!) 132 (!) 126   Resp: 29 21 26 26   Temp:    98.2 °F (36.8 °C)   TempSrc:    Temporal   SpO2: 97% 98% 97% 98%   Weight:       Height:             I personally reviewed and updated social history, past medical history, medications, allergy, surgical history, and family history as documented in the patient's 
    [facility]   CONSULTATION  [phone]        Reason for Consultation/Chief Complaint: \"I have been having CP and SOB.\"  Consulted by Franci Restrepo NP for CP, hx of significant CAD with PCI  Last seen by cardiology 5/18/24 Dr. Caruso    History of Present Illness:    Kam Nguyen is a 59 y.o. patient who presented to [facility] 11/19/24 with c/o CP.  He has PMH HTN, HLD, CAD s/p PCI 2006, 2012, 2013, tobacco abuse, ETOH abuse, pancreatitis hx, depression/SI, and COPD. Prior testing: Echo 8/14/17 EF=55-60%. Trivial MR. C 1/30/18 mild-mod CAD all three coronary arteries. Patent stent noted within distal RCA with minimal ISR;  EF=60%. Carotid Doppler 9/24/19 <50% stenosis.  Babita Nuc 4/7/20 was normal. EGD 10/31/24 +candida esophagitis prox/mid; sliding HH 3cm; Chaudhari's; s/p balloon dilation.   Note recent admit early November for suicidal ideation and low Na+/Mg+. Late October diagnosed esophageal candidiasis. Admits to not taking meds due to trouble swallowing and pain for 3-4 weeks. Back taking meds currently. He now presents with complaints of CP yesterday relieved with NTG SL. Reported sharp left of center CP; no radiation; assoc SOB and nausea; lasted 5 minutes. Went to bed after NTG relieved it. Again this AM has similar CP and called 911. Still mild dysphagia but no odynophagia. Admission Testing:  EKG noted  bpm. CXR noted no significant findings in the chest.  LABS: , K 3.6, BUN/Cr 3/0.3, Pro-, ALT 8, AST 13, H/H 11.6/33.8 and Taye 9. Patient with no c/o palpitations, dizziness, edema, or orthopnea/PND. I have been asked to provide consultation regarding further management and testing.    Past Medical History:   has a past medical history of Affective bipolar disorder (HCC), Anxiety, Bipolar affective (HCC), CAD (coronary artery disease), Chronic back pain, Class 2 obesity due to excess calories with body mass index (BMI) of 39.0 to 39.9 in adult, COPD (chronic obstructive pulmonary 
Pulmonary & Critical Care Consultation Note    Patient is being seen at the request of Franci Restrepo APRN - CNP for a consultation for hyponatremia    HISTORY OF PRESENT ILLNESS:   59 years old presented with chest pain started 10 pm last night, 7-8/10, pressure tightness better with NTG, lasted 5 min. Second episode this am lasted 3-4 min. Uses oxygen at home-2.5L at night.  Mild cough.  No sputum production or hemoptysis.  Nasal congestion.  In ED found to be hyponatremic, sodium 115. Smoker 1/4 ppd, smoker since age of 6. Compliant with inhaled bronchodilators.     PAST MEDICAL HISTORY:  Past Medical History:   Diagnosis Date    Affective bipolar disorder (HCC) 11/18/2010    Anxiety     Bipolar affective (HCC)     CAD (coronary artery disease)     Chronic back pain     Class 2 obesity due to excess calories with body mass index (BMI) of 39.0 to 39.9 in adult 2/2/2022    COPD (chronic obstructive pulmonary disease) ([facility])     DDD (degenerative disc disease), cervical     Depression     Fatty liver 2/2/2021    GERD (gastroesophageal reflux disease)     Hyperlipidemia     Hypertension     MI (myocardial infarction) ([facility])     Pancreatitis     Recovering alcoholic ([facility])     Tobacco abuse 12/16/2016     PAST SURGICAL HISTORY:  Past Surgical History:   Procedure Laterality Date    CORONARY ANGIOPLASTY WITH STENT PLACEMENT  2006, 2012, 2013    [facility]       FAMILY HISTORY:  family history includes Arthritis in his mother; Cancer in his sister and sister; Depression in his mother; Diabetes in his brother and father; Early Death in his sister; Heart Disease in his father and sister; High Blood Pressure in his brother and sister; Learning Disabilities in his brother; Mental Illness in his mother; Mental Retardation in his brother; Other in his brother and mother; Substance Abuse in his father and sister.    SOCIAL HISTORY:   reports that he has been smoking cigarettes. He started smoking about 47 years ago. He has a 
[address]  [facility]              
ICU speciality surface    Current Diet: ADULT ORAL NUTRITION SUPPLEMENT; Breakfast, Lunch, Dinner; Standard High Calorie/High Protein Oral Supplement  Diet NPO Exceptions are: Sips of Water with Meds  ADULT DIET; Regular; No Caffeine  Dietician consult:  Yes    Discharge Plan:  Placement for patient upon discharge: To be determined TBD    Patient appropriate for Outpatient Wound Care Center: Yes    Referrals:      Patient/Caregiver Teaching:  Level of patient/caregiver understanding able to:  Indicates understanding and Verbal understanding      Electronically signed by Bety Perez RN, CWOCN on 11/21/2024 at 1:03 PM

## 2024-11-21 NOTE — ACP (ADVANCE CARE PLANNING)
Advance Care Planning     Advance Care Planning Inpatient Note  [facility] Department    Today's Date: 11/21/2024  [facility]    Received request from IDT Member.  Upon review of chart and communication with care team, patient's decision making abilities are not in question.. Patient and Healthcare Decision Maker was/were present in the room during visit.    Goals of ACP Conversation:  Facilitate a discussion related to patient's goals of care as they align with the patient's values and beliefs.    Health Care Decision Makers:       Primary Decision Maker: Annmarie Gary - Brother/Sister - [phone]    Secondary Decision Maker: Melania [place] - Domestic Partner - [phone]  Summary:  Completed New Documents  Verified Healthcare Decision Maker    Advance Care Planning Documents (Patient Wishes):  Healthcare Power of /Advance Directive Appointment of Health Care Agent     Assessment:  HC POA completed IAW patient's guidance    Interventions:  Provided education on documents for clarity and greater understanding  Assisted in the completion of documents according to patient's wishes at this time    Care Preferences Communicated:   No    Outcomes/Plan:  ACP Discussion: Completed  New advance directive completed.    Electronically signed by MARIA R Cortez on 11/21/2024 at 4:04 PM

## 2024-11-21 NOTE — PLAN OF CARE
Problem: Discharge Planning  Goal: Discharge to home or other facility with appropriate resources  Outcome: Progressing  Flowsheets (Taken 11/21/2024 0338)  Discharge to home or other facility with appropriate resources:   Identify barriers to discharge with patient and caregiver   Arrange for needed discharge resources and transportation as appropriate     Problem: Pain  Goal: Verbalizes/displays adequate comfort level or baseline comfort level  Outcome: Progressing  Flowsheets (Taken 11/21/2024 0338)  Verbalizes/displays adequate comfort level or baseline comfort level:   Encourage patient to monitor pain and request assistance   Assess pain using appropriate pain scale     Problem: Skin/Tissue Integrity  Goal: Absence of new skin breakdown  Description: 1.  Monitor for areas of redness and/or skin breakdown  2.  Assess vascular access sites hourly  3.  Every 4-6 hours minimum:  Change oxygen saturation probe site  4.  Every 4-6 hours:  If on nasal continuous positive airway pressure, respiratory therapy assess nares and determine need for appliance change or resting period.  Outcome: Progressing  Note: Turn patient q2h.

## 2024-11-21 NOTE — FLOWSHEET NOTE
11/20/24 2000   Vitals   Temp 98.5 °F (36.9 °C)   Temp Source Oral   Pulse (!) 143   Heart Rate Source Monitor   Respirations 25   /85   MAP (Calculated) 92   MAP (mmHg) 93   BP Location Right upper arm   BP Method Automatic   Patient Position Semi fowlers   Cardiac Rhythm Atrial fib     Shift assessment complete. Patient is A&O x4. He is sitting up I bed watching television with room air. Spo2  93%. Noted patient with moderate tremors. Patient states it is from his anxiety and requesting to know when his next ativan is due. Ativan ordered BID PRN. Last does was today @ 1039. Next dose can bed given at 2239 tonight and patient was updated.     Patient is able to reposition self I bed. However, assisted patient to reposition more towards the head of the bed.     External urinary catheter draining to wall suction at 40 mmgHg continuous. Patient voiding.    Call light in reach and patient has demonstrated correct use for calling for assistance. Bed in lowest position, wheels locked and rails up 3/4.    Electronically signed by Shandra Beaulieu RN on 11/20/2024 at 10:35 PM

## 2024-11-22 ENCOUNTER — APPOINTMENT (OUTPATIENT)
Age: 59
DRG: 426 | End: 2024-11-22
Attending: INTERNAL MEDICINE
Payer: COMMERCIAL

## 2024-11-22 ENCOUNTER — APPOINTMENT (OUTPATIENT)
Dept: GENERAL RADIOLOGY | Age: 59
DRG: 426 | End: 2024-11-22
Payer: COMMERCIAL

## 2024-11-22 ENCOUNTER — APPOINTMENT (OUTPATIENT)
Dept: NUCLEAR MEDICINE | Age: 59
DRG: 426 | End: 2024-11-22
Attending: INTERNAL MEDICINE
Payer: COMMERCIAL

## 2024-11-22 LAB
ANION GAP SERPL CALCULATED.3IONS-SCNC: 10 MMOL/L (ref 3–16)
BACTERIA BLD CULT ORG #2: ABNORMAL
BACTERIA BLD CULT: ABNORMAL
BACTERIA BLD CULT: ABNORMAL
BASOPHILS # BLD: 0 K/UL (ref 0–0.2)
BASOPHILS NFR BLD: 0.4 %
BUN SERPL-MCNC: 3 MG/DL (ref 7–20)
CALCIUM SERPL-MCNC: 7.6 MG/DL (ref 8.3–10.6)
CHLORIDE SERPL-SCNC: 96 MMOL/L (ref 99–110)
CO2 SERPL-SCNC: 24 MMOL/L (ref 21–32)
CREAT SERPL-MCNC: 0.4 MG/DL (ref 0.9–1.3)
DEPRECATED RDW RBC AUTO: 15.3 % (ref 12.4–15.4)
ECHO BSA: 2.04 M2
EOSINOPHIL # BLD: 0 K/UL (ref 0–0.6)
EOSINOPHIL NFR BLD: 0.4 %
ERYTHROCYTE [SEDIMENTATION RATE] IN BLOOD BY WESTERGREN METHOD: 81 MM/HR (ref 0–20)
GFR SERPLBLD CREATININE-BSD FMLA CKD-EPI: >90 ML/MIN/{1.73_M2}
GLUCOSE SERPL-MCNC: 108 MG/DL (ref 70–99)
HCT VFR BLD AUTO: 29.9 % (ref 40.5–52.5)
HGB BLD-MCNC: 10 G/DL (ref 13.5–17.5)
LYMPHOCYTES # BLD: 1.3 K/UL (ref 1–5.1)
LYMPHOCYTES NFR BLD: 15.9 %
MCH RBC QN AUTO: 27.3 PG (ref 26–34)
MCHC RBC AUTO-ENTMCNC: 33.5 G/DL (ref 31–36)
MCV RBC AUTO: 81.6 FL (ref 80–100)
MONOCYTES # BLD: 1 K/UL (ref 0–1.3)
MONOCYTES NFR BLD: 12.2 %
NEUTROPHILS # BLD: 5.9 K/UL (ref 1.7–7.7)
NEUTROPHILS NFR BLD: 71.1 %
NUC STRESS EJECTION FRACTION: 74 %
NUC STRESS LV EDV: 84 ML (ref 67–155)
NUC STRESS LV ESV: 22 ML (ref 22–58)
NUC STRESS LV MASS: 129 G
ORGANISM: ABNORMAL
PLATELET # BLD AUTO: 237 K/UL (ref 135–450)
PMV BLD AUTO: 8.4 FL (ref 5–10.5)
POTASSIUM SERPL-SCNC: 3.7 MMOL/L (ref 3.5–5.1)
RBC # BLD AUTO: 3.66 M/UL (ref 4.2–5.9)
SODIUM SERPL-SCNC: 130 MMOL/L (ref 136–145)
STRESS BASELINE DIAS BP: 96 MMHG
STRESS BASELINE HR: 96 BPM
STRESS BASELINE SYS BP: 120 MMHG
STRESS ESTIMATED WORKLOAD: 1 METS
STRESS PEAK DIAS BP: 64 MMHG
STRESS PEAK SYS BP: 129 MMHG
STRESS PERCENT HR ACHIEVED: 69 %
STRESS POST PEAK HR: 112 BPM
STRESS RATE PRESSURE PRODUCT: NORMAL BPM*MMHG
STRESS TARGET HR: 162 BPM
WBC # BLD AUTO: 8.3 K/UL (ref 4–11)

## 2024-11-22 PROCEDURE — 6370000000 HC RX 637 (ALT 250 FOR IP): Performed by: INTERNAL MEDICINE

## 2024-11-22 PROCEDURE — 36415 COLL VENOUS BLD VENIPUNCTURE: CPT

## 2024-11-22 PROCEDURE — 6360000002 HC RX W HCPCS: Performed by: INTERNAL MEDICINE

## 2024-11-22 PROCEDURE — 94669 MECHANICAL CHEST WALL OSCILL: CPT

## 2024-11-22 PROCEDURE — A9502 TC99M TETROFOSMIN: HCPCS | Performed by: INTERNAL MEDICINE

## 2024-11-22 PROCEDURE — 78452 HT MUSCLE IMAGE SPECT MULT: CPT

## 2024-11-22 PROCEDURE — 72100 X-RAY EXAM L-S SPINE 2/3 VWS: CPT

## 2024-11-22 PROCEDURE — 99233 SBSQ HOSP IP/OBS HIGH 50: CPT | Performed by: INTERNAL MEDICINE

## 2024-11-22 PROCEDURE — 85025 COMPLETE CBC W/AUTO DIFF WBC: CPT

## 2024-11-22 PROCEDURE — 3430000000 HC RX DIAGNOSTIC RADIOPHARMACEUTICAL: Performed by: INTERNAL MEDICINE

## 2024-11-22 PROCEDURE — 6370000000 HC RX 637 (ALT 250 FOR IP): Performed by: PSYCHIATRY & NEUROLOGY

## 2024-11-22 PROCEDURE — 94640 AIRWAY INHALATION TREATMENT: CPT

## 2024-11-22 PROCEDURE — 2580000003 HC RX 258: Performed by: INTERNAL MEDICINE

## 2024-11-22 PROCEDURE — 78452 HT MUSCLE IMAGE SPECT MULT: CPT | Performed by: INTERNAL MEDICINE

## 2024-11-22 PROCEDURE — 85652 RBC SED RATE AUTOMATED: CPT

## 2024-11-22 PROCEDURE — 6370000000 HC RX 637 (ALT 250 FOR IP): Performed by: NURSE PRACTITIONER

## 2024-11-22 PROCEDURE — 2060000000 HC ICU INTERMEDIATE R&B

## 2024-11-22 PROCEDURE — 99233 SBSQ HOSP IP/OBS HIGH 50: CPT | Performed by: PSYCHIATRY & NEUROLOGY

## 2024-11-22 PROCEDURE — 93017 CV STRESS TEST TRACING ONLY: CPT

## 2024-11-22 PROCEDURE — 93016 CV STRESS TEST SUPVJ ONLY: CPT | Performed by: INTERNAL MEDICINE

## 2024-11-22 PROCEDURE — 87040 BLOOD CULTURE FOR BACTERIA: CPT

## 2024-11-22 PROCEDURE — 93018 CV STRESS TEST I&R ONLY: CPT | Performed by: INTERNAL MEDICINE

## 2024-11-22 PROCEDURE — 80048 BASIC METABOLIC PNL TOTAL CA: CPT

## 2024-11-22 RX ORDER — LORAZEPAM 2 MG/ML
0.5 INJECTION INTRAMUSCULAR ONCE
Status: COMPLETED | OUTPATIENT
Start: 2024-11-22 | End: 2024-11-22

## 2024-11-22 RX ADMIN — LORAZEPAM 0.5 MG: 2 INJECTION, SOLUTION INTRAMUSCULAR; INTRAVENOUS at 14:15

## 2024-11-22 RX ADMIN — MUPIROCIN: 20 OINTMENT TOPICAL at 07:46

## 2024-11-22 RX ADMIN — CEFAZOLIN 2000 MG: 2 INJECTION, POWDER, FOR SOLUTION INTRAVENOUS at 18:04

## 2024-11-22 RX ADMIN — CLOPIDOGREL BISULFATE 75 MG: 75 TABLET ORAL at 07:43

## 2024-11-22 RX ADMIN — GUAIFENESIN 600 MG: 600 TABLET, EXTENDED RELEASE ORAL at 07:43

## 2024-11-22 RX ADMIN — REGADENOSON 0.4 MG: 0.08 INJECTION, SOLUTION INTRAVENOUS at 15:11

## 2024-11-22 RX ADMIN — IPRATROPIUM BROMIDE AND ALBUTEROL SULFATE 1 DOSE: 2.5; .5 SOLUTION RESPIRATORY (INHALATION) at 08:30

## 2024-11-22 RX ADMIN — PANTOPRAZOLE SODIUM 40 MG: 40 TABLET, DELAYED RELEASE ORAL at 07:43

## 2024-11-22 RX ADMIN — GABAPENTIN 200 MG: 100 CAPSULE ORAL at 20:39

## 2024-11-22 RX ADMIN — GABAPENTIN 200 MG: 100 CAPSULE ORAL at 07:43

## 2024-11-22 RX ADMIN — ATORVASTATIN CALCIUM 40 MG: 40 TABLET, FILM COATED ORAL at 07:43

## 2024-11-22 RX ADMIN — CARIPRAZINE 4.5 MG: 4.5 CAPSULE, GELATIN COATED ORAL at 07:42

## 2024-11-22 RX ADMIN — ENOXAPARIN SODIUM 80 MG: 100 INJECTION SUBCUTANEOUS at 20:39

## 2024-11-22 RX ADMIN — IPRATROPIUM BROMIDE AND ALBUTEROL SULFATE 1 DOSE: 2.5; .5 SOLUTION RESPIRATORY (INHALATION) at 19:43

## 2024-11-22 RX ADMIN — LORAZEPAM 0.5 MG: 2 INJECTION, SOLUTION INTRAMUSCULAR; INTRAVENOUS at 15:48

## 2024-11-22 RX ADMIN — ASPIRIN 81 MG: 81 TABLET, CHEWABLE ORAL at 07:43

## 2024-11-22 RX ADMIN — CEFAZOLIN 2000 MG: 2 INJECTION, POWDER, FOR SOLUTION INTRAVENOUS at 02:16

## 2024-11-22 RX ADMIN — Medication 100 MG: at 07:43

## 2024-11-22 RX ADMIN — METOPROLOL SUCCINATE 50 MG: 50 TABLET, EXTENDED RELEASE ORAL at 07:43

## 2024-11-22 RX ADMIN — ENOXAPARIN SODIUM 80 MG: 100 INJECTION SUBCUTANEOUS at 07:43

## 2024-11-22 RX ADMIN — TETROFOSMIN 10.4 MILLICURIE: 1.38 INJECTION, POWDER, LYOPHILIZED, FOR SOLUTION INTRAVENOUS at 14:29

## 2024-11-22 RX ADMIN — GUAIFENESIN 600 MG: 600 TABLET, EXTENDED RELEASE ORAL at 20:39

## 2024-11-22 RX ADMIN — CEFAZOLIN 2000 MG: 2 INJECTION, POWDER, FOR SOLUTION INTRAVENOUS at 11:59

## 2024-11-22 RX ADMIN — TRAZODONE HYDROCHLORIDE 50 MG: 50 TABLET ORAL at 20:39

## 2024-11-22 RX ADMIN — FOLIC ACID 1 MG: 1 TABLET ORAL at 07:43

## 2024-11-22 RX ADMIN — MUPIROCIN: 20 OINTMENT TOPICAL at 20:39

## 2024-11-22 RX ADMIN — LORAZEPAM 0.5 MG: 2 INJECTION, SOLUTION INTRAMUSCULAR; INTRAVENOUS at 08:39

## 2024-11-22 RX ADMIN — TETROFOSMIN 31 MILLICURIE: 1.38 INJECTION, POWDER, LYOPHILIZED, FOR SOLUTION INTRAVENOUS at 15:11

## 2024-11-22 NOTE — NURSING NOTE
A lexiscan stress test was completed on this patient as ordered. Awaiting stress imaging with nuclear medicine department at this time.

## 2024-11-22 NOTE — PLAN OF CARE
Problem: Pain  Goal: Verbalizes/displays adequate comfort level or baseline comfort level  Outcome: Progressing  Flowsheets (Taken 11/22/2024 0325)  Verbalizes/displays adequate comfort level or baseline comfort level:   Encourage patient to monitor pain and request assistance   Assess pain using appropriate pain scale     Problem: Safety - Adult  Goal: Free from fall injury  Outcome: Progressing  Note: Fall risk protocol in place     Problem: Skin/Tissue Integrity  Goal: Absence of new skin breakdown  Description: 1.  Monitor for areas of redness and/or skin breakdown  2.  Assess vascular access sites hourly  3.  Every 4-6 hours minimum:  Change oxygen saturation probe site  4.  Every 4-6 hours:  If on nasal continuous positive airway pressure, respiratory therapy assess nares and determine need for appliance change or resting period.  Outcome: Progressing  Note: Monitor that patient is repositioning self at least every two hours in bed

## 2024-11-22 NOTE — DISCHARGE INSTR - COC
Continuity of Care Form    Patient Name: Kam Nguyen   :  1965  MRN:  [id number]    Admit date:  2024  Discharge date:  ***    Code Status Order: Full Code   Advance Directives:   Advance Care Flowsheet Documentation             Admitting Physician:  Laureano Vora MD  PCP: Candelaria Dover APRN - NP    Discharging Nurse: ***  Discharging Hospital Unit/Room#: 3009/3009-01  Discharging Unit Phone Number: ***    Emergency Contact:   Extended Emergency Contact Information  Primary Emergency Contact: Melania [place], [place]  Home Phone: [phone]  Mobile Phone: [phone]  Relation: Domestic Partner  Secondary Emergency Contact: Annmarie [place], [place]  Home Phone: [phone]  Mobile Phone: [phone]  Relation: Brother/Sister    Past Surgical History:  Past Surgical History:   Procedure Laterality Date    CORONARY ANGIOPLASTY WITH STENT PLACEMENT  , ,     [facility]       Immunization History:   Immunization History   Administered Date(s) Administered    COVID-19, PFIZER PURPLE top, DILUTE for use, (age 12 y+), 30mcg/0.3mL 2021, 2022    Influenza Vaccine, unspecified formulation 2018    Influenza Virus Vaccine 2017, 2019    Influenza, AFLURIA (age 3 y+), FLUZONE, (age 6 mo+), Quadv MDV, 0.5mL 2016    Influenza, FLUCELVAX, (age 6 mo+), MDCK, Quadv MDV, 0.5mL 10/11/2021    Influenza, FLUCELVAX, (age 6 mo+), MDCK, Quadv PF, 0.5mL 2023    Pneumococcal, PCV20, PREVNAR 20, (age 6w+), IM, 0.5mL 2024       Active Problems:  Patient Active Problem List   Diagnosis Code    Claudication (HCC) I73.9    HTN (hypertension) I10    Hyperlipidemia E78.5    Acute chest pain R07.9    Coronary artery disease involving native coronary artery of native heart I25.10    Tobacco abuse Z72.0    Thumb sprain S63.609A    Mass of hand R22.30    Cardiac microvascular disease I25.85    Chest pain R07.9    Pneumonia

## 2024-11-22 NOTE — PLAN OF CARE
Problem: Discharge Planning  Goal: Discharge to home or other facility with appropriate resources  Outcome: Progressing     Problem: Pain  Goal: Verbalizes/displays adequate comfort level or baseline comfort level  11/22/2024 0824 by Emilee Degroot RN  Outcome: Progressing  11/22/2024 0325 by Shandra Beaulieu RN  Outcome: Progressing  Flowsheets (Taken 11/22/2024 0325)  Verbalizes/displays adequate comfort level or baseline comfort level:   Encourage patient to monitor pain and request assistance   Assess pain using appropriate pain scale     Problem: Safety - Adult  Goal: Free from fall injury  11/22/2024 0824 by Emilee Degroot RN  Outcome: Progressing  11/22/2024 0325 by Shandra Beaulieu RN  Outcome: Progressing  Note: Fall risk protocol in place     Problem: Skin/Tissue Integrity  Goal: Absence of new skin breakdown  Description: 1.  Monitor for areas of redness and/or skin breakdown  2.  Assess vascular access sites hourly  3.  Every 4-6 hours minimum:  Change oxygen saturation probe site  4.  Every 4-6 hours:  If on nasal continuous positive airway pressure, respiratory therapy assess nares and determine need for appliance change or resting period.  11/22/2024 0824 by Emilee Degroot RN  Outcome: Progressing  11/22/2024 0325 by Shandra Beaulieu RN  Outcome: Progressing  Note: Monitor that patient is repositioning self at least every two hours in bed

## 2024-11-23 PROBLEM — I25.10 CORONARY ARTERY DISEASE DUE TO LIPID RICH PLAQUE: Status: ACTIVE | Noted: 2024-11-23

## 2024-11-23 PROBLEM — I25.83 CORONARY ARTERY DISEASE DUE TO LIPID RICH PLAQUE: Status: ACTIVE | Noted: 2024-11-23

## 2024-11-23 LAB
ALBUMIN SERPL-MCNC: 2.3 G/DL (ref 3.4–5)
ALBUMIN SERPL-MCNC: 2.5 G/DL (ref 3.4–5)
ANION GAP SERPL CALCULATED.3IONS-SCNC: 10 MMOL/L (ref 3–16)
ANION GAP SERPL CALCULATED.3IONS-SCNC: 9 MMOL/L (ref 3–16)
BASOPHILS # BLD: 0 K/UL (ref 0–0.2)
BASOPHILS NFR BLD: 0.3 %
BUN SERPL-MCNC: 3 MG/DL (ref 7–20)
BUN SERPL-MCNC: 4 MG/DL (ref 7–20)
CALCIUM SERPL-MCNC: 7.4 MG/DL (ref 8.3–10.6)
CALCIUM SERPL-MCNC: 7.9 MG/DL (ref 8.3–10.6)
CHLORIDE SERPL-SCNC: 95 MMOL/L (ref 99–110)
CHLORIDE SERPL-SCNC: 96 MMOL/L (ref 99–110)
CO2 SERPL-SCNC: 24 MMOL/L (ref 21–32)
CO2 SERPL-SCNC: 26 MMOL/L (ref 21–32)
CREAT SERPL-MCNC: 0.3 MG/DL (ref 0.9–1.3)
CREAT SERPL-MCNC: 0.4 MG/DL (ref 0.9–1.3)
DEPRECATED RDW RBC AUTO: 15.4 % (ref 12.4–15.4)
EOSINOPHIL # BLD: 0.1 K/UL (ref 0–0.6)
EOSINOPHIL NFR BLD: 0.8 %
GFR SERPLBLD CREATININE-BSD FMLA CKD-EPI: >90 ML/MIN/{1.73_M2}
GFR SERPLBLD CREATININE-BSD FMLA CKD-EPI: >90 ML/MIN/{1.73_M2}
GLUCOSE SERPL-MCNC: 101 MG/DL (ref 70–99)
GLUCOSE SERPL-MCNC: 107 MG/DL (ref 70–99)
HCT VFR BLD AUTO: 30 % (ref 40.5–52.5)
HGB BLD-MCNC: 10.3 G/DL (ref 13.5–17.5)
LYMPHOCYTES # BLD: 1.6 K/UL (ref 1–5.1)
LYMPHOCYTES NFR BLD: 19.4 %
MCH RBC QN AUTO: 27.5 PG (ref 26–34)
MCHC RBC AUTO-ENTMCNC: 34.3 G/DL (ref 31–36)
MCV RBC AUTO: 80.1 FL (ref 80–100)
MONOCYTES # BLD: 1 K/UL (ref 0–1.3)
MONOCYTES NFR BLD: 11.8 %
NEUTROPHILS # BLD: 5.5 K/UL (ref 1.7–7.7)
NEUTROPHILS NFR BLD: 67.7 %
PHOSPHATE SERPL-MCNC: 2.5 MG/DL (ref 2.5–4.9)
PHOSPHATE SERPL-MCNC: 3.5 MG/DL (ref 2.5–4.9)
PLATELET # BLD AUTO: 249 K/UL (ref 135–450)
PMV BLD AUTO: 8.1 FL (ref 5–10.5)
POTASSIUM SERPL-SCNC: 3.4 MMOL/L (ref 3.5–5.1)
POTASSIUM SERPL-SCNC: 3.6 MMOL/L (ref 3.5–5.1)
RBC # BLD AUTO: 3.75 M/UL (ref 4.2–5.9)
SODIUM SERPL-SCNC: 129 MMOL/L (ref 136–145)
SODIUM SERPL-SCNC: 131 MMOL/L (ref 136–145)
WBC # BLD AUTO: 8.1 K/UL (ref 4–11)

## 2024-11-23 PROCEDURE — 99232 SBSQ HOSP IP/OBS MODERATE 35: CPT | Performed by: INTERNAL MEDICINE

## 2024-11-23 PROCEDURE — 2060000000 HC ICU INTERMEDIATE R&B

## 2024-11-23 PROCEDURE — 97530 THERAPEUTIC ACTIVITIES: CPT

## 2024-11-23 PROCEDURE — 6370000000 HC RX 637 (ALT 250 FOR IP): Performed by: INTERNAL MEDICINE

## 2024-11-23 PROCEDURE — 6360000002 HC RX W HCPCS: Performed by: INTERNAL MEDICINE

## 2024-11-23 PROCEDURE — 2580000003 HC RX 258: Performed by: INTERNAL MEDICINE

## 2024-11-23 PROCEDURE — 97166 OT EVAL MOD COMPLEX 45 MIN: CPT

## 2024-11-23 PROCEDURE — 80069 RENAL FUNCTION PANEL: CPT

## 2024-11-23 PROCEDURE — 6370000000 HC RX 637 (ALT 250 FOR IP): Performed by: PSYCHIATRY & NEUROLOGY

## 2024-11-23 PROCEDURE — 94640 AIRWAY INHALATION TREATMENT: CPT

## 2024-11-23 PROCEDURE — 36415 COLL VENOUS BLD VENIPUNCTURE: CPT

## 2024-11-23 PROCEDURE — 85025 COMPLETE CBC W/AUTO DIFF WBC: CPT

## 2024-11-23 PROCEDURE — 6370000000 HC RX 637 (ALT 250 FOR IP): Performed by: NURSE PRACTITIONER

## 2024-11-23 PROCEDURE — 94669 MECHANICAL CHEST WALL OSCILL: CPT

## 2024-11-23 PROCEDURE — 97161 PT EVAL LOW COMPLEX 20 MIN: CPT

## 2024-11-23 PROCEDURE — 2580000003 HC RX 258: Performed by: NURSE PRACTITIONER

## 2024-11-23 PROCEDURE — 99233 SBSQ HOSP IP/OBS HIGH 50: CPT | Performed by: INTERNAL MEDICINE

## 2024-11-23 RX ORDER — ASPIRIN 81 MG/1
81 TABLET ORAL DAILY
Status: DISCONTINUED | OUTPATIENT
Start: 2024-11-24 | End: 2024-11-25

## 2024-11-23 RX ORDER — GABAPENTIN 250 MG/5ML
200 SOLUTION ORAL 2 TIMES DAILY
Status: DISCONTINUED | OUTPATIENT
Start: 2024-11-23 | End: 2024-11-25 | Stop reason: HOSPADM

## 2024-11-23 RX ADMIN — MUPIROCIN: 20 OINTMENT TOPICAL at 19:47

## 2024-11-23 RX ADMIN — GUAIFENESIN 600 MG: 600 TABLET, EXTENDED RELEASE ORAL at 08:29

## 2024-11-23 RX ADMIN — CEFAZOLIN 2000 MG: 2 INJECTION, POWDER, FOR SOLUTION INTRAVENOUS at 02:44

## 2024-11-23 RX ADMIN — ENOXAPARIN SODIUM 80 MG: 100 INJECTION SUBCUTANEOUS at 19:46

## 2024-11-23 RX ADMIN — MUPIROCIN: 20 OINTMENT TOPICAL at 09:19

## 2024-11-23 RX ADMIN — LORAZEPAM 0.5 MG: 2 INJECTION, SOLUTION INTRAMUSCULAR; INTRAVENOUS at 21:34

## 2024-11-23 RX ADMIN — CEFAZOLIN 2000 MG: 2 INJECTION, POWDER, FOR SOLUTION INTRAVENOUS at 17:52

## 2024-11-23 RX ADMIN — CLOPIDOGREL BISULFATE 75 MG: 75 TABLET ORAL at 08:29

## 2024-11-23 RX ADMIN — FOLIC ACID 1 MG: 1 TABLET ORAL at 08:29

## 2024-11-23 RX ADMIN — SODIUM CHLORIDE, PRESERVATIVE FREE 10 ML: 5 INJECTION INTRAVENOUS at 08:30

## 2024-11-23 RX ADMIN — GABAPENTIN 200 MG: 250 SOLUTION ORAL at 19:46

## 2024-11-23 RX ADMIN — VASOPRESSIN: 20 INJECTION, SOLUTION INTRAVENOUS at 09:30

## 2024-11-23 RX ADMIN — Medication 100 MG: at 08:29

## 2024-11-23 RX ADMIN — CEFAZOLIN 2000 MG: 2 INJECTION, POWDER, FOR SOLUTION INTRAVENOUS at 09:43

## 2024-11-23 RX ADMIN — ENOXAPARIN SODIUM 80 MG: 100 INJECTION SUBCUTANEOUS at 08:29

## 2024-11-23 RX ADMIN — LORAZEPAM 0.5 MG: 2 INJECTION, SOLUTION INTRAMUSCULAR; INTRAVENOUS at 10:22

## 2024-11-23 RX ADMIN — ASPIRIN 81 MG: 81 TABLET, CHEWABLE ORAL at 08:29

## 2024-11-23 RX ADMIN — PANTOPRAZOLE SODIUM 40 MG: 40 TABLET, DELAYED RELEASE ORAL at 06:15

## 2024-11-23 RX ADMIN — IPRATROPIUM BROMIDE AND ALBUTEROL SULFATE 1 DOSE: 2.5; .5 SOLUTION RESPIRATORY (INHALATION) at 19:01

## 2024-11-23 RX ADMIN — GABAPENTIN 200 MG: 250 SOLUTION ORAL at 11:11

## 2024-11-23 RX ADMIN — IPRATROPIUM BROMIDE AND ALBUTEROL SULFATE 1 DOSE: 2.5; .5 SOLUTION RESPIRATORY (INHALATION) at 06:52

## 2024-11-23 RX ADMIN — TRAZODONE HYDROCHLORIDE 50 MG: 50 TABLET ORAL at 19:46

## 2024-11-23 RX ADMIN — GUAIFENESIN 600 MG: 600 TABLET, EXTENDED RELEASE ORAL at 19:47

## 2024-11-23 RX ADMIN — CARIPRAZINE 4.5 MG: 4.5 CAPSULE, GELATIN COATED ORAL at 09:19

## 2024-11-23 RX ADMIN — METOPROLOL SUCCINATE 50 MG: 50 TABLET, EXTENDED RELEASE ORAL at 08:30

## 2024-11-23 RX ADMIN — ATORVASTATIN CALCIUM 40 MG: 40 TABLET, FILM COATED ORAL at 08:29

## 2024-11-23 ASSESSMENT — PAIN DESCRIPTION - LOCATION
LOCATION: LEG

## 2024-11-23 ASSESSMENT — PAIN SCALES - GENERAL
PAINLEVEL_OUTOF10: 4
PAINLEVEL_OUTOF10: 5
PAINLEVEL_OUTOF10: 4
PAINLEVEL_OUTOF10: 5

## 2024-11-23 ASSESSMENT — PAIN DESCRIPTION - ORIENTATION
ORIENTATION: LEFT

## 2024-11-24 ENCOUNTER — APPOINTMENT (OUTPATIENT)
Dept: MRI IMAGING | Age: 59
DRG: 426 | End: 2024-11-24
Payer: COMMERCIAL

## 2024-11-24 LAB
ALBUMIN SERPL-MCNC: 2.2 G/DL (ref 3.4–5)
ANION GAP SERPL CALCULATED.3IONS-SCNC: 9 MMOL/L (ref 3–16)
BASOPHILS # BLD: 0 K/UL (ref 0–0.2)
BASOPHILS NFR BLD: 0.4 %
BUN SERPL-MCNC: 4 MG/DL (ref 7–20)
CALCIUM SERPL-MCNC: 7.5 MG/DL (ref 8.3–10.6)
CHLORIDE SERPL-SCNC: 97 MMOL/L (ref 99–110)
CO2 SERPL-SCNC: 25 MMOL/L (ref 21–32)
CREAT SERPL-MCNC: 0.3 MG/DL (ref 0.9–1.3)
DEPRECATED RDW RBC AUTO: 15.7 % (ref 12.4–15.4)
EOSINOPHIL # BLD: 0.1 K/UL (ref 0–0.6)
EOSINOPHIL NFR BLD: 1 %
GFR SERPLBLD CREATININE-BSD FMLA CKD-EPI: >90 ML/MIN/{1.73_M2}
GLUCOSE SERPL-MCNC: 94 MG/DL (ref 70–99)
HCT VFR BLD AUTO: 27.5 % (ref 40.5–52.5)
HGB BLD-MCNC: 9.3 G/DL (ref 13.5–17.5)
LYMPHOCYTES # BLD: 1.4 K/UL (ref 1–5.1)
LYMPHOCYTES NFR BLD: 18.9 %
MCH RBC QN AUTO: 27.5 PG (ref 26–34)
MCHC RBC AUTO-ENTMCNC: 34 G/DL (ref 31–36)
MCV RBC AUTO: 81 FL (ref 80–100)
MONOCYTES # BLD: 0.8 K/UL (ref 0–1.3)
MONOCYTES NFR BLD: 10.6 %
NEUTROPHILS # BLD: 5.1 K/UL (ref 1.7–7.7)
NEUTROPHILS NFR BLD: 69.1 %
PHOSPHATE SERPL-MCNC: 2.9 MG/DL (ref 2.5–4.9)
PLATELET # BLD AUTO: 223 K/UL (ref 135–450)
PMV BLD AUTO: 8.2 FL (ref 5–10.5)
POTASSIUM SERPL-SCNC: 3.3 MMOL/L (ref 3.5–5.1)
RBC # BLD AUTO: 3.4 M/UL (ref 4.2–5.9)
SODIUM SERPL-SCNC: 131 MMOL/L (ref 136–145)
URATE SERPL-MCNC: 1.5 MG/DL (ref 3.5–7.2)
WBC # BLD AUTO: 7.4 K/UL (ref 4–11)

## 2024-11-24 PROCEDURE — 72158 MRI LUMBAR SPINE W/O & W/DYE: CPT

## 2024-11-24 PROCEDURE — 6370000000 HC RX 637 (ALT 250 FOR IP): Performed by: INTERNAL MEDICINE

## 2024-11-24 PROCEDURE — 6360000002 HC RX W HCPCS: Performed by: INTERNAL MEDICINE

## 2024-11-24 PROCEDURE — 99233 SBSQ HOSP IP/OBS HIGH 50: CPT | Performed by: INTERNAL MEDICINE

## 2024-11-24 PROCEDURE — 99232 SBSQ HOSP IP/OBS MODERATE 35: CPT | Performed by: INTERNAL MEDICINE

## 2024-11-24 PROCEDURE — 36415 COLL VENOUS BLD VENIPUNCTURE: CPT

## 2024-11-24 PROCEDURE — A9579 GAD-BASE MR CONTRAST NOS,1ML: HCPCS | Performed by: INTERNAL MEDICINE

## 2024-11-24 PROCEDURE — 2580000003 HC RX 258: Performed by: NURSE PRACTITIONER

## 2024-11-24 PROCEDURE — 2580000003 HC RX 258: Performed by: INTERNAL MEDICINE

## 2024-11-24 PROCEDURE — 6370000000 HC RX 637 (ALT 250 FOR IP): Performed by: NURSE PRACTITIONER

## 2024-11-24 PROCEDURE — 85025 COMPLETE CBC W/AUTO DIFF WBC: CPT

## 2024-11-24 PROCEDURE — 80069 RENAL FUNCTION PANEL: CPT

## 2024-11-24 PROCEDURE — 72157 MRI CHEST SPINE W/O & W/DYE: CPT

## 2024-11-24 PROCEDURE — 6360000004 HC RX CONTRAST MEDICATION: Performed by: INTERNAL MEDICINE

## 2024-11-24 PROCEDURE — 94640 AIRWAY INHALATION TREATMENT: CPT

## 2024-11-24 PROCEDURE — 94669 MECHANICAL CHEST WALL OSCILL: CPT

## 2024-11-24 PROCEDURE — 2060000000 HC ICU INTERMEDIATE R&B

## 2024-11-24 PROCEDURE — 94761 N-INVAS EAR/PLS OXIMETRY MLT: CPT

## 2024-11-24 PROCEDURE — 84550 ASSAY OF BLOOD/URIC ACID: CPT

## 2024-11-24 PROCEDURE — 2500000003 HC RX 250 WO HCPCS: Performed by: INTERNAL MEDICINE

## 2024-11-24 RX ORDER — LORAZEPAM 2 MG/ML
0.5 INJECTION INTRAMUSCULAR ONCE
Status: COMPLETED | OUTPATIENT
Start: 2024-11-24 | End: 2024-11-24

## 2024-11-24 RX ORDER — METOPROLOL TARTRATE 25 MG/1
25 TABLET, FILM COATED ORAL 2 TIMES DAILY
Status: DISCONTINUED | OUTPATIENT
Start: 2024-11-24 | End: 2024-11-25 | Stop reason: HOSPADM

## 2024-11-24 RX ADMIN — ASPIRIN 81 MG: 81 TABLET, COATED ORAL at 08:07

## 2024-11-24 RX ADMIN — FOLIC ACID 1 MG: 1 TABLET ORAL at 08:10

## 2024-11-24 RX ADMIN — GABAPENTIN 200 MG: 250 SOLUTION ORAL at 21:08

## 2024-11-24 RX ADMIN — METOPROLOL TARTRATE 25 MG: 25 TABLET, FILM COATED ORAL at 21:08

## 2024-11-24 RX ADMIN — MUPIROCIN: 20 OINTMENT TOPICAL at 08:10

## 2024-11-24 RX ADMIN — ENOXAPARIN SODIUM 80 MG: 100 INJECTION SUBCUTANEOUS at 08:07

## 2024-11-24 RX ADMIN — SODIUM CHLORIDE, PRESERVATIVE FREE 10 ML: 5 INJECTION INTRAVENOUS at 21:09

## 2024-11-24 RX ADMIN — CARIPRAZINE 4.5 MG: 4.5 CAPSULE, GELATIN COATED ORAL at 08:35

## 2024-11-24 RX ADMIN — LORAZEPAM 0.5 MG: 2 INJECTION, SOLUTION INTRAMUSCULAR; INTRAVENOUS at 09:57

## 2024-11-24 RX ADMIN — IPRATROPIUM BROMIDE AND ALBUTEROL SULFATE 1 DOSE: 2.5; .5 SOLUTION RESPIRATORY (INHALATION) at 07:15

## 2024-11-24 RX ADMIN — LORAZEPAM 0.5 MG: 2 INJECTION, SOLUTION INTRAMUSCULAR; INTRAVENOUS at 22:09

## 2024-11-24 RX ADMIN — METOPROLOL TARTRATE 25 MG: 25 TABLET, FILM COATED ORAL at 08:35

## 2024-11-24 RX ADMIN — CEFAZOLIN 2000 MG: 2 INJECTION, POWDER, FOR SOLUTION INTRAVENOUS at 22:12

## 2024-11-24 RX ADMIN — POTASSIUM PHOSPHATE, MONOBASIC POTASSIUM PHOSPHATE, DIBASIC 30 MMOL: 224; 236 INJECTION, SOLUTION, CONCENTRATE INTRAVENOUS at 13:07

## 2024-11-24 RX ADMIN — LORAZEPAM 0.5 MG: 2 INJECTION, SOLUTION INTRAMUSCULAR; INTRAVENOUS at 17:54

## 2024-11-24 RX ADMIN — Medication 100 MG: at 08:35

## 2024-11-24 RX ADMIN — CEFAZOLIN 2000 MG: 2 INJECTION, POWDER, FOR SOLUTION INTRAVENOUS at 02:01

## 2024-11-24 RX ADMIN — PANTOPRAZOLE SODIUM 40 MG: 40 TABLET, DELAYED RELEASE ORAL at 06:09

## 2024-11-24 RX ADMIN — ATORVASTATIN CALCIUM 40 MG: 40 TABLET, FILM COATED ORAL at 08:07

## 2024-11-24 RX ADMIN — SODIUM CHLORIDE, PRESERVATIVE FREE 10 ML: 5 INJECTION INTRAVENOUS at 08:44

## 2024-11-24 RX ADMIN — CEFAZOLIN 2000 MG: 2 INJECTION, POWDER, FOR SOLUTION INTRAVENOUS at 09:54

## 2024-11-24 RX ADMIN — ENOXAPARIN SODIUM 80 MG: 100 INJECTION SUBCUTANEOUS at 21:08

## 2024-11-24 RX ADMIN — GABAPENTIN 200 MG: 250 SOLUTION ORAL at 08:10

## 2024-11-24 RX ADMIN — TRAZODONE HYDROCHLORIDE 50 MG: 50 TABLET ORAL at 21:08

## 2024-11-24 RX ADMIN — IPRATROPIUM BROMIDE AND ALBUTEROL SULFATE 1 DOSE: 2.5; .5 SOLUTION RESPIRATORY (INHALATION) at 21:09

## 2024-11-24 RX ADMIN — CLOPIDOGREL BISULFATE 75 MG: 75 TABLET ORAL at 08:07

## 2024-11-24 RX ADMIN — GADOTERIDOL 17 ML: 279.3 INJECTION, SOLUTION INTRAVENOUS at 19:36

## 2024-11-24 ASSESSMENT — PAIN DESCRIPTION - LOCATION: LOCATION: HIP;LEG

## 2024-11-24 ASSESSMENT — PAIN DESCRIPTION - ORIENTATION: ORIENTATION: LEFT

## 2024-11-24 ASSESSMENT — PAIN - FUNCTIONAL ASSESSMENT: PAIN_FUNCTIONAL_ASSESSMENT: PREVENTS OR INTERFERES SOME ACTIVE ACTIVITIES AND ADLS

## 2024-11-24 ASSESSMENT — PAIN SCALES - GENERAL: PAINLEVEL_OUTOF10: 5

## 2024-11-24 ASSESSMENT — PAIN DESCRIPTION - PAIN TYPE: TYPE: CHRONIC PAIN

## 2024-11-24 ASSESSMENT — PAIN DESCRIPTION - DESCRIPTORS: DESCRIPTORS: BURNING;PRESSURE

## 2024-11-24 NOTE — FLOWSHEET NOTE
11/24/24 0745   Vital Signs   Temp 98.1 °F (36.7 °C)   Temp Source Oral   Pulse 90   Heart Rate Source Monitor   Respirations 18   /71   MAP (Calculated) 88   Patient Position Semi fowlers   Oxygen Therapy   SpO2 93 %   O2 Device None (Room air)     Pt. Resting in bed. Call light in reach. Shift assessment completed see flow sheet. Denies any needs at this time. Will continue to monitor.

## 2024-11-24 NOTE — PLAN OF CARE
Problem: Pain  Goal: Verbalizes/displays adequate comfort level or baseline comfort level  11/23/2024 2044 by Teresa Quiles RN  Outcome: Progressing  11/23/2024 1032 by Kailey Todd RN  Outcome: Progressing  Flowsheets (Taken 11/23/2024 0800)  Verbalizes/displays adequate comfort level or baseline comfort level: Encourage patient to monitor pain and request assistance     Problem: Safety - Adult  Goal: Free from fall injury  11/23/2024 2044 by Teresa Quiles RN  Outcome: Progressing  11/23/2024 1032 by Kailey Todd RN  Outcome: Progressing     Problem: Skin/Tissue Integrity  Goal: Absence of new skin breakdown  Description: 1.  Monitor for areas of redness and/or skin breakdown  2.  Assess vascular access sites hourly  3.  Every 4-6 hours minimum:  Change oxygen saturation probe site  4.  Every 4-6 hours:  If on nasal continuous positive airway pressure, respiratory therapy assess nares and determine need for appliance change or resting period.  11/23/2024 2044 by Teresa Quiles RN  Outcome: Progressing  11/23/2024 1032 by Kailey Todd RN  Outcome: Progressing

## 2024-11-25 ENCOUNTER — HOSPITAL ENCOUNTER (INPATIENT)
Age: 59
LOS: 7 days | Discharge: NURSING, REHAB OR LONG-TERM CARE | DRG: 344 | End: 2024-12-02
Attending: INTERNAL MEDICINE | Admitting: INTERNAL MEDICINE
Payer: COMMERCIAL

## 2024-11-25 VITALS
RESPIRATION RATE: 18 BRPM | HEIGHT: 72 IN | DIASTOLIC BLOOD PRESSURE: 77 MMHG | SYSTOLIC BLOOD PRESSURE: 126 MMHG | WEIGHT: 168.3 LBS | BODY MASS INDEX: 22.8 KG/M2 | OXYGEN SATURATION: 96 % | HEART RATE: 83 BPM | TEMPERATURE: 97.3 F

## 2024-11-25 DIAGNOSIS — F41.9 ANXIETY: ICD-10-CM

## 2024-11-25 DIAGNOSIS — I38 ENDOCARDITIS: ICD-10-CM

## 2024-11-25 DIAGNOSIS — R78.81 MSSA BACTEREMIA: ICD-10-CM

## 2024-11-25 DIAGNOSIS — M86.9 HIP OSTEOMYELITIS, LEFT: ICD-10-CM

## 2024-11-25 DIAGNOSIS — I48.0 PAF (PAROXYSMAL ATRIAL FIBRILLATION) (HCC): ICD-10-CM

## 2024-11-25 DIAGNOSIS — R78.81 BACTEREMIA: Primary | ICD-10-CM

## 2024-11-25 DIAGNOSIS — B95.61 MSSA BACTEREMIA: ICD-10-CM

## 2024-11-25 DIAGNOSIS — E78.5 HYPERLIPIDEMIA: ICD-10-CM

## 2024-11-25 DIAGNOSIS — M00.052 STAPHYLOCOCCAL ARTHRITIS OF LEFT HIP: ICD-10-CM

## 2024-11-25 LAB
ALBUMIN SERPL-MCNC: 2.2 G/DL (ref 3.4–5)
ALBUMIN SERPL-MCNC: 2.6 G/DL (ref 3.4–5)
ALBUMIN/GLOB SERPL: 0.8 {RATIO} (ref 1.1–2.2)
ALP SERPL-CCNC: 69 U/L (ref 40–129)
ALT SERPL-CCNC: 6 U/L (ref 10–40)
ANION GAP SERPL CALCULATED.3IONS-SCNC: 10 MMOL/L (ref 3–16)
ANION GAP SERPL CALCULATED.3IONS-SCNC: 9 MMOL/L (ref 3–16)
AST SERPL-CCNC: 11 U/L (ref 15–37)
BASOPHILS # BLD: 0.1 K/UL (ref 0–0.2)
BASOPHILS # BLD: 0.1 K/UL (ref 0–0.2)
BASOPHILS NFR BLD: 0.6 %
BASOPHILS NFR BLD: 1 %
BILIRUB SERPL-MCNC: 0.3 MG/DL (ref 0–1)
BUN SERPL-MCNC: 3 MG/DL (ref 7–20)
BUN SERPL-MCNC: 4 MG/DL (ref 7–20)
CALCIUM SERPL-MCNC: 7.5 MG/DL (ref 8.3–10.6)
CALCIUM SERPL-MCNC: 8.5 MG/DL (ref 8.3–10.6)
CHLORIDE SERPL-SCNC: 94 MMOL/L (ref 99–110)
CHLORIDE SERPL-SCNC: 95 MMOL/L (ref 99–110)
CO2 SERPL-SCNC: 24 MMOL/L (ref 21–32)
CO2 SERPL-SCNC: 26 MMOL/L (ref 21–32)
CREAT SERPL-MCNC: 0.3 MG/DL (ref 0.9–1.3)
CREAT SERPL-MCNC: 0.3 MG/DL (ref 0.9–1.3)
DEPRECATED RDW RBC AUTO: 15.4 % (ref 12.4–15.4)
DEPRECATED RDW RBC AUTO: 16 % (ref 12.4–15.4)
EOSINOPHIL # BLD: 0.1 K/UL (ref 0–0.6)
EOSINOPHIL # BLD: 0.1 K/UL (ref 0–0.6)
EOSINOPHIL NFR BLD: 0.6 %
EOSINOPHIL NFR BLD: 1.5 %
GFR SERPLBLD CREATININE-BSD FMLA CKD-EPI: >90 ML/MIN/{1.73_M2}
GFR SERPLBLD CREATININE-BSD FMLA CKD-EPI: >90 ML/MIN/{1.73_M2}
GLUCOSE SERPL-MCNC: 95 MG/DL (ref 70–99)
GLUCOSE SERPL-MCNC: 96 MG/DL (ref 70–99)
HCT VFR BLD AUTO: 27.8 % (ref 40.5–52.5)
HCT VFR BLD AUTO: 28.8 % (ref 40.5–52.5)
HGB BLD-MCNC: 9.4 G/DL (ref 13.5–17.5)
HGB BLD-MCNC: 9.6 G/DL (ref 13.5–17.5)
IRON SATN MFR SERPL: 13 % (ref 20–50)
IRON SERPL-MCNC: 18 UG/DL (ref 59–158)
LYMPHOCYTES # BLD: 1.7 K/UL (ref 1–5.1)
LYMPHOCYTES # BLD: 2.2 K/UL (ref 1–5.1)
LYMPHOCYTES NFR BLD: 19.1 %
LYMPHOCYTES NFR BLD: 25.7 %
MAGNESIUM SERPL-MCNC: 1.74 MG/DL (ref 1.8–2.4)
MCH RBC QN AUTO: 27.2 PG (ref 26–34)
MCH RBC QN AUTO: 27.3 PG (ref 26–34)
MCHC RBC AUTO-ENTMCNC: 33.2 G/DL (ref 31–36)
MCHC RBC AUTO-ENTMCNC: 33.6 G/DL (ref 31–36)
MCV RBC AUTO: 81.1 FL (ref 80–100)
MCV RBC AUTO: 81.9 FL (ref 80–100)
MONOCYTES # BLD: 0.8 K/UL (ref 0–1.3)
MONOCYTES # BLD: 0.9 K/UL (ref 0–1.3)
MONOCYTES NFR BLD: 10.6 %
MONOCYTES NFR BLD: 9.2 %
NEUTROPHILS # BLD: 5.3 K/UL (ref 1.7–7.7)
NEUTROPHILS # BLD: 6.1 K/UL (ref 1.7–7.7)
NEUTROPHILS NFR BLD: 61.2 %
NEUTROPHILS NFR BLD: 70.5 %
PHOSPHATE SERPL-MCNC: 3 MG/DL (ref 2.5–4.9)
PHOSPHATE SERPL-MCNC: 3.1 MG/DL (ref 2.5–4.9)
PLATELET # BLD AUTO: 235 K/UL (ref 135–450)
PLATELET # BLD AUTO: 280 K/UL (ref 135–450)
PMV BLD AUTO: 7.8 FL (ref 5–10.5)
PMV BLD AUTO: 8.2 FL (ref 5–10.5)
POTASSIUM SERPL-SCNC: 3.3 MMOL/L (ref 3.5–5.1)
POTASSIUM SERPL-SCNC: 4.2 MMOL/L (ref 3.5–5.1)
PROT SERPL-MCNC: 5.9 G/DL (ref 6.4–8.2)
RBC # BLD AUTO: 3.43 M/UL (ref 4.2–5.9)
RBC # BLD AUTO: 3.52 M/UL (ref 4.2–5.9)
SODIUM SERPL-SCNC: 129 MMOL/L (ref 136–145)
SODIUM SERPL-SCNC: 129 MMOL/L (ref 136–145)
TIBC SERPL-MCNC: 135 UG/DL (ref 260–445)
WBC # BLD AUTO: 8.7 K/UL (ref 4–11)
WBC # BLD AUTO: 8.7 K/UL (ref 4–11)

## 2024-11-25 PROCEDURE — 6360000002 HC RX W HCPCS: Performed by: INTERNAL MEDICINE

## 2024-11-25 PROCEDURE — 6370000000 HC RX 637 (ALT 250 FOR IP): Performed by: INTERNAL MEDICINE

## 2024-11-25 PROCEDURE — 84100 ASSAY OF PHOSPHORUS: CPT

## 2024-11-25 PROCEDURE — 94640 AIRWAY INHALATION TREATMENT: CPT

## 2024-11-25 PROCEDURE — 2580000003 HC RX 258: Performed by: NURSE PRACTITIONER

## 2024-11-25 PROCEDURE — 85025 COMPLETE CBC W/AUTO DIFF WBC: CPT

## 2024-11-25 PROCEDURE — 6370000000 HC RX 637 (ALT 250 FOR IP): Performed by: NURSE PRACTITIONER

## 2024-11-25 PROCEDURE — 80069 RENAL FUNCTION PANEL: CPT

## 2024-11-25 PROCEDURE — 83550 IRON BINDING TEST: CPT

## 2024-11-25 PROCEDURE — 6360000002 HC RX W HCPCS: Performed by: NURSE PRACTITIONER

## 2024-11-25 PROCEDURE — 99232 SBSQ HOSP IP/OBS MODERATE 35: CPT

## 2024-11-25 PROCEDURE — 80053 COMPREHEN METABOLIC PANEL: CPT

## 2024-11-25 PROCEDURE — 6370000000 HC RX 637 (ALT 250 FOR IP)

## 2024-11-25 PROCEDURE — 94669 MECHANICAL CHEST WALL OSCILL: CPT

## 2024-11-25 PROCEDURE — 1200000000 HC SEMI PRIVATE

## 2024-11-25 PROCEDURE — 36415 COLL VENOUS BLD VENIPUNCTURE: CPT

## 2024-11-25 PROCEDURE — 83540 ASSAY OF IRON: CPT

## 2024-11-25 PROCEDURE — 2580000003 HC RX 258: Performed by: INTERNAL MEDICINE

## 2024-11-25 PROCEDURE — 94761 N-INVAS EAR/PLS OXIMETRY MLT: CPT

## 2024-11-25 PROCEDURE — 83735 ASSAY OF MAGNESIUM: CPT

## 2024-11-25 RX ORDER — GABAPENTIN 250 MG/5ML
300 SOLUTION ORAL 4 TIMES DAILY
Status: DISCONTINUED | OUTPATIENT
Start: 2024-11-25 | End: 2024-11-25

## 2024-11-25 RX ORDER — POLYETHYLENE GLYCOL 3350 17 G/17G
17 POWDER, FOR SOLUTION ORAL DAILY PRN
Status: DISCONTINUED | OUTPATIENT
Start: 2024-11-25 | End: 2024-12-02

## 2024-11-25 RX ORDER — RANOLAZINE 500 MG/1
1000 TABLET, EXTENDED RELEASE ORAL 2 TIMES DAILY
Status: DISCONTINUED | OUTPATIENT
Start: 2024-11-25 | End: 2024-12-02 | Stop reason: HOSPADM

## 2024-11-25 RX ORDER — NICOTINE 21 MG/24HR
1 PATCH, TRANSDERMAL 24 HOURS TRANSDERMAL DAILY
Status: DISCONTINUED | OUTPATIENT
Start: 2024-11-25 | End: 2024-12-02 | Stop reason: HOSPADM

## 2024-11-25 RX ORDER — OXYCODONE AND ACETAMINOPHEN 5; 325 MG/1; MG/1
1 TABLET ORAL EVERY 4 HOURS PRN
Status: DISCONTINUED | OUTPATIENT
Start: 2024-11-25 | End: 2024-11-29

## 2024-11-25 RX ORDER — ACETAMINOPHEN 650 MG/1
650 SUPPOSITORY RECTAL EVERY 6 HOURS PRN
Status: DISCONTINUED | OUTPATIENT
Start: 2024-11-25 | End: 2024-11-30

## 2024-11-25 RX ORDER — SODIUM CHLORIDE 0.9 % (FLUSH) 0.9 %
5-40 SYRINGE (ML) INJECTION EVERY 12 HOURS SCHEDULED
Status: DISCONTINUED | OUTPATIENT
Start: 2024-11-25 | End: 2024-12-02 | Stop reason: HOSPADM

## 2024-11-25 RX ORDER — GABAPENTIN 250 MG/5ML
250 SOLUTION ORAL EVERY 12 HOURS SCHEDULED
Status: DISCONTINUED | OUTPATIENT
Start: 2024-11-26 | End: 2024-11-26 | Stop reason: SDUPTHER

## 2024-11-25 RX ORDER — ALBUTEROL SULFATE 90 UG/1
2 INHALANT RESPIRATORY (INHALATION) EVERY 4 HOURS PRN
Status: DISCONTINUED | OUTPATIENT
Start: 2024-11-25 | End: 2024-11-28

## 2024-11-25 RX ORDER — SODIUM CHLORIDE 0.9 % (FLUSH) 0.9 %
5-40 SYRINGE (ML) INJECTION PRN
Status: DISCONTINUED | OUTPATIENT
Start: 2024-11-25 | End: 2024-12-02 | Stop reason: HOSPADM

## 2024-11-25 RX ORDER — ACETAMINOPHEN 325 MG/1
650 TABLET ORAL EVERY 6 HOURS PRN
Status: DISCONTINUED | OUTPATIENT
Start: 2024-11-25 | End: 2024-11-30

## 2024-11-25 RX ORDER — METOPROLOL SUCCINATE 50 MG/1
100 TABLET, EXTENDED RELEASE ORAL DAILY
Status: DISCONTINUED | OUTPATIENT
Start: 2024-11-26 | End: 2024-11-27

## 2024-11-25 RX ORDER — SODIUM CHLORIDE 9 MG/ML
INJECTION, SOLUTION INTRAVENOUS CONTINUOUS
Status: DISCONTINUED | OUTPATIENT
Start: 2024-11-25 | End: 2024-11-25

## 2024-11-25 RX ORDER — MORPHINE SULFATE 4 MG/ML
4 INJECTION, SOLUTION INTRAMUSCULAR; INTRAVENOUS EVERY 4 HOURS PRN
Status: DISCONTINUED | OUTPATIENT
Start: 2024-11-25 | End: 2024-11-26

## 2024-11-25 RX ORDER — IPRATROPIUM BROMIDE AND ALBUTEROL SULFATE 2.5; .5 MG/3ML; MG/3ML
1 SOLUTION RESPIRATORY (INHALATION) EVERY 4 HOURS PRN
Status: DISCONTINUED | OUTPATIENT
Start: 2024-11-25 | End: 2024-12-02 | Stop reason: HOSPADM

## 2024-11-25 RX ORDER — POTASSIUM CHLORIDE 1500 MG/1
20 TABLET, EXTENDED RELEASE ORAL 2 TIMES DAILY
Status: DISCONTINUED | OUTPATIENT
Start: 2024-11-25 | End: 2024-11-25 | Stop reason: SDUPTHER

## 2024-11-25 RX ORDER — GABAPENTIN 300 MG/1
300 CAPSULE ORAL 4 TIMES DAILY
Status: DISCONTINUED | OUTPATIENT
Start: 2024-11-25 | End: 2024-11-25

## 2024-11-25 RX ORDER — PROCHLORPERAZINE EDISYLATE 5 MG/ML
10 INJECTION INTRAMUSCULAR; INTRAVENOUS EVERY 6 HOURS PRN
Status: DISCONTINUED | OUTPATIENT
Start: 2024-11-25 | End: 2024-12-02 | Stop reason: HOSPADM

## 2024-11-25 RX ORDER — OXYCODONE AND ACETAMINOPHEN 5; 325 MG/1; MG/1
1 TABLET ORAL EVERY 8 HOURS PRN
Status: DISCONTINUED | OUTPATIENT
Start: 2024-11-25 | End: 2024-11-25 | Stop reason: HOSPADM

## 2024-11-25 RX ORDER — LORAZEPAM 0.5 MG/1
0.5 TABLET ORAL EVERY 12 HOURS
Status: DISCONTINUED | OUTPATIENT
Start: 2024-11-25 | End: 2024-12-02 | Stop reason: HOSPADM

## 2024-11-25 RX ORDER — LANOLIN ALCOHOL/MO/W.PET/CERES
100 CREAM (GRAM) TOPICAL DAILY
Status: DISCONTINUED | OUTPATIENT
Start: 2024-11-26 | End: 2024-12-02 | Stop reason: HOSPADM

## 2024-11-25 RX ORDER — PANTOPRAZOLE SODIUM 40 MG/1
40 TABLET, DELAYED RELEASE ORAL
Status: DISCONTINUED | OUTPATIENT
Start: 2024-11-26 | End: 2024-12-02 | Stop reason: HOSPADM

## 2024-11-25 RX ORDER — BUDESONIDE AND FORMOTEROL FUMARATE DIHYDRATE 160; 4.5 UG/1; UG/1
2 AEROSOL RESPIRATORY (INHALATION) 2 TIMES DAILY
Status: DISCONTINUED | OUTPATIENT
Start: 2024-11-25 | End: 2024-12-02

## 2024-11-25 RX ORDER — SODIUM CHLORIDE 9 MG/ML
INJECTION, SOLUTION INTRAVENOUS PRN
Status: DISCONTINUED | OUTPATIENT
Start: 2024-11-25 | End: 2024-12-02 | Stop reason: HOSPADM

## 2024-11-25 RX ORDER — CETIRIZINE HYDROCHLORIDE 10 MG/1
10 TABLET ORAL DAILY
Status: DISCONTINUED | OUTPATIENT
Start: 2024-11-26 | End: 2024-12-02 | Stop reason: HOSPADM

## 2024-11-25 RX ORDER — OXYCODONE AND ACETAMINOPHEN 5; 325 MG/1; MG/1
1 TABLET ORAL ONCE
Status: COMPLETED | OUTPATIENT
Start: 2024-11-25 | End: 2024-11-25

## 2024-11-25 RX ORDER — ATORVASTATIN CALCIUM 40 MG/1
40 TABLET, FILM COATED ORAL DAILY
Status: DISCONTINUED | OUTPATIENT
Start: 2024-11-26 | End: 2024-12-02 | Stop reason: HOSPADM

## 2024-11-25 RX ADMIN — POTASSIUM BICARBONATE 20 MEQ: 782 TABLET, EFFERVESCENT ORAL at 12:49

## 2024-11-25 RX ADMIN — ASPIRIN 81 MG: 81 TABLET, COATED ORAL at 08:36

## 2024-11-25 RX ADMIN — SODIUM CHLORIDE, PRESERVATIVE FREE 10 ML: 5 INJECTION INTRAVENOUS at 08:48

## 2024-11-25 RX ADMIN — CEFAZOLIN 2000 MG: 2 INJECTION, POWDER, FOR SOLUTION INTRAVENOUS at 12:36

## 2024-11-25 RX ADMIN — SODIUM CHLORIDE, PRESERVATIVE FREE 10 ML: 5 INJECTION INTRAVENOUS at 20:03

## 2024-11-25 RX ADMIN — Medication 100 MG: at 08:36

## 2024-11-25 RX ADMIN — IPRATROPIUM BROMIDE AND ALBUTEROL SULFATE 1 DOSE: 2.5; .5 SOLUTION RESPIRATORY (INHALATION) at 08:17

## 2024-11-25 RX ADMIN — GABAPENTIN 200 MG: 250 SOLUTION ORAL at 08:44

## 2024-11-25 RX ADMIN — METOPROLOL TARTRATE 25 MG: 25 TABLET, FILM COATED ORAL at 08:36

## 2024-11-25 RX ADMIN — CEFAZOLIN 2000 MG: 2 INJECTION, POWDER, FOR SOLUTION INTRAVENOUS at 22:22

## 2024-11-25 RX ADMIN — GABAPENTIN 300 MG: 300 CAPSULE ORAL at 22:24

## 2024-11-25 RX ADMIN — ENOXAPARIN SODIUM 80 MG: 100 INJECTION SUBCUTANEOUS at 08:36

## 2024-11-25 RX ADMIN — LORAZEPAM 0.5 MG: 2 INJECTION, SOLUTION INTRAMUSCULAR; INTRAVENOUS at 08:37

## 2024-11-25 RX ADMIN — LORAZEPAM 0.5 MG: 2 INJECTION, SOLUTION INTRAMUSCULAR; INTRAVENOUS at 20:01

## 2024-11-25 RX ADMIN — OXYCODONE HYDROCHLORIDE AND ACETAMINOPHEN 1 TABLET: 5; 325 TABLET ORAL at 15:30

## 2024-11-25 RX ADMIN — ATORVASTATIN CALCIUM 40 MG: 40 TABLET, FILM COATED ORAL at 08:36

## 2024-11-25 RX ADMIN — SODIUM CHLORIDE: 9 INJECTION, SOLUTION INTRAVENOUS at 22:20

## 2024-11-25 RX ADMIN — GUAIFENESIN 600 MG: 600 TABLET, EXTENDED RELEASE ORAL at 08:36

## 2024-11-25 RX ADMIN — METOPROLOL TARTRATE 25 MG: 25 TABLET, FILM COATED ORAL at 20:14

## 2024-11-25 RX ADMIN — CLOPIDOGREL BISULFATE 75 MG: 75 TABLET ORAL at 08:36

## 2024-11-25 RX ADMIN — FOLIC ACID 1 MG: 1 TABLET ORAL at 08:45

## 2024-11-25 RX ADMIN — CEFAZOLIN 2000 MG: 2 INJECTION, POWDER, FOR SOLUTION INTRAVENOUS at 05:04

## 2024-11-25 RX ADMIN — TRAZODONE HYDROCHLORIDE 50 MG: 50 TABLET ORAL at 20:03

## 2024-11-25 RX ADMIN — SODIUM CHLORIDE, PRESERVATIVE FREE 10 ML: 5 INJECTION INTRAVENOUS at 22:19

## 2024-11-25 RX ADMIN — POTASSIUM BICARBONATE 20 MEQ: 782 TABLET, EFFERVESCENT ORAL at 20:03

## 2024-11-25 RX ADMIN — OXYCODONE AND ACETAMINOPHEN 1 TABLET: 5; 325 TABLET ORAL at 22:24

## 2024-11-25 RX ADMIN — CARIPRAZINE 4.5 MG: 4.5 CAPSULE, GELATIN COATED ORAL at 09:43

## 2024-11-25 RX ADMIN — GUAIFENESIN 600 MG: 600 TABLET, EXTENDED RELEASE ORAL at 20:03

## 2024-11-25 RX ADMIN — OXYCODONE HYDROCHLORIDE AND ACETAMINOPHEN 1 TABLET: 5; 325 TABLET ORAL at 08:36

## 2024-11-25 RX ADMIN — MORPHINE SULFATE 4 MG: 4 INJECTION, SOLUTION INTRAMUSCULAR; INTRAVENOUS at 23:50

## 2024-11-25 ASSESSMENT — PAIN SCALES - GENERAL
PAINLEVEL_OUTOF10: 7
PAINLEVEL_OUTOF10: 6
PAINLEVEL_OUTOF10: 7
PAINLEVEL_OUTOF10: 6
PAINLEVEL_OUTOF10: 5
PAINLEVEL_OUTOF10: 7
PAINLEVEL_OUTOF10: 4
PAINLEVEL_OUTOF10: 7

## 2024-11-25 ASSESSMENT — PAIN DESCRIPTION - LOCATION
LOCATION: ABDOMEN;LEG
LOCATION: BACK
LOCATION: BACK
LOCATION: BACK;HIP

## 2024-11-25 ASSESSMENT — PAIN DESCRIPTION - DESCRIPTORS
DESCRIPTORS: ACHING

## 2024-11-25 ASSESSMENT — PAIN DESCRIPTION - ONSET: ONSET: PROGRESSIVE

## 2024-11-25 ASSESSMENT — PAIN DESCRIPTION - PAIN TYPE: TYPE: CHRONIC PAIN

## 2024-11-25 ASSESSMENT — PAIN DESCRIPTION - ORIENTATION
ORIENTATION: LEFT
ORIENTATION: RIGHT;LEFT
ORIENTATION: LEFT
ORIENTATION: LEFT

## 2024-11-25 ASSESSMENT — PAIN DESCRIPTION - FREQUENCY: FREQUENCY: CONTINUOUS

## 2024-11-25 NOTE — CARE COORDINATION
INTERDISCIPLINARY PLAN OF CARE CONFERENCE    Date/Time: 11/22/2024 3:31 PM  Completed by: Jennifer Kerns RN, Case Management      Patient Name:  Kam Nguyen  YOB: 1965  Admitting Diagnosis: Hyponatremia [E87.1]  Chest pain, unspecified type [R07.9]     Admit Date/Time:  11/19/2024 10:39 AM    Chart reviewed. Interdisciplinary team contacted or reviewed plan related to patient progress and discharge plans.   Disciplines included Case Management, Nursing, and Dietitian.    Current Status:Stable  PT/OT recommendation for discharge plan of care: Pending    Expected D/C Disposition:  Rehab  Confirmed plan with patient and/or family Yes confirmed with: (name) S.O.  Met with:patient and S.O.  Discharge Plan Comments: Reviewed chart and met with pt and S.O. Role of CM explained. [facility] plan is to go to [facility] for rehab. [facility] has been working with T prior to admit as pt becoming weaker at home . Spoke with Александр at [facility] who confirms has been working with pt. [facility] has \"tripped \" for level 2 \" review and will start pre cert once PASRR results completed and therapy notes available. Per Александр [facility] will reach out to pt to complete PASRR. Per Александр he will call [facility] and let them know pt in hospital.  Will follow and await pre cert completion    Home O2 in place on admit: Yes  Pt informed of need to bring portable home O2 tank on day of discharge for nursing to connect prior to leaving:  Yes  Verbalized agreement/Understanding:  Yes   
Patient plans to discharge to home with significant other. Significant other provides all care.     Patient active with [facility] for SN/OT/PT.    
Writer left message for Bety with [facility] r/t pre-cert and PASRR and to let her know about dc orders for today. Awaiting call back from Bety. CM following.    Per Bety with [facility] pre-cert is still pending and she is aware of plan for pt to transfer to [facility]. Per Bety she has received pt's Level 2 PASRR. CM following.  
goals of Hyponatremia [E87.1]  Chest pain, unspecified type [R07.9]    IF APPLICABLE: The Patient and/or patient representative Kam and his family were provided with a choice of provider and agrees with the discharge plan. Freedom of choice list with basic dialogue that supports the patient's individualized plan of care/goals and shares the quality data associated with the providers was provided to:     Patient Representative Name:       The Patient and/or Patient Representative Agree with the Discharge Plan?      Teresa Boeck, RN  Case Management Department  Ph: [phone]

## 2024-11-25 NOTE — PLAN OF CARE
Problem: Discharge Planning  Goal: Discharge to home or other facility with appropriate resources  11/25/2024 1000 by Norma Parra, RN  Outcome: Progressing     Problem: Pain  Goal: Verbalizes/displays adequate comfort level or baseline comfort level  11/25/2024 1000 by Norma Parra RN  Outcome: Progressing     Problem: Safety - Adult  Goal: Free from fall injury  11/25/2024 1000 by Norma Parra, RN  Outcome: Progressing     Problem: Skin/Tissue Integrity  Goal: Absence of new skin breakdown  Description: 1.  Monitor for areas of redness and/or skin breakdown  2.  Assess vascular access sites hourly  3.  Every 4-6 hours minimum:  Change oxygen saturation probe site  4.  Every 4-6 hours:  If on nasal continuous positive airway pressure, respiratory therapy assess nares and determine need for appliance change or resting period.  11/25/2024 1000 by Norma Parra, RN  Outcome: Progressing     Problem: Pain  Goal: Verbalizes/displays adequate comfort level or baseline comfort level  11/25/2024 1000 by Norma Parra RN  Outcome: Progressing  11/24/2024 2241 by Jessica Carmichael RN  Outcome: Not Progressing

## 2024-11-25 NOTE — DISCHARGE SUMMARY
Discharged in stable condition to [facility]     Follow Up:  Follow up with physician at [facility]     DEMETRI Aguilar  11/25/24  8:43 AM

## 2024-11-26 ENCOUNTER — APPOINTMENT (OUTPATIENT)
Dept: CT IMAGING | Age: 59
DRG: 344 | End: 2024-11-26
Attending: INTERNAL MEDICINE
Payer: COMMERCIAL

## 2024-11-26 PROBLEM — E44.0 MODERATE MALNUTRITION (HCC): Status: ACTIVE | Noted: 2024-11-26

## 2024-11-26 PROBLEM — I48.0 PAF (PAROXYSMAL ATRIAL FIBRILLATION) (HCC): Status: ACTIVE | Noted: 2024-11-20

## 2024-11-26 LAB
ANION GAP SERPL CALCULATED.3IONS-SCNC: 8 MMOL/L (ref 3–16)
BACTERIA BLD CULT ORG #2: NORMAL
BACTERIA BLD CULT: NORMAL
BASOPHILS # BLD: 0.1 K/UL (ref 0–0.2)
BASOPHILS NFR BLD: 0.6 %
BUN SERPL-MCNC: 4 MG/DL (ref 7–20)
CALCIUM SERPL-MCNC: 8.3 MG/DL (ref 8.3–10.6)
CHLORIDE SERPL-SCNC: 95 MMOL/L (ref 99–110)
CO2 SERPL-SCNC: 26 MMOL/L (ref 21–32)
CREAT SERPL-MCNC: 0.3 MG/DL (ref 0.9–1.3)
DEPRECATED RDW RBC AUTO: 15.6 % (ref 12.4–15.4)
EOSINOPHIL # BLD: 0.1 K/UL (ref 0–0.6)
EOSINOPHIL NFR BLD: 1.5 %
GFR SERPLBLD CREATININE-BSD FMLA CKD-EPI: >90 ML/MIN/{1.73_M2}
GLUCOSE SERPL-MCNC: 93 MG/DL (ref 70–99)
HCT VFR BLD AUTO: 29.2 % (ref 40.5–52.5)
HGB BLD-MCNC: 9.6 G/DL (ref 13.5–17.5)
LYMPHOCYTES # BLD: 1.5 K/UL (ref 1–5.1)
LYMPHOCYTES NFR BLD: 18.6 %
MCH RBC QN AUTO: 26.8 PG (ref 26–34)
MCHC RBC AUTO-ENTMCNC: 32.8 G/DL (ref 31–36)
MCV RBC AUTO: 81.8 FL (ref 80–100)
MONOCYTES # BLD: 0.8 K/UL (ref 0–1.3)
MONOCYTES NFR BLD: 9.8 %
NEUTROPHILS # BLD: 5.5 K/UL (ref 1.7–7.7)
NEUTROPHILS NFR BLD: 69.5 %
PLATELET # BLD AUTO: 261 K/UL (ref 135–450)
PMV BLD AUTO: 7.6 FL (ref 5–10.5)
POTASSIUM SERPL-SCNC: 3.9 MMOL/L (ref 3.5–5.1)
RBC # BLD AUTO: 3.56 M/UL (ref 4.2–5.9)
SODIUM SERPL-SCNC: 129 MMOL/L (ref 136–145)
T4 FREE SERPL-MCNC: 1 NG/DL (ref 0.9–1.8)
TSH SERPL DL<=0.005 MIU/L-ACNC: 2.29 UIU/ML (ref 0.27–4.2)
WBC # BLD AUTO: 8 K/UL (ref 4–11)

## 2024-11-26 PROCEDURE — 99223 1ST HOSP IP/OBS HIGH 75: CPT | Performed by: INTERNAL MEDICINE

## 2024-11-26 PROCEDURE — 84443 ASSAY THYROID STIM HORMONE: CPT

## 2024-11-26 PROCEDURE — 2580000003 HC RX 258: Performed by: NURSE PRACTITIONER

## 2024-11-26 PROCEDURE — 84439 ASSAY OF FREE THYROXINE: CPT

## 2024-11-26 PROCEDURE — 6360000002 HC RX W HCPCS: Performed by: NURSE PRACTITIONER

## 2024-11-26 PROCEDURE — 36415 COLL VENOUS BLD VENIPUNCTURE: CPT

## 2024-11-26 PROCEDURE — APPNB60 APP NON BILLABLE TIME 46-60 MINS: Performed by: CLINICAL NURSE SPECIALIST

## 2024-11-26 PROCEDURE — 80048 BASIC METABOLIC PNL TOTAL CA: CPT

## 2024-11-26 PROCEDURE — 1200000000 HC SEMI PRIVATE

## 2024-11-26 PROCEDURE — 6370000000 HC RX 637 (ALT 250 FOR IP): Performed by: NURSE PRACTITIONER

## 2024-11-26 PROCEDURE — 85025 COMPLETE CBC W/AUTO DIFF WBC: CPT

## 2024-11-26 PROCEDURE — 99254 IP/OBS CNSLTJ NEW/EST MOD 60: CPT | Performed by: SURGERY

## 2024-11-26 PROCEDURE — 71260 CT THORAX DX C+: CPT

## 2024-11-26 PROCEDURE — 94640 AIRWAY INHALATION TREATMENT: CPT

## 2024-11-26 PROCEDURE — 6370000000 HC RX 637 (ALT 250 FOR IP): Performed by: INTERNAL MEDICINE

## 2024-11-26 PROCEDURE — 6360000004 HC RX CONTRAST MEDICATION: Performed by: CLINICAL NURSE SPECIALIST

## 2024-11-26 PROCEDURE — 74177 CT ABD & PELVIS W/CONTRAST: CPT

## 2024-11-26 RX ORDER — HONEY 100 %
PASTE (ML) TOPICAL DAILY
Status: DISCONTINUED | OUTPATIENT
Start: 2024-11-26 | End: 2024-12-02 | Stop reason: HOSPADM

## 2024-11-26 RX ORDER — IOPAMIDOL 755 MG/ML
75 INJECTION, SOLUTION INTRAVASCULAR
Status: COMPLETED | OUTPATIENT
Start: 2024-11-26 | End: 2024-11-26

## 2024-11-26 RX ORDER — GABAPENTIN 250 MG/5ML
250 SOLUTION ORAL EVERY 12 HOURS SCHEDULED
Status: DISCONTINUED | OUTPATIENT
Start: 2024-11-26 | End: 2024-11-30

## 2024-11-26 RX ADMIN — OXYCODONE AND ACETAMINOPHEN 1 TABLET: 5; 325 TABLET ORAL at 20:52

## 2024-11-26 RX ADMIN — Medication 2 PUFF: at 19:12

## 2024-11-26 RX ADMIN — CEFAZOLIN 2000 MG: 2 INJECTION, POWDER, FOR SOLUTION INTRAVENOUS at 06:04

## 2024-11-26 RX ADMIN — GABAPENTIN 250 MG: 250 SOLUTION ORAL at 20:53

## 2024-11-26 RX ADMIN — IOPAMIDOL 75 ML: 755 INJECTION, SOLUTION INTRAVENOUS at 14:51

## 2024-11-26 RX ADMIN — MORPHINE SULFATE 4 MG: 4 INJECTION, SOLUTION INTRAMUSCULAR; INTRAVENOUS at 06:04

## 2024-11-26 RX ADMIN — Medication 2 PUFF: at 08:47

## 2024-11-26 RX ADMIN — LORAZEPAM 0.5 MG: 0.5 TABLET ORAL at 12:42

## 2024-11-26 RX ADMIN — MORPHINE SULFATE 4 MG: 4 INJECTION, SOLUTION INTRAMUSCULAR; INTRAVENOUS at 09:37

## 2024-11-26 RX ADMIN — SODIUM CHLORIDE, PRESERVATIVE FREE 10 ML: 5 INJECTION INTRAVENOUS at 20:53

## 2024-11-26 RX ADMIN — Medication 2 PUFF: at 00:00

## 2024-11-26 RX ADMIN — LORAZEPAM 0.5 MG: 0.5 TABLET ORAL at 20:52

## 2024-11-26 RX ADMIN — CEFAZOLIN 2000 MG: 2 INJECTION, POWDER, FOR SOLUTION INTRAVENOUS at 20:57

## 2024-11-26 RX ADMIN — OXYCODONE AND ACETAMINOPHEN 1 TABLET: 5; 325 TABLET ORAL at 12:42

## 2024-11-26 RX ADMIN — CEFAZOLIN 2000 MG: 2 INJECTION, POWDER, FOR SOLUTION INTRAVENOUS at 15:25

## 2024-11-26 RX ADMIN — OXYCODONE AND ACETAMINOPHEN 1 TABLET: 5; 325 TABLET ORAL at 16:45

## 2024-11-26 RX ADMIN — Medication: at 16:45

## 2024-11-26 ASSESSMENT — PAIN DESCRIPTION - DESCRIPTORS
DESCRIPTORS: ACHING

## 2024-11-26 ASSESSMENT — PAIN DESCRIPTION - FREQUENCY
FREQUENCY: CONTINUOUS

## 2024-11-26 ASSESSMENT — PAIN SCALES - GENERAL
PAINLEVEL_OUTOF10: 7
PAINLEVEL_OUTOF10: 5
PAINLEVEL_OUTOF10: 7
PAINLEVEL_OUTOF10: 9
PAINLEVEL_OUTOF10: 6
PAINLEVEL_OUTOF10: 6
PAINLEVEL_OUTOF10: 7

## 2024-11-26 ASSESSMENT — PAIN DESCRIPTION - LOCATION
LOCATION: ABDOMEN

## 2024-11-26 ASSESSMENT — PAIN DESCRIPTION - PAIN TYPE
TYPE: CHRONIC PAIN

## 2024-11-26 ASSESSMENT — PAIN DESCRIPTION - ORIENTATION
ORIENTATION: LOWER

## 2024-11-26 ASSESSMENT — PAIN DESCRIPTION - ONSET: ONSET: PROGRESSIVE

## 2024-11-26 NOTE — PROGRESS NOTES
[facility]  Nephrology Follow up Note           Reason for Consult: Hyponatremia  Requesting Physician:  Dr. Restrepo    Sub/interval history    No acute events overnight  Sodium up to 131  Labs normal   Kidney function normal with good urine output     ROS: No chest pain/shortness of breath/fever/nausea/vomiting  PSFH: no visitor    Scheduled Meds:   metoprolol succinate  50 mg Oral Daily    enoxaparin  1 mg/kg SubCUTAneous Q12H    clopidogrel  75 mg Oral Daily    ceFAZolin  2,000 mg IntraVENous Q8H    thiamine  100 mg Oral Daily    folic acid  1 mg Oral Daily    gabapentin  200 mg Oral BID    sodium chloride flush  5-40 mL IntraVENous 2 times per day    traZODone  50 mg Oral Nightly    aspirin  81 mg Oral Daily    pantoprazole  40 mg Oral QAM AC    cariprazine hcl  4.5 mg Oral Daily    mupirocin   Each Nostril BID    nicotine  1 patch TransDERmal Daily    atorvastatin  40 mg Oral Daily    guaiFENesin  600 mg Oral BID    ipratropium 0.5 mg-albuterol 2.5 mg  1 Dose Inhalation BID     Continuous Infusions:   sodium chloride 100 mEq in sodium chloride 0.9 % 1,000 mL infusion      sodium chloride       PRN Meds:.ipratropium 0.5 mg-albuterol 2.5 mg, metoprolol, sodium chloride flush, sodium chloride, potassium chloride **OR** potassium chloride, magnesium sulfate, ondansetron **OR** ondansetron, polyethylene glycol, acetaminophen **OR** acetaminophen, LORazepam, perflutren lipid microspheres      History of Present Illness on 11/19/2024:    59 y.o. yo male with PMH of bipolar disorder, COPD, GERD, hypertension, hyperlipidemia, alcohol abuse who is admitted for chest pain and hyponatremia  Patient presented to the emergency room with complaints of chest pain and shortness of breath and was found to have sodium of 115 when nephrology was consulted.  Patient recently had oral thrush on his mouth and has not been eating much.  He is drinking about 160 ounces of water every day  He reports that he has stopped drinking 
    [facility]  [facility]ph[facility] Follow [facility]           Reason for Consult: Hyponatremia  Requesting Physician:  Dr. Restrepo    Sub/interval history  Feels bit better than yesterday  Na came up fast and d5w was started overnight which has been stopped     Last 24 h uop 2.4 l    ROS: No chest pain/shortness of breath/fever/nausea/vomiting  PSFH: No visitor    Scheduled Meds:   metoprolol        metoprolol        enoxaparin  1 mg/kg SubCUTAneous Q12H    digoxin  250 mcg IntraVENous Q6H    ceFAZolin  2,000 mg IntraVENous Q8H    thiamine  100 mg Oral Daily    folic acid  1 mg Oral Daily    LORazepam  1 mg IntraVENous Once    gabapentin  200 mg Oral BID    sodium chloride flush  5-40 mL IntraVENous 2 times per day    metoprolol succinate  25 mg Oral Daily    traZODone  50 mg Oral Nightly    aspirin  81 mg Oral Daily    pantoprazole  40 mg Oral QAM AC    cariprazine hcl  4.5 mg Oral Daily    mupirocin   Each Nostril BID    nicotine  1 patch TransDERmal Daily    atorvastatin  40 mg Oral Daily    guaiFENesin  600 mg Oral BID    ipratropium 0.5 mg-albuterol 2.5 mg  1 Dose Inhalation BID     Continuous Infusions:   dilTIAZem 125 mg in sodium chloride 0.9 % 125 mL infusion 12.5 mg/hr (11/20/24 1453)    sodium chloride 50 mL/hr at 11/20/24 1453    sodium chloride       PRN Meds:.metoprolol, metoprolol, metoprolol, sodium chloride flush, sodium chloride, potassium chloride **OR** potassium chloride, magnesium sulfate, ondansetron **OR** ondansetron, polyethylene glycol, acetaminophen **OR** acetaminophen, LORazepam, perflutren lipid microspheres      History of Present Illness on 11/19/2024:    59 y.o. yo male with PMH of bipolar disorder, COPD, GERD, hypertension, hyperlipidemia, alcohol abuse who is admitted for chest pain and hyponatremia  Patient presented to the emergency room with complaints of chest pain and shortness of breath and was found to have sodium of 115 when nephrology was consulted.  Patient recently had oral thrush 
    [facility]  Neph[facility] Follow up [facility]           Reason for Consult: Hyponatremia  Requesting Physician:  Dr. Restrepo    Sub/interval history  Getting stress test  On 11/21 received tolvaptan 7.5 mg p.o. x 1 due to sodium ranging between 122-124 after which it has improved to 129    Last 24 h uop 4 l    ROS: No chest pain/shortness of breath/fever/nausea/vomiting  PSFH: + visitor    Scheduled Meds:   metoprolol succinate  50 mg Oral Daily    enoxaparin  1 mg/kg SubCUTAneous Q12H    clopidogrel  75 mg Oral Daily    ceFAZolin  2,000 mg IntraVENous Q8H    thiamine  100 mg Oral Daily    folic acid  1 mg Oral Daily    gabapentin  200 mg Oral BID    sodium chloride flush  5-40 mL IntraVENous 2 times per day    traZODone  50 mg Oral Nightly    aspirin  81 mg Oral Daily    pantoprazole  40 mg Oral QAM AC    cariprazine hcl  4.5 mg Oral Daily    mupirocin   Each Nostril BID    nicotine  1 patch TransDERmal Daily    atorvastatin  40 mg Oral Daily    guaiFENesin  600 mg Oral BID    ipratropium 0.5 mg-albuterol 2.5 mg  1 Dose Inhalation BID     Continuous Infusions:   sodium chloride       PRN Meds:.ipratropium 0.5 mg-albuterol 2.5 mg, regadenoson, metoprolol, sodium chloride flush, sodium chloride, potassium chloride **OR** potassium chloride, magnesium sulfate, ondansetron **OR** ondansetron, polyethylene glycol, acetaminophen **OR** acetaminophen, LORazepam, perflutren lipid microspheres      History of Present Illness on 11/19/2024:    59 y.o. yo male with PMH of bipolar disorder, COPD, GERD, hypertension, hyperlipidemia, alcohol abuse who is admitted for chest pain and hyponatremia  Patient presented to the emergency room with complaints of chest pain and shortness of breath and was found to have sodium of 115 when nephrology was consulted.  Patient recently had oral thrush on his mouth and has not been eating much.  He is drinking about 160 ounces of water every day  He reports that he has stopped drinking alcohol for 
    [facility]  Nephrology Follow up Note           Reason for Consult: Hyponatremia  Requesting Physician:  Dr. Restrepo    Sub/interval history  Resting  Received tolvaptan 7.5 mg p.o. x 1 due to sodium ranging between 122-124 after which it has improved to 129    Last 24 h uop 1.35 l    ROS: No chest pain/shortness of breath/fever/nausea/vomiting  PSFH: + visitor    Scheduled Meds:   [START ON 11/22/2024] metoprolol succinate  50 mg Oral Daily    enoxaparin  1 mg/kg SubCUTAneous Q12H    clopidogrel  75 mg Oral Daily    ceFAZolin  2,000 mg IntraVENous Q8H    thiamine  100 mg Oral Daily    folic acid  1 mg Oral Daily    gabapentin  200 mg Oral BID    sodium chloride flush  5-40 mL IntraVENous 2 times per day    traZODone  50 mg Oral Nightly    aspirin  81 mg Oral Daily    pantoprazole  40 mg Oral QAM AC    cariprazine hcl  4.5 mg Oral Daily    mupirocin   Each Nostril BID    nicotine  1 patch TransDERmal Daily    atorvastatin  40 mg Oral Daily    guaiFENesin  600 mg Oral BID    ipratropium 0.5 mg-albuterol 2.5 mg  1 Dose Inhalation BID     Continuous Infusions:   dilTIAZem 125 mg in sodium chloride 0.9 % 125 mL infusion 2.5 mg/hr (11/21/24 0600)    [Held by provider] sodium chloride Stopped (11/21/24 1046)    sodium chloride       PRN Meds:.ipratropium 0.5 mg-albuterol 2.5 mg, regadenoson, metoprolol, sodium chloride flush, sodium chloride, potassium chloride **OR** potassium chloride, magnesium sulfate, ondansetron **OR** ondansetron, polyethylene glycol, acetaminophen **OR** acetaminophen, LORazepam, perflutren lipid microspheres      History of Present Illness on 11/19/2024:    59 y.o. yo male with PMH of bipolar disorder, COPD, GERD, hypertension, hyperlipidemia, alcohol abuse who is admitted for chest pain and hyponatremia  Patient presented to the emergency room with complaints of chest pain and shortness of breath and was found to have sodium of 115 when nephrology was consulted.  Patient recently had oral 
    [facility]  Nephrology Follow up Note           Reason for Consult: Hyponatremia  Requesting Physician:  Dr. Restrepo    Sub/interval history    No acute events overnight  Sodium up to 131  Labs normal   Kidney function normal with good urine output     ROS: No chest pain/shortness of breath/fever/nausea/vomiting  PSFH: no visitor    Scheduled Meds:   metoprolol tartrate  25 mg Oral BID    potassium phosphate IVPB (PERIPHERAL LINE)  30 mmol IntraVENous Once    gabapentin  200 mg Oral BID    aspirin  81 mg Oral Daily    enoxaparin  1 mg/kg SubCUTAneous Q12H    clopidogrel  75 mg Oral Daily    ceFAZolin  2,000 mg IntraVENous Q8H    thiamine  100 mg Oral Daily    folic acid  1 mg Oral Daily    sodium chloride flush  5-40 mL IntraVENous 2 times per day    traZODone  50 mg Oral Nightly    pantoprazole  40 mg Oral QAM AC    cariprazine hcl  4.5 mg Oral Daily    nicotine  1 patch TransDERmal Daily    atorvastatin  40 mg Oral Daily    guaiFENesin  600 mg Oral BID    ipratropium 0.5 mg-albuterol 2.5 mg  1 Dose Inhalation BID     Continuous Infusions:   sodium chloride       PRN Meds:.ipratropium 0.5 mg-albuterol 2.5 mg, metoprolol, sodium chloride flush, sodium chloride, potassium chloride **OR** potassium chloride, magnesium sulfate, ondansetron **OR** ondansetron, polyethylene glycol, acetaminophen **OR** acetaminophen, LORazepam, perflutren lipid microspheres      History of Present Illness on 11/19/2024:    59 y.o. yo male with PMH of bipolar disorder, COPD, GERD, hypertension, hyperlipidemia, alcohol abuse who is admitted for chest pain and hyponatremia  Patient presented to the emergency room with complaints of chest pain and shortness of breath and was found to have sodium of 115 when nephrology was consulted.  Patient recently had oral thrush on his mouth and has not been eating much.  He is drinking about 160 ounces of water every day  He reports that he has stopped drinking alcohol for last 5 to 6 
    [facility]  Nephrology Follow up Note           Reason for Consult: Hyponatremia  Requesting Physician:  Dr. Restrepo    Sub/interval history    No acute events overnight  Sodium back to 129 - has been in the 129 to 131 range   Labs normal   Kidney function normal with good urine output     ROS: No chest pain/shortness of breath/fever/nausea/vomiting  PSFH: no visitor    Scheduled Meds:      Continuous Infusions:      PRN Meds:.      History of Present Illness on 11/19/2024:    59 y.o. yo male with PMH of bipolar disorder, COPD, GERD, hypertension, hyperlipidemia, alcohol abuse who is admitted for chest pain and hyponatremia  Patient presented to the emergency room with complaints of chest pain and shortness of breath and was found to have sodium of 115 when nephrology was consulted.  Patient recently had oral thrush on his mouth and has not been eating much.  He is drinking about 160 ounces of water every day  He reports that he has stopped drinking alcohol for last 5 to 6 months.    Physical exam:   Constitutional:  VITALS:  /77   Pulse 83   Temp 97.3 °F (36.3 °C) (Oral)   Resp 18   Ht 1.829 m (6')   Wt 76.3 kg (168 lb 4.8 oz)   SpO2 96%   BMI 22.82 kg/m²   Gen: NAD  Skin: Unremarkable  Respiratory:  Normal effort, no distress   Abdomen:  soft, nt, nd,   Extremities: no lower extremity edema      Data/  Recent Labs     11/24/24 0438 11/25/24 0455 11/25/24  2140   WBC 7.4 8.7 8.7   HGB 9.3* 9.4* 9.6*   HCT 27.5* 27.8* 28.8*   MCV 81.0 81.1 81.9    235 280     Recent Labs     11/24/24  0438 11/25/24  0455 11/25/24  2140   * 129* 129*   K 3.3* 3.3* 4.2   CL 97* 95* 94*   CO2 25 24 26   GLUCOSE 94 95 96   PHOS 2.9 3.0 3.1   MG  --   --  1.74*   BUN 4* 3* 4*   CREATININE 0.3* 0.3* 0.3*   LABGLOM >90 >90 >90     Echocardiogram    Left Ventricle: Hyperdynamic left ventricular systolic function with a   visually estimated EF of 65 - 70%. Left ventricle size is normal. Mildly   increased wall 
   11/24/24 2100   RT Protocol   History Pulmonary Disease 2   Respiratory pattern 0   Breath sounds 2   Cough 0   Indications for Bronchodilator Therapy Decreased or absent breath sounds   Bronchodilator Assessment Score 4     RT Inhaler-Nebulizer Bronchodilator Protocol Note    There is a bronchodilator order in the chart from a provider indicating to follow the RT Bronchodilator Protocol and there is an “Initiate RT Inhaler-Nebulizer Bronchodilator Protocol” order as well (see protocol at bottom of note).    CXR Findings:  No results found.    The findings from the last RT Protocol Assessment were as follows:   History Pulmonary Disease: Chronic pulmonary disease  Respiratory Pattern: Regular pattern and RR 12-20 bpm  Breath Sounds: Slightly diminished and/or crackles  Cough: Strong, spontaneous, non-productive  Indication for Bronchodilator Therapy: Decreased or absent breath sounds  Bronchodilator Assessment Score: 4    Aerosolized bronchodilator medication orders have been revised according to the RT Inhaler-Nebulizer Bronchodilator Protocol below.    Respiratory Therapist to perform RT Therapy Protocol Assessment initially then follow the protocol.  Repeat RT Therapy Protocol Assessment PRN for score 0-3 or on second treatment, BID, and PRN for scores above 3.    No Indications - adjust the frequency to every 6 hours PRN wheezing or bronchospasm, if no treatments needed after 48 hours then discontinue using Per Protocol order mode.     If indication present, adjust the RT bronchodilator orders based on the Bronchodilator Assessment Score as indicated below.  Use Inhaler orders unless patient has one or more of the following: on home nebulizer, not able to hold breath for 10 seconds, is not alert and oriented, cannot activate and use MDI correctly, or respiratory rate 25 breaths per minute or more, then use the equivalent nebulizer order(s) with same Frequency and PRN reasons based on the score.  If a patient 
 Latest Reference Range & Units 11/21/24 03:10   Sodium 136 - 145 mmol/L 124 (L)   (L): Data is abnormally low    Na level is above 123 as per nephrology nursing communication order.     Electronically signed by Shandra Beaulieu RN on 11/21/2024 at 4:21 AM    
4 Eyes Skin Assessment     NAME:  Kam Nguyen  YOB: 1965  MEDICAL RECORD NUMBER:  [id number]    The patient is being assessed for  Transfer to New Unit    I agree that at least one RN has performed a thorough Head to Toe Skin Assessment on the patient. ALL assessment sites listed below have been assessed.      Areas assessed by both nurses:    Head, Face, Ears, Shoulders, Back, Chest, Arms, Elbows, Hands, Sacrum. Buttock, Coccyx, Ischium, Legs. Feet and Heels, and Under Medical Devices     Blister like area and redness to sacrum.-- photo in chart.            Does the Patient have a Wound? Yes wound(s) were present on assessment. LDA wound assessment was Initiated and completed by RN       Lux Prevention initiated by RN: No  Wound Care Orders initiated by RN: Yes    Pressure Injury (Stage 3,4, Unstageable, DTI, NWPT, and Complex wounds) if present, place Wound referral order by RN under : No    New Ostomies, if present place, Ostomy referral order under : No     Nurse 1 eSignature: Electronically signed by Cesar Robertson RN on 11/24/24 at 6:51 AM EST    **SHARE this note so that the co-signing nurse can place an eSignature**    Nurse 2 eSignature: Electronically signed by Jackie Mehta RN on 11/24/24 at 6:55 AM EST   
4 Eyes Skin Assessment     NAME:  Kam Nguyen  YOB: 1965  MEDICAL RECORD NUMBER:  [id number]    The patient is being assessed for  Shift Handoff    I agree that at least one RN has performed a thorough Head to Toe Skin Assessment on the patient. ALL assessment sites listed below have been assessed.      Areas assessed by both nurses:    Head, Face, Ears, Shoulders, Back, Chest, Arms, Elbows, Hands, Sacrum. Buttock, Coccyx, Ischium, Legs. Feet and Heels, and Under Medical Devices         Does the Patient have a Wound? Yes           Lux Prevention initiated by RN: Yes  Wound Care Orders initiated by RN: No    Pressure Injury (Stage 3,4, Unstageable, DTI, NWPT, and Complex wounds) if present, place Wound referral order by RN under : Yes    New Ostomies, if present place, Ostomy referral order under : No     Nurse 1 eSignature: Electronically signed by Emilee Degroot RN on 11/20/24 at 9:52 AM EST    **SHARE this note so that the co-signing nurse can place an eSignature**    Nurse 2 eSignature: Electronically signed by Shandra Beaulieu RN on 11/20/24 at 10:36 PM EST    
4 Eyes Skin Assessment     NAME:  Kam Nguyen  YOB: 1965  MEDICAL RECORD NUMBER:  [id number]    The patient is being assessed for  Admission    I agree that at least one RN has performed a thorough Head to Toe Skin Assessment on the patient. ALL assessment sites listed below have been assessed.      Areas assessed by both nurses:    See photo documentation for sacral area    Head, Face, Ears, Shoulders, Back, Chest, Arms, Elbows, Hands, Sacrum. Buttock, Coccyx, Ischium, Legs. Feet and Heels, and Under Medical Devices         Does the Patient have a Wound? Yes wound(s) were present on assessment. LDA wound assessment was Initiated and completed by RN       Lux Prevention initiated by RN: Yes  Wound Care Orders initiated by RN: No    Pressure Injury (Stage 3,4, Unstageable, DTI, NWPT, and Complex wounds) if present, place Wound referral order by RN under : No    New Ostomies, if present place, Ostomy referral order under : No     Nurse 1 eSignature: Electronically signed by Irma Fu RN on 11/19/24 at 2:44 PM EST    **SHARE this note so that the co-signing nurse can place an eSignature**    Nurse 2 eSignature: Electronically signed by Kailey Todd RN on 11/19/24 at 7:23 PM EST            Pt admitted to ICU from ER. New orders released & acknowledged.  RR 30 /84    Pt awake, alert & oriented. PIVs x2 WNL.      
4 Eyes Skin Assessment     NAME:  Kam Nguyen  YOB: 1965  MEDICAL RECORD NUMBER:  [id number]    The patient is being assessed for  Other Low Lux Scale    I agree that at least one RN has performed a thorough Head to Toe Skin Assessment on the patient. ALL assessment sites listed below have been assessed.      Areas assessed by both nurses:    Head, Face, Ears, Shoulders, Back, Chest, Arms, Elbows, Hands, Sacrum. Buttock, Coccyx, Ischium, Legs. Feet and Heels, and Under Medical Devices         Does the Patient have a Wound? No noted wound(s)       Lux Prevention initiated by RN: Yes  Wound Care Orders initiated by RN: No    Pressure Injury (Stage 3,4, Unstageable, DTI, NWPT, and Complex wounds) if present, place Wound referral order by RN under : No    New Ostomies, if present place, Ostomy referral order under : No     Patient is not able to demonstrate the ability to move from a reclining position to an upright position within the recliner due to weakness.    Nurse 1 eSignature: Electronically signed by Teresa Quiles RN on 11/19/24 at 9:01 PM EST    **SHARE this note so that the co-signing nurse can place an eSignature**    Nurse 2 eSignature: Electronically signed by Ny Tinoco RN on 11/20/24 at 6:28 AM EST    
4 Eyes Skin Assessment     NAME:  Kam Nguyen  YOB: 1965  MEDICAL RECORD NUMBER:  [id number]    The patient is being assessed for  Transfer to New Unit    I agree that at least one RN has performed a thorough Head to Toe Skin Assessment on the patient. ALL assessment sites listed below have been assessed.      Areas assessed by both nurses:    Head, Face, Ears, Shoulders, Back, Chest, Arms, Elbows, Hands, Sacrum. Buttock, Coccyx, Ischium, Legs. Feet and Heels, and Under Medical Devices       Scattered abrasions and bruising open areas on buttock.  Does the Patient have a Wound? No noted wound(s)       Lux Prevention initiated by RN: No  Wound Care Orders initiated by RN: No    Pressure Injury (Stage 3,4, Unstageable, DTI, NWPT, and Complex wounds) if present, place Wound referral order by RN under : No    New Ostomies, if present place, Ostomy referral order under : No     Nurse 1 eSignature: Electronically signed by Almas Moy RN on 11/24/24 at 8:02 AM EST    **SHARE this note so that the co-signing nurse can place an eSignature**    Nurse 2 eSignature: {Esignature:[id number]}    
4 Eyes Skin Assessment     NAME:  Kam Nguyen  YOB: 1965  MEDICAL RECORD NUMBER:  [id number]    The patient is being assessed for  Shift Handoff    I agree that at least one RN has performed a thorough Head to Toe Skin Assessment on the patient. ALL assessment sites listed below have been assessed.      Areas assessed by both nurses:    Head, Face, Ears, Shoulders, Back, Chest, Arms, Elbows, Hands, Sacrum. Buttock, Coccyx, Ischium, Legs. Feet and Heels, and Under Medical Devices         Does the Patient have a Wound? Yes wound(s) were present on assessment. LDA wound assessment was Initiated and completed by RN       Lux Prevention initiated by RN: Yes  Wound Care Orders initiated by RN: Yes    Pressure Injury (Stage 3,4, Unstageable, DTI, NWPT, and Complex wounds) if present, place Wound referral order by RN under : No    New Ostomies, if present place, Ostomy referral order under : No     Nurse 1 eSignature: Electronically signed by Kailey Todd RN on 11/23/24 at 4:52 PM EST    **SHARE this note so that the co-signing nurse can place an eSignature**    Nurse 2 eSignature: Electronically signed by Teresa Quiles RN on 11/23/24 at 8:45 PM EST   
9 pm is next sodium draw.   
Admit: 2024    Name:  Kam Nguyen  Room:  [address] [place] [address]  MRN:    [id number]    Critical Care Daily Progress Note for 2024   Admitted with acute on chronic hyponatremia and chest pain  Interval History:   Went into A-fib with RVR  Given 3 doses of IV metoprolol  Now started on Cardizem drip  Loading dose of dig       Now in NSR   Dilt drip d/c ed   CT head showed pituitary tumor       Scheduled Meds:   enoxaparin  1 mg/kg SubCUTAneous Q12H    ceFAZolin  2,000 mg IntraVENous Q8H    thiamine  100 mg Oral Daily    folic acid  1 mg Oral Daily    LORazepam  1 mg IntraVENous Once    gabapentin  200 mg Oral BID    sodium chloride flush  5-40 mL IntraVENous 2 times per day    metoprolol succinate  25 mg Oral Daily    traZODone  50 mg Oral Nightly    aspirin  81 mg Oral Daily    pantoprazole  40 mg Oral QAM AC    cariprazine hcl  4.5 mg Oral Daily    mupirocin   Each Nostril BID    nicotine  1 patch TransDERmal Daily    atorvastatin  40 mg Oral Daily    guaiFENesin  600 mg Oral BID    ipratropium 0.5 mg-albuterol 2.5 mg  1 Dose Inhalation BID       Continuous Infusions:   dilTIAZem 125 mg in sodium chloride 0.9 % 125 mL infusion 2.5 mg/hr (24 0600)    sodium chloride 75 mL/hr at 24 0734    sodium chloride         PRN Meds:  metoprolol, sodium chloride flush, sodium chloride, potassium chloride **OR** potassium chloride, magnesium sulfate, ondansetron **OR** ondansetron, polyethylene glycol, acetaminophen **OR** acetaminophen, LORazepam, perflutren lipid microspheres                  Objective:     Temp  Av.3 °F (36.8 °C)  Min: 97.3 °F (36.3 °C)  Max: 98.6 °F (37 °C)  Pulse  Av  Min: 85  Max: 145  BP  Min: 94/73  Max: 120/81  SpO2  Av.7 %  Min: 93 %  Max: 100 %  Patient Vitals for the past 4 hrs:   BP Temp Temp src Pulse Resp SpO2 Height Weight   24 0716 105/64 -- -- -- -- -- 1.829 m (6') 82.1 kg (181 lb)   24 0700 103/79 97.3 °F (36.3 °C) Temporal 87 29 94 % -- -- 
Admit: 2024    Name:  Kam Nguyen  Room:  [address] [place] [address]  MRN:    [id number]    Critical Care Daily Progress Note for 2024   Admitted with acute on chronic hyponatremia and chest pain  Interval History:   Went into A-fib with RVR  Given 3 doses of IV metoprolol  Now started on Cardizem drip  Loading dose of dig     Scheduled Meds:   metoprolol        metoprolol        sodium chloride flush  5-40 mL IntraVENous 2 times per day    enoxaparin  40 mg SubCUTAneous Daily    metoprolol succinate  25 mg Oral Daily    traZODone  50 mg Oral Nightly    aspirin  81 mg Oral Daily    pantoprazole  40 mg Oral QAM AC    cariprazine hcl  4.5 mg Oral Daily    mupirocin   Each Nostril BID    nicotine  1 patch TransDERmal Daily    atorvastatin  40 mg Oral Daily    guaiFENesin  600 mg Oral BID    ipratropium 0.5 mg-albuterol 2.5 mg  1 Dose Inhalation BID       Continuous Infusions:   dextrose 100 mL/hr at 24 0513    sodium chloride Stopped (24 2251)    sodium chloride         PRN Meds:  metoprolol, metoprolol, metoprolol, sodium chloride flush, sodium chloride, potassium chloride **OR** potassium chloride, magnesium sulfate, ondansetron **OR** ondansetron, polyethylene glycol, acetaminophen **OR** acetaminophen, LORazepam, perflutren lipid microspheres                  Objective:     Temp  Av.3 °F (36.8 °C)  Min: 97.4 °F (36.3 °C)  Max: 99.3 °F (37.4 °C)  Pulse  Av.9  Min: 93  Max: 152  BP  Min: 95/74  Max: 184/74  SpO2  Av.8 %  Min: 95 %  Max: 100 %  Patient Vitals for the past 4 hrs:   BP Temp Temp src Pulse Resp SpO2   24 0724 -- 98.6 °F (37 °C) Temporal -- -- --   24 0700 100/71 -- -- (!) 138 26 99 %   24 0600 104/65 -- -- (!) 131 26 98 %   24 0500 95/74 -- -- (!) 113 29 98 %   24 0400 136/72 98.5 °F (36.9 °C) Oral (!) 143 29 96 %   24 0355 -- -- -- (!) 152 25 95 %         Intake/Output Summary (Last 24 hours) at 2024 0752  Last data filed at 
Admit: 2024    Name:  Kam Nguyen  Room:  [address] [place] [address]  MRN:    [id number]    Critical Care Daily Progress Note for 2024   Admitted with acute on chronic hyponatremia and chest pain  Interval History:   Went into A-fib with RVR  Given 3 doses of IV metoprolol  Now started on Cardizem drip  Loading dose of dig       Now in NSR   Dilt drip d/c ed   CT head showed pituitary tumor       NSR  Has low back pain      Scheduled Meds:   metoprolol succinate  50 mg Oral Daily    enoxaparin  1 mg/kg SubCUTAneous Q12H    clopidogrel  75 mg Oral Daily    ceFAZolin  2,000 mg IntraVENous Q8H    thiamine  100 mg Oral Daily    folic acid  1 mg Oral Daily    gabapentin  200 mg Oral BID    sodium chloride flush  5-40 mL IntraVENous 2 times per day    traZODone  50 mg Oral Nightly    aspirin  81 mg Oral Daily    pantoprazole  40 mg Oral QAM AC    cariprazine hcl  4.5 mg Oral Daily    mupirocin   Each Nostril BID    nicotine  1 patch TransDERmal Daily    atorvastatin  40 mg Oral Daily    guaiFENesin  600 mg Oral BID    ipratropium 0.5 mg-albuterol 2.5 mg  1 Dose Inhalation BID       Continuous Infusions:   sodium chloride         PRN Meds:  ipratropium 0.5 mg-albuterol 2.5 mg, regadenoson, metoprolol, sodium chloride flush, sodium chloride, potassium chloride **OR** potassium chloride, magnesium sulfate, ondansetron **OR** ondansetron, polyethylene glycol, acetaminophen **OR** acetaminophen, LORazepam, perflutren lipid microspheres                  Objective:     Temp  Av.8 °F (36.6 °C)  Min: 97.7 °F (36.5 °C)  Max: 98 °F (36.7 °C)  Pulse  Av.5  Min: 83  Max: 101  BP  Min: 99/70  Max: 146/128  SpO2  Av.8 %  Min: 96 %  Max: 100 %  Patient Vitals for the past 4 hrs:   BP Temp Temp src Pulse Resp   24 0600 114/68 -- -- 84 25   24 0500 111/72 -- -- 95 21   24 0400 114/69 97.8 °F (36.6 °C) Temporal 90 27         Intake/Output Summary (Last 24 hours) at 2024 0750  Last data filed 
Bed pad changed. Pt purewick working appropriately and emptied at this time as it was full   
Bedside report and transfer of care given to ARLEY Green. Pt currently resting in bed with the call light within reach. Pt denies any other care needs at this time. Pt stable at this time.    
Blood pressure (!) 150/86, pulse 89, temperature 98.2 °F (36.8 °C), temperature source Temporal, resp. rate 25, height 1.829 m (6'), weight 77.7 kg (171 lb 3.2 oz), SpO2 98%.    Patient alert Oriented x 4. Denies pain or discomfort at this time.   Fluids infusing at 50 ml/H per MD order.     Shift assessment complete. See doc flow. Nightly medications given see MAR. Patient with no complaints at this time. Call light and bedside table within easy reach.     Electronically signed by Teresa Quiles RN on 11/23/2024 at 7:55 PM    
Blood pressure 116/73, pulse 90, temperature 97.9 °F (36.6 °C), temperature source Oral, resp. rate 18, height 1.829 m (6'), weight 83.1 kg (183 lb 3.2 oz), SpO2 99%.    Patient resting on room air with eyes closed, call light in reach.   No s.s of distress.     Electronically signed by Teresa Quiles RN on 11/23/2024 at 1:11 AM    
Blood pressure 120/78, pulse (!) 105, temperature 98.1 °F (36.7 °C), temperature source Oral, resp. rate 15, height 1.829 m (6'), weight 80.9 kg (178 lb 6.4 oz), SpO2 98%.    Patient is alert oriented x 4. Denies pain at this time. Patient did request PRN ativan for anxiety. Given per order.   Shift assessment complete. See doc flow. Nightly medications given see MAR. Patient with no complaints at this time. Call light and bedside table within easy reach.     Electronically signed by Teresa Quiles RN on 11/19/2024 at 8:59 PM    
Blood pressure 125/75, pulse 99, temperature 97.7 °F (36.5 °C), temperature source Oral, resp. rate (!) 36, height 1.829 m (6'), weight 83.1 kg (183 lb 3.2 oz), SpO2 94%.    Patient is alert/Oriented x 4. Denies pain or discomfort at this time.   Shift assessment complete. See doc flow. Nightly medications given see MAR. Patient with no complaints at this time. Call light and bedside table within easy reach.     Electronically signed by Teresa Quiles RN on 11/22/2024 at 9:18 PM    
Called I for Emilee TUBBS to page Dr. Fisher Electronically signed by Taylor Stinson on 11/21/2024 at 10:22 AM    
[facility]   Progress Note  Cardiology      HPI: Seeing Mr. Nguyen today for f/u CP and hx CAD. He c/o HA and sore throat overnight. He developed rapid afib approx 3AM but does not feel it. He got scared with nursing concerns about tele monitor. Tele afib 137bpm now.       Physical Examination:    Vitals:    11/20/24 0724   BP:    Pulse:    Resp:    Temp: 98.6 °F (37 °C)   SpO2:           Constitutional and General Appearance: NAD   Respiratory:  Normal excursion and expansion without use of accessory muscles  Resp Auscultation: Normal breath sounds without dullness  Cardiovascular:  The apical impulses not displaced  Heart tones are crisp and normal; +irregularly irregular and tachycardic  Cervical veins are not engorged  The carotid upstroke is normal in amplitude and contour without delay or bruit  Normal S1S2, No S3, No Murmur  Peripheral pulses are symmetrical and full  There is no clubbing, cyanosis of the extremities.  No edema  Pedal Pulses: 2+ and equal   Abdomen:  No masses or tenderness  Liver/Spleen: No Abnormalities Noted  Neurological/Psychiatric:  Alert and oriented in all spheres  Moves all extremities well  No abnormalities of mood, affect, memory, mentation, or behavior are noted  Skin: warm and dry      Lab Results   Component Value Date    WBC 11.1 (H) 11/20/2024    HGB 11.1 (L) 11/20/2024    HCT 32.3 (L) 11/20/2024    MCV 80.1 11/20/2024     11/20/2024     Lab Results   Component Value Date    CREATININE 0.4 (L) 11/20/2024    BUN 3 (L) 11/20/2024     (L) 11/20/2024    K 3.0 (L) 11/20/2024    CL 86 (L) 11/20/2024    CO2 22 11/20/2024     Lab Results   Component Value Date    INR 1.03 03/06/2021    PROTIME 11.9 03/06/2021        Assessment: Kam Nguyen is a 59 y.o. patient who presented to [facility] 11/19/24 with c/o CP.  He has PMH HTN, HLD, CAD s/p PCI 2006, 2012, 2013, tobacco abuse, ETOH abuse, pancreatitis hx, depression/SI, and COPD. Prior testing: Echo 8/14/17 
Cardiology consult called, spoke with Brianda, Electronically signed by Ruben Agee on 11/19/2024 at 2:47 PM    Critical Care consult called to answering service, Electronically signed by Ruben Agee on 11/19/2024 at 2:48 PM  
Cmu called ,pt off the monitor headed to [facility]  
Dr. Carrasco made aware of CT results.   
Dr. Mustafa at bedside. Asked for 1500 BMP to be drawn early at 1430. NS infusing at 50 mL/hr ordered and started.   
EEG completed and available for interpretation on the [facility] database .   
Echo and MRI results pending.   
Inpatient Occupational Therapy Evaluation & Treatment    Unit: ICU  Date:  11/23/2024  Patient Name:    Kam Nguyen  Admitting diagnosis:  Hyponatremia [E87.1]  Chest pain, unspecified type [R07.9]  Admit Date:  11/19/2024  Precautions/Restrictions/WB Status/ Lines/ Wounds/ Oxygen: Fall risk, Bed/chair alarm, Lines (IV and external catheter), Telemetry, and Continuous pulse oximetry    Pt seen for cotreatment this date due to patient safety, patient endurance, complexity of condition, and acute illness/injury    Treatment Time: 08:38-09:18  Treatment Number:  1  Timed Code Treatment Minutes: 30 minutes  Total Treatment Minutes: 40 minutes    Patient Goals for Therapy: \"to get better\"          Discharge Recommendations: SNF  DME needs for discharge: Defer to facility       Therapy recommendations for staff:   Assist of 2 for repositioning in bed    History of Present Illness: Per admission H&P from Laureano Vora MD on 11/19  \"The patient is a 59 y.o. male with PMH of bipolar disorder, CAD, s/p PCI, chronic pain, COPD, depression, GERD, HLD, pancreatitis, tobacco abuse, hx of alcohol use who presents to [facility] with chest pain and tremors. A month ago he had candidal esophagitis and had some chest pain related to this and also he ran out of ativan causing him anxiety and chest pains  He was appropriately tx for above last admission and now he comes back for recurrent ongoing mid sternal chest pains that come and go . No relation to diet or swallowing . No dysphagia  No relation to activity , chest pain resolved with nitro in ER and reports it could be anxiety. He has been drinking upto 120 ounces of water daily due to dry mouth and recent thrush and reports he has quit alcohol 6 months ago , no recent change in bladder or bowel habits. Workup in ER showed sinus tachycardia, severe hyponatremia and normal troponin. Admitted for chest pain eval and correction of hyponatremia. Pt denies any recent new 
[facility]   Progress Note  Cardiology      HPI: Seeing Mr. Nguyen today for f/u CP and hx CAD. He has no specific complaints but is anxious about stress test. He remains NSR. Tele NSR 94 bpm.      Physical Examination:    Vitals:    11/22/24 0600   BP: 114/68   Pulse: 84   Resp: 25   Temp:    SpO2:           Constitutional and General Appearance: NAD   Respiratory:  Normal excursion and expansion without use of accessory muscles  Resp Auscultation: Normal breath sounds without dullness  Cardiovascular:  The apical impulses not displaced  Heart tones are crisp and normal; regular rhythm  Cervical veins are not engorged  The carotid upstroke is normal in amplitude and contour without delay or bruit  Normal S1S2, No S3, No Murmur  Peripheral pulses are symmetrical and full  There is no clubbing, cyanosis of the extremities.  No edema  Pedal Pulses: 2+ and equal   Abdomen:  No masses or tenderness  Liver/Spleen: No Abnormalities Noted  Neurological/Psychiatric:  Alert and oriented in all spheres  Moves all extremities well  No abnormalities of mood, affect, memory, mentation, or behavior are noted  Skin: warm and dry      Lab Results   Component Value Date    WBC 11.6 (H) 11/21/2024    HGB 10.6 (L) 11/21/2024    HCT 31.4 (L) 11/21/2024    MCV 81.9 11/21/2024     11/21/2024     Lab Results   Component Value Date    CREATININE 0.4 (L) 11/22/2024    BUN 3 (L) 11/22/2024     (L) 11/22/2024    K 3.7 11/22/2024    CL 96 (L) 11/22/2024    CO2 24 11/22/2024     Lab Results   Component Value Date    INR 1.03 03/06/2021    PROTIME 11.9 03/06/2021      [id number] Score for Atrial Fibrillation Stroke Risk   Risk   Factors  Component Value   C CHF No 0   H HTN Yes 1   A2 Age >= 75 No,  (59 y.o.) 0   D DM No 0   S2 Prior Stroke/TIA No 0   V Vascular Disease No 1   A Age 65-74 No,  (59 y.o.) 0   Sc Sex male 0    [id number]  Score  2   Score last updated 11/21/24 8:19 AM EST    ECHO 11/21/24    Left 
Labs resulted. Dr. Mustafa sent a secure message. No new orders at this time.   
MRI called no answer Radiology called to see if MRI was here said they do not believe they will be in today and MRI will be done Monday.   
N.O Metoprolol 10 mg IV every 5 minutes x 3 doses for HR > 120.     
Na level came back at 120.  Fluids stopped per Dr. Mustafa's note.     Goal sodium of 121 to 123 by 11 a.m. on 11/20  -Normal saline at 50 mL/h; adjust based on above parameters  -Stop normal saline once sodium increases to 118 and above.    Electronically signed by Teresa Quiles RN on 11/19/2024 at 10:55 PM    
Neurology Progress Note    ID: Kam Nguyen is a 59 y.o. male    : 1965     LOS: 3 days     ASSESSMENT    Principal Problem:    Hyponatremia  Active Problems:    Coronary artery disease involving native coronary artery of native heart    Pulmonary nodules    Stage 3 severe COPD by GOLD classification (HCC)    New onset a-fib (HCC)    Bacteremia    Atrial fibrillation with rapid ventricular response (HCC)    Pituitary tumor  Resolved Problems:    * No resolved hospital problems. *    The patient remains to have action tremor on both hands, right greater than left.  However, it has been improved since admission.  MRI brain showed large pituitary tumor at the 17 mm.  The patient also reports blurry vision on both eyes.        From neurology perspective, the pituitary tumor need surgical evaluation.  The patient also needs to follow-up with ophthalmologist for visual field test and endocrinologist to determine hormonal axis.    For tremor, the patient may need to switch gabapentin to primidone as an outpatient.    PLAN    1.  Continue gabapentin 200 mg twice daily.  2.  Outpatient follow-up specialties as above.  3.  Follow-up with neurology in 2 months.    There is nothing else neurology can offer at this time.  Neurology will sign off.    Medications:  Scheduled Meds:    metoprolol succinate  50 mg Oral Daily    enoxaparin  1 mg/kg SubCUTAneous Q12H    clopidogrel  75 mg Oral Daily    ceFAZolin  2,000 mg IntraVENous Q8H    thiamine  100 mg Oral Daily    folic acid  1 mg Oral Daily    gabapentin  200 mg Oral BID    sodium chloride flush  5-40 mL IntraVENous 2 times per day    traZODone  50 mg Oral Nightly    aspirin  81 mg Oral Daily    pantoprazole  40 mg Oral QAM AC    cariprazine hcl  4.5 mg Oral Daily    mupirocin   Each Nostril BID    nicotine  1 patch TransDERmal Daily    atorvastatin  40 mg Oral Daily    guaiFENesin  600 mg Oral BID    ipratropium 0.5 mg-albuterol 2.5 mg  1 Dose Inhalation BID 
Neurology consult sent via perfect serve, Electronically signed by Ruben Agee on 11/20/2024 at 10:18 AM  
No changes in assessment. He remains in NSR with occasional PAC's.    Patient has remained NPO since midnight for Cardiac Stress test today.    Will continue current POC.    Electronically signed by Shandra Beaulieu RN on 11/22/2024 at 6:18 AM    
Order from Dr. Mustafa to stop D5W.   
Patient I bed resting quietly. He has his blanket over his head. VSS. Noted rise and fall of chest as patient is breathing. Will continue to  monitor for now.    Electronically signed by Shandra Beaulieu RN on 11/21/2024 at 12:46 AM    
Patient heart rhythm converted to NSR with occasional PAC's earlier tonight. Have continued to titrate Diltiazem down per ordered titration. (See MAR).     Spoke with Dr. Petersen and he agrees to continue to titrate down to off.     Current rate now at 2.5 mg/hr.     /63; HR 88 NSR    Electronically signed by Shandra Beaulieu RN on 11/21/2024 at 6:05 AM    
Patient observed resting quietly in bed. No tremors observed. VSS    Electronically signed by Shandra Beaulieu RN on 11/21/2024 at 11:08 PM    
Patient refused scheduled trazadone. Wasted dose in pill waste bottle.    Electronically signed by Shandra Beaulieu RN on 11/21/2024 at 9:19 PM    
Patient requested nurse to room. Noted on monitor, prior to entering room, patient did not appear to be having coarse tremors.     Upon entering room patient's tremors immediately started in his right arm and the proceeded to RLE.     He is requesting medication for anxiety.  Medicated with PRN Ativan 0.5 mg IVP.    Proceeded, with patient permission, to give patient a CHG bath, change linens and gown.     Patient tolerated activity well.     Electronically signed by Shandra Beaulieu RN on 11/21/2024 at 12:45 AM    
Patient transferred to PCU at this time.   Lilian made aware.     Electronically signed by Teresa Quiles RN on 11/24/2024 at 6:35 AM      
Patients heart rate now running 140's-150's.   12 lead EKG obtained.   MD made aware.     
Per Dr. Cisneros stat Na level now and then call him with the results. Possible D5 drip depending on results.   
Progress Note    Admit Date:  11/19/2024    Admitted with acute on chronic hyponatremia and chest pain     MSSA bacteremia      Ct head with Pituitary tumor     Went into A-fib with RVR  Given 3 doses of IV metoprolol  Now started on Cardizem drip  Loading dose of dig      11/21  Now in NSR   Dilt drip d/c ed   CT head showed pituitary tumor      11/22  NSR  Has low back pain     11/24  Remains in NSR    low back pain and left LE weakness, MRI pending  No fevers  Na levels good    11/25  Still complaining of LLE pain   MRI with iliacus muscle abscess     Subjective:  Mr. Nguyen today seen resting in bed. No further episodes of chest pain   Is still complaining of LLE pain and weakness     Objective:   Patient Vitals for the past 4 hrs:   BP Temp Temp src Pulse Resp SpO2 Weight   11/25/24 0730 (!) 116/55 98.1 °F (36.7 °C) Oral 84 18 97 % --   11/25/24 0423 131/73 98.4 °F (36.9 °C) Oral 89 18 97 % 76.3 kg (168 lb 4.8 oz)          Intake/Output Summary (Last 24 hours) at 11/25/2024 0755  Last data filed at 11/25/2024 0749  Gross per 24 hour   Intake 1044 ml   Output 2900 ml   Net -1856 ml       Physical Exam:    Gen: No distress. Alert. +Pleasant male  Eyes: PERRL. No sclera icterus. No conjunctival injection.   ENT: No discharge. Pharynx clear.   Neck: No JVD.  Trachea midline.  Resp: No accessory muscle use. No crackles. No wheezes. No rhonchi.   CV: Regular rate. Regular rhythm. No murmur.  No rub. No edema.   Peripheral Pulses: +2 palpable, equal bilaterally   GI: Non-tender. Non-distended.  Normal bowel sounds.  Skin: Warm and dry. No nodule on exposed extremities. No rash on exposed extremities.   M/S: No cyanosis. No joint deformity. No clubbing.   Neuro: Awake. Grossly nonfocal  +course tremors of both upper extremities   LLE weakness and limited ROM, sensation intact   Psych:  No anxiety or agitation.         Medications:  metoprolol tartrate, 25 mg, BID  ceFAZolin, 2,000 mg, Q8H  gabapentin, 200 mg, 
Pt back from cardiac stress test. Pt tolerated well after ativan x2 was given. Pt back in room and cleaned up. New purewick placed after last one was removed. Pt gf at bedside.    Plan is for patient to leave for rehab and go to [facility].   
Pt has not urinated since just after 1 pm. States he feels like he needs to go. Bladder scan complete and charted.   
Pt leaving unit and going down to Xray at this time. Plan for him to go to Stress lab at 1 pm  
Pt taken to MRI and brought back. Pt only tolerated long enough for a regular no contrast brain scan. Pt back on the unit and hooked up to the monitor.   
Pt. Extend release Metoprolol can  not be crushed so pt. Is refusing to take it  as needs meds crushed in apple sauce. Dr. Vora made aware and changed to metoprolol tartrate 25 mg BID.   
Pulmonary & Critical Care Medicine ICU Progress Note    CC: Hyponatremia    Events of Last 24 hours:   Afib RVR on Diltiazem drip       Vascular lines: IV: PIVs         / / /   No results for input(s): \"PHART\", \"PHS4KBT\", \"PO2ART\" in the last 72 hours.    IV:   dextrose 100 mL/hr at 24 0513    sodium chloride Stopped (24)    sodium chloride         Vitals:  Blood pressure 100/71, pulse (!) 138, temperature 98.6 °F (37 °C), temperature source Temporal, resp. rate 26, height 1.829 m (6'), weight 79.8 kg (175 lb 14.4 oz), SpO2 99%.  on RA  Temp  Av.3 °F (36.8 °C)  Min: 97.4 °F (36.3 °C)  Max: 99.3 °F (37.4 °C)    Intake/Output Summary (Last 24 hours) at 2024 0736  Last data filed at 2024 0513  Gross per 24 hour   Intake 1032.6 ml   Output 2350 ml   Net -1317.4 ml     Gen: No distress.   Eyes: No sclera icterus. No conjunctival injection.   Neck: Trachea midline. No obvious mass.    Resp: No accessory muscle use. No crackles. No wheezes. No rhonchi. No dullness on percussion.  Decreased air entry.  CV: Tacky rate. Regular rhythm. No murmur or rub. No edema.  GI: Non-tender. Non-distended. No hernia.   Skin: Warm and dry. No nodule on exposed extremities.   Lymph: No cervical LAD. No supraclavicular LAD.   M/S: No cyanosis. No joint deformity. No clubbing.   Neuro: Awake. Alert. Moves all four extremities.   Psych: Oriented. No anxiety.       Scheduled Meds:   metoprolol        metoprolol        sodium chloride flush  5-40 mL IntraVENous 2 times per day    enoxaparin  40 mg SubCUTAneous Daily    metoprolol succinate  25 mg Oral Daily    traZODone  50 mg Oral Nightly    aspirin  81 mg Oral Daily    pantoprazole  40 mg Oral QAM AC    cariprazine hcl  4.5 mg Oral Daily    mupirocin   Each Nostril BID    nicotine  1 patch TransDERmal Daily    atorvastatin  40 mg Oral Daily    guaiFENesin  600 mg Oral BID    ipratropium 0.5 mg-albuterol 2.5 mg  1 Dose Inhalation BID       Data:  CBC:   Recent 
Pulmonary & Critical Care Medicine ICU Progress Note    CC: Hyponatremia    Events of Last 24 hours:   Diltiazem drip off   Converted last night to NSR         Vascular lines: IV: PIVs         / / /   No results for input(s): \"PHART\", \"YAP2BEB\", \"PO2ART\" in the last 72 hours.    IV:   dilTIAZem 125 mg in sodium chloride 0.9 % 125 mL infusion 2.5 mg/hr (24 0600)    sodium chloride 50 mL/hr at 24 0325    sodium chloride         Vitals:  Blood pressure 105/64, pulse 88, temperature 98.6 °F (37 °C), temperature source Oral, resp. rate 26, height 1.829 m (6'), weight 82.1 kg (181 lb), SpO2 96%.  on RA  Temp  Av.5 °F (36.9 °C)  Min: 98.2 °F (36.8 °C)  Max: 98.6 °F (37 °C)    Intake/Output Summary (Last 24 hours) at 2024 0732  Last data filed at 2024 0325  Gross per 24 hour   Intake 2398.64 ml   Output 1350 ml   Net 1048.64 ml     Gen: No distress.   Eyes: No sclera icterus. No conjunctival injection.   Neck: Trachea midline. No obvious mass.    Resp: No accessory muscle use. No crackles. No wheezes. No rhonchi. No dullness on percussion.  Decreased air entry.  CV: Tacky rate. irregular rhythm. No murmur or rub. No edema.  GI: Non-tender. Non-distended. No hernia.   Skin: Warm and dry. No nodule on exposed extremities.   Lymph: No cervical LAD. No supraclavicular LAD.   M/S: No cyanosis. No joint deformity. No clubbing.   Neuro: Awake. Alert. Moves all four extremities.   Psych: Oriented. No anxiety.       Scheduled Meds:   enoxaparin  1 mg/kg SubCUTAneous Q12H    ceFAZolin  2,000 mg IntraVENous Q8H    thiamine  100 mg Oral Daily    folic acid  1 mg Oral Daily    LORazepam  1 mg IntraVENous Once    gabapentin  200 mg Oral BID    sodium chloride flush  5-40 mL IntraVENous 2 times per day    metoprolol succinate  25 mg Oral Daily    traZODone  50 mg Oral Nightly    aspirin  81 mg Oral Daily    pantoprazole  40 mg Oral QAM AC    cariprazine hcl  4.5 mg Oral Daily    mupirocin   Each Nostril BID    
Pulmonary & Critical Care Medicine ICU Progress Note    CC: Hyponatremia    Events of Last 24 hours:   RA  NSR         Vascular lines: IV: PIVs         / / /   No results for input(s): \"PHART\", \"ACN1PEY\", \"PO2ART\" in the last 72 hours.    IV:   dilTIAZem 125 mg in sodium chloride 0.9 % 125 mL infusion Stopped (24 0735)    sodium chloride         Vitals:  Blood pressure 114/68, pulse 84, temperature 97.8 °F (36.6 °C), temperature source Temporal, resp. rate 25, height 1.829 m (6'), weight 83.1 kg (183 lb 3.2 oz), SpO2 96%.  on RA  Temp  Av.8 °F (36.6 °C)  Min: 97.7 °F (36.5 °C)  Max: 98 °F (36.7 °C)    Intake/Output Summary (Last 24 hours) at 2024 0797  Last data filed at 2024 0608  Gross per 24 hour   Intake 801.49 ml   Output 4050 ml   Net -3248.51 ml     Gen: No distress.   Eyes: No sclera icterus. No conjunctival injection.   Neck: Trachea midline. No obvious mass.    Resp: No accessory muscle use. No crackles. No wheezes. No rhonchi. No dullness on percussion.  Decreased air entry.  CV: Tacky rate. Regular rhythm. No murmur or rub. No edema.  GI: Non-tender. Non-distended. No hernia.   Skin: Warm and dry. No nodule on exposed extremities.   Lymph: No cervical LAD. No supraclavicular LAD.   M/S: No cyanosis. No joint deformity. No clubbing.   Neuro: Awake. Alert. Moves all four extremities. + tremor   Psych: Oriented. No anxiety.       Scheduled Meds:   metoprolol succinate  50 mg Oral Daily    enoxaparin  1 mg/kg SubCUTAneous Q12H    clopidogrel  75 mg Oral Daily    ceFAZolin  2,000 mg IntraVENous Q8H    thiamine  100 mg Oral Daily    folic acid  1 mg Oral Daily    gabapentin  200 mg Oral BID    sodium chloride flush  5-40 mL IntraVENous 2 times per day    traZODone  50 mg Oral Nightly    aspirin  81 mg Oral Daily    pantoprazole  40 mg Oral QAM AC    cariprazine hcl  4.5 mg Oral Daily    mupirocin   Each Nostril BID    nicotine  1 patch TransDERmal Daily    atorvastatin  40 mg Oral Daily 
Pulmonary & Critical Care Medicine ICU Progress Note    CC: Hyponatremia    Events of Last 24 hours:   RA  NSR         Vascular lines: IV: PIVs         / / /   No results for input(s): \"PHART\", \"UHY0MRW\", \"PO2ART\" in the last 72 hours.    IV:   sodium chloride         Vitals:  Blood pressure 127/83, pulse 91, temperature 98 °F (36.7 °C), temperature source Oral, resp. rate 18, height 1.829 m (6'), weight 77.7 kg (171 lb 3.2 oz), SpO2 97%.  on RA  Temp  Av °F (36.7 °C)  Min: 97.7 °F (36.5 °C)  Max: 98.6 °F (37 °C)    Intake/Output Summary (Last 24 hours) at 2024 0731  Last data filed at 2024 0600  Gross per 24 hour   Intake 900 ml   Output 2475 ml   Net -1575 ml     Gen: No distress.   Eyes: No sclera icterus. No conjunctival injection.   Neck: Trachea midline. No obvious mass.    Resp: No accessory muscle use. No crackles. No wheezes. No rhonchi. No dullness on percussion.  Decreased air entry.  CV: Tacky rate. Regular rhythm. No murmur or rub. No edema.  GI: Non-tender. Non-distended. No hernia.   Skin: Warm and dry. No nodule on exposed extremities.   Lymph: No cervical LAD. No supraclavicular LAD.   M/S: No cyanosis. No joint deformity. No clubbing.   Neuro: Awake. Alert. Moves all four extremities. + tremor. LLE weakness today   Psych: Oriented. No anxiety.       Scheduled Meds:   metoprolol succinate  50 mg Oral Daily    enoxaparin  1 mg/kg SubCUTAneous Q12H    clopidogrel  75 mg Oral Daily    ceFAZolin  2,000 mg IntraVENous Q8H    thiamine  100 mg Oral Daily    folic acid  1 mg Oral Daily    gabapentin  200 mg Oral BID    sodium chloride flush  5-40 mL IntraVENous 2 times per day    traZODone  50 mg Oral Nightly    aspirin  81 mg Oral Daily    pantoprazole  40 mg Oral QAM AC    cariprazine hcl  4.5 mg Oral Daily    mupirocin   Each Nostril BID    nicotine  1 patch TransDERmal Daily    atorvastatin  40 mg Oral Daily    guaiFENesin  600 mg Oral BID    ipratropium 0.5 mg-albuterol 2.5 mg  1 Dose 
RT Inhaler-Nebulizer Bronchodilator Protocol Note    There is a bronchodilator order in the chart from a provider indicating to follow the RT Bronchodilator Protocol and there is an “Initiate RT Inhaler-Nebulizer Bronchodilator Protocol” order as well (see protocol at bottom of note).    CXR Findings:  No results found.    The findings from the last RT Protocol Assessment were as follows:   History Pulmonary Disease: (P) Chronic pulmonary disease  Respiratory Pattern: (P) Regular pattern and RR 12-20 bpm  Breath Sounds: (P) Intermittent or unilateral wheezes  Cough: (P) Strong, spontaneous, non-productive  Indication for Bronchodilator Therapy: (P) Decreased or absent breath sounds  Bronchodilator Assessment Score: (P) 6    Aerosolized bronchodilator medication orders have been revised according to the RT Inhaler-Nebulizer Bronchodilator Protocol below.    Respiratory Therapist to perform RT Therapy Protocol Assessment initially then follow the protocol.  Repeat RT Therapy Protocol Assessment PRN for score 0-3 or on second treatment, BID, and PRN for scores above 3.    No Indications - adjust the frequency to every 6 hours PRN wheezing or bronchospasm, if no treatments needed after 48 hours then discontinue using Per Protocol order mode.     If indication present, adjust the RT bronchodilator orders based on the Bronchodilator Assessment Score as indicated below.  Use Inhaler orders unless patient has one or more of the following: on home nebulizer, not able to hold breath for 10 seconds, is not alert and oriented, cannot activate and use MDI correctly, or respiratory rate 25 breaths per minute or more, then use the equivalent nebulizer order(s) with same Frequency and PRN reasons based on the score.  If a patient is on this medication at home then do not decrease Frequency below that used at home.    0-3 - enter or revise RT bronchodilator order(s) to equivalent RT Bronchodilator order with Frequency of every 4 
RT Inhaler-Nebulizer Bronchodilator Protocol Note    There is a bronchodilator order in the chart from a provider indicating to follow the RT Bronchodilator Protocol and there is an “Initiate RT Inhaler-Nebulizer Bronchodilator Protocol” order as well (see protocol at bottom of note).    CXR Findings:  No results found.    The findings from the last RT Protocol Assessment were as follows:   History Pulmonary Disease: (P) Chronic pulmonary disease  Respiratory Pattern: (P) Regular pattern and RR 12-20 bpm  Breath Sounds: (P) Slightly diminished and/or crackles  Cough: (P) Strong, spontaneous, non-productive  Indication for Bronchodilator Therapy: (P) Decreased or absent breath sounds  Bronchodilator Assessment Score: (P) 4    Aerosolized bronchodilator medication orders have been revised according to the RT Inhaler-Nebulizer Bronchodilator Protocol below.    Respiratory Therapist to perform RT Therapy Protocol Assessment initially then follow the protocol.  Repeat RT Therapy Protocol Assessment PRN for score 0-3 or on second treatment, BID, and PRN for scores above 3.    No Indications - adjust the frequency to every 6 hours PRN wheezing or bronchospasm, if no treatments needed after 48 hours then discontinue using Per Protocol order mode.     If indication present, adjust the RT bronchodilator orders based on the Bronchodilator Assessment Score as indicated below.  Use Inhaler orders unless patient has one or more of the following: on home nebulizer, not able to hold breath for 10 seconds, is not alert and oriented, cannot activate and use MDI correctly, or respiratory rate 25 breaths per minute or more, then use the equivalent nebulizer order(s) with same Frequency and PRN reasons based on the score.  If a patient is on this medication at home then do not decrease Frequency below that used at home.    0-3 - enter or revise RT bronchodilator order(s) to equivalent RT Bronchodilator order with Frequency of every 4 
RT Inhaler-Nebulizer Bronchodilator Protocol Note    There is a bronchodilator order in the chart from a provider indicating to follow the RT Bronchodilator Protocol and there is an “Initiate RT Inhaler-Nebulizer Bronchodilator Protocol” order as well (see protocol at bottom of note).    CXR Findings:  No results found.    The findings from the last RT Protocol Assessment were as follows:   History Pulmonary Disease: Chronic pulmonary disease  Respiratory Pattern: Regular pattern and RR 12-20 bpm  Breath Sounds: Slightly diminished and/or crackles  Cough: Strong, spontaneous, non-productive  Indication for Bronchodilator Therapy: Decreased or absent breath sounds  Bronchodilator Assessment Score: 4    Aerosolized bronchodilator medication orders have been revised according to the RT Inhaler-Nebulizer Bronchodilator Protocol below.    Respiratory Therapist to perform RT Therapy Protocol Assessment initially then follow the protocol.  Repeat RT Therapy Protocol Assessment PRN for score 0-3 or on second treatment, BID, and PRN for scores above 3.    No Indications - adjust the frequency to every 6 hours PRN wheezing or bronchospasm, if no treatments needed after 48 hours then discontinue using Per Protocol order mode.     If indication present, adjust the RT bronchodilator orders based on the Bronchodilator Assessment Score as indicated below.  Use Inhaler orders unless patient has one or more of the following: on home nebulizer, not able to hold breath for 10 seconds, is not alert and oriented, cannot activate and use MDI correctly, or respiratory rate 25 breaths per minute or more, then use the equivalent nebulizer order(s) with same Frequency and PRN reasons based on the score.  If a patient is on this medication at home then do not decrease Frequency below that used at home.    0-3 - enter or revise RT bronchodilator order(s) to equivalent RT Bronchodilator order with Frequency of every 4 hours PRN for wheezing or 
RT Inhaler-Nebulizer Bronchodilator Protocol Note    There is a bronchodilator order in the chart from a provider indicating to follow the RT Bronchodilator Protocol and there is an “Initiate RT Inhaler-Nebulizer Bronchodilator Protocol” order as well (see protocol at bottom of note).    CXR Findings:  No results found.    The findings from the last RT Protocol Assessment were as follows:   History Pulmonary Disease: Chronic pulmonary disease  Respiratory Pattern: Regular pattern and RR 12-20 bpm  Breath Sounds: Slightly diminished and/or crackles  Cough: Strong, spontaneous, non-productive  Indication for Bronchodilator Therapy: Decreased or absent breath sounds  Bronchodilator Assessment Score: 4    Aerosolized bronchodilator medication orders have been revised according to the RT Inhaler-Nebulizer Bronchodilator Protocol below.    Respiratory Therapist to perform RT Therapy Protocol Assessment initially then follow the protocol.  Repeat RT Therapy Protocol Assessment PRN for score 0-3 or on second treatment, BID, and PRN for scores above 3.    No Indications - adjust the frequency to every 6 hours PRN wheezing or bronchospasm, if no treatments needed after 48 hours then discontinue using Per Protocol order mode.     If indication present, adjust the RT bronchodilator orders based on the Bronchodilator Assessment Score as indicated below.  Use Inhaler orders unless patient has one or more of the following: on home nebulizer, not able to hold breath for 10 seconds, is not alert and oriented, cannot activate and use MDI correctly, or respiratory rate 25 breaths per minute or more, then use the equivalent nebulizer order(s) with same Frequency and PRN reasons based on the score.  If a patient is on this medication at home then do not decrease Frequency below that used at home.    0-3 - enter or revise RT bronchodilator order(s) to equivalent RT Bronchodilator order with Frequency of every 4 hours PRN for wheezing or 
RT Inhaler-Nebulizer Bronchodilator Protocol Note    There is a bronchodilator order in the chart from a provider indicating to follow the RT Bronchodilator Protocol and there is an “Initiate RT Inhaler-Nebulizer Bronchodilator Protocol” order as well (see protocol at bottom of note).    CXR Findings:  XR CHEST PORTABLE    Result Date: 11/19/2024  No significant findings in the chest.       The findings from the last RT Protocol Assessment were as follows:   History Pulmonary Disease: Chronic pulmonary disease  Respiratory Pattern: Dyspnea on exertion or RR 21-25 bpm  Breath Sounds: Slightly diminished and/or crackles  Cough: Strong, spontaneous, non-productive  Indication for Bronchodilator Therapy: Decreased or absent breath sounds  Bronchodilator Assessment Score: 6    Aerosolized bronchodilator medication orders have been revised according to the RT Inhaler-Nebulizer Bronchodilator Protocol below.    Respiratory Therapist to perform RT Therapy Protocol Assessment initially then follow the protocol.  Repeat RT Therapy Protocol Assessment PRN for score 0-3 or on second treatment, BID, and PRN for scores above 3.    No Indications - adjust the frequency to every 6 hours PRN wheezing or bronchospasm, if no treatments needed after 48 hours then discontinue using Per Protocol order mode.     If indication present, adjust the RT bronchodilator orders based on the Bronchodilator Assessment Score as indicated below.  Use Inhaler orders unless patient has one or more of the following: on home nebulizer, not able to hold breath for 10 seconds, is not alert and oriented, cannot activate and use MDI correctly, or respiratory rate 25 breaths per minute or more, then use the equivalent nebulizer order(s) with same Frequency and PRN reasons based on the score.  If a patient is on this medication at home then do not decrease Frequency below that used at home.    0-3 - enter or revise RT bronchodilator order(s) to equivalent RT 
Report called to [facility] at [facility],PT A/ox4,daughter at bedside, pt will be at room 524   
Report to GABBIE Degroot RN and care of patient transferred.in stable condition.    .Electronically signed by Shandra Beaulieu RN on 11/22/2024 at 7:20 AM    
[facility]   Progress Note  Cardiology      HPI: Seeing Mr. Nguyen today for f/u CP and hx CAD. He has c/o not being able to move LLE well. No other issues. He converted to NSR last night and dilt gtt d/c'd earlier this AM. Tele NSR 91 bpm now.       Physical Examination:    Vitals:    11/21/24 0716   BP: 105/64   Pulse:    Resp:    Temp:    SpO2:           Constitutional and General Appearance: NAD   Respiratory:  Normal excursion and expansion without use of accessory muscles  Resp Auscultation: Normal breath sounds without dullness  Cardiovascular:  The apical impulses not displaced  Heart tones are crisp and normal; regular rhythm  Cervical veins are not engorged  The carotid upstroke is normal in amplitude and contour without delay or bruit  Normal S1S2, No S3, No Murmur  Peripheral pulses are symmetrical and full  There is no clubbing, cyanosis of the extremities.  No edema  Pedal Pulses: 2+ and equal   Abdomen:  No masses or tenderness  Liver/Spleen: No Abnormalities Noted  Neurological/Psychiatric:  Alert and oriented in all spheres  Moves all extremities well  No abnormalities of mood, affect, memory, mentation, or behavior are noted  Skin: warm and dry      Lab Results   Component Value Date    WBC 11.6 (H) 11/21/2024    HGB 10.6 (L) 11/21/2024    HCT 31.4 (L) 11/21/2024    MCV 81.9 11/21/2024     11/21/2024     Lab Results   Component Value Date    CREATININE 0.4 (L) 11/21/2024    BUN 4 (L) 11/21/2024     (L) 11/21/2024    K 3.6 11/21/2024    CL 89 (L) 11/21/2024    CO2 23 11/21/2024     Lab Results   Component Value Date    INR 1.03 03/06/2021    PROTIME 11.9 03/06/2021      [id number] Score for Atrial Fibrillation Stroke Risk   Risk   Factors  Component Value   C CHF No 0   H HTN Yes 1   A2 Age >= 75 No,  (59 y.o.) 0   D DM No 0   S2 Prior Stroke/TIA No 0   V Vascular Disease No 1   A Age 65-74 No,  (59 y.o.) 0   Sc Sex male 0    [id number]  Score  2   Score last updated 
STAT NA results = 122. Called Dr. Cisneros, N.O start D5 100/H  Recheck Serum NA in 2 hours.  If Na drops below 118 stop D5. If continuous to go up call Dr. Cisneros back.   Goal Na 118 at 4 a.m and 121 at 4 p.m  
Shift assessment complete- see flowsheets.  Pt is A&Ox4.  VSS  Respirations are easy, even and unlabored.  PIV WDL. Flushed at this time.  Dilt gtt titrated off this am. Pt is in SR rate of 87. NS increased to 75 mL/hr per Dr. Mustafa.   Morning medications given as per MAR.  Plan of care and goals reviewed. Call light within reach.  Bed in lowest position with wheels locked. No needs expressed at this time. Will continue to monitor.    
Shift assessment complete- see flowsheets.  Pt is A&Ox4.  VSS- HR elevated. Cards and Dr. SINGH at bedside now.   Respirations are easy, even and unlabored.  PIV WDL. Flushed at this time.  Plan of care and goals reviewed. Call light within reach.  Bed in lowest position with wheels locked. No needs expressed at this time. Will continue to monitor.    
Shift assessment complete- see flowsheets.  Pt is A&Ox4. Tremors, which are his baseline.   VSS  Respirations are easy, even and unlabored.  PIV WDL. Flushed at this time.  Plan of care and goals reviewed. Call light within reach.  Bed in lowest position with wheels locked. No needs expressed at this time. Will continue to monitor.  
Shift assessment complete. Patient in bed with blanket over head and body. He denied being cold and requested fan be turned on in room. He is A&O x4.     VSS: /81;  A-fib; RR 25; Spo2  96%, Oral temperature 98 F    External urinary catheter remains secure and intact. Connected to wall suction of 40 mmgHg continuous.     IV sites WNL, patent and flushed.     Call light in reach and patient demonstrated correct use for calling for assistance. Bed in lowest position, wheels locked and rails up 3/4.    Patient refused bath for the night stating that he had one last night and didn't need one. Educated patient on use of CHG baths. He continued to deny need.     Electronically signed by Shandra Beaulieu RN on 11/21/2024 at 9:26 PM    
Still awaiting 9 am lab results  
VSS. Room air. Patient alert and oriented. Chronic tremor to hands moderate. Weakness to left side. Patient in bed. Pure wick overnight and urinal during the day. Meds crushed in applesauce. Transferring to [place] today to see ID and NS. Stage 2 to buttocks- dressing CDI. Patient has decreased appetite. Not much intake. Will monitor.   
Vital signs taken and shift assessment is complete, see flow sheet. Pt A&OX 4 with call light within reach and bed alarm on.   
Bronchodilator order with Frequency of every 4 hours PRN for wheezing or increased work of breathing using Per Protocol order mode.        4-6 - enter or revise RT Bronchodilator order(s) to two equivalent RT bronchodilator orders with one order with BID Frequency and one order with Frequency of every 4 hours PRN wheezing or increased work of breathing using Per Protocol order mode.        7-10 - enter or revise RT Bronchodilator order(s) to two equivalent RT bronchodilator orders with one order with TID Frequency and one order with Frequency of every 4 hours PRN wheezing or increased work of breathing using Per Protocol order mode.       11-13 - enter or revise RT Bronchodilator order(s) to one equivalent RT bronchodilator order with QID Frequency and an Albuterol order with Frequency of every 4 hours PRN wheezing or increased work of breathing using Per Protocol order mode.      Greater than 13 - enter or revise RT Bronchodilator order(s) to one equivalent RT bronchodilator order with every 4 hours Frequency and an Albuterol order with Frequency of every 2 hours PRN wheezing or increased work of breathing using Per Protocol order mode.       Electronically signed by Emory Lloyd RCP on 11/19/2024 at 7:30 PM  
Bronchodilator order with Frequency of every 4 hours PRN for wheezing or increased work of breathing using Per Protocol order mode.        4-6 - enter or revise RT Bronchodilator order(s) to two equivalent RT bronchodilator orders with one order with BID Frequency and one order with Frequency of every 4 hours PRN wheezing or increased work of breathing using Per Protocol order mode.        7-10 - enter or revise RT Bronchodilator order(s) to two equivalent RT bronchodilator orders with one order with TID Frequency and one order with Frequency of every 4 hours PRN wheezing or increased work of breathing using Per Protocol order mode.       11-13 - enter or revise RT Bronchodilator order(s) to one equivalent RT bronchodilator order with QID Frequency and an Albuterol order with Frequency of every 4 hours PRN wheezing or increased work of breathing using Per Protocol order mode.      Greater than 13 - enter or revise RT Bronchodilator order(s) to one equivalent RT bronchodilator order with every 4 hours Frequency and an Albuterol order with Frequency of every 2 hours PRN wheezing or increased work of breathing using Per Protocol order mode.       Electronically signed by Emory Lloyd RCP on 11/20/2024 at 7:21 PM  
brachial radialis 2/4, patellar 2/4   Planters: Flexor bilaterally  Coordination: no pronator drift, no dysmetria with FNF  Sensation: normal in both arms and legs.  Gait/Posture: steady gait with normal posturing and station.     MDM:  A. Problems (any 1)    High:    [x] Acute/Chronic Illness/injury posing threat to life or bodily function:    [] Severe exacerbation of chronic illness:      Moderate:    []     1 or more chronic illness with exacerbation, progression or side effect of treatment or  []     2 or more stable chronic illnesses or  []     1 acute illness with systemic symptoms     ---------------------------------------------------------------------  B. Risk of Treatment (any 1)   [] Drugs/treatments that require intensive monitoring for toxicity include:    [] Change in code status:    [] Decision to escalate care:    [] Major surgery/procedure with associated risk factors:    [x] Prescription drug management  ----------------------------------------------------------------------  [x] High (any 2)  [] Moderate (any 1)    C. Data (any 2 for high and any 1 for moderate)  [x] Discussed management of the case with: nurse  [x] Imaging personally reviewed and interpreted  [x] Data Review (any 3)  [x] Collateral history obtained from: nurse  [x] All available Consultant notes from yesterday/today were reviewed  [x] All current labs were reviewed and interpreted for clinical significance   [] Appropriate follow-up labs were ordered      Data  LABS:   Lab Results   Component Value Date/Time     11/21/2024 09:06 AM    K 3.3 11/21/2024 09:06 AM    CL 90 11/21/2024 09:06 AM    CO2 23 11/21/2024 09:06 AM    BUN 4 11/21/2024 09:06 AM    CREATININE 0.3 11/21/2024 09:06 AM    GFRAA >60 05/09/2022 04:03 PM    LABGLOM >90 11/21/2024 09:06 AM    LABGLOM >60 12/01/2023 04:00 PM    GLUCOSE 107 11/21/2024 09:06 AM    PHOS 2.6 11/21/2024 09:06 AM    MG 1.86 11/21/2024 09:06 AM    CALCIUM 7.3 11/21/2024 09:06 AM     Lab 
increased work of breathing using Per Protocol order mode.        4-6 - enter or revise RT Bronchodilator order(s) to two equivalent RT bronchodilator orders with one order with BID Frequency and one order with Frequency of every 4 hours PRN wheezing or increased work of breathing using Per Protocol order mode.        7-10 - enter or revise RT Bronchodilator order(s) to two equivalent RT bronchodilator orders with one order with TID Frequency and one order with Frequency of every 4 hours PRN wheezing or increased work of breathing using Per Protocol order mode.       11-13 - enter or revise RT Bronchodilator order(s) to one equivalent RT bronchodilator order with QID Frequency and an Albuterol order with Frequency of every 4 hours PRN wheezing or increased work of breathing using Per Protocol order mode.      Greater than 13 - enter or revise RT Bronchodilator order(s) to one equivalent RT bronchodilator order with every 4 hours Frequency and an Albuterol order with Frequency of every 2 hours PRN wheezing or increased work of breathing using Per Protocol order mode.       Electronically signed by Crista Tinoco RCP on 11/22/2024 at 7:45 PM  
mg-albuterol 2.5 mg  1 Dose Inhalation BID       Data:  CBC:   Recent Labs     11/22/24  0424 11/23/24  0409 11/24/24  0438   WBC 8.3 8.1 7.4   HGB 10.0* 10.3* 9.3*   HCT 29.9* 30.0* 27.5*   MCV 81.6 80.1 81.0    249 223     BMP:   Recent Labs     11/23/24  0409 11/23/24  1926 11/24/24  0438   * 129* 131*   K 3.6 3.4* 3.3*   CL 96* 95* 97*   CO2 26 24 25   PHOS 3.5 2.5 2.9   BUN 3* 4* 4*   CREATININE 0.4* 0.3* 0.3*     LIVER PROFILE:   No results for input(s): \"AST\", \"ALT\", \"LIPASE\", \"AMYLASE\", \"BILIDIR\", \"BILITOT\", \"ALKPHOS\" in the last 72 hours.    Invalid input(s): \"ALB\"        Microbiology:  11/19 BC Staphylococcus   11/19 COVID-19 and influenza not detected  11/22 BCx NGTD      Imaging:  Chest x-ray 11/19 images independently reviewed by me and showed:  No acute cardiopulmonary disease     Low-dose CT 3/25/2024  Stable emphysema scattered pulmonary nodules largest 3 mm.  No new or suspicious nodule Coronary artery disease.     Echo 11/22  Left Ventricle: Hyperdynamic left ventricular systolic function with a visually estimated EF of 65 - 70%. Left ventricle size is normal. Mildly increased wall thickness. Findings consistent with mild concentric hypertrophy. Normal wall motion. Normal diastolic function.    Mitral Valve: Mildly thickened, at the anterior leaflet.    Tricuspid Valve: Unable to assess RVSP due to inadequate or insignificant tricuspid regurgitation.    Image quality is adequate.    MRI 11/21  Sellar mass demonstrating suprasellar extension, most suggestive of a  pituitary macroadenoma.  Postcontrast imaging following the pituitary  protocol could be performed for further characterization.      ASSESSMENT:  Acute severe hyponatremia level   LARISA bacteremia - Staphylococcus   Recurrent chest pain improved with nitroglycerin  A-fib with RVR  Severe COPD-not in exacerbation of  Pulmonary nodules-stable on most recent CT  Tremor and blurry vision-workup revealed pituitary mass   Back pain 
abnormality.   2. Findings suggestive of presence of pituitary mass.  Finding may be on the   basis of pituitary adenoma.  Follow-up MRI examination of the brain with   contrast would be helpful for more complete evaluation.         XR CHEST PORTABLE   Final Result   No significant findings in the chest.         MRI THORACIC SPINE W WO CONTRAST    (Results Pending)   MRI LUMBAR SPINE W WO CONTRAST    (Results Pending)       ECHO    Left Ventricle: Hyperdynamic left ventricular systolic function with a visually estimated EF of 65 - 70%. Left ventricle size is normal. Mildly increased wall thickness. Findings consistent with mild concentric hypertrophy. Normal wall motion. Normal diastolic function.    Mitral Valve: Mildly thickened, at the anterior leaflet.    Tricuspid Valve: Unable to assess RVSP due to inadequate or insignificant tricuspid regurgitation.    Image quality is adequate.    Stress test         Stress Combined Conclusion: Mildly diminished activity inferior wall at stress with improvement at rest. Myocardial perfusion normal in remaining segments. LV function is hyperdynamic EF > 65% with uniform wall motion. Note patient refused obtaining attenuation correct images. Mild ischemia cannot be ruled out but given normal inferior wall motion findings may represent diaphragm attenuation artifact. Note stress EKG portion is abnormal. Lower risk abnormal study. Clinical correlation recommended.    Stress Test: A pharmacological stress test was performed using regadenoson (Lexiscan). PO caffeine given as a reversal agent.    Resting ECG: NSR 98bpm; artifact; NST change    Stress ECG: Arrhythmias during stress: rare PVCs. 1mm downsloping ST depression anterior leads. The stress ECG was positive for ischemia.     Blood cx - MSSA  Repeat blood pending      Assessment & Plan:           Acute on Chronic Hyponatremia   - sodium on admission 115 with no mentation changes   - recent adm for the same noted    - pt 
lying reclined in bed with no family present.  Pt agreeable to this PT session.    Cognition    A&O x4   Able to follow 2 step commands    Pain   Yes  Location: L Leg, low back  Ratin /10 at rest, 10/10 when trying to stand  Pain Medicine Status: No request made    Activity Tolerance   During therapy session noted pt with no adverse symptoms    Pt Position BP (mmHg) HR (bpm) SpO2 (%) on RA  Comments   Supine at rest 127/87 98 92    Seated at EOB       Standing       End of session         Objective  Does this pt have an acute or acute on chronic diagnosis of CHF? No    Upper Extremity ROM/Strength  Please see OT evaluation.      Lower Extremity ROM / Strength   AROM WFL: No  ROM limitations: limited hip/knee/ankle movement in L LE    Strength Assessment (measured on a 0-5 scale):  R LE   Quad   4+   Ant Tib  4   Hamstring 4   Iliopsoas 4-  L LE  Quad   Weak   Ant Tib  Weak   Hamstring Weak   Iliopsoas Weak    Lower Extremity Sensation    WNL    Coordination  WNL    Tone  WNL    Balance  Static Sitting:  Not Tested  Dynamic Sitting:  Not Tested   Comments: held per Dr. Rooney, pending MRI    Static Standing: Not Tested  Dynamic Standing:  Not Tested  Comments: held per Dr. Rooney, pending MRI    Posture  Seated: Not Tested  Standing: Not Tested    Bed Mobility   Supine to Sit:    Not Tested  Sit to Supine:   Not Tested  Rolling:   Max A   Scooting in sitting: Not Tested  Scooting in supine:  Not Tested   Bridging:  Not Tested  Comments: held per Dr. Rooney, pending MRI    Transfer Training     Sit to stand:   Not Tested  Stand to sit:   Not Tested  Bed to/from Chair:  Not Tested   Comments: Per pt reports, his is unable to perform OOB activitiy currently; held per Dr. Rooney, pending MRI    Gait gait deferred due to difficulty with transfers; pt ambulated 0 ft.   Comment: Per pt reports, his is unable to perform OOB activitiy currently; held per Dr. Rooney, pending MRI    Stair Training deferred, pt unsafe/ not 
times daily Both prilosec and protonix refilled in October 2024  Patient not taking: Reported on 11/19/2024    ProviderMelvin MD   traZODone (DESYREL) 50 MG tablet Take 1 tablet by mouth nightly  Patient not taking: Reported on 11/19/2024    Melvin Lord MD   amLODIPine (NORVASC) 2.5 MG tablet TAKE 1 TABLET ONCE DAILY  Patient not taking: Reported on 11/19/2024 5/13/24   Zoey Caruso MD       Allergies  Pcn [penicillins]    Review of Systems:   Reviewed. No changes except as noted in HPI and A/P      Lab Results   Component Value Date    WBC 8.1 11/23/2024    HGB 10.3 (L) 11/23/2024    HCT 30.0 (L) 11/23/2024    MCV 80.1 11/23/2024     11/23/2024     Lab Results   Component Value Date    CREATININE 0.4 (L) 11/23/2024    BUN 3 (L) 11/23/2024     (L) 11/23/2024    K 3.6 11/23/2024    CL 96 (L) 11/23/2024    CO2 26 11/23/2024     Lab Results   Component Value Date    INR 1.03 03/06/2021    PROTIME 11.9 03/06/2021       I reviewed EKGs and radiology imaging. Pertinent findings and changes as described in assessment below.      Physical Examination:    /84   Pulse 85   Temp 97.9 °F (36.6 °C) (Oral)   Resp 21   Ht 1.829 m (6')   Wt 77.7 kg (171 lb 3.2 oz)   SpO2 99%   BMI 23.22 kg/m²      General Appearance:  Alert, no distress, appears stated age   Head:  Normocephalic, without obvious abnormality, atraumatic   Eyes:  PERRL, conjunctiva/corneas clear         Nose: Nares normal, no drainage or sinus tenderness   Throat: Lips, mucosa, and tongue normal   Neck: Supple, symmetrical, trachea midline, no adenopathy         Lungs:   Clear to auscultation bilaterally    Chest Wall:  No tenderness or deformity   Heart:  Regular rate and rhythm, S1, S2 normal, no murmur, rub or gallop   Abdomen:   Soft, non tender, normal bowel sounds                Extremities: No cyanosis or edema   Pulses: 2+ and symmetric   Skin: Skin color, texture, turgor normal, no rashes    Pysch: Normal mood 
lower blood pressure   - monitor      Tobacco Dependence  -Recommended cessation  - nicotine patch ordered       Note above makes patient higher risk for morbidity and mortality requiring testing and treatment.       DVT Prophylaxis: Lovenox full dose  Diet: ADULT DIET; Regular; 1500 ml; No Caffeine  ADULT ORAL NUTRITION SUPPLEMENT; Breakfast, Lunch, Dinner; Standard High Calorie/High Protein Oral Supplement  Code Status: Full Code        Laureano Vora MD, 11/23/2024 12:57 PM

## 2024-11-26 NOTE — H&P
fracture or dislocation.  Arterial vascular calcification is identified.     No evidence of acute osseous abnormality.     XR CHEST PORTABLE    Result Date: 11/5/2024  EXAMINATION: ONE XRAY VIEW OF THE CHEST 11/5/2024 1:06 pm COMPARISON: 10/06/2024 HISTORY: ORDERING SYSTEM PROVIDED HISTORY: CP TECHNOLOGIST PROVIDED HISTORY: Reason for exam:->CP Reason for Exam: Chest pain FINDINGS: There is a calcified granuloma in the the left lung base.  The lungs otherwise clear.  The heart and mediastinal structures appear unremarkable. Bony structures appear normal.  Visualized upper abdomen appears normal. There is no change from prior examination.     No active cardiopulmonary disease.     XR HIP LEFT (2-3 VIEWS)    Result Date: 11/5/2024  EXAMINATION: TWO XRAY VIEWS OF THE LEFT HIP 11/5/2024 1:06 pm COMPARISON: None. HISTORY: ORDERING SYSTEM PROVIDED HISTORY: pain TECHNOLOGIST PROVIDED HISTORY: Reason for exam:->pain Reason for Exam: Fall x one month ago FINDINGS: There is advanced osteoarthritis of the left hip, with almost complete loss of the joint space.  There is some subchondral sclerosis.  The right hip appears normal.  There is no evidence for any fracture or dislocation. Pelvic bones appear unremarkable.     Advanced osteoarthritis of the left hip.  No fractures.     XR KNEE LEFT (3 VIEWS)    Result Date: 11/5/2024  EXAMINATION: THREE XRAY VIEWS OF THE LEFT KNEE 11/5/2024 1:06 pm COMPARISON: None. HISTORY: ORDERING SYSTEM PROVIDED HISTORY: pain TECHNOLOGIST PROVIDED HISTORY: Reason for exam:->pain Reason for Exam: Fall x one month ago. FINDINGS: No evidence of acute fracture or dislocation.  No focal osseous lesion.  No evidence of joint effusion. No focal soft tissue abnormality.     No acute abnormality of the knee.       PCP: Candelaria Dover APRN - NP    Past Medical History:        Diagnosis Date    Affective bipolar disorder (HCC) 11/18/2010    Anxiety     Bipolar affective (HCC)     CAD (coronary artery

## 2024-11-26 NOTE — RT PROTOCOL NOTE
work of breathing using Per Protocol order mode.        4-6 - enter or revise RT Bronchodilator order(s) to two equivalent RT bronchodilator orders with one order with BID Frequency and one order with Frequency of every 4 hours PRN wheezing or increased work of breathing using Per Protocol order mode.        7-10 - enter or revise RT Bronchodilator order(s) to two equivalent RT bronchodilator orders with one order with TID Frequency and one order with Frequency of every 4 hours PRN wheezing or increased work of breathing using Per Protocol order mode.       11-13 - enter or revise RT Bronchodilator order(s) to one equivalent RT bronchodilator order with QID Frequency and an Albuterol order with Frequency of every 4 hours PRN wheezing or increased work of breathing using Per Protocol order mode.      Greater than 13 - enter or revise RT Bronchodilator order(s) to one equivalent RT bronchodilator order with every 4 hours Frequency and an Albuterol order with Frequency of every 2 hours PRN wheezing or increased work of breathing using Per Protocol order mode.     RT to enter RT Home Evaluation for COPD & MDI Assessment order using Per Protocol order mode.    Electronically signed by Cecille Cantrell RCP on 11/25/2024 at 10:01 PM

## 2024-11-26 NOTE — PLAN OF CARE
Problem: Safety - Adult  Goal: Free from fall injury  11/26/2024 1221 by Victoria Oneill, RN  Outcome: Progressing  11/26/2024 0526 by Candelaria Huffman, RN  Outcome: Progressing

## 2024-11-26 NOTE — FLOWSHEET NOTE
11/25/24 1900   Vital Signs   Temp 97.3 °F (36.3 °C)   Temp Source Oral   Pulse 83   Heart Rate Source Monitor   Respirations 18   /77   MAP (Calculated) 93   BP Location Right upper arm   BP Method Automatic   Patient Position Semi fowlers   Pain Assessment   Pain Assessment None - Denies Pain   Oxygen Therapy   SpO2 96 %   O2 Device None (Room air)     Ahift assessment done  Vitals sf  RX given per mar  Call light within reach  Prn ativan given

## 2024-11-27 ENCOUNTER — HOSPITAL ENCOUNTER (INPATIENT)
Dept: CARDIAC CATH/INVASIVE PROCEDURES | Age: 59
Discharge: HOME | DRG: 344 | End: 2024-11-29
Attending: INTERNAL MEDICINE
Payer: COMMERCIAL

## 2024-11-27 ENCOUNTER — ANESTHESIA EVENT (OUTPATIENT)
Dept: CARDIAC CATH/INVASIVE PROCEDURES | Age: 59
End: 2024-11-27
Payer: COMMERCIAL

## 2024-11-27 ENCOUNTER — ANESTHESIA (OUTPATIENT)
Dept: CARDIAC CATH/INVASIVE PROCEDURES | Age: 59
End: 2024-11-27
Payer: COMMERCIAL

## 2024-11-27 VITALS
RESPIRATION RATE: 21 BRPM | HEART RATE: 88 BPM | SYSTOLIC BLOOD PRESSURE: 133 MMHG | DIASTOLIC BLOOD PRESSURE: 75 MMHG | OXYGEN SATURATION: 99 %

## 2024-11-27 PROBLEM — M00.9 PYOGENIC ARTHRITIS OF LEFT HIP: Status: ACTIVE | Noted: 2024-11-27

## 2024-11-27 LAB
CORTIS AM PEAK SERPL-MCNC: 11.4 UG/DL (ref 4.3–22.4)
CRP SERPL-MCNC: 66.2 MG/L (ref 0–5.1)
ECHO BSA: 1.97 M2
HIV 1+2 AB+HIV1 P24 AG SERPL QL IA: NORMAL
HIV 2 AB SERPL QL IA: NORMAL
HIV1 AB SERPL QL IA: NORMAL
HIV1 P24 AG SERPL QL IA: NORMAL

## 2024-11-27 PROCEDURE — 97110 THERAPEUTIC EXERCISES: CPT

## 2024-11-27 PROCEDURE — 93325 DOPPLER ECHO COLOR FLOW MAPG: CPT | Performed by: INTERNAL MEDICINE

## 2024-11-27 PROCEDURE — 3700000000 HC ANESTHESIA ATTENDED CARE

## 2024-11-27 PROCEDURE — 86702 HIV-2 ANTIBODY: CPT

## 2024-11-27 PROCEDURE — 2580000003 HC RX 258: Performed by: INTERNAL MEDICINE

## 2024-11-27 PROCEDURE — 86701 HIV-1ANTIBODY: CPT

## 2024-11-27 PROCEDURE — 6370000000 HC RX 637 (ALT 250 FOR IP): Performed by: INTERNAL MEDICINE

## 2024-11-27 PROCEDURE — 99223 1ST HOSP IP/OBS HIGH 75: CPT | Performed by: ORTHOPAEDIC SURGERY

## 2024-11-27 PROCEDURE — 99232 SBSQ HOSP IP/OBS MODERATE 35: CPT | Performed by: INTERNAL MEDICINE

## 2024-11-27 PROCEDURE — 86140 C-REACTIVE PROTEIN: CPT

## 2024-11-27 PROCEDURE — 6360000002 HC RX W HCPCS

## 2024-11-27 PROCEDURE — 97166 OT EVAL MOD COMPLEX 45 MIN: CPT

## 2024-11-27 PROCEDURE — 6370000000 HC RX 637 (ALT 250 FOR IP): Performed by: NURSE PRACTITIONER

## 2024-11-27 PROCEDURE — 6360000002 HC RX W HCPCS: Performed by: NURSE PRACTITIONER

## 2024-11-27 PROCEDURE — 97530 THERAPEUTIC ACTIVITIES: CPT

## 2024-11-27 PROCEDURE — 6370000000 HC RX 637 (ALT 250 FOR IP)

## 2024-11-27 PROCEDURE — 1200000000 HC SEMI PRIVATE

## 2024-11-27 PROCEDURE — 82533 TOTAL CORTISOL: CPT

## 2024-11-27 PROCEDURE — 3700000001 HC ADD 15 MINUTES (ANESTHESIA)

## 2024-11-27 PROCEDURE — 2580000003 HC RX 258: Performed by: NURSE PRACTITIONER

## 2024-11-27 PROCEDURE — 93320 DOPPLER ECHO COMPLETE: CPT | Performed by: INTERNAL MEDICINE

## 2024-11-27 PROCEDURE — 97162 PT EVAL MOD COMPLEX 30 MIN: CPT

## 2024-11-27 PROCEDURE — 94640 AIRWAY INHALATION TREATMENT: CPT

## 2024-11-27 PROCEDURE — 99406 BEHAV CHNG SMOKING 3-10 MIN: CPT | Performed by: ORTHOPAEDIC SURGERY

## 2024-11-27 PROCEDURE — 2580000003 HC RX 258

## 2024-11-27 PROCEDURE — 7100000010 HC PHASE II RECOVERY - FIRST 15 MIN

## 2024-11-27 PROCEDURE — 36415 COLL VENOUS BLD VENIPUNCTURE: CPT

## 2024-11-27 PROCEDURE — 86803 HEPATITIS C AB TEST: CPT

## 2024-11-27 PROCEDURE — 87390 HIV-1 AG IA: CPT

## 2024-11-27 PROCEDURE — 99232 SBSQ HOSP IP/OBS MODERATE 35: CPT | Performed by: SURGERY

## 2024-11-27 PROCEDURE — 93312 ECHO TRANSESOPHAGEAL: CPT | Performed by: INTERNAL MEDICINE

## 2024-11-27 PROCEDURE — B24BZZ4 ULTRASONOGRAPHY OF HEART WITH AORTA, TRANSESOPHAGEAL: ICD-10-PCS | Performed by: INTERNAL MEDICINE

## 2024-11-27 PROCEDURE — 7100000011 HC PHASE II RECOVERY - ADDTL 15 MIN

## 2024-11-27 PROCEDURE — 93320 DOPPLER ECHO COMPLETE: CPT

## 2024-11-27 RX ORDER — PROPOFOL 10 MG/ML
INJECTION, EMULSION INTRAVENOUS
Status: DISCONTINUED | OUTPATIENT
Start: 2024-11-27 | End: 2024-11-27 | Stop reason: SDUPTHER

## 2024-11-27 RX ORDER — NALOXONE HYDROCHLORIDE 0.4 MG/ML
INJECTION, SOLUTION INTRAMUSCULAR; INTRAVENOUS; SUBCUTANEOUS PRN
Status: CANCELLED | OUTPATIENT
Start: 2024-11-27

## 2024-11-27 RX ORDER — SODIUM CHLORIDE 9 MG/ML
INJECTION, SOLUTION INTRAVENOUS PRN
Status: DISCONTINUED | OUTPATIENT
Start: 2024-11-27 | End: 2024-12-02 | Stop reason: HOSPADM

## 2024-11-27 RX ORDER — SODIUM CHLORIDE 0.9 % (FLUSH) 0.9 %
5-40 SYRINGE (ML) INJECTION EVERY 12 HOURS SCHEDULED
Status: DISCONTINUED | OUTPATIENT
Start: 2024-11-27 | End: 2024-12-02 | Stop reason: HOSPADM

## 2024-11-27 RX ORDER — SODIUM CHLORIDE 0.9 % (FLUSH) 0.9 %
5-40 SYRINGE (ML) INJECTION EVERY 12 HOURS SCHEDULED
Status: CANCELLED | OUTPATIENT
Start: 2024-11-27

## 2024-11-27 RX ORDER — ONDANSETRON 2 MG/ML
4 INJECTION INTRAMUSCULAR; INTRAVENOUS EVERY 30 MIN PRN
Status: CANCELLED | OUTPATIENT
Start: 2024-11-27

## 2024-11-27 RX ORDER — SODIUM CHLORIDE 9 MG/ML
INJECTION, SOLUTION INTRAVENOUS PRN
Status: CANCELLED | OUTPATIENT
Start: 2024-11-27

## 2024-11-27 RX ORDER — METOPROLOL TARTRATE 50 MG
50 TABLET ORAL 2 TIMES DAILY
Status: DISCONTINUED | OUTPATIENT
Start: 2024-11-27 | End: 2024-12-02 | Stop reason: HOSPADM

## 2024-11-27 RX ORDER — SODIUM CHLORIDE 9 MG/ML
INJECTION, SOLUTION INTRAVENOUS
Status: DISCONTINUED | OUTPATIENT
Start: 2024-11-27 | End: 2024-11-27 | Stop reason: SDUPTHER

## 2024-11-27 RX ORDER — LIDOCAINE HYDROCHLORIDE 20 MG/ML
INJECTION, SOLUTION INFILTRATION; PERINEURAL
Status: DISCONTINUED | OUTPATIENT
Start: 2024-11-27 | End: 2024-11-27 | Stop reason: SDUPTHER

## 2024-11-27 RX ORDER — OXYCODONE HYDROCHLORIDE 5 MG/1
10 TABLET ORAL PRN
Status: CANCELLED | OUTPATIENT
Start: 2024-11-27 | End: 2024-11-27

## 2024-11-27 RX ORDER — OXYCODONE HYDROCHLORIDE 5 MG/1
5 TABLET ORAL PRN
Status: CANCELLED | OUTPATIENT
Start: 2024-11-27 | End: 2024-11-27

## 2024-11-27 RX ORDER — SODIUM CHLORIDE 0.9 % (FLUSH) 0.9 %
5-40 SYRINGE (ML) INJECTION PRN
Status: DISCONTINUED | OUTPATIENT
Start: 2024-11-27 | End: 2024-12-02 | Stop reason: HOSPADM

## 2024-11-27 RX ORDER — SODIUM CHLORIDE 0.9 % (FLUSH) 0.9 %
5-40 SYRINGE (ML) INJECTION PRN
Status: CANCELLED | OUTPATIENT
Start: 2024-11-27

## 2024-11-27 RX ADMIN — OXYCODONE AND ACETAMINOPHEN 1 TABLET: 5; 325 TABLET ORAL at 01:21

## 2024-11-27 RX ADMIN — Medication 10 ML: at 20:25

## 2024-11-27 RX ADMIN — CEFAZOLIN 2000 MG: 2 INJECTION, POWDER, FOR SOLUTION INTRAVENOUS at 22:12

## 2024-11-27 RX ADMIN — OXYCODONE AND ACETAMINOPHEN 1 TABLET: 5; 325 TABLET ORAL at 17:15

## 2024-11-27 RX ADMIN — OXYCODONE AND ACETAMINOPHEN 1 TABLET: 5; 325 TABLET ORAL at 22:07

## 2024-11-27 RX ADMIN — PROPOFOL 25 MG: 10 INJECTION, EMULSION INTRAVENOUS at 14:34

## 2024-11-27 RX ADMIN — PROPOFOL 25 MG: 10 INJECTION, EMULSION INTRAVENOUS at 14:40

## 2024-11-27 RX ADMIN — CETIRIZINE HYDROCHLORIDE 10 MG: 10 TABLET, FILM COATED ORAL at 09:56

## 2024-11-27 RX ADMIN — ATORVASTATIN CALCIUM 40 MG: 40 TABLET, FILM COATED ORAL at 09:56

## 2024-11-27 RX ADMIN — Medication: at 16:07

## 2024-11-27 RX ADMIN — CEFAZOLIN 2000 MG: 2 INJECTION, POWDER, FOR SOLUTION INTRAVENOUS at 06:06

## 2024-11-27 RX ADMIN — GABAPENTIN 250 MG: 250 SOLUTION ORAL at 20:32

## 2024-11-27 RX ADMIN — CEFAZOLIN 2000 MG: 2 INJECTION, POWDER, FOR SOLUTION INTRAVENOUS at 16:07

## 2024-11-27 RX ADMIN — RANOLAZINE 1000 MG: 500 TABLET, FILM COATED, EXTENDED RELEASE ORAL at 20:25

## 2024-11-27 RX ADMIN — METOPROLOL TARTRATE 50 MG: 50 TABLET, FILM COATED ORAL at 20:25

## 2024-11-27 RX ADMIN — Medication 2 PUFF: at 08:15

## 2024-11-27 RX ADMIN — OXYCODONE AND ACETAMINOPHEN 1 TABLET: 5; 325 TABLET ORAL at 09:44

## 2024-11-27 RX ADMIN — BENZOCAINE 1 PACKAGE: 200 SPRAY DENTAL; ORAL; PERIODONTAL at 14:32

## 2024-11-27 RX ADMIN — GABAPENTIN 250 MG: 250 SOLUTION ORAL at 09:56

## 2024-11-27 RX ADMIN — LORAZEPAM 0.5 MG: 0.5 TABLET ORAL at 22:07

## 2024-11-27 RX ADMIN — LIDOCAINE HYDROCHLORIDE 50 MG: 20 INJECTION, SOLUTION INFILTRATION; PERINEURAL at 14:34

## 2024-11-27 RX ADMIN — LORAZEPAM 0.5 MG: 0.5 TABLET ORAL at 09:44

## 2024-11-27 RX ADMIN — SODIUM CHLORIDE, PRESERVATIVE FREE 10 ML: 5 INJECTION INTRAVENOUS at 20:25

## 2024-11-27 RX ADMIN — Medication 100 MG: at 09:56

## 2024-11-27 RX ADMIN — METOPROLOL TARTRATE 50 MG: 50 TABLET, FILM COATED ORAL at 09:56

## 2024-11-27 RX ADMIN — SODIUM CHLORIDE: 9 INJECTION, SOLUTION INTRAVENOUS at 14:28

## 2024-11-27 RX ADMIN — IPRATROPIUM BROMIDE AND ALBUTEROL SULFATE 1 DOSE: 2.5; .5 SOLUTION RESPIRATORY (INHALATION) at 14:27

## 2024-11-27 RX ADMIN — Medication 2 PUFF: at 19:07

## 2024-11-27 ASSESSMENT — PAIN DESCRIPTION - LOCATION
LOCATION: KNEE
LOCATION: HIP;LEG
LOCATION: ABDOMEN

## 2024-11-27 ASSESSMENT — PAIN DESCRIPTION - ORIENTATION
ORIENTATION: LOWER
ORIENTATION: LEFT
ORIENTATION: LOWER
ORIENTATION: LEFT

## 2024-11-27 ASSESSMENT — PAIN DESCRIPTION - FREQUENCY
FREQUENCY: CONTINUOUS

## 2024-11-27 ASSESSMENT — PAIN SCALES - GENERAL
PAINLEVEL_OUTOF10: 7
PAINLEVEL_OUTOF10: 7
PAINLEVEL_OUTOF10: 4
PAINLEVEL_OUTOF10: 7
PAINLEVEL_OUTOF10: 8
PAINLEVEL_OUTOF10: 0
PAINLEVEL_OUTOF10: 6

## 2024-11-27 ASSESSMENT — PAIN DESCRIPTION - PAIN TYPE
TYPE: CHRONIC PAIN

## 2024-11-27 ASSESSMENT — PAIN DESCRIPTION - DESCRIPTORS
DESCRIPTORS: ACHING

## 2024-11-27 ASSESSMENT — PAIN DESCRIPTION - ONSET: ONSET: PROGRESSIVE

## 2024-11-27 ASSESSMENT — PAIN - FUNCTIONAL ASSESSMENT
PAIN_FUNCTIONAL_ASSESSMENT: PREVENTS OR INTERFERES SOME ACTIVE ACTIVITIES AND ADLS
PAIN_FUNCTIONAL_ASSESSMENT: PREVENTS OR INTERFERES SOME ACTIVE ACTIVITIES AND ADLS

## 2024-11-27 ASSESSMENT — COPD QUESTIONNAIRES: CAT_SEVERITY: SEVERE

## 2024-11-27 NOTE — ANESTHESIA POSTPROCEDURE EVALUATION
Department of Anesthesiology  Postprocedure Note    Patient: Kam Nguyen  MRN: [id number]  YOB: 1965  Date of evaluation: 11/27/2024    Procedure Summary       Date: 11/27/24 Room / Location: [facility]    Anesthesia Start: 1428 Anesthesia Stop: 1444    Procedure: CUCA (PRN CONTRAST/BUBBLE/3D) Diagnosis: Bacteremia    Scheduled Providers: Zoey Caruso MD Responsible Provider: Harjeet Rincon MD    Anesthesia Type: MAC ASA Status: 3            Anesthesia Type: No value filed.    Roland Phase I:      Roland Phase II:      Anesthesia Post Evaluation    Patient location during evaluation: PACU  Level of consciousness: awake  Airway patency: patent  Nausea & Vomiting: no vomiting  Cardiovascular status: blood pressure returned to baseline  Respiratory status: acceptable  Hydration status: stable  Pain management: adequate    No notable events documented.

## 2024-11-27 NOTE — ANESTHESIA PRE PROCEDURE
disease due to lipid rich plaque I25.10, I25.83   • MSSA bacteremia R78.81, B95.61   • Moderate malnutrition (HCC) E44.0       Past Medical History:        Diagnosis Date   • Affective bipolar disorder (HCC) 11/18/2010   • Anxiety    • Bipolar affective (HCC)    • CAD (coronary artery disease)    • Chronic back pain    • Class 2 obesity due to excess calories with body mass index (BMI) of 39.0 to 39.9 in adult 2/2/2022   • COPD (chronic obstructive pulmonary disease) (HCC)    • DDD (degenerative disc disease), cervical    • Depression    • Fatty liver 2/2/2021   • GERD (gastroesophageal reflux disease)    • Hyperlipidemia    • Hypertension    • MI (myocardial infarction) (HCC)    • Pancreatitis    • Recovering alcoholic (HCC)    • Tobacco abuse 12/16/2016       Past Surgical History:        Procedure Laterality Date   • CORONARY ANGIOPLASTY WITH STENT PLACEMENT  2006, 2012, 2013    [facility]       Social History:    Social History     Tobacco Use   • Smoking status: Every Day     Current packs/day: 1.00     Average packs/day: 1 pack/day for 47.3 years (47.3 ttl pk-yrs)     Types: Cigarettes     Start date: 8/17/1977   • Smokeless tobacco: Never   • Tobacco comments:     1/2 pack day    Substance Use Topics   • Alcohol use: Not Currently     Comment: states \"a few beers monthly\"                                Ready to quit: Not Answered  Counseling given: Not Answered  Tobacco comments: 1/2 pack day       Vital Signs (Current): There were no vitals filed for this visit.                                           BP Readings from Last 3 Encounters:   11/27/24 110/72   11/25/24 126/77   11/12/24 132/77       NPO Status:                                                                                 BMI:   Wt Readings from Last 3 Encounters:   11/26/24 76.3 kg (168 lb 4.8 oz)   11/25/24 76.3 kg (168 lb 4.8 oz)   11/23/24 77.7 kg (171 lb 3 oz)     There is no height or weight on file to calculate BMI.    CBC:   Lab

## 2024-11-27 NOTE — PLAN OF CARE
Problem: Discharge Planning  Goal: Discharge to home or other facility with appropriate resources  Outcome: Progressing  Flowsheets (Taken 11/26/2024 0837 by Victoria Oneill, RN)  Discharge to home or other facility with appropriate resources: Identify barriers to discharge with patient and caregiver     Problem: Safety - Adult  Goal: Free from fall injury  11/26/2024 2203 by Mare Augustine RN  Outcome: Progressing  11/26/2024 1221 by Victoria Oneill, RN  Outcome: Progressing     Problem: Skin/Tissue Integrity  Goal: Absence of new skin breakdown  Description: 1.  Monitor for areas of redness and/or skin breakdown  2.  Assess vascular access sites hourly  3.  Every 4-6 hours minimum:  Change oxygen saturation probe site  4.  Every 4-6 hours:  If on nasal continuous positive airway pressure, respiratory therapy assess nares and determine need for appliance change or resting period.  Outcome: Progressing     Problem: ABCDS Injury Assessment  Goal: Absence of physical injury  Outcome: Progressing     Problem: Pain  Goal: Verbalizes/displays adequate comfort level or baseline comfort level  Outcome: Progressing  Flowsheets (Taken 11/26/2024 0837 by Victoria Oneill, RN)  Verbalizes/displays adequate comfort level or baseline comfort level: Encourage patient to monitor pain and request assistance     Problem: Nutrition Deficit:  Goal: Optimize nutritional status  Outcome: Progressing

## 2024-11-27 NOTE — PROGRESS NOTES
TRANSFER - OUT REPORT:    Verbal report given to Arina(name) on Kam Nguyen being transferred to [facility]  (unit) for routine post-op       Report consisted of patient's Situation, Background, Assessment and   Recommendations(SBAR).     Information from the following report(s) Nurse Handoff Report was reviewed with the receiving nurse.    Opportunity for questions and clarification was provided.      Patient transported with:   Monitor

## 2024-11-27 NOTE — PROCEDURES
Brief cardiology procedure report:   Full details of the procedure findings are available in CPACS.    Patient: Kam Nguyen  Date: 11/27/2024  MRN: [id number]        Procedure performed: Transesophageal echocardiogram    Findings:  Normal left ventricular function with ejection fraction estimated at about 55%.    The cardiac valves were normal without evidence for infective endocarditis.    There is no evidence for an intracardiac shunt or intracardiac thrombus.    There is no evidence for cardiac source of embolus.    Indication:   Present on Admission:  **None**      Sedation: The procedure was completed under the direction of anesthesia service.  The patient was given deep intravenous sedation.  The patient was monitored throughout the course of the procedure which included measurement of the patient's blood pressure, heart rate, SpO2, and clinical condition.      Procedural technique: Transesophageal probe was advanced through the oropharynx into the esophagus without difficulty. Multiple images were obtained including 2-dimensional and color Doppler.     Complications: There were no procedural complications.  Patient was transferred to the appropriate level of care post procedure.    The findings were discussed with available patient representatives/family.  All questions were answered.

## 2024-11-28 LAB
ALBUMIN SERPL-MCNC: 2.6 G/DL (ref 3.4–5)
ANION GAP SERPL CALCULATED.3IONS-SCNC: 9 MMOL/L (ref 3–16)
BUN SERPL-MCNC: 4 MG/DL (ref 7–20)
CALCIUM SERPL-MCNC: 8.5 MG/DL (ref 8.3–10.6)
CHLORIDE SERPL-SCNC: 98 MMOL/L (ref 99–110)
CO2 SERPL-SCNC: 24 MMOL/L (ref 21–32)
CREAT SERPL-MCNC: 0.3 MG/DL (ref 0.9–1.3)
DEPRECATED RDW RBC AUTO: 15.7 % (ref 12.4–15.4)
GFR SERPLBLD CREATININE-BSD FMLA CKD-EPI: >90 ML/MIN/{1.73_M2}
GLUCOSE SERPL-MCNC: 87 MG/DL (ref 70–99)
HCT VFR BLD AUTO: 28.6 % (ref 40.5–52.5)
HCV AB S/CO SERPL IA: 0.1 IV
HCV AB SERPL QL IA: NEGATIVE
HGB BLD-MCNC: 9.5 G/DL (ref 13.5–17.5)
MAGNESIUM SERPL-MCNC: 1.83 MG/DL (ref 1.8–2.4)
MCH RBC QN AUTO: 27.2 PG (ref 26–34)
MCHC RBC AUTO-ENTMCNC: 33.2 G/DL (ref 31–36)
MCV RBC AUTO: 81.8 FL (ref 80–100)
PHOSPHATE SERPL-MCNC: 3.3 MG/DL (ref 2.5–4.9)
PLATELET # BLD AUTO: 282 K/UL (ref 135–450)
PMV BLD AUTO: 7.4 FL (ref 5–10.5)
POTASSIUM SERPL-SCNC: 3.5 MMOL/L (ref 3.5–5.1)
POTASSIUM SERPL-SCNC: 3.5 MMOL/L (ref 3.5–5.1)
RBC # BLD AUTO: 3.49 M/UL (ref 4.2–5.9)
SODIUM SERPL-SCNC: 131 MMOL/L (ref 136–145)
WBC # BLD AUTO: 6.8 K/UL (ref 4–11)

## 2024-11-28 PROCEDURE — 6370000000 HC RX 637 (ALT 250 FOR IP): Performed by: NURSE PRACTITIONER

## 2024-11-28 PROCEDURE — 6360000002 HC RX W HCPCS: Performed by: NURSE PRACTITIONER

## 2024-11-28 PROCEDURE — 2580000003 HC RX 258: Performed by: NURSE PRACTITIONER

## 2024-11-28 PROCEDURE — 6370000000 HC RX 637 (ALT 250 FOR IP): Performed by: INTERNAL MEDICINE

## 2024-11-28 PROCEDURE — 36415 COLL VENOUS BLD VENIPUNCTURE: CPT

## 2024-11-28 PROCEDURE — 1200000000 HC SEMI PRIVATE

## 2024-11-28 PROCEDURE — 85027 COMPLETE CBC AUTOMATED: CPT

## 2024-11-28 PROCEDURE — 94640 AIRWAY INHALATION TREATMENT: CPT

## 2024-11-28 PROCEDURE — 99233 SBSQ HOSP IP/OBS HIGH 50: CPT | Performed by: ORTHOPAEDIC SURGERY

## 2024-11-28 PROCEDURE — 80069 RENAL FUNCTION PANEL: CPT

## 2024-11-28 PROCEDURE — 83735 ASSAY OF MAGNESIUM: CPT

## 2024-11-28 RX ORDER — LORAZEPAM 2 MG/ML
1 INJECTION INTRAMUSCULAR DAILY PRN
Status: COMPLETED | OUTPATIENT
Start: 2024-11-28 | End: 2024-11-29

## 2024-11-28 RX ORDER — ALBUTEROL SULFATE 90 UG/1
2 INHALANT RESPIRATORY (INHALATION)
Status: DISCONTINUED | OUTPATIENT
Start: 2024-11-28 | End: 2024-12-02 | Stop reason: HOSPADM

## 2024-11-28 RX ADMIN — OXYCODONE AND ACETAMINOPHEN 1 TABLET: 5; 325 TABLET ORAL at 10:18

## 2024-11-28 RX ADMIN — OXYCODONE AND ACETAMINOPHEN 1 TABLET: 5; 325 TABLET ORAL at 14:36

## 2024-11-28 RX ADMIN — ATORVASTATIN CALCIUM 40 MG: 40 TABLET, FILM COATED ORAL at 08:57

## 2024-11-28 RX ADMIN — METOPROLOL TARTRATE 50 MG: 50 TABLET, FILM COATED ORAL at 21:02

## 2024-11-28 RX ADMIN — SODIUM CHLORIDE: 9 INJECTION, SOLUTION INTRAVENOUS at 21:20

## 2024-11-28 RX ADMIN — GABAPENTIN 250 MG: 250 SOLUTION ORAL at 08:58

## 2024-11-28 RX ADMIN — RANOLAZINE 1000 MG: 500 TABLET, FILM COATED, EXTENDED RELEASE ORAL at 21:01

## 2024-11-28 RX ADMIN — METOPROLOL TARTRATE 50 MG: 50 TABLET, FILM COATED ORAL at 08:57

## 2024-11-28 RX ADMIN — CETIRIZINE HYDROCHLORIDE 10 MG: 10 TABLET, FILM COATED ORAL at 08:59

## 2024-11-28 RX ADMIN — CEFAZOLIN 2000 MG: 2 INJECTION, POWDER, FOR SOLUTION INTRAVENOUS at 21:21

## 2024-11-28 RX ADMIN — PANTOPRAZOLE SODIUM 40 MG: 40 TABLET, DELAYED RELEASE ORAL at 06:23

## 2024-11-28 RX ADMIN — LORAZEPAM 0.5 MG: 0.5 TABLET ORAL at 08:58

## 2024-11-28 RX ADMIN — OXYCODONE AND ACETAMINOPHEN 1 TABLET: 5; 325 TABLET ORAL at 06:23

## 2024-11-28 RX ADMIN — CEFAZOLIN 2000 MG: 2 INJECTION, POWDER, FOR SOLUTION INTRAVENOUS at 06:22

## 2024-11-28 RX ADMIN — GABAPENTIN 250 MG: 250 SOLUTION ORAL at 22:16

## 2024-11-28 RX ADMIN — Medication 2 PUFF: at 08:33

## 2024-11-28 RX ADMIN — OXYCODONE AND ACETAMINOPHEN 1 TABLET: 5; 325 TABLET ORAL at 22:18

## 2024-11-28 RX ADMIN — OXYCODONE AND ACETAMINOPHEN 1 TABLET: 5; 325 TABLET ORAL at 18:20

## 2024-11-28 RX ADMIN — CARIPRAZINE 3 MG: 1.5 CAPSULE, GELATIN COATED ORAL at 08:59

## 2024-11-28 RX ADMIN — LORAZEPAM 0.5 MG: 0.5 TABLET ORAL at 21:03

## 2024-11-28 RX ADMIN — SODIUM CHLORIDE, PRESERVATIVE FREE 10 ML: 5 INJECTION INTRAVENOUS at 08:58

## 2024-11-28 RX ADMIN — Medication 100 MG: at 08:57

## 2024-11-28 RX ADMIN — CEFAZOLIN 2000 MG: 2 INJECTION, POWDER, FOR SOLUTION INTRAVENOUS at 14:35

## 2024-11-28 RX ADMIN — Medication 2 PUFF: at 19:58

## 2024-11-28 RX ADMIN — Medication: at 16:02

## 2024-11-28 ASSESSMENT — PAIN DESCRIPTION - LOCATION
LOCATION: HIP;KNEE
LOCATION: HIP
LOCATION: HIP
LOCATION: HIP;LEG

## 2024-11-28 ASSESSMENT — PAIN SCALES - GENERAL
PAINLEVEL_OUTOF10: 7
PAINLEVEL_OUTOF10: 7
PAINLEVEL_OUTOF10: 6
PAINLEVEL_OUTOF10: 6
PAINLEVEL_OUTOF10: 5
PAINLEVEL_OUTOF10: 6
PAINLEVEL_OUTOF10: 7

## 2024-11-28 ASSESSMENT — PAIN DESCRIPTION - ORIENTATION
ORIENTATION: LEFT

## 2024-11-28 ASSESSMENT — PAIN DESCRIPTION - DESCRIPTORS
DESCRIPTORS: ACHING
DESCRIPTORS: ACHING

## 2024-11-28 ASSESSMENT — PAIN SCALES - WONG BAKER: WONGBAKER_NUMERICALRESPONSE: HURTS LITTLE MORE

## 2024-11-28 NOTE — PLAN OF CARE
Problem: Discharge Planning  Goal: Discharge to home or other facility with appropriate resources  11/27/2024 2201 by Zev Todd RN  Outcome: Progressing  Flowsheets (Taken 11/27/2024 0944 by Victoria Oneill RN)  Discharge to home or other facility with appropriate resources: Identify barriers to discharge with patient and caregiver     Problem: Safety - Adult  Goal: Free from fall injury  11/28/2024 0950 by Jennifer Forman RN  Outcome: Progressing  11/27/2024 2201 by Zev Todd RN  Outcome: Progressing     Problem: Pain  Goal: Verbalizes/displays adequate comfort level or baseline comfort level  11/28/2024 0950 by Jennifer Forman RN  Outcome: Progressing  11/27/2024 2201 by Zev Todd RN  Outcome: Progressing  Flowsheets (Taken 11/27/2024 0944 by Victoria Oneill RN)  Verbalizes/displays adequate comfort level or baseline comfort level: Encourage patient to monitor pain and request assistance     Problem: Skin/Tissue Integrity  Goal: Absence of new skin breakdown  Description: 1.  Monitor for areas of redness and/or skin breakdown  2.  Assess vascular access sites hourly  3.  Every 4-6 hours minimum:  Change oxygen saturation probe site  4.  Every 4-6 hours:  If on nasal continuous positive airway pressure, respiratory therapy assess nares and determine need for appliance change or resting period.  11/28/2024 0950 by Jennifer Forman RN  Outcome: Progressing  11/27/2024 2201 by Zev Todd RN  Outcome: Progressing

## 2024-11-28 NOTE — PLAN OF CARE
Problem: Discharge Planning  Goal: Discharge to home or other facility with appropriate resources  Outcome: Progressing  Flowsheets (Taken 11/27/2024 0944 by Victoria Oneill, RN)  Discharge to home or other facility with appropriate resources: Identify barriers to discharge with patient and caregiver     Problem: Safety - Adult  Goal: Free from fall injury  11/27/2024 2201 by Zev Todd, RN  Outcome: Progressing  11/27/2024 1403 by Victoria Oneill, RN  Outcome: Progressing     Problem: Skin/Tissue Integrity  Goal: Absence of new skin breakdown  Description: 1.  Monitor for areas of redness and/or skin breakdown  2.  Assess vascular access sites hourly  3.  Every 4-6 hours minimum:  Change oxygen saturation probe site  4.  Every 4-6 hours:  If on nasal continuous positive airway pressure, respiratory therapy assess nares and determine need for appliance change or resting period.  Outcome: Progressing     Problem: ABCDS Injury Assessment  Goal: Absence of physical injury  Outcome: Progressing     Problem: Pain  Goal: Verbalizes/displays adequate comfort level or baseline comfort level  Outcome: Progressing  Flowsheets (Taken 11/27/2024 0944 by Victoria Oneill, RN)  Verbalizes/displays adequate comfort level or baseline comfort level: Encourage patient to monitor pain and request assistance     Problem: Nutrition Deficit:  Goal: Optimize nutritional status  Outcome: Progressing

## 2024-11-29 ENCOUNTER — APPOINTMENT (OUTPATIENT)
Dept: MRI IMAGING | Age: 59
DRG: 344 | End: 2024-11-29
Attending: INTERNAL MEDICINE
Payer: COMMERCIAL

## 2024-11-29 LAB
ALBUMIN SERPL-MCNC: 2.8 G/DL (ref 3.4–5)
ANION GAP SERPL CALCULATED.3IONS-SCNC: 12 MMOL/L (ref 3–16)
BUN SERPL-MCNC: 3 MG/DL (ref 7–20)
CALCIUM SERPL-MCNC: 8.6 MG/DL (ref 8.3–10.6)
CHLORIDE SERPL-SCNC: 94 MMOL/L (ref 99–110)
CO2 SERPL-SCNC: 24 MMOL/L (ref 21–32)
CREAT SERPL-MCNC: 0.3 MG/DL (ref 0.9–1.3)
GFR SERPLBLD CREATININE-BSD FMLA CKD-EPI: >90 ML/MIN/{1.73_M2}
GLUCOSE SERPL-MCNC: 105 MG/DL (ref 70–99)
PHOSPHATE SERPL-MCNC: 3.8 MG/DL (ref 2.5–4.9)
POTASSIUM SERPL-SCNC: 3.6 MMOL/L (ref 3.5–5.1)
SODIUM SERPL-SCNC: 130 MMOL/L (ref 136–145)

## 2024-11-29 PROCEDURE — 80069 RENAL FUNCTION PANEL: CPT

## 2024-11-29 PROCEDURE — 2580000003 HC RX 258: Performed by: NURSE PRACTITIONER

## 2024-11-29 PROCEDURE — 73721 MRI JNT OF LWR EXTRE W/O DYE: CPT

## 2024-11-29 PROCEDURE — 6370000000 HC RX 637 (ALT 250 FOR IP): Performed by: INTERNAL MEDICINE

## 2024-11-29 PROCEDURE — 6370000000 HC RX 637 (ALT 250 FOR IP): Performed by: NURSE PRACTITIONER

## 2024-11-29 PROCEDURE — 36415 COLL VENOUS BLD VENIPUNCTURE: CPT

## 2024-11-29 PROCEDURE — 6360000002 HC RX W HCPCS: Performed by: NURSE PRACTITIONER

## 2024-11-29 PROCEDURE — 1200000000 HC SEMI PRIVATE

## 2024-11-29 PROCEDURE — 94640 AIRWAY INHALATION TREATMENT: CPT

## 2024-11-29 PROCEDURE — 99232 SBSQ HOSP IP/OBS MODERATE 35: CPT | Performed by: INTERNAL MEDICINE

## 2024-11-29 PROCEDURE — 6360000002 HC RX W HCPCS: Performed by: INTERNAL MEDICINE

## 2024-11-29 PROCEDURE — 99232 SBSQ HOSP IP/OBS MODERATE 35: CPT | Performed by: ORTHOPAEDIC SURGERY

## 2024-11-29 RX ORDER — LORAZEPAM 2 MG/ML
1 INJECTION INTRAMUSCULAR ONCE
Status: COMPLETED | OUTPATIENT
Start: 2024-11-29 | End: 2024-11-29

## 2024-11-29 RX ORDER — OXYCODONE AND ACETAMINOPHEN 7.5; 325 MG/1; MG/1
1 TABLET ORAL EVERY 4 HOURS PRN
Status: DISCONTINUED | OUTPATIENT
Start: 2024-11-29 | End: 2024-11-30

## 2024-11-29 RX ADMIN — OXYCODONE AND ACETAMINOPHEN 1 TABLET: 7.5; 325 TABLET ORAL at 18:41

## 2024-11-29 RX ADMIN — PANTOPRAZOLE SODIUM 40 MG: 40 TABLET, DELAYED RELEASE ORAL at 05:33

## 2024-11-29 RX ADMIN — SODIUM CHLORIDE: 9 INJECTION, SOLUTION INTRAVENOUS at 21:25

## 2024-11-29 RX ADMIN — Medication 2 PUFF: at 19:17

## 2024-11-29 RX ADMIN — ATORVASTATIN CALCIUM 40 MG: 40 TABLET, FILM COATED ORAL at 09:21

## 2024-11-29 RX ADMIN — SODIUM CHLORIDE, PRESERVATIVE FREE 10 ML: 5 INJECTION INTRAVENOUS at 20:31

## 2024-11-29 RX ADMIN — CETIRIZINE HYDROCHLORIDE 10 MG: 10 TABLET, FILM COATED ORAL at 09:54

## 2024-11-29 RX ADMIN — METOPROLOL TARTRATE 50 MG: 50 TABLET, FILM COATED ORAL at 09:21

## 2024-11-29 RX ADMIN — GABAPENTIN 250 MG: 250 SOLUTION ORAL at 20:42

## 2024-11-29 RX ADMIN — LORAZEPAM 1 MG: 2 INJECTION INTRAMUSCULAR; INTRAVENOUS at 11:54

## 2024-11-29 RX ADMIN — SODIUM CHLORIDE: 9 INJECTION, SOLUTION INTRAVENOUS at 13:53

## 2024-11-29 RX ADMIN — Medication 100 MG: at 09:21

## 2024-11-29 RX ADMIN — LORAZEPAM 1 MG: 2 INJECTION INTRAMUSCULAR; INTRAVENOUS at 11:10

## 2024-11-29 RX ADMIN — Medication: at 20:44

## 2024-11-29 RX ADMIN — CARIPRAZINE 3 MG: 1.5 CAPSULE, GELATIN COATED ORAL at 09:21

## 2024-11-29 RX ADMIN — OXYCODONE AND ACETAMINOPHEN 1 TABLET: 5; 325 TABLET ORAL at 14:15

## 2024-11-29 RX ADMIN — RANOLAZINE 1000 MG: 500 TABLET, FILM COATED, EXTENDED RELEASE ORAL at 20:33

## 2024-11-29 RX ADMIN — OXYCODONE AND ACETAMINOPHEN 1 TABLET: 5; 325 TABLET ORAL at 09:21

## 2024-11-29 RX ADMIN — LORAZEPAM 0.5 MG: 0.5 TABLET ORAL at 21:34

## 2024-11-29 RX ADMIN — LORAZEPAM 0.5 MG: 0.5 TABLET ORAL at 09:21

## 2024-11-29 RX ADMIN — SODIUM CHLORIDE: 9 INJECTION, SOLUTION INTRAVENOUS at 05:32

## 2024-11-29 RX ADMIN — METOPROLOL TARTRATE 50 MG: 50 TABLET, FILM COATED ORAL at 20:33

## 2024-11-29 RX ADMIN — GABAPENTIN 250 MG: 250 SOLUTION ORAL at 09:54

## 2024-11-29 RX ADMIN — CEFAZOLIN 2000 MG: 2 INJECTION, POWDER, FOR SOLUTION INTRAVENOUS at 13:54

## 2024-11-29 RX ADMIN — CEFAZOLIN 2000 MG: 2 INJECTION, POWDER, FOR SOLUTION INTRAVENOUS at 21:26

## 2024-11-29 RX ADMIN — CEFAZOLIN 2000 MG: 2 INJECTION, POWDER, FOR SOLUTION INTRAVENOUS at 05:33

## 2024-11-29 RX ADMIN — Medication 2 PUFF: at 07:52

## 2024-11-29 ASSESSMENT — PAIN DESCRIPTION - ORIENTATION
ORIENTATION: LEFT

## 2024-11-29 ASSESSMENT — PAIN SCALES - WONG BAKER
WONGBAKER_NUMERICALRESPONSE: HURTS LITTLE MORE
WONGBAKER_NUMERICALRESPONSE: HURTS LITTLE MORE

## 2024-11-29 ASSESSMENT — PAIN DESCRIPTION - ONSET
ONSET: ON-GOING

## 2024-11-29 ASSESSMENT — PAIN SCALES - GENERAL
PAINLEVEL_OUTOF10: 3
PAINLEVEL_OUTOF10: 7
PAINLEVEL_OUTOF10: 6
PAINLEVEL_OUTOF10: 4
PAINLEVEL_OUTOF10: 7
PAINLEVEL_OUTOF10: 6

## 2024-11-29 ASSESSMENT — PAIN DESCRIPTION - PAIN TYPE
TYPE: ACUTE PAIN

## 2024-11-29 ASSESSMENT — PAIN DESCRIPTION - LOCATION
LOCATION: HIP

## 2024-11-29 ASSESSMENT — PAIN DESCRIPTION - DESCRIPTORS
DESCRIPTORS: ACHING;DISCOMFORT;NAGGING;THROBBING
DESCRIPTORS: THROBBING;NAGGING
DESCRIPTORS: ACHING;DISCOMFORT;NAGGING;THROBBING

## 2024-11-29 ASSESSMENT — PAIN DESCRIPTION - FREQUENCY
FREQUENCY: CONTINUOUS

## 2024-11-29 ASSESSMENT — PAIN - FUNCTIONAL ASSESSMENT
PAIN_FUNCTIONAL_ASSESSMENT: PREVENTS OR INTERFERES SOME ACTIVE ACTIVITIES AND ADLS

## 2024-11-29 NOTE — PLAN OF CARE
Problem: Discharge Planning  Goal: Discharge to home or other facility with appropriate resources  11/29/2024 1029 by Mere Holloway, RN  Outcome: Progressing     Problem: Safety - Adult  Goal: Free from fall injury  11/29/2024 1029 by Mere Holloway, RN  Outcome: Progressing     Problem: Skin/Tissue Integrity  Goal: Absence of new skin breakdown  Description: 1.  Monitor for areas of redness and/or skin breakdown  2.  Assess vascular access sites hourly  3.  Every 4-6 hours minimum:  Change oxygen saturation probe site  4.  Every 4-6 hours:  If on nasal continuous positive airway pressure, respiratory therapy assess nares and determine need for appliance change or resting period.  11/29/2024 1029 by Mere Holloway, RN  Outcome: Progressing     Problem: ABCDS Injury Assessment  Goal: Absence of physical injury  11/29/2024 1029 by Mere Holloway, RN  Outcome: Progressing     Problem: Pain  Goal: Verbalizes/displays adequate comfort level or baseline comfort level  11/29/2024 1029 by Mere Holloway, RN  Outcome: Progressing     Problem: Nutrition Deficit:  Goal: Optimize nutritional status  11/29/2024 1029 by Mere Holloway, RN  Outcome: Progressing      Order placed.

## 2024-11-30 ENCOUNTER — ANESTHESIA EVENT (OUTPATIENT)
Dept: OPERATING ROOM | Age: 59
End: 2024-11-30
Payer: COMMERCIAL

## 2024-11-30 PROBLEM — Z01.810 PREOPERATIVE CARDIOVASCULAR EXAMINATION: Status: ACTIVE | Noted: 2024-11-30

## 2024-11-30 PROBLEM — R94.39 ABNORMAL CARDIOVASCULAR STRESS TEST: Status: ACTIVE | Noted: 2024-11-30

## 2024-11-30 PROBLEM — M86.9 HIP OSTEOMYELITIS, LEFT: Status: ACTIVE | Noted: 2024-11-25

## 2024-11-30 LAB
ALBUMIN SERPL-MCNC: 2.7 G/DL (ref 3.4–5)
ANION GAP SERPL CALCULATED.3IONS-SCNC: 11 MMOL/L (ref 3–16)
BUN SERPL-MCNC: 3 MG/DL (ref 7–20)
CALCIUM SERPL-MCNC: 9.4 MG/DL (ref 8.3–10.6)
CHLORIDE SERPL-SCNC: 97 MMOL/L (ref 99–110)
CO2 SERPL-SCNC: 25 MMOL/L (ref 21–32)
CREAT SERPL-MCNC: 0.4 MG/DL (ref 0.9–1.3)
GFR SERPLBLD CREATININE-BSD FMLA CKD-EPI: >90 ML/MIN/{1.73_M2}
GLUCOSE SERPL-MCNC: 106 MG/DL (ref 70–99)
PHOSPHATE SERPL-MCNC: 3.9 MG/DL (ref 2.5–4.9)
POTASSIUM SERPL-SCNC: 3.4 MMOL/L (ref 3.5–5.1)
SODIUM SERPL-SCNC: 133 MMOL/L (ref 136–145)

## 2024-11-30 PROCEDURE — 6370000000 HC RX 637 (ALT 250 FOR IP): Performed by: INTERNAL MEDICINE

## 2024-11-30 PROCEDURE — 36415 COLL VENOUS BLD VENIPUNCTURE: CPT

## 2024-11-30 PROCEDURE — 97530 THERAPEUTIC ACTIVITIES: CPT

## 2024-11-30 PROCEDURE — 80069 RENAL FUNCTION PANEL: CPT

## 2024-11-30 PROCEDURE — 1200000000 HC SEMI PRIVATE

## 2024-11-30 PROCEDURE — 97110 THERAPEUTIC EXERCISES: CPT

## 2024-11-30 PROCEDURE — 2580000003 HC RX 258: Performed by: NURSE PRACTITIONER

## 2024-11-30 PROCEDURE — 99254 IP/OBS CNSLTJ NEW/EST MOD 60: CPT | Performed by: STUDENT IN AN ORGANIZED HEALTH CARE EDUCATION/TRAINING PROGRAM

## 2024-11-30 PROCEDURE — 94640 AIRWAY INHALATION TREATMENT: CPT

## 2024-11-30 PROCEDURE — 2580000003 HC RX 258: Performed by: INTERNAL MEDICINE

## 2024-11-30 PROCEDURE — 99222 1ST HOSP IP/OBS MODERATE 55: CPT | Performed by: INTERNAL MEDICINE

## 2024-11-30 PROCEDURE — 6370000000 HC RX 637 (ALT 250 FOR IP): Performed by: STUDENT IN AN ORGANIZED HEALTH CARE EDUCATION/TRAINING PROGRAM

## 2024-11-30 PROCEDURE — 6360000002 HC RX W HCPCS: Performed by: NURSE PRACTITIONER

## 2024-11-30 PROCEDURE — 6370000000 HC RX 637 (ALT 250 FOR IP): Performed by: NURSE PRACTITIONER

## 2024-11-30 RX ORDER — ACETAMINOPHEN 650 MG/1
650 SUPPOSITORY RECTAL EVERY 8 HOURS SCHEDULED
Status: DISCONTINUED | OUTPATIENT
Start: 2024-11-30 | End: 2024-12-02 | Stop reason: HOSPADM

## 2024-11-30 RX ORDER — POTASSIUM CHLORIDE 1500 MG/1
40 TABLET, EXTENDED RELEASE ORAL ONCE
Status: COMPLETED | OUTPATIENT
Start: 2024-11-30 | End: 2024-11-30

## 2024-11-30 RX ORDER — GABAPENTIN 300 MG/1
300 CAPSULE ORAL 2 TIMES DAILY
Status: DISCONTINUED | OUTPATIENT
Start: 2024-11-30 | End: 2024-12-02 | Stop reason: HOSPADM

## 2024-11-30 RX ORDER — OXYCODONE HYDROCHLORIDE 5 MG/1
10 TABLET ORAL EVERY 4 HOURS PRN
Status: DISCONTINUED | OUTPATIENT
Start: 2024-11-30 | End: 2024-12-02

## 2024-11-30 RX ORDER — ACETAMINOPHEN 500 MG
1000 TABLET ORAL EVERY 8 HOURS SCHEDULED
Status: DISCONTINUED | OUTPATIENT
Start: 2024-11-30 | End: 2024-12-02 | Stop reason: HOSPADM

## 2024-11-30 RX ADMIN — POTASSIUM CHLORIDE 40 MEQ: 1500 TABLET, EXTENDED RELEASE ORAL at 14:20

## 2024-11-30 RX ADMIN — Medication 2 PUFF: at 19:48

## 2024-11-30 RX ADMIN — OXYCODONE AND ACETAMINOPHEN 1 TABLET: 7.5; 325 TABLET ORAL at 08:17

## 2024-11-30 RX ADMIN — Medication 100 MG: at 08:12

## 2024-11-30 RX ADMIN — OXYCODONE AND ACETAMINOPHEN 1 TABLET: 7.5; 325 TABLET ORAL at 04:27

## 2024-11-30 RX ADMIN — CEFAZOLIN 2000 MG: 2 INJECTION, POWDER, FOR SOLUTION INTRAVENOUS at 05:44

## 2024-11-30 RX ADMIN — CEFAZOLIN 2000 MG: 2 INJECTION, POWDER, FOR SOLUTION INTRAVENOUS at 14:26

## 2024-11-30 RX ADMIN — CETIRIZINE HYDROCHLORIDE 10 MG: 10 TABLET, FILM COATED ORAL at 08:12

## 2024-11-30 RX ADMIN — METOPROLOL TARTRATE 50 MG: 50 TABLET, FILM COATED ORAL at 20:41

## 2024-11-30 RX ADMIN — OXYCODONE 10 MG: 5 TABLET ORAL at 16:26

## 2024-11-30 RX ADMIN — Medication 10 ML: at 22:37

## 2024-11-30 RX ADMIN — Medication: at 17:37

## 2024-11-30 RX ADMIN — GABAPENTIN 300 MG: 300 CAPSULE ORAL at 20:41

## 2024-11-30 RX ADMIN — GABAPENTIN 300 MG: 300 CAPSULE ORAL at 09:31

## 2024-11-30 RX ADMIN — PANTOPRAZOLE SODIUM 40 MG: 40 TABLET, DELAYED RELEASE ORAL at 05:45

## 2024-11-30 RX ADMIN — ACETAMINOPHEN 1000 MG: 500 TABLET ORAL at 14:20

## 2024-11-30 RX ADMIN — ACETAMINOPHEN 1000 MG: 500 TABLET ORAL at 21:31

## 2024-11-30 RX ADMIN — Medication 2 PUFF: at 07:38

## 2024-11-30 RX ADMIN — OXYCODONE 10 MG: 5 TABLET ORAL at 12:06

## 2024-11-30 RX ADMIN — METOPROLOL TARTRATE 50 MG: 50 TABLET, FILM COATED ORAL at 08:12

## 2024-11-30 RX ADMIN — LORAZEPAM 0.5 MG: 0.5 TABLET ORAL at 09:31

## 2024-11-30 RX ADMIN — CEFAZOLIN 2000 MG: 2 INJECTION, POWDER, FOR SOLUTION INTRAVENOUS at 21:30

## 2024-11-30 RX ADMIN — LORAZEPAM 0.5 MG: 0.5 TABLET ORAL at 21:31

## 2024-11-30 RX ADMIN — POTASSIUM CHLORIDE 40 MEQ: 1500 TABLET, EXTENDED RELEASE ORAL at 09:50

## 2024-11-30 RX ADMIN — OXYCODONE 10 MG: 5 TABLET ORAL at 20:41

## 2024-11-30 RX ADMIN — SODIUM CHLORIDE: 9 INJECTION, SOLUTION INTRAVENOUS at 05:43

## 2024-11-30 RX ADMIN — Medication 10 ML: at 08:24

## 2024-11-30 RX ADMIN — ATORVASTATIN CALCIUM 40 MG: 40 TABLET, FILM COATED ORAL at 08:12

## 2024-11-30 ASSESSMENT — PAIN DESCRIPTION - LOCATION
LOCATION: HIP;LEG
LOCATION: HIP
LOCATION: HIP
LOCATION: LEG
LOCATION: HIP;LEG;KNEE
LOCATION: HIP

## 2024-11-30 ASSESSMENT — PAIN SCALES - WONG BAKER
WONGBAKER_NUMERICALRESPONSE: HURTS LITTLE MORE

## 2024-11-30 ASSESSMENT — PAIN DESCRIPTION - DESCRIPTORS
DESCRIPTORS: ACHING;TENDER
DESCRIPTORS: ACHING

## 2024-11-30 ASSESSMENT — PAIN SCALES - GENERAL
PAINLEVEL_OUTOF10: 5
PAINLEVEL_OUTOF10: 5
PAINLEVEL_OUTOF10: 7
PAINLEVEL_OUTOF10: 0
PAINLEVEL_OUTOF10: 7
PAINLEVEL_OUTOF10: 7
PAINLEVEL_OUTOF10: 8
PAINLEVEL_OUTOF10: 7
PAINLEVEL_OUTOF10: 8

## 2024-11-30 ASSESSMENT — PAIN DESCRIPTION - ORIENTATION
ORIENTATION: LEFT

## 2024-11-30 ASSESSMENT — LIFESTYLE VARIABLES: SMOKING_STATUS: 1

## 2024-11-30 ASSESSMENT — PAIN - FUNCTIONAL ASSESSMENT: PAIN_FUNCTIONAL_ASSESSMENT: INTOLERABLE, UNABLE TO DO ANY ACTIVE OR PASSIVE ACTIVITIES

## 2024-11-30 NOTE — PLAN OF CARE
Problem: Discharge Planning  Goal: Discharge to home or other facility with appropriate resources  11/30/2024 1000 by Lilli Vicente RN  Outcome: Progressing  11/29/2024 2223 by Martha Magana RN  Outcome: Progressing     Problem: Safety - Adult  Goal: Free from fall injury  11/30/2024 1000 by Lilli Vicente RN  Outcome: Progressing  11/29/2024 2223 by Martha Magana RN  Outcome: Progressing     Problem: Skin/Tissue Integrity  Goal: Absence of new skin breakdown  Description: 1.  Monitor for areas of redness and/or skin breakdown  2.  Assess vascular access sites hourly  3.  Every 4-6 hours minimum:  Change oxygen saturation probe site  4.  Every 4-6 hours:  If on nasal continuous positive airway pressure, respiratory therapy assess nares and determine need for appliance change or resting period.  11/30/2024 1000 by Lilli Vicente RN  Outcome: Progressing  11/29/2024 2223 by Martha Magana RN  Outcome: Progressing     Problem: ABCDS Injury Assessment  Goal: Absence of physical injury  11/30/2024 1000 by Lilli Vicente RN  Outcome: Progressing  11/29/2024 2223 by Martha Magana RN  Outcome: Progressing     Problem: Pain  Goal: Verbalizes/displays adequate comfort level or baseline comfort level  11/30/2024 1000 by Lilli Vicente RN  Outcome: Progressing  11/29/2024 2223 by Martha Magana RN  Outcome: Progressing     Problem: Nutrition Deficit:  Goal: Optimize nutritional status  11/30/2024 1000 by Lilli Vicente RN  Outcome: Progressing  11/29/2024 2223 by Martha Magana RN  Outcome: Progressing

## 2024-11-30 NOTE — ANESTHESIA PRE PROCEDURE
Department of Anesthesiology  Preprocedure Note       Name:  Kam Nguyen   Age:  59 y.o.  :  1965                                          MRN:  [id number]         Date:  2024      Surgeon: Surgeon(s):  J Carlos Almonte MD    Procedure: Procedure(s):  HIP BONE BIOPSY/EXCISION    Medications prior to admission:   Prior to Admission medications    Medication Sig Start Date End Date Taking? Authorizing Provider   metoprolol succinate (TOPROL XL) 100 MG extended release tablet TAKE ONE (1) TABLET ONCE DAILY 24  Yes Zoey Caruso MD   LORazepam (ATIVAN) 0.5 MG tablet Take 1 tablet by mouth in the morning and 1 tablet in the evening. Do all this for 30 days. Max Daily Amount: 1 mg. 24 Yes Endy Castaneda MD   cariprazine hcl (VRAYLAR) 4.5 MG CAPS capsule Take 1 capsule by mouth daily Dose clarified 24  Yes Endy Castaneda MD   gabapentin (NEURONTIN) 300 MG capsule TAKE ONE (1) CAPSULE BY MOUTH FOUR (4) TIMES DAILY 11/5/24 3/5/25 Yes Edwardo Butler MD   loratadine (CLARITIN) 10 MG tablet Take 1 tablet by mouth daily   Yes ProviderMelvin MD   traZODone (DESYREL) 50 MG tablet Take 1 tablet by mouth nightly   Yes ProviderMelvin MD   atorvastatin (LIPITOR) 40 MG tablet TAKE ONE (1) TABLET BY MOUTH DAILY 10/3/24  Yes Zoey Caruso MD   clopidogrel (PLAVIX) 75 MG tablet TAKE 1 TABLET ONCE DAILY 24  Yes Zoey Caruso MD   amLODIPine (NORVASC) 2.5 MG tablet TAKE 1 TABLET ONCE DAILY 24  Yes Zoey Caruso MD   albuterol sulfate HFA (PROVENTIL;VENTOLIN;PROAIR) 108 (90 Base) MCG/ACT inhaler Inhale 2 puffs into the lungs every 4 hours as needed for Shortness of Breath or Wheezing 3/25/24  Yes Brian Gurrola MD   ipratropium-albuterol (DUONEB) 0.5-2.5 (3) MG/3ML SOLN nebulizer solution Inhale 3 mLs into the lungs every 4 hours 21  Yes Lux Agrawal MD   aspirin 325 MG tablet Take 1 tablet by mouth daily   Yes Provider, MD Melvin

## 2024-11-30 NOTE — PLAN OF CARE
Problem: Discharge Planning  Goal: Discharge to home or other facility with appropriate resources  11/29/2024 2223 by Martha Magana RN  Outcome: Progressing  11/29/2024 1029 by Mere Holloway RN  Outcome: Progressing     Problem: Safety - Adult  Goal: Free from fall injury  11/29/2024 2223 by Martha Magana RN  Outcome: Progressing  11/29/2024 1029 by Mere Holloway RN  Outcome: Progressing     Problem: Skin/Tissue Integrity  Goal: Absence of new skin breakdown  Description: 1.  Monitor for areas of redness and/or skin breakdown  2.  Assess vascular access sites hourly  3.  Every 4-6 hours minimum:  Change oxygen saturation probe site  4.  Every 4-6 hours:  If on nasal continuous positive airway pressure, respiratory therapy assess nares and determine need for appliance change or resting period.  11/29/2024 2223 by Martha Magana RN  Outcome: Progressing  11/29/2024 1029 by Mere Holloway RN  Outcome: Progressing     Problem: ABCDS Injury Assessment  Goal: Absence of physical injury  11/29/2024 2223 by Martha Magana RN  Outcome: Progressing  11/29/2024 1029 by Mere Holloway RN  Outcome: Progressing     Problem: Pain  Goal: Verbalizes/displays adequate comfort level or baseline comfort level  11/29/2024 2223 by Martha Magana RN  Outcome: Progressing  11/29/2024 1029 by Mere Holloway RN  Outcome: Progressing     Problem: Nutrition Deficit:  Goal: Optimize nutritional status  11/29/2024 2223 by Martha Magana RN  Outcome: Progressing  11/29/2024 1029 by Mere Holloway RN  Outcome: Progressing  Flowsheets (Taken 11/29/2024 1026 by Lavonne Mcgrath RD)  Nutrient intake appropriate for improving, restoring, or maintaining nutritional needs:   Assess nutritional status and recommend course of action   Monitor oral intake, labs, and treatment plans   Recommend appropriate diets, oral nutritional supplements, and vitamin/mineral supplements

## 2024-12-01 ENCOUNTER — ANESTHESIA (OUTPATIENT)
Dept: OPERATING ROOM | Age: 59
End: 2024-12-01
Payer: COMMERCIAL

## 2024-12-01 ENCOUNTER — APPOINTMENT (OUTPATIENT)
Dept: GENERAL RADIOLOGY | Age: 59
DRG: 344 | End: 2024-12-01
Attending: INTERNAL MEDICINE
Payer: COMMERCIAL

## 2024-12-01 LAB
ALBUMIN SERPL-MCNC: 2.7 G/DL (ref 3.4–5)
ANION GAP SERPL CALCULATED.3IONS-SCNC: 8 MMOL/L (ref 3–16)
BUN SERPL-MCNC: 3 MG/DL (ref 7–20)
CALCIUM SERPL-MCNC: 8.8 MG/DL (ref 8.3–10.6)
CHLORIDE SERPL-SCNC: 96 MMOL/L (ref 99–110)
CO2 SERPL-SCNC: 28 MMOL/L (ref 21–32)
CREAT SERPL-MCNC: 0.4 MG/DL (ref 0.9–1.3)
DEPRECATED RDW RBC AUTO: 16.1 % (ref 12.4–15.4)
EKG ATRIAL RATE: 72 BPM
EKG DIAGNOSIS: NORMAL
EKG P AXIS: 82 DEGREES
EKG P-R INTERVAL: 168 MS
EKG Q-T INTERVAL: 420 MS
EKG QRS DURATION: 90 MS
EKG QTC CALCULATION (BAZETT): 459 MS
EKG R AXIS: 68 DEGREES
EKG T AXIS: 82 DEGREES
EKG VENTRICULAR RATE: 72 BPM
GFR SERPLBLD CREATININE-BSD FMLA CKD-EPI: >90 ML/MIN/{1.73_M2}
GLUCOSE SERPL-MCNC: 95 MG/DL (ref 70–99)
HCT VFR BLD AUTO: 30.3 % (ref 40.5–52.5)
HGB BLD-MCNC: 10 G/DL (ref 13.5–17.5)
MCH RBC QN AUTO: 27.1 PG (ref 26–34)
MCHC RBC AUTO-ENTMCNC: 32.8 G/DL (ref 31–36)
MCV RBC AUTO: 82.6 FL (ref 80–100)
PHOSPHATE SERPL-MCNC: 3.8 MG/DL (ref 2.5–4.9)
PLATELET # BLD AUTO: 309 K/UL (ref 135–450)
PMV BLD AUTO: 7.5 FL (ref 5–10.5)
POTASSIUM SERPL-SCNC: 3.8 MMOL/L (ref 3.5–5.1)
RBC # BLD AUTO: 3.67 M/UL (ref 4.2–5.9)
SODIUM SERPL-SCNC: 132 MMOL/L (ref 136–145)
WBC # BLD AUTO: 7.1 K/UL (ref 4–11)

## 2024-12-01 PROCEDURE — 93005 ELECTROCARDIOGRAM TRACING: CPT | Performed by: INTERNAL MEDICINE

## 2024-12-01 PROCEDURE — 93010 ELECTROCARDIOGRAM REPORT: CPT | Performed by: INTERNAL MEDICINE

## 2024-12-01 PROCEDURE — 27071 PART REMOVAL HIP BONE DEEP: CPT | Performed by: STUDENT IN AN ORGANIZED HEALTH CARE EDUCATION/TRAINING PROGRAM

## 2024-12-01 PROCEDURE — 6370000000 HC RX 637 (ALT 250 FOR IP): Performed by: STUDENT IN AN ORGANIZED HEALTH CARE EDUCATION/TRAINING PROGRAM

## 2024-12-01 PROCEDURE — 2500000003 HC RX 250 WO HCPCS: Performed by: STUDENT IN AN ORGANIZED HEALTH CARE EDUCATION/TRAINING PROGRAM

## 2024-12-01 PROCEDURE — 2709999900 HC NON-CHARGEABLE SUPPLY: Performed by: STUDENT IN AN ORGANIZED HEALTH CARE EDUCATION/TRAINING PROGRAM

## 2024-12-01 PROCEDURE — C1776 JOINT DEVICE (IMPLANTABLE): HCPCS | Performed by: STUDENT IN AN ORGANIZED HEALTH CARE EDUCATION/TRAINING PROGRAM

## 2024-12-01 PROCEDURE — 7100000000 HC PACU RECOVERY - FIRST 15 MIN: Performed by: STUDENT IN AN ORGANIZED HEALTH CARE EDUCATION/TRAINING PROGRAM

## 2024-12-01 PROCEDURE — 88304 TISSUE EXAM BY PATHOLOGIST: CPT

## 2024-12-01 PROCEDURE — 94640 AIRWAY INHALATION TREATMENT: CPT

## 2024-12-01 PROCEDURE — 87070 CULTURE OTHR SPECIMN AEROBIC: CPT

## 2024-12-01 PROCEDURE — 2580000003 HC RX 258: Performed by: STUDENT IN AN ORGANIZED HEALTH CARE EDUCATION/TRAINING PROGRAM

## 2024-12-01 PROCEDURE — 88312 SPECIAL STAINS GROUP 1: CPT

## 2024-12-01 PROCEDURE — XW0U0GA INTRODUCTION OF VANCOMYCIN HYDROCHLORIDE AND TOBRAMYCIN SULFATE ANTI-INFECTIVE, TEMPORARY IRRIGATION SPACER SYSTEM INTO JOINTS, OPEN APPROACH, NEW TECHNOLOGY GROUP 10: ICD-10-PCS | Performed by: STUDENT IN AN ORGANIZED HEALTH CARE EDUCATION/TRAINING PROGRAM

## 2024-12-01 PROCEDURE — C1713 ANCHOR/SCREW BN/BN,TIS/BN: HCPCS | Performed by: STUDENT IN AN ORGANIZED HEALTH CARE EDUCATION/TRAINING PROGRAM

## 2024-12-01 PROCEDURE — 88311 DECALCIFY TISSUE: CPT

## 2024-12-01 PROCEDURE — 36415 COLL VENOUS BLD VENIPUNCTURE: CPT

## 2024-12-01 PROCEDURE — 3600000004 HC SURGERY LEVEL 4 BASE: Performed by: STUDENT IN AN ORGANIZED HEALTH CARE EDUCATION/TRAINING PROGRAM

## 2024-12-01 PROCEDURE — 1200000000 HC SEMI PRIVATE

## 2024-12-01 PROCEDURE — 2720000010 HC SURG SUPPLY STERILE: Performed by: STUDENT IN AN ORGANIZED HEALTH CARE EDUCATION/TRAINING PROGRAM

## 2024-12-01 PROCEDURE — 6360000002 HC RX W HCPCS: Performed by: NURSE PRACTITIONER

## 2024-12-01 PROCEDURE — 3700000000 HC ANESTHESIA ATTENDED CARE: Performed by: STUDENT IN AN ORGANIZED HEALTH CARE EDUCATION/TRAINING PROGRAM

## 2024-12-01 PROCEDURE — 6360000002 HC RX W HCPCS: Performed by: STUDENT IN AN ORGANIZED HEALTH CARE EDUCATION/TRAINING PROGRAM

## 2024-12-01 PROCEDURE — 94761 N-INVAS EAR/PLS OXIMETRY MLT: CPT

## 2024-12-01 PROCEDURE — 86403 PARTICLE AGGLUT ANTBDY SCRN: CPT

## 2024-12-01 PROCEDURE — 3700000001 HC ADD 15 MINUTES (ANESTHESIA): Performed by: STUDENT IN AN ORGANIZED HEALTH CARE EDUCATION/TRAINING PROGRAM

## 2024-12-01 PROCEDURE — 0QB90ZX EXCISION OF LEFT FEMORAL SHAFT, OPEN APPROACH, DIAGNOSTIC: ICD-10-PCS | Performed by: STUDENT IN AN ORGANIZED HEALTH CARE EDUCATION/TRAINING PROGRAM

## 2024-12-01 PROCEDURE — 80069 RENAL FUNCTION PANEL: CPT

## 2024-12-01 PROCEDURE — 72170 X-RAY EXAM OF PELVIS: CPT

## 2024-12-01 PROCEDURE — 3600000014 HC SURGERY LEVEL 4 ADDTL 15MIN: Performed by: STUDENT IN AN ORGANIZED HEALTH CARE EDUCATION/TRAINING PROGRAM

## 2024-12-01 PROCEDURE — A4217 STERILE WATER/SALINE, 500 ML: HCPCS | Performed by: STUDENT IN AN ORGANIZED HEALTH CARE EDUCATION/TRAINING PROGRAM

## 2024-12-01 PROCEDURE — 11981 INSERTION DRUG DLVR IMPLANT: CPT | Performed by: STUDENT IN AN ORGANIZED HEALTH CARE EDUCATION/TRAINING PROGRAM

## 2024-12-01 PROCEDURE — 85027 COMPLETE CBC AUTOMATED: CPT

## 2024-12-01 PROCEDURE — 2580000003 HC RX 258: Performed by: NURSE PRACTITIONER

## 2024-12-01 PROCEDURE — 7100000001 HC PACU RECOVERY - ADDTL 15 MIN: Performed by: STUDENT IN AN ORGANIZED HEALTH CARE EDUCATION/TRAINING PROGRAM

## 2024-12-01 PROCEDURE — 87077 CULTURE AEROBIC IDENTIFY: CPT

## 2024-12-01 PROCEDURE — 2700000000 HC OXYGEN THERAPY PER DAY

## 2024-12-01 PROCEDURE — 87075 CULTR BACTERIA EXCEPT BLOOD: CPT

## 2024-12-01 PROCEDURE — 87186 SC STD MICRODIL/AGAR DIL: CPT

## 2024-12-01 PROCEDURE — 87205 SMEAR GRAM STAIN: CPT

## 2024-12-01 DEVICE — IMPLANTABLE DEVICE
Type: IMPLANTABLE DEVICE | Site: HIP | Status: FUNCTIONAL
Brand: RINGLOC BI-POLAR HIP SYSTEM

## 2024-12-01 DEVICE — WAGNER SL REVISION® HIP STEM, UNCEMENTED, Ø 16/190, TAPER 12/14
Type: IMPLANTABLE DEVICE | Site: HIP | Status: FUNCTIONAL
Brand: WAGNER SL REVISION®

## 2024-12-01 DEVICE — IMPLANTABLE DEVICE: Type: IMPLANTABLE DEVICE | Site: HIP | Status: FUNCTIONAL

## 2024-12-01 DEVICE — CEM-OSTETIC IS A BIO-ENGINEERED MIXTURE OF CALCIUM-BASED INORGANIC COMPOUNDS. AFTER IT IS IMPLANTED, CEM-OSTETIC RESORBS AND IS LATER REPLACED BY NATURAL BONE. CEM-OSTETIC IS A NATURAL CHOICE FOR SPARING PATIENTS THE TRAUMA OF AUTOGRAFT HARVESTING. IT ALSO PROVIDES A SAFE ALTERNATIVE TO HUMAN OR ANIMAL CADAVER BONE THAT COMPLETELY ELIMINATES THE POTENTIAL FOR DISEASE TRANSMISSION.CEM-OSTETIC IS AN OSTEOCONDUCTIVE PUTTY THAT IS INTENDED TO BE USED TO FILL VOIDS AND GAPS THAT ARE NOT INTRINSIC TO THE STABILITY OF THE BONE STRUCTURE. THESE GAPS OR VOIDS MAY BE LOCATED IN THE EXTREMITIES, SPINE, PELVIS, OR CRANIUM. THE PUTTY MAY BE SHAPED AND PRESSED INTO THE VOID BY HAND OR INSERTED INTO A SYRINGE AND INJECTED INTO THE SURGICAL SITE. THE PASTE SET IN SITU OR EX SITU PROVIDES A VOID FILLER THAT CAN AUGMENT HARDWARE TO SUPPORT BONE FRAGMENTS DURING THE SURGICAL PROCEDURE. THE SET PUTTY ACTS AS A TEMPORARY SUPPORT MEDIUM AND IS NOT INTENDED TO PROVIDE STRUCTURAL SUPPORT DURING THE HEALING PROCESS. THE IMPLANT IS RADIO-OPAQUE. IT IS BIOCOMPATIBLE AND RESORBS IN THE BODY AS BONE IN-GROWTH OCCURS.
Type: IMPLANTABLE DEVICE | Site: HIP | Status: FUNCTIONAL
Brand: CEM-OSTETIC PUTTY

## 2024-12-01 DEVICE — THE PUTTY CONVENIENCE KIT (PCK) IS A FLEXIBLE POLYMER MAT WITH CAVITIES THAT ALLOWS THE PHYSICIAN TO MOLD BONE PUTTY INTO BEADS.
Type: IMPLANTABLE DEVICE | Site: HIP | Status: FUNCTIONAL
Brand: PUTTY CONVENIENCE KIT, 10CC

## 2024-12-01 DEVICE — IMPLANTABLE DEVICE
Type: IMPLANTABLE DEVICE | Site: HIP | Status: FUNCTIONAL
Brand: REFOBACIN® BONE CEMENT R

## 2024-12-01 RX ORDER — BUPIVACAINE HYDROCHLORIDE AND EPINEPHRINE 2.5; 5 MG/ML; UG/ML
INJECTION, SOLUTION EPIDURAL; INFILTRATION; INTRACAUDAL; PERINEURAL PRN
Status: DISCONTINUED | OUTPATIENT
Start: 2024-12-01 | End: 2024-12-01 | Stop reason: ALTCHOICE

## 2024-12-01 RX ORDER — SODIUM CHLORIDE 9 MG/ML
INJECTION, SOLUTION INTRAVENOUS PRN
Status: DISCONTINUED | OUTPATIENT
Start: 2024-12-01 | End: 2024-12-01 | Stop reason: HOSPADM

## 2024-12-01 RX ORDER — MAGNESIUM HYDROXIDE 1200 MG/15ML
LIQUID ORAL CONTINUOUS PRN
Status: COMPLETED | OUTPATIENT
Start: 2024-12-01 | End: 2024-12-01

## 2024-12-01 RX ORDER — SODIUM CHLORIDE 9 MG/ML
INJECTION, SOLUTION INTRAVENOUS
Status: DISCONTINUED | OUTPATIENT
Start: 2024-12-01 | End: 2024-12-01 | Stop reason: SDUPTHER

## 2024-12-01 RX ORDER — DIPHENHYDRAMINE HYDROCHLORIDE 50 MG/ML
12.5 INJECTION INTRAMUSCULAR; INTRAVENOUS
Status: DISCONTINUED | OUTPATIENT
Start: 2024-12-01 | End: 2024-12-01 | Stop reason: HOSPADM

## 2024-12-01 RX ORDER — SODIUM CHLORIDE 0.9 % (FLUSH) 0.9 %
5-40 SYRINGE (ML) INJECTION PRN
Status: DISCONTINUED | OUTPATIENT
Start: 2024-12-01 | End: 2024-12-01 | Stop reason: HOSPADM

## 2024-12-01 RX ORDER — VANCOMYCIN HYDROCHLORIDE 1 G/20ML
INJECTION, POWDER, LYOPHILIZED, FOR SOLUTION INTRAVENOUS PRN
Status: DISCONTINUED | OUTPATIENT
Start: 2024-12-01 | End: 2024-12-01 | Stop reason: ALTCHOICE

## 2024-12-01 RX ORDER — DEXAMETHASONE SODIUM PHOSPHATE 4 MG/ML
INJECTION, SOLUTION INTRA-ARTICULAR; INTRALESIONAL; INTRAMUSCULAR; INTRAVENOUS; SOFT TISSUE
Status: DISCONTINUED | OUTPATIENT
Start: 2024-12-01 | End: 2024-12-01 | Stop reason: SDUPTHER

## 2024-12-01 RX ORDER — ALBUTEROL SULFATE 90 UG/1
INHALANT RESPIRATORY (INHALATION)
Status: DISCONTINUED | OUTPATIENT
Start: 2024-12-01 | End: 2024-12-01 | Stop reason: SDUPTHER

## 2024-12-01 RX ORDER — DROPERIDOL 2.5 MG/ML
0.62 INJECTION, SOLUTION INTRAMUSCULAR; INTRAVENOUS
Status: DISCONTINUED | OUTPATIENT
Start: 2024-12-01 | End: 2024-12-01 | Stop reason: HOSPADM

## 2024-12-01 RX ORDER — ONDANSETRON 2 MG/ML
INJECTION INTRAMUSCULAR; INTRAVENOUS
Status: DISCONTINUED | OUTPATIENT
Start: 2024-12-01 | End: 2024-12-01 | Stop reason: SDUPTHER

## 2024-12-01 RX ORDER — SODIUM CHLORIDE 0.9 % (FLUSH) 0.9 %
5-40 SYRINGE (ML) INJECTION EVERY 12 HOURS SCHEDULED
Status: DISCONTINUED | OUTPATIENT
Start: 2024-12-01 | End: 2024-12-01 | Stop reason: HOSPADM

## 2024-12-01 RX ORDER — NALOXONE HYDROCHLORIDE 0.4 MG/ML
INJECTION, SOLUTION INTRAMUSCULAR; INTRAVENOUS; SUBCUTANEOUS PRN
Status: DISCONTINUED | OUTPATIENT
Start: 2024-12-01 | End: 2024-12-01 | Stop reason: HOSPADM

## 2024-12-01 RX ORDER — LABETALOL HYDROCHLORIDE 5 MG/ML
5 INJECTION, SOLUTION INTRAVENOUS EVERY 10 MIN PRN
Status: DISCONTINUED | OUTPATIENT
Start: 2024-12-01 | End: 2024-12-01 | Stop reason: HOSPADM

## 2024-12-01 RX ORDER — LIDOCAINE HYDROCHLORIDE 20 MG/ML
INJECTION, SOLUTION EPIDURAL; INFILTRATION; INTRACAUDAL; PERINEURAL
Status: DISCONTINUED | OUTPATIENT
Start: 2024-12-01 | End: 2024-12-01 | Stop reason: SDUPTHER

## 2024-12-01 RX ORDER — MIDAZOLAM HYDROCHLORIDE 1 MG/ML
INJECTION, SOLUTION INTRAMUSCULAR; INTRAVENOUS
Status: DISCONTINUED | OUTPATIENT
Start: 2024-12-01 | End: 2024-12-01 | Stop reason: SDUPTHER

## 2024-12-01 RX ORDER — OXYCODONE HYDROCHLORIDE 5 MG/1
10 TABLET ORAL PRN
Status: DISCONTINUED | OUTPATIENT
Start: 2024-12-01 | End: 2024-12-01 | Stop reason: HOSPADM

## 2024-12-01 RX ORDER — CLOPIDOGREL BISULFATE 75 MG/1
75 TABLET ORAL DAILY
Status: DISCONTINUED | OUTPATIENT
Start: 2024-12-02 | End: 2024-12-02 | Stop reason: HOSPADM

## 2024-12-01 RX ORDER — KETOROLAC TROMETHAMINE 30 MG/ML
INJECTION, SOLUTION INTRAMUSCULAR; INTRAVENOUS
Status: DISCONTINUED | OUTPATIENT
Start: 2024-12-01 | End: 2024-12-01 | Stop reason: SDUPTHER

## 2024-12-01 RX ORDER — GENTAMICIN 40 MG/ML
INJECTION, SOLUTION INTRAMUSCULAR; INTRAVENOUS PRN
Status: DISCONTINUED | OUTPATIENT
Start: 2024-12-01 | End: 2024-12-01 | Stop reason: ALTCHOICE

## 2024-12-01 RX ORDER — PHENYLEPHRINE HCL IN 0.9% NACL 1 MG/10 ML
SYRINGE (ML) INTRAVENOUS
Status: DISCONTINUED | OUTPATIENT
Start: 2024-12-01 | End: 2024-12-01 | Stop reason: SDUPTHER

## 2024-12-01 RX ORDER — FENTANYL CITRATE 50 UG/ML
INJECTION, SOLUTION INTRAMUSCULAR; INTRAVENOUS
Status: DISCONTINUED | OUTPATIENT
Start: 2024-12-01 | End: 2024-12-01 | Stop reason: SDUPTHER

## 2024-12-01 RX ORDER — ROCURONIUM BROMIDE 10 MG/ML
INJECTION, SOLUTION INTRAVENOUS
Status: DISCONTINUED | OUTPATIENT
Start: 2024-12-01 | End: 2024-12-01 | Stop reason: SDUPTHER

## 2024-12-01 RX ORDER — OXYCODONE HYDROCHLORIDE 5 MG/1
5 TABLET ORAL PRN
Status: DISCONTINUED | OUTPATIENT
Start: 2024-12-01 | End: 2024-12-01 | Stop reason: HOSPADM

## 2024-12-01 RX ORDER — PROPOFOL 10 MG/ML
INJECTION, EMULSION INTRAVENOUS
Status: DISCONTINUED | OUTPATIENT
Start: 2024-12-01 | End: 2024-12-01 | Stop reason: SDUPTHER

## 2024-12-01 RX ADMIN — PROPOFOL 140 MG: 10 INJECTION, EMULSION INTRAVENOUS at 08:28

## 2024-12-01 RX ADMIN — LORAZEPAM 0.5 MG: 0.5 TABLET ORAL at 12:38

## 2024-12-01 RX ADMIN — CEFAZOLIN 2000 MG: 2 INJECTION, POWDER, FOR SOLUTION INTRAVENOUS at 22:08

## 2024-12-01 RX ADMIN — Medication 100 MCG: at 09:59

## 2024-12-01 RX ADMIN — Medication 100 MCG: at 10:13

## 2024-12-01 RX ADMIN — CEFAZOLIN 2000 MG: 2 INJECTION, POWDER, FOR SOLUTION INTRAVENOUS at 06:04

## 2024-12-01 RX ADMIN — FENTANYL CITRATE 25 MCG: 50 INJECTION, SOLUTION INTRAMUSCULAR; INTRAVENOUS at 10:36

## 2024-12-01 RX ADMIN — ACETAMINOPHEN 1000 MG: 500 TABLET ORAL at 13:09

## 2024-12-01 RX ADMIN — KETOROLAC TROMETHAMINE 15 MG: 30 INJECTION, SOLUTION INTRAMUSCULAR; INTRAVENOUS at 10:24

## 2024-12-01 RX ADMIN — Medication 2 PUFF: at 09:29

## 2024-12-01 RX ADMIN — Medication 100 MCG: at 09:45

## 2024-12-01 RX ADMIN — MIDAZOLAM 2 MG: 1 INJECTION INTRAMUSCULAR; INTRAVENOUS at 08:18

## 2024-12-01 RX ADMIN — OXYCODONE 10 MG: 5 TABLET ORAL at 21:10

## 2024-12-01 RX ADMIN — Medication 100 MCG: at 08:38

## 2024-12-01 RX ADMIN — Medication 100 MCG: at 10:07

## 2024-12-01 RX ADMIN — SODIUM CHLORIDE: 9 INJECTION, SOLUTION INTRAVENOUS at 22:06

## 2024-12-01 RX ADMIN — FENTANYL CITRATE 25 MCG: 50 INJECTION, SOLUTION INTRAMUSCULAR; INTRAVENOUS at 10:17

## 2024-12-01 RX ADMIN — METHOCARBAMOL 200 MG: 100 INJECTION INTRAMUSCULAR; INTRAVENOUS at 09:24

## 2024-12-01 RX ADMIN — OXYCODONE 10 MG: 5 TABLET ORAL at 16:59

## 2024-12-01 RX ADMIN — Medication: at 17:00

## 2024-12-01 RX ADMIN — METOPROLOL TARTRATE 50 MG: 50 TABLET, FILM COATED ORAL at 21:10

## 2024-12-01 RX ADMIN — HYDROMORPHONE HYDROCHLORIDE 0.5 MG: 0.5 INJECTION, SOLUTION INTRAMUSCULAR; INTRAVENOUS; SUBCUTANEOUS at 11:28

## 2024-12-01 RX ADMIN — OXYCODONE 10 MG: 5 TABLET ORAL at 13:09

## 2024-12-01 RX ADMIN — Medication 100 MCG: at 09:49

## 2024-12-01 RX ADMIN — CEFAZOLIN 2000 MG: 2 INJECTION, POWDER, FOR SOLUTION INTRAVENOUS at 14:32

## 2024-12-01 RX ADMIN — Medication 100 MCG: at 09:16

## 2024-12-01 RX ADMIN — SODIUM CHLORIDE: 9 INJECTION, SOLUTION INTRAVENOUS at 08:18

## 2024-12-01 RX ADMIN — FENTANYL CITRATE 25 MCG: 50 INJECTION, SOLUTION INTRAMUSCULAR; INTRAVENOUS at 10:38

## 2024-12-01 RX ADMIN — ROCURONIUM BROMIDE 50 MG: 50 INJECTION, SOLUTION INTRAVENOUS at 08:28

## 2024-12-01 RX ADMIN — SUGAMMADEX 200 MG: 100 INJECTION, SOLUTION INTRAVENOUS at 10:44

## 2024-12-01 RX ADMIN — GABAPENTIN 300 MG: 300 CAPSULE ORAL at 21:10

## 2024-12-01 RX ADMIN — Medication 100 MCG: at 10:25

## 2024-12-01 RX ADMIN — LIDOCAINE HYDROCHLORIDE 80 MG: 20 INJECTION, SOLUTION EPIDURAL; INFILTRATION; INTRACAUDAL; PERINEURAL at 08:28

## 2024-12-01 RX ADMIN — HYDROMORPHONE HYDROCHLORIDE 0.5 MG: 0.5 INJECTION, SOLUTION INTRAMUSCULAR; INTRAVENOUS; SUBCUTANEOUS at 11:11

## 2024-12-01 RX ADMIN — FENTANYL CITRATE 50 MCG: 50 INJECTION, SOLUTION INTRAMUSCULAR; INTRAVENOUS at 08:20

## 2024-12-01 RX ADMIN — ACETAMINOPHEN 1000 MG: 500 TABLET ORAL at 22:01

## 2024-12-01 RX ADMIN — Medication 100 MCG: at 08:55

## 2024-12-01 RX ADMIN — Medication 10 ML: at 21:11

## 2024-12-01 RX ADMIN — METHOCARBAMOL 200 MG: 100 INJECTION INTRAMUSCULAR; INTRAVENOUS at 08:52

## 2024-12-01 RX ADMIN — LORAZEPAM 0.5 MG: 0.5 TABLET ORAL at 22:01

## 2024-12-01 RX ADMIN — ROCURONIUM BROMIDE 10 MG: 50 INJECTION, SOLUTION INTRAVENOUS at 09:51

## 2024-12-01 RX ADMIN — ONDANSETRON 4 MG: 2 INJECTION INTRAMUSCULAR; INTRAVENOUS at 08:35

## 2024-12-01 RX ADMIN — DEXAMETHASONE SODIUM PHOSPHATE 4 MG: 4 INJECTION, SOLUTION INTRAMUSCULAR; INTRAVENOUS at 08:35

## 2024-12-01 RX ADMIN — FENTANYL CITRATE 25 MCG: 50 INJECTION, SOLUTION INTRAMUSCULAR; INTRAVENOUS at 09:50

## 2024-12-01 RX ADMIN — Medication 2 PUFF: at 19:06

## 2024-12-01 RX ADMIN — FENTANYL CITRATE 50 MCG: 50 INJECTION, SOLUTION INTRAMUSCULAR; INTRAVENOUS at 08:28

## 2024-12-01 RX ADMIN — Medication 100 MCG: at 09:38

## 2024-12-01 RX ADMIN — OXYCODONE 10 MG: 5 TABLET ORAL at 04:06

## 2024-12-01 RX ADMIN — Medication 200 MCG: at 08:33

## 2024-12-01 ASSESSMENT — PAIN DESCRIPTION - LOCATION
LOCATION: ABDOMEN
LOCATION: HIP;LEG
LOCATION: HIP;LEG
LOCATION: HIP
LOCATION: HIP
LOCATION: HIP;LEG

## 2024-12-01 ASSESSMENT — PAIN SCALES - GENERAL
PAINLEVEL_OUTOF10: 7
PAINLEVEL_OUTOF10: 6
PAINLEVEL_OUTOF10: 8
PAINLEVEL_OUTOF10: 9
PAINLEVEL_OUTOF10: 7
PAINLEVEL_OUTOF10: 7
PAINLEVEL_OUTOF10: 0
PAINLEVEL_OUTOF10: 7

## 2024-12-01 ASSESSMENT — PAIN SCALES - WONG BAKER
WONGBAKER_NUMERICALRESPONSE: HURTS EVEN MORE
WONGBAKER_NUMERICALRESPONSE: HURTS EVEN MORE

## 2024-12-01 ASSESSMENT — PAIN - FUNCTIONAL ASSESSMENT
PAIN_FUNCTIONAL_ASSESSMENT: INTOLERABLE, UNABLE TO DO ANY ACTIVE OR PASSIVE ACTIVITIES
PAIN_FUNCTIONAL_ASSESSMENT: PREVENTS OR INTERFERES WITH ALL ACTIVE AND SOME PASSIVE ACTIVITIES

## 2024-12-01 ASSESSMENT — PAIN DESCRIPTION - DESCRIPTORS
DESCRIPTORS: ACHING

## 2024-12-01 ASSESSMENT — PAIN DESCRIPTION - ORIENTATION
ORIENTATION: LEFT

## 2024-12-01 NOTE — PLAN OF CARE
Problem: Discharge Planning  Goal: Discharge to home or other facility with appropriate resources  Outcome: Progressing  Flowsheets (Taken 12/1/2024 7212)  Discharge to home or other facility with appropriate resources: Identify barriers to discharge with patient and caregiver     Problem: Safety - Adult  Goal: Free from fall injury  Outcome: Progressing     Problem: Skin/Tissue Integrity  Goal: Absence of new skin breakdown  Description: 1.  Monitor for areas of redness and/or skin breakdown  2.  Assess vascular access sites hourly  3.  Every 4-6 hours minimum:  Change oxygen saturation probe site  4.  Every 4-6 hours:  If on nasal continuous positive airway pressure, respiratory therapy assess nares and determine need for appliance change or resting period.  Outcome: Progressing

## 2024-12-01 NOTE — OP NOTE
Operative Note      Patient: Kam Nguyen  YOB: 1965  MRN: [id number]    Date of Procedure: 12/1/2024    Pre-Op Diagnosis Codes:      * Hip osteomyelitis, left [M86.9]    Post-Op Diagnosis: Same       Procedure(s):  HIP BONE BIOPSY/EXCISION WITH ANTIBIOTIC SPACER PLACEMENT    Surgeon(s):  J Carlos Almonte MD    Assistant:   Surgical Assistant: Solitario Zayas RN    Anesthesia: General    Estimated Blood Loss (mL): 300    Complications: None    Specimens:   ID Type Source Tests Collected by Time Destination   1 : LEFT HIP SWAB Joint/Joint Fluid Hip CULTURE, AERORBIC AND ANAROBIC, JOINT J Carlos Almonte MD 12/1/2024 0859    2 : LEFT HIP TISSUE #1 Tissue Hip CULTURE, AERORBIC AND ANAROBIC, JOINT J Carlos Almonte MD 12/1/2024 0907    3 : LEFT HIP TISSUE #2 Tissue Hip CULTURE, ANAEROBIC AND AEROBIC J Carlos Almonte MD 12/1/2024 0910    4 : LEFT HIP TISSUE #3 Tissue Hip CULTURE, ANAEROBIC AND AEROBIC J Carlos Almonte MD 12/1/2024 0925    A : LEFT FEMORAL HEAD Bone Hip SURGICAL PATHOLOGY J Carlos Almonte MD 12/1/2024 0901        Implants:  Implant Name Type Inv. Item Serial No.  Lot No. LRB No. Used Action   CEMENT BNE 40GM W/ GENT HI VISC RADPQ FOR REV SURG - [id number]  CEMENT BNE 40GM W/ GENT HI VISC RADPQ FOR REV SURG  [employer]- [id number] Left 1 Implanted   CEMENT BNE 40GM W/ GENT HI VISC RADPQ FOR REV SURG - [id number]  CEMENT BNE 40GM W/ GENT HI VISC RADPQ FOR REV SURG  [employer]- [id number] Left 1 Implanted   GRAFT BNE PTTY 10 CC INJ ANNETTA-OSTETIC - [id number]  GRAFT BNE PTTY 10 CC INJ ANNETTA-OSTETIC [id number] [employer]- [id number] Left 2 Implanted   GRAFT KIT PTTY 10 CC CONVENIENCE - [id number]  GRAFT KIT PTTY 10 CC CONVENIENCE [id number] [employer] [id number] Left 2 Implanted   GONZALEZ SL REVISION STEM 135 NK ANGLE 16MM X 190MM - [id number]  GONZALEZ SL REVISION STEM 135 NK ANGLE 16MM X 190MM  [employer]- [id number] Left 1

## 2024-12-01 NOTE — CONSULTS
[address] [place] [address]                              CONSULTATION      PATIENT NAME: ALEX GABRIEL              : 1965  MED REC NO: [id number]                      ROOM: 0524  ACCOUNT NO: [id number]                       ADMIT DATE: 2024  PROVIDER: Jack Shane MD    CARDIAC ELECTROPHYSIOLOGY CONSULTATION    CONSULT DATE: 2024    REFERRING PHYSICIAN:  ISAIAS MOAR      REASON FOR CONSULTATION:  Atrial fibrillation.    HISTORY OF PRESENT ILLNESS:  The patient is a 59-year-old man with a history of hypertension, hyperlipidemia, coronary artery disease, alcoholism, who was admitted to [facility] on 2024 with complaints of chest pain.  There was no evidence for myocardial injury at that time.  During that admission, he was noted to have rapidly conducted atrial fibrillation with spontaneous termination.  He is also found to have methicillin-sensitive Staphylococcus aureus bacteremia.  MRI of the thoracic and lumbar spine on 2024 demonstrates a 2.5 cm left iliacus muscle abscess.  He is then transferred from [facility] to [facility] for ID, General Surgery evaluation.  There is no evidence of recurrent atrial fibrillation since the ECG of 2024.  The patient is unaware of palpitations.  He has a history of remote PCI in  and he was taking Plavix at the time of admission.  After discovery of the atrial fibrillation, he was treated with enoxaparin subcu 1 mg/kg q.12 hours.  This was apparently not continued following his transfer to [facility].  He does not currently have any active bleeding, but does have worsening anemia.  He is currently receiving antibiotics for his abscess.    PAST MEDICAL HISTORY:    1. Bipolar disorder.  2. Coronary artery disease, status post remote PCI.  3. COPD.  4. Alcoholism.  5. Hypertension.  6. Hyperlipidemia.  7. Tobacco 
        Department of General Surgery Consult    PATIENT NAME: Kam Nguyen   YOB: 1965    ADMISSION DATE: 11/25/2024  9:16 PM      TODAY'S DATE: 11/26/2024    Reason for Consult:  abscess iliacus muscle    Chief Complaint: left leg weakness and pain    Historian: patient, EMR    Requesting Practitioner:  Julio    HISTORY OF PRESENT ILLNESS:              The patient is a 59 y.o. male who was transferred from [facility] for ongoing care and evaluation of abscess in iliacus muscle seen on MRI. The pt has multiple medical issues as outlined in internal medicine notes. He initially presented to the hospital with chest pain and was admitted due to hyponatremia and further workup. He subsequently developed Afib with RVR, tremors and has been evaluated by cardiology and neurology.   He had an MRI yesterday due to complaints of left leg pain that he reports starts at his knee and goes all the way up into his groin. In discussion, the pt reports he has had this since September and has also had left leg weakness.     Past Medical History:        Diagnosis Date    Affective bipolar disorder (HCC) 11/18/2010    Anxiety     Bipolar affective (HCC)     CAD (coronary artery disease)     Chronic back pain     Class 2 obesity due to excess calories with body mass index (BMI) of 39.0 to 39.9 in adult 2/2/2022    COPD (chronic obstructive pulmonary disease) ([facility])     DDD (degenerative disc disease), cervical     Depression     Fatty liver 2/2/2021    GERD (gastroesophageal reflux disease)     Hyperlipidemia     Hypertension     MI (myocardial infarction) ([facility])     Pancreatitis     Recovering alcoholic (HCC)     Tobacco abuse 12/16/2016       Past Surgical History:        Procedure Laterality Date    CORONARY ANGIOPLASTY WITH STENT PLACEMENT  2006, 2012, 2013    [facility]       Current Medications:   Current Facility-Administered Medications: wound/burn dressing (MEDIHONEY;TRIAD) paste, , Topical, 
 [facility]  Consult Note       NAME:  Kam Nguyen  MEDICAL RECORD NUMBER:  [id number]  AGE: 59 y.o.   GENDER: male  : 1965  TODAY'S DATE:  2024    Subjective; I can turn to left if you put that rail up.   I'm sorry I get anxious.   Reason for WOCN Evaluation and Assessment:     stage 2 on bilat buttocks     Wound Care to sign off.      Kam Nguyen is a 59 y.o. male referred by:   [] Physician  [x] Nursing  [] Other:     Wound Identification:  Wound Type: pressure  Contributing Factors: chronic pressure, decreased mobility, and shear force    Wound History: 59 y.o. male with a significant past medical history of hypertension, hyperlipidemia, CAD s/p PCI, chronic pain syndrome, COPD, depression, GERD with recent finding of candidal esophagitis on EGD 10/2024, and tobacco user who present to [facility] as a direct admit from [facility] after being admitted there since .  with complaint of chest pain.  Current Wound Care Treatment:  zinc    Patient Goal of Care:  [x] Wound Healing  [] Odor Control  [] Palliative Care  [x] Pain Control   [] Other:         PAST MEDICAL HISTORY        Diagnosis Date    Affective bipolar disorder (HCC) 2010    Anxiety     Bipolar affective (HCC)     CAD (coronary artery disease)     Chronic back pain     Class 2 obesity due to excess calories with body mass index (BMI) of 39.0 to 39.9 in adult 2022    COPD (chronic obstructive pulmonary disease) (HCC)     DDD (degenerative disc disease), cervical     Depression     Fatty liver 2021    GERD (gastroesophageal reflux disease)     Hyperlipidemia     Hypertension     MI (myocardial infarction) (HCC)     Pancreatitis     Recovering alcoholic (HCC)     Tobacco abuse 2016       PAST SURGICAL HISTORY    Past Surgical History:   Procedure Laterality Date    CORONARY ANGIOPLASTY WITH STENT PLACEMENT  , ,     [facility] 
Consult Call Back    Who:[facility]  Date:11/30/2024,  Time:7:51 PM    Electronically signed by Cassandra Washington on 11/30/24 at 7:51 PM EST    
Consult PerfectServed/called to Cardiology on 11/26/24 at 9:04 AM Denia Acosta  
Consult PerfectServed/called to Orthopedics on 11/27/24 at 8:33 AM Denia Acosta  
Consult PerfectServed/called to [facility] on 11/26/24 at 9:07 AM Denia Acosta  
Consult Placed     Who: Dr. Han Fisher - Cardiology [facility]  Date: 11/30/2024  Time: 1942     Electronically signed by Saundra Chou RN on 11/30/2024 at 7:42 PM     
Consult dictated # [id number]  
Consult placed  [facility] GENERAL SURGERY  WHEN 11/25/2024  TIME 9:54PM  Juana Magdaleno.  
Consultant: J Carlos Almonte MD  From: Dr. Soares  Reason: L septic hip / OM    No chief complaint on file.       History of Present Illness      Kam Nguyen is a 59 y.o. male who presents with approximately 2 and half months of worsening left hip pain and gait difficulties.  He presents with family member at bedside who was able to confirm this history.  He eventually presented to [facility] where he was found to have left septic hip and osteomyelitis per MRI and I was consulted for surgical management.  His current pain is isolated to the left hip.       Past History       Past Medical History:   Diagnosis Date    Affective bipolar disorder (HCC) 11/18/2010    Anxiety     Bipolar affective (HCC)     CAD (coronary artery disease)     Chronic back pain     Class 2 obesity due to excess calories with body mass index (BMI) of 39.0 to 39.9 in adult 2/2/2022    COPD (chronic obstructive pulmonary disease) ([facility])     DDD (degenerative disc disease), cervical     Depression     Fatty liver 2/2/2021    GERD (gastroesophageal reflux disease)     Hyperlipidemia     Hypertension     MI (myocardial infarction) ([facility])     Pancreatitis     Recovering alcoholic ([facility])     Tobacco abuse 12/16/2016     Past Surgical History:   Procedure Laterality Date    CORONARY ANGIOPLASTY WITH STENT PLACEMENT  2006, 2012, 2013    [facility]     Family History   Problem Relation Age of Onset    Other Mother     Arthritis Mother     Mental Illness Mother     Depression Mother     Diabetes Father     Heart Disease Father     Substance Abuse Father     Heart Disease Sister     High Blood Pressure Sister     Mental Retardation Brother     Other Brother     Learning Disabilities Brother     Early Death Sister     Cancer Sister     Substance Abuse Sister     Cancer Sister     Diabetes Brother     High Blood Pressure Brother      Social History     Tobacco Use    Smoking status: Every Day     Current packs/day: 1.00     Average packs/day: 1 
ultimately transferred here for surgical evaluation.     Past Medical History:        Diagnosis Date    Affective bipolar disorder (HCC) 11/18/2010    Anxiety     Bipolar affective (HCC)     CAD (coronary artery disease)     Chronic back pain     Class 2 obesity due to excess calories with body mass index (BMI) of 39.0 to 39.9 in adult 2/2/2022    COPD (chronic obstructive pulmonary disease) (HCC)     DDD (degenerative disc disease), cervical     Depression     Fatty liver 2/2/2021    GERD (gastroesophageal reflux disease)     Hyperlipidemia     Hypertension     MI (myocardial infarction) (HCC)     Pancreatitis     Recovering alcoholic (HCC)     Tobacco abuse 12/16/2016       Past Surgical History:        Procedure Laterality Date    CORONARY ANGIOPLASTY WITH STENT PLACEMENT  2006, 2012, 2013    [facility]       Medications prior to admission:   Prior to Admission medications    Medication Sig Start Date End Date Taking? Authorizing Provider   metoprolol succinate (TOPROL XL) 100 MG extended release tablet TAKE ONE (1) TABLET ONCE DAILY 11/18/24  Yes Zoey Caruso MD   LORazepam (ATIVAN) 0.5 MG tablet Take 1 tablet by mouth in the morning and 1 tablet in the evening. Do all this for 30 days. Max Daily Amount: 1 mg. 11/6/24 12/6/24 Yes Endy Castaneda MD   cariprazine hcl (VRAYLAR) 4.5 MG CAPS capsule Take 1 capsule by mouth daily Dose clarified 11/7/24  Yes Endy Castaneda MD   gabapentin (NEURONTIN) 300 MG capsule TAKE ONE (1) CAPSULE BY MOUTH FOUR (4) TIMES DAILY 11/5/24 3/5/25 Yes Edwardo Butler MD   loratadine (CLARITIN) 10 MG tablet Take 1 tablet by mouth daily   Yes Melvin Lord MD   traZODone (DESYREL) 50 MG tablet Take 1 tablet by mouth nightly   Yes Melvin Lord MD   atorvastatin (LIPITOR) 40 MG tablet TAKE ONE (1) TABLET BY MOUTH DAILY 10/3/24  Yes Zoey Caruso MD   clopidogrel (PLAVIX) 75 MG tablet TAKE 1 TABLET ONCE DAILY 6/4/24  Yes Zoey Caruso MD   amLODIPine 
estimated EF of 65 - 70%. Left ventricle size is normal. Mildly increased wall thickness. Findings consistent with mild concentric hypertrophy. Normal wall motion. Normal diastolic function.    Mitral Valve: Mildly thickened, at the anterior leaflet.    Tricuspid Valve: Unable to assess RVSP due to inadequate or insignificant tricuspid regurgitation.    Image quality is adequate.    Stress: 11/2024    Stress Combined Conclusion: Mildly diminished activity inferior wall at stress with improvement at rest. Myocardial perfusion normal in remaining segments. LV function is hyperdynamic EF > 65% with uniform wall motion. Note patient refused obtaining attenuation correct images. Mild ischemia cannot be ruled out but given normal inferior wall motion findings may represent diaphragm attenuation artifact. Note stress EKG portion is abnormal. Lower risk abnormal study. Clinical correlation recommended.    Stress Test: A pharmacological stress test was performed using regadenoson (Lexiscan). PO caffeine given as a reversal agent.    Resting ECG: NSR 98bpm; artifact; NST change    Stress ECG: Arrhythmias during stress: rare PVCs. 1mm downsloping ST depression anterior leads. The stress ECG was positive for ischemia.    Cath:     High complexity/medical decision making due to extensive data review, extensive history review, independent review of data.    High risk due to acute illness, evaluation of drug-drug interactions, medication management and diagnostic interventions.      ASSESSMENT & PLAN:    CAD and history of multiple PCI's  Atypical chest pain  Abnormal low risk stress test  Left hip septic arthritis and osteomyelitis  Preop cardiac risk assessment  ?  New onset atrial fibrillation    Currently without anginal or heart failure symptoms.  Activity has been limited over the last 2 and half months due to hip issues but used to be active without much limitation previously.  Recent stress test done for atypical chest 
microvascular disease    Chest pain    Pneumonia    COPD exacerbation (HCC)    Acute respiratory failure with hypoxia    Hyponatremia    Affective bipolar disorder (HCC)    Depression with anxiety    Fatty liver    GERD (gastroesophageal reflux disease)    COPD, severe (HCC)    Class 2 obesity due to excess calories with body mass index (BMI) of 39.0 to 39.9 in adult    Chronic stable angina (HCC)    Suicidal ideation    Hypomagnesemia    Anxiety    Dysphagia    Pulmonary nodules    Stage 3 severe COPD by GOLD classification (HCC)    New onset a-fib (HCC)    Bacteremia    Atrial fibrillation with rapid ventricular response (HCC)    Pituitary tumor    Coronary artery disease due to lipid rich plaque    MSSA bacteremia    Moderate malnutrition (HCC)       History of HTN, CAD, HLD, COPD, FLD, anxiety     Admitted 11/19/24 with chest pain, low grade fever, leukocytosis     MSSA bacteremia, \"complicated,\" community onset, no clear primary source   Repeat BC collected on HD #4 are negative   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  No definite spinal infection seen on the contrasted MRI   No other focal complaints to suggest primary or secondary sites of infection   The MV was mildly thickened on TTE, but no overt vegetation was seen     History of syncope ascribed to pcn       Recs:  Continue cefazolin   Check CRP for baseline   Await CT ap ordered by Gen Shaina  Favor CUCA, if negative, could treat with 4 weeks rather than 6   Will need placement for IV abx after DC, final recs TBD   Monitor for any other s/s metastatic infection     HIV and HCV screens for completeness     Will follow     Above d/w patient, questions addressed      Medical Decision Making:  The following items were considered in medical decision making:  Discussion of patient care with other providers  Reviewed clinical lab tests  Reviewed radiology tests  Reviewed other diagnostic tests/interventions  Independent review of

## 2024-12-02 ENCOUNTER — APPOINTMENT (OUTPATIENT)
Dept: GENERAL RADIOLOGY | Age: 59
DRG: 344 | End: 2024-12-02
Attending: INTERNAL MEDICINE
Payer: COMMERCIAL

## 2024-12-02 ENCOUNTER — ANCILLARY PROCEDURE (OUTPATIENT)
Dept: CARDIOLOGY CLINIC | Age: 59
End: 2024-12-02
Payer: COMMERCIAL

## 2024-12-02 ENCOUNTER — TELEPHONE (OUTPATIENT)
Dept: CARDIAC CATH/INVASIVE PROCEDURES | Age: 59
End: 2024-12-02

## 2024-12-02 VITALS
HEIGHT: 72 IN | OXYGEN SATURATION: 93 % | DIASTOLIC BLOOD PRESSURE: 75 MMHG | SYSTOLIC BLOOD PRESSURE: 124 MMHG | HEART RATE: 83 BPM | BODY MASS INDEX: 22.8 KG/M2 | WEIGHT: 168.3 LBS | RESPIRATION RATE: 14 BRPM | TEMPERATURE: 97.7 F

## 2024-12-02 DIAGNOSIS — I48.0 PAF (PAROXYSMAL ATRIAL FIBRILLATION) (HCC): ICD-10-CM

## 2024-12-02 LAB
ANION GAP SERPL CALCULATED.3IONS-SCNC: 9 MMOL/L (ref 3–16)
BUN SERPL-MCNC: 7 MG/DL (ref 7–20)
CALCIUM SERPL-MCNC: 9.5 MG/DL (ref 8.3–10.6)
CHLORIDE SERPL-SCNC: 94 MMOL/L (ref 99–110)
CO2 SERPL-SCNC: 27 MMOL/L (ref 21–32)
CREAT SERPL-MCNC: 0.4 MG/DL (ref 0.9–1.3)
DEPRECATED RDW RBC AUTO: 16.3 % (ref 12.4–15.4)
EKG ATRIAL RATE: 92 BPM
EKG DIAGNOSIS: NORMAL
EKG P AXIS: 59 DEGREES
EKG P-R INTERVAL: 156 MS
EKG Q-T INTERVAL: 352 MS
EKG QRS DURATION: 88 MS
EKG QTC CALCULATION (BAZETT): 435 MS
EKG R AXIS: 34 DEGREES
EKG T AXIS: 74 DEGREES
EKG VENTRICULAR RATE: 92 BPM
GFR SERPLBLD CREATININE-BSD FMLA CKD-EPI: >90 ML/MIN/{1.73_M2}
GLUCOSE SERPL-MCNC: 102 MG/DL (ref 70–99)
HCT VFR BLD AUTO: 25.7 % (ref 40.5–52.5)
HGB BLD-MCNC: 8.4 G/DL (ref 13.5–17.5)
MAGNESIUM SERPL-MCNC: 1.7 MG/DL (ref 1.8–2.4)
MCH RBC QN AUTO: 26.9 PG (ref 26–34)
MCHC RBC AUTO-ENTMCNC: 32.9 G/DL (ref 31–36)
MCV RBC AUTO: 81.7 FL (ref 80–100)
PLATELET # BLD AUTO: 327 K/UL (ref 135–450)
PMV BLD AUTO: 7.7 FL (ref 5–10.5)
POTASSIUM SERPL-SCNC: 3.7 MMOL/L (ref 3.5–5.1)
RBC # BLD AUTO: 3.14 M/UL (ref 4.2–5.9)
SODIUM SERPL-SCNC: 130 MMOL/L (ref 136–145)
TROPONIN, HIGH SENSITIVITY: 18 NG/L (ref 0–22)
TROPONIN, HIGH SENSITIVITY: 18 NG/L (ref 0–22)
WBC # BLD AUTO: 12.9 K/UL (ref 4–11)

## 2024-12-02 PROCEDURE — 80048 BASIC METABOLIC PNL TOTAL CA: CPT

## 2024-12-02 PROCEDURE — 2580000003 HC RX 258: Performed by: STUDENT IN AN ORGANIZED HEALTH CARE EDUCATION/TRAINING PROGRAM

## 2024-12-02 PROCEDURE — 99232 SBSQ HOSP IP/OBS MODERATE 35: CPT | Performed by: INTERNAL MEDICINE

## 2024-12-02 PROCEDURE — 6370000000 HC RX 637 (ALT 250 FOR IP): Performed by: INTERNAL MEDICINE

## 2024-12-02 PROCEDURE — 73502 X-RAY EXAM HIP UNI 2-3 VIEWS: CPT

## 2024-12-02 PROCEDURE — 97168 OT RE-EVAL EST PLAN CARE: CPT

## 2024-12-02 PROCEDURE — 36415 COLL VENOUS BLD VENIPUNCTURE: CPT

## 2024-12-02 PROCEDURE — 97164 PT RE-EVAL EST PLAN CARE: CPT

## 2024-12-02 PROCEDURE — 97530 THERAPEUTIC ACTIVITIES: CPT

## 2024-12-02 PROCEDURE — 85027 COMPLETE CBC AUTOMATED: CPT

## 2024-12-02 PROCEDURE — 6370000000 HC RX 637 (ALT 250 FOR IP): Performed by: STUDENT IN AN ORGANIZED HEALTH CARE EDUCATION/TRAINING PROGRAM

## 2024-12-02 PROCEDURE — 83735 ASSAY OF MAGNESIUM: CPT

## 2024-12-02 PROCEDURE — 36569 INSJ PICC 5 YR+ W/O IMAGING: CPT

## 2024-12-02 PROCEDURE — 84484 ASSAY OF TROPONIN QUANT: CPT

## 2024-12-02 PROCEDURE — 6370000000 HC RX 637 (ALT 250 FOR IP): Performed by: SPECIALIST/TECHNOLOGIST

## 2024-12-02 PROCEDURE — C1751 CATH, INF, PER/CENT/MIDLINE: HCPCS

## 2024-12-02 PROCEDURE — 6360000002 HC RX W HCPCS: Performed by: STUDENT IN AN ORGANIZED HEALTH CARE EDUCATION/TRAINING PROGRAM

## 2024-12-02 PROCEDURE — 93010 ELECTROCARDIOGRAM REPORT: CPT | Performed by: INTERNAL MEDICINE

## 2024-12-02 PROCEDURE — 93005 ELECTROCARDIOGRAM TRACING: CPT | Performed by: INTERNAL MEDICINE

## 2024-12-02 PROCEDURE — 71045 X-RAY EXAM CHEST 1 VIEW: CPT

## 2024-12-02 PROCEDURE — 02HV33Z INSERTION OF INFUSION DEVICE INTO SUPERIOR VENA CAVA, PERCUTANEOUS APPROACH: ICD-10-PCS | Performed by: INTERNAL MEDICINE

## 2024-12-02 PROCEDURE — 94640 AIRWAY INHALATION TREATMENT: CPT

## 2024-12-02 RX ORDER — METHOCARBAMOL 500 MG/1
500 TABLET, FILM COATED ORAL 3 TIMES DAILY PRN
Qty: 20 TABLET | Refills: 0 | Status: SHIPPED | OUTPATIENT
Start: 2024-12-02 | End: 2024-12-12

## 2024-12-02 RX ORDER — OXYCODONE HYDROCHLORIDE 10 MG/1
10 TABLET ORAL EVERY 6 HOURS PRN
Qty: 28 TABLET | Refills: 0 | Status: SHIPPED | OUTPATIENT
Start: 2024-12-02 | End: 2024-12-09

## 2024-12-02 RX ORDER — OXYCODONE HYDROCHLORIDE 10 MG/1
10 TABLET ORAL EVERY 4 HOURS PRN
Qty: 30 TABLET | Refills: 0 | Status: SHIPPED | OUTPATIENT
Start: 2024-12-02 | End: 2024-12-02

## 2024-12-02 RX ORDER — LORAZEPAM 0.5 MG/1
0.5 TABLET ORAL EVERY 12 HOURS
Qty: 20 TABLET | Refills: 0 | Status: SHIPPED | OUTPATIENT
Start: 2024-12-02 | End: 2024-12-12

## 2024-12-02 RX ORDER — GABAPENTIN 300 MG/1
300 CAPSULE ORAL 2 TIMES DAILY
Qty: 20 CAPSULE | Refills: 0 | Status: SHIPPED | OUTPATIENT
Start: 2024-12-02 | End: 2024-12-12

## 2024-12-02 RX ORDER — SODIUM CHLORIDE 0.9 % (FLUSH) 0.9 %
5-40 SYRINGE (ML) INJECTION EVERY 12 HOURS SCHEDULED
Status: DISCONTINUED | OUTPATIENT
Start: 2024-12-02 | End: 2024-12-02 | Stop reason: HOSPADM

## 2024-12-02 RX ORDER — POLYETHYLENE GLYCOL 3350 17 G/17G
17 POWDER, FOR SOLUTION ORAL DAILY
Qty: 30 PACKET | Refills: 0
Start: 2024-12-03 | End: 2025-01-02

## 2024-12-02 RX ORDER — OXYCODONE HYDROCHLORIDE 5 MG/1
5 TABLET ORAL EVERY 4 HOURS PRN
Status: DISCONTINUED | OUTPATIENT
Start: 2024-12-02 | End: 2024-12-02 | Stop reason: HOSPADM

## 2024-12-02 RX ORDER — NICOTINE 21 MG/24HR
1 PATCH, TRANSDERMAL 24 HOURS TRANSDERMAL DAILY
Qty: 30 PATCH | Refills: 0
Start: 2024-12-03

## 2024-12-02 RX ORDER — POLYETHYLENE GLYCOL 3350 17 G/17G
17 POWDER, FOR SOLUTION ORAL DAILY
Status: DISCONTINUED | OUTPATIENT
Start: 2024-12-02 | End: 2024-12-02 | Stop reason: HOSPADM

## 2024-12-02 RX ORDER — SODIUM CHLORIDE 0.9 % (FLUSH) 0.9 %
5-40 SYRINGE (ML) INJECTION PRN
Status: DISCONTINUED | OUTPATIENT
Start: 2024-12-02 | End: 2024-12-02 | Stop reason: HOSPADM

## 2024-12-02 RX ORDER — METOPROLOL TARTRATE 50 MG
50 TABLET ORAL 2 TIMES DAILY
Qty: 60 TABLET | Refills: 3 | Status: SHIPPED | OUTPATIENT
Start: 2024-12-02 | End: 2024-12-02 | Stop reason: HOSPADM

## 2024-12-02 RX ORDER — OXYCODONE HYDROCHLORIDE 5 MG/1
10 TABLET ORAL EVERY 4 HOURS PRN
Status: DISCONTINUED | OUTPATIENT
Start: 2024-12-02 | End: 2024-12-02 | Stop reason: HOSPADM

## 2024-12-02 RX ORDER — METHOCARBAMOL 500 MG/1
500 TABLET, FILM COATED ORAL 3 TIMES DAILY PRN
Status: DISCONTINUED | OUTPATIENT
Start: 2024-12-02 | End: 2024-12-02 | Stop reason: HOSPADM

## 2024-12-02 RX ORDER — SODIUM CHLORIDE 9 MG/ML
INJECTION, SOLUTION INTRAVENOUS PRN
Status: DISCONTINUED | OUTPATIENT
Start: 2024-12-02 | End: 2024-12-02 | Stop reason: HOSPADM

## 2024-12-02 RX ORDER — METOPROLOL TARTRATE 50 MG
50 TABLET ORAL 2 TIMES DAILY
Qty: 60 TABLET | Refills: 6
Start: 2024-12-02 | End: 2025-12-02

## 2024-12-02 RX ORDER — BUDESONIDE AND FORMOTEROL FUMARATE DIHYDRATE 160; 4.5 UG/1; UG/1
2 AEROSOL RESPIRATORY (INHALATION) 2 TIMES DAILY PRN
Status: DISCONTINUED | OUTPATIENT
Start: 2024-12-02 | End: 2024-12-02 | Stop reason: HOSPADM

## 2024-12-02 RX ADMIN — Medication 100 MG: at 08:10

## 2024-12-02 RX ADMIN — LORAZEPAM 0.5 MG: 0.5 TABLET ORAL at 09:32

## 2024-12-02 RX ADMIN — APIXABAN 5 MG: 5 TABLET, FILM COATED ORAL at 08:11

## 2024-12-02 RX ADMIN — CEFAZOLIN 2000 MG: 2 INJECTION, POWDER, FOR SOLUTION INTRAVENOUS at 14:55

## 2024-12-02 RX ADMIN — METHOCARBAMOL TABLETS 500 MG: 500 TABLET, COATED ORAL at 15:55

## 2024-12-02 RX ADMIN — PANTOPRAZOLE SODIUM 40 MG: 40 TABLET, DELAYED RELEASE ORAL at 05:30

## 2024-12-02 RX ADMIN — SODIUM CHLORIDE: 9 INJECTION, SOLUTION INTRAVENOUS at 05:35

## 2024-12-02 RX ADMIN — ACETAMINOPHEN 1000 MG: 500 TABLET ORAL at 05:30

## 2024-12-02 RX ADMIN — CLOPIDOGREL BISULFATE 75 MG: 75 TABLET ORAL at 08:11

## 2024-12-02 RX ADMIN — ACETAMINOPHEN 1000 MG: 500 TABLET ORAL at 13:17

## 2024-12-02 RX ADMIN — CEFAZOLIN 2000 MG: 2 INJECTION, POWDER, FOR SOLUTION INTRAVENOUS at 05:36

## 2024-12-02 RX ADMIN — OXYCODONE 10 MG: 5 TABLET ORAL at 01:09

## 2024-12-02 RX ADMIN — METHOCARBAMOL TABLETS 500 MG: 500 TABLET, COATED ORAL at 12:07

## 2024-12-02 RX ADMIN — ATORVASTATIN CALCIUM 40 MG: 40 TABLET, FILM COATED ORAL at 08:11

## 2024-12-02 RX ADMIN — OXYCODONE 10 MG: 5 TABLET ORAL at 05:30

## 2024-12-02 RX ADMIN — Medication 2 PUFF: at 07:08

## 2024-12-02 RX ADMIN — OXYCODONE 10 MG: 5 TABLET ORAL at 09:32

## 2024-12-02 RX ADMIN — GABAPENTIN 300 MG: 300 CAPSULE ORAL at 08:11

## 2024-12-02 RX ADMIN — POLYETHYLENE GLYCOL 3350 17 G: 17 POWDER, FOR SOLUTION ORAL at 10:38

## 2024-12-02 RX ADMIN — OXYCODONE 10 MG: 5 TABLET ORAL at 13:18

## 2024-12-02 RX ADMIN — METOPROLOL TARTRATE 50 MG: 50 TABLET, FILM COATED ORAL at 08:10

## 2024-12-02 ASSESSMENT — PAIN DESCRIPTION - ORIENTATION
ORIENTATION: LEFT

## 2024-12-02 ASSESSMENT — PAIN SCALES - GENERAL
PAINLEVEL_OUTOF10: 7
PAINLEVEL_OUTOF10: 6
PAINLEVEL_OUTOF10: 7
PAINLEVEL_OUTOF10: 6
PAINLEVEL_OUTOF10: 7

## 2024-12-02 ASSESSMENT — PAIN - FUNCTIONAL ASSESSMENT
PAIN_FUNCTIONAL_ASSESSMENT: INTOLERABLE, UNABLE TO DO ANY ACTIVE OR PASSIVE ACTIVITIES
PAIN_FUNCTIONAL_ASSESSMENT: INTOLERABLE, UNABLE TO DO ANY ACTIVE OR PASSIVE ACTIVITIES

## 2024-12-02 ASSESSMENT — PAIN DESCRIPTION - DESCRIPTORS
DESCRIPTORS: ACHING
DESCRIPTORS: ACHING

## 2024-12-02 ASSESSMENT — PAIN DESCRIPTION - LOCATION
LOCATION: HIP;LEG
LOCATION: HIP
LOCATION: HIP;LEG

## 2024-12-02 NOTE — PROGRESS NOTES
Infectious Disease Follow up Notes    CC :  MSSA bacteremia and infection L hip      Antibiotics:  Cefazolin 2g q8     Admit Date:   11/25/2024  Hospital Day: 8    Subjective:   He remains AF on RA   Pain in L hip is controlled, \"sore.\"  Low, central back is also \"sore,\" no change.  No focal weakness noted.  He is eager for discharge.    No other focal complaints voiced     Objective:     Patient Vitals for the past 8 hrs:   BP Temp Temp src Pulse Resp SpO2   12/02/24 0932 -- -- -- -- 16 --   12/02/24 0920 108/70 -- -- -- -- 97 %   12/02/24 0800 132/72 97.2 °F (36.2 °C) Oral 90 16 97 %   12/02/24 0715 129/78 97.8 °F (36.6 °C) Axillary 80 16 97 %   12/02/24 0712 -- -- -- -- -- 97 %   12/02/24 0315 127/75 97.6 °F (36.4 °C) Oral 79 18 97 %       EXAM:  General:  alert, conversant, NAD   Resting tremor     HEENT:  NCAT, PERRL, sclera anicteric    NECK:   Supple   LUNGS:   non-labored breathing    CV:  RRR  ABD: Soft, flat, NT     EXT:  no focal rash  Dressing L hip w small drainage evident      LINE:    PIV in place        Scheduled Meds:   polyethylene glycol  17 g Oral Daily    apixaban  5 mg Oral BID    clopidogrel  75 mg Oral Daily    acetaminophen  1,000 mg Oral 3 times per day    Or    acetaminophen  650 mg Rectal 3 times per day    gabapentin  300 mg Oral BID    albuterol sulfate HFA  2 puff Inhalation BID RT    sodium chloride flush  5-40 mL IntraVENous 2 times per day    cariprazine hcl  3 mg Oral Daily    metoprolol tartrate  50 mg Oral BID    wound/burn dressing   Topical Daily    atorvastatin  40 mg Oral Daily    cetirizine  10 mg Oral Daily    LORazepam  0.5 mg Oral Q12H    pantoprazole  40 mg Oral QAM AC    ranolazine  1,000 mg Oral BID    ceFAZolin  2,000 mg IntraVENous Q8H    thiamine  100 mg Oral Daily    sodium chloride flush  5-40 mL IntraVENous 2 times per day    nicotine  1 patch TransDERmal Daily       Continuous 
    [facility]opedic Surgery  Progress Note              CHIEF COMPLAINT:  Left hip pain.    History Obtained From:  patient, domestic partner, electronic medical record    HISTORY OF PRESENT ILLNESS:    Kam Nguyen 59 y.o. male seen today at [facility] for f/u evaluation of possible left septic hip. MRI still pending. He has been c/o left hip pain for over 2 months with no h/o trauma. He was admitted to [facility] on 11/19/2024 for Chest pain. He has with a PMH of hypertension, hyperlipidemia, CAD s/p PCI, chronic pain syndrome, COPD, depression, GERD with recent finding of candidal esophagitis on EGD 10/2024, and tobacco user who present to [facility] as a direct admit from [facility] on 11/25/2024 after being admitted there since 11/19. He had initially presented to their ED on 11/19 with complaint of chest pain. His work-up in the ED showed no troponin or EKG changes, but initial metabolic panel showed a sodium of 115. He was admitted for hyponatremia and further chest pain work-up. In brief, he had many new findings between 11/19 and 11/25. He developed new onset AF with RVR on 11/20 requiring diltiazem and restarting home metoprolol. Cardiology was consulted, recommended echocardiogram, which showed pEF at 60-65% with hyperdynamic LV. He also has had worsening tremors with no prior evaluation. Neurology was consulted, EEG showed excessive beta wave activity without seizures, inclined to believe it's from prior alcohol abuse and hyponatremia. He required a dose of tolvaptan on 11/21 for hyponatremia, has responded well along with fluid restriction. He continued to complain of LLQ pain with radiation into left flank along with left leg pain and weakness. CT and MRI of L-spine showed 2.4 cm abscess and was also found to be bacteremic with MSSA. He has been on cefazolin. He was ultimately transferred here for surgical evaluation.     Past Medical History:        Diagnosis Date    Affective bipolar 
    [email or web address]  Nephrology Follow up Note           Reason for Consult: Hyponatremia  Requesting Physician:  Dr. Restrepo    Sub/interval history  Moved to [place] for additional work up as to the source of the MSSA bacteremia    Resting    ROS: No chest pain/shortness of breath/fever/nausea/vomiting  PSFH: + visitor    Scheduled Meds:   [START ON 12/2/2024] apixaban  5 mg Oral BID    [START ON 12/2/2024] clopidogrel  75 mg Oral Daily    acetaminophen  1,000 mg Oral 3 times per day    Or    acetaminophen  650 mg Rectal 3 times per day    gabapentin  300 mg Oral BID    albuterol sulfate HFA  2 puff Inhalation BID RT    sodium chloride flush  5-40 mL IntraVENous 2 times per day    cariprazine hcl  3 mg Oral Daily    metoprolol tartrate  50 mg Oral BID    wound/burn dressing   Topical Daily    atorvastatin  40 mg Oral Daily    budesonide-formoterol  2 puff Inhalation BID    cetirizine  10 mg Oral Daily    LORazepam  0.5 mg Oral Q12H    pantoprazole  40 mg Oral QAM AC    ranolazine  1,000 mg Oral BID    ceFAZolin  2,000 mg IntraVENous Q8H    thiamine  100 mg Oral Daily    sodium chloride flush  5-40 mL IntraVENous 2 times per day    nicotine  1 patch TransDERmal Daily       Continuous Infusions:   sodium chloride      sodium chloride 20 mL/hr at 11/30/24 0543       PRN Meds:.oxyCODONE, sodium chloride flush, sodium chloride, ipratropium 0.5 mg-albuterol 2.5 mg, sodium chloride flush, sodium chloride, polyethylene glycol, prochlorperazine      History of Present Illness on 11/19/2024:    59 y.o. yo male with PMH of bipolar disorder, COPD, GERD, hypertension, hyperlipidemia, alcohol abuse who is admitted for chest pain and hyponatremia  Patient presented to the emergency room with complaints of chest pain and shortness of breath and was found to have sodium of 115 when nephrology was consulted.  Patient recently had oral thrush on his mouth and has not been eating much.  He is drinking about 160 ounces of water every 
    [facility]  Nephrology Follow up Note           Reason for Consult: Hyponatremia  Requesting Physician:  Dr. Restrepo    Sub/interval history  Moved to [place] for additional work up as to the source of the MSSA bacteremia    Resting  MRI of the hip done     ROS: No chest pain/shortness of breath/fever/nausea/vomiting  PSFH: + visitor    Scheduled Meds:   albuterol sulfate HFA  2 puff Inhalation BID RT    sodium chloride flush  5-40 mL IntraVENous 2 times per day    cariprazine hcl  3 mg Oral Daily    metoprolol tartrate  50 mg Oral BID    wound/burn dressing   Topical Daily    gabapentin  250 mg Oral 2 times per day    atorvastatin  40 mg Oral Daily    budesonide-formoterol  2 puff Inhalation BID    cetirizine  10 mg Oral Daily    LORazepam  0.5 mg Oral Q12H    pantoprazole  40 mg Oral QAM AC    ranolazine  1,000 mg Oral BID    ceFAZolin  2,000 mg IntraVENous Q8H    thiamine  100 mg Oral Daily    sodium chloride flush  5-40 mL IntraVENous 2 times per day    nicotine  1 patch TransDERmal Daily       Continuous Infusions:   sodium chloride      sodium chloride 20 mL/hr at 11/29/24 0532       PRN Meds:.sodium chloride flush, sodium chloride, ipratropium 0.5 mg-albuterol 2.5 mg, sodium chloride flush, sodium chloride, polyethylene glycol, acetaminophen **OR** acetaminophen, prochlorperazine, oxyCODONE-acetaminophen      History of Present Illness on 11/19/2024:    59 y.o. yo male with PMH of bipolar disorder, COPD, GERD, hypertension, hyperlipidemia, alcohol abuse who is admitted for chest pain and hyponatremia  Patient presented to the emergency room with complaints of chest pain and shortness of breath and was found to have sodium of 115 when nephrology was consulted.  Patient recently had oral thrush on his mouth and has not been eating much.  He is drinking about 160 ounces of water every day  He reports that he has stopped drinking alcohol for last 5 to 6 months.    Physical exam:   Constitutional:  VITALS:  BP 
    [email or web address]  Nephrology Follow up Note           Reason for Consult: Hyponatremia  Requesting Physician:  Dr. Restrepo    Sub/interval history  Moved to [place] for additional work up as to the source of the MSSA bacteremia    Resting  Surgery planned for tomorrow    ROS: No chest pain/shortness of breath/fever/nausea/vomiting  PSFH: No visitor    Scheduled Meds:   acetaminophen  1,000 mg Oral 3 times per day    Or    acetaminophen  650 mg Rectal 3 times per day    gabapentin  300 mg Oral BID    potassium chloride  40 mEq Oral Once    albuterol sulfate HFA  2 puff Inhalation BID RT    sodium chloride flush  5-40 mL IntraVENous 2 times per day    cariprazine hcl  3 mg Oral Daily    metoprolol tartrate  50 mg Oral BID    wound/burn dressing   Topical Daily    atorvastatin  40 mg Oral Daily    budesonide-formoterol  2 puff Inhalation BID    cetirizine  10 mg Oral Daily    LORazepam  0.5 mg Oral Q12H    pantoprazole  40 mg Oral QAM AC    ranolazine  1,000 mg Oral BID    ceFAZolin  2,000 mg IntraVENous Q8H    thiamine  100 mg Oral Daily    sodium chloride flush  5-40 mL IntraVENous 2 times per day    nicotine  1 patch TransDERmal Daily       Continuous Infusions:   sodium chloride      sodium chloride 20 mL/hr at 11/30/24 0543       PRN Meds:.oxyCODONE, sodium chloride flush, sodium chloride, ipratropium 0.5 mg-albuterol 2.5 mg, sodium chloride flush, sodium chloride, polyethylene glycol, prochlorperazine      History of Present Illness on 11/19/2024:    59 y.o. yo male with PMH of bipolar disorder, COPD, GERD, hypertension, hyperlipidemia, alcohol abuse who is admitted for chest pain and hyponatremia  Patient presented to the emergency room with complaints of chest pain and shortness of breath and was found to have sodium of 115 when nephrology was consulted.  Patient recently had oral thrush on his mouth and has not been eating much.  He is drinking about 160 ounces of water every day  He reports that he has stopped 
    [facility]    PATIENT NAME: Kam Nguyen     TODAY'S DATE: 11/27/2024    CHIEF COMPLAINT: left hip pain    INTERVAL HISTORY/HPI:    Pt with left hip and leg pain, no nausea or emeiss, no fevers.     REVIEW OF SYSTEMS:  Pertinent positives and negatives as per interval history section    OBJECTIVE:  VITALS:  /71   Pulse 89   Temp 97.8 °F (36.6 °C)   Resp 16   Ht 1.829 m (6' 0.01\")   Wt 76.3 kg (168 lb 4.8 oz)   SpO2 98%   BMI 22.82 kg/m²     INTAKE/OUTPUT:    I/O last 3 completed shifts:  In: 480 [P.O.:480]  Out: 2075 [Urine:2075]  I/O this shift:  In: -   Out: 3000 [Urine:3000]    CONSTITUTIONAL:  awake and alert  LUNGS:  Respirations easy and unlabored, no crackles or wheezing  CARD:  regular rate and rhythm  ABDOMEN:  normal bowel sounds, soft, non-distended, non-tender     Data:  CBC:   Recent Labs     11/25/24 0455 11/25/24 2140 11/26/24  0546   WBC 8.7 8.7 8.0   HGB 9.4* 9.6* 9.6*   HCT 27.8* 28.8* 29.2*    280 261     BMP:    Recent Labs     11/25/24 0455 11/25/24 2140 11/26/24  0546   * 129* 129*   K 3.3* 4.2 3.9   CL 95* 94* 95*   CO2 24 26 26   BUN 3* 4* 4*   CREATININE 0.3* 0.3* 0.3*   GLUCOSE 95 96 93     Hepatic:   Recent Labs     11/25/24 2140   AST 11*   ALT 6*   BILITOT 0.3   ALKPHOS 69     Mag:      Recent Labs     11/25/24 2140   MG 1.74*      Phos:     Recent Labs     11/25/24 0455 11/25/24 2140   PHOS 3.0 3.1      INR: No results for input(s): \"INR\" in the last 72 hours.    Radiology Review:  *Imaging personally reviewed by me.   CT - hip and joint abscess       ASSESSMENT AND PLAN:  60 yo with left pelvic and hip joint abscess  Given iliacus extension to hip joint infection will defer to ortho on further management.  Will sign off at this time.     Electronically signed by Pete Bradford MD     [id number]  
  Arrived to place PICC line with bedside RN. Pre-procedure and timeout done with RN, discussed limitations of placement and allergies.      Pt's basilic, brachial, cephalic are all easily collapsible with no indication for a clot. Vein selected is large enough for catheter. Pt tolerated sterile procedure well, with no difficulty accessing basilic vein, when accessed - blood was free flowing and non-pulsatile. Guidewire, introducer, and catheter went in smoothly.   PICC line verified with 3CG technology with peaked P-waves (please see image below). PICC tip terminates in the SVC according to [employer] 3CG tip confirmation system. PICC was seen dropping into SVC with tip tracking technology and discernable peaked p waves were noted without negative deflection.   OK to use PICC.   Please use new IV tubing when connecting to the newly placed central line.      Post procedure - reorganized pt table, placed pt in lowest position, with call light and educated on line care. Reported off to bedside RN.      Please call if you have any questions about the PICC or ML. The  will direct you to the PICC RN that is on call.      [phone]          
  Hospital Medicine Progress Note      Subjective:  He is frustrated with being NPO.  CT scan was ordered this AM.  Ongoing weakness and tiredness.       General appearance: No apparent distress  HEENT: Pupils equal, round.  Conjunctivae/corneas clear.  Neck: No jugular venous distention.   Respiratory:  Normal respiratory effort.  Bilaterally without Rales/Wheezes/Rhonchi.  Cardiovascular: Normal rate and regular rhythm with normal S1/S2 without murmurs, rubs or gallops.  Abdomen: Soft, non-tender, non-distended with normal bowel sounds.  Musculoskeletal: No edema.  Without deformity.  Skin: No jaundice.  No rashes or lesions.  Neurologic:  Neurovascularly intact without any focal sensory/motor deficits. Cranial nerves: II-XII intact.  Psychiatric: Alert and oriented, has insight.  Impulsive, frustrated.       Presenting Admission History:  The patient is a pleasant 59 Y M with a h/o COPD, ongoing smoking of both cigarettes and marijuana, former alcoholism (quit 6 months ago) with pancreatitis and fatty liver disease, HTN, HLD, CAD s/p stents, former obesity with unintentional 80 lb weight loss attributed to dysphagia, depression, anxiety, and bipolar disorder.  He has not been often admitted until recently.    He presented to [facility] on 10/19 with dyspnea, was treated for AECOPD and hyponatremia.  Regarding his dysphagia, he refused EGD and esophagram and was anxious for discharge so they sent him home on 10/21.  It looks like he eventually had an outpatient EGD on 10/31 which showed candidal esophagitis and he was treated with fluconazole.  Then he presented to [facility] on [place]/4 with suicidal ideation after \"running out of his lorazepam.\"  Psychiatry restarted his lorazepam, determined that he didn't need inpatient psychiatric care, and he was discharged home on 11/6 after his hyponatremia improved.  Then on 11/19 he returned to [facility] with atypical chest pain.  Mild ischemia could not be ruled out on a nuclear stress test, 
  Hospital Medicine Progress Note      Subjective:  He is scared to have the MRI.  Wants to put it off until some other day.  Reluctantly agreeable to try with lorazepam.  I'm not sure MRI will happen today anyway.  I'm not sure he will be able to stop tremoring during MRI either.      General appearance: No apparent distress  HEENT: Pupils equal, round.  Conjunctivae/corneas clear.  Neck: No jugular venous distention.   Respiratory:  Normal respiratory effort.  Bilaterally without Rales/Wheezes/Rhonchi.  Cardiovascular: Normal rate and regular rhythm with normal S1/S2 without murmurs, rubs or gallops.  Abdomen: Soft, non-tender, non-distended with normal bowel sounds.  Musculoskeletal: No edema.  Without deformity.  Skin: No jaundice.  No rashes or lesions.  Neurologic:  Neurovascularly intact without any focal sensory/motor deficits. Cranial nerves: II-XII intact.  Tremor at baseline.   Psychiatric: Alert and oriented, has insight.  Anxious, impulsive, frustrated.       Presenting Admission History:  The patient is a pleasant 59 Y M with a h/o COPD, ongoing smoking of both cigarettes and marijuana, former alcoholism (quit 6 months ago) with pancreatitis and fatty liver disease, HTN, HLD, CAD s/p stents, former obesity with unintentional 80 lb weight loss attributed to dysphagia.  He also carries diagnoses of depression, anxiety, and bipolar disorder.  He says that as a teenager he heard voices other people couldn't hear and he has been on some form of antipsychotic ever since, but he claims to no longer have any psychoses.  He has not been often admitted until recently.    He presented to [facility] on 10/19 with dyspnea, was treated for AECOPD and hyponatremia.  Regarding his dysphagia, he refused EGD and esophagram and was anxious for discharge so they sent him home on 10/21.  It looks like he eventually had an outpatient EGD on 10/31 which showed candidal esophagitis and he was treated with fluconazole.  Then he 
  Hospital Medicine Progress Note      Subjective:  He tearful when we discuss that his hip seems infected.  Girlfriend updated.        General appearance: No apparent distress  HEENT: Pupils equal, round.  Conjunctivae/corneas clear.  Neck: No jugular venous distention.   Respiratory:  Normal respiratory effort.  Bilaterally without Rales/Wheezes/Rhonchi.  Cardiovascular: Normal rate and regular rhythm with normal S1/S2 without murmurs, rubs or gallops.  Abdomen: Soft, non-tender, non-distended with normal bowel sounds.  Musculoskeletal: No edema.  Without deformity.  Skin: No jaundice.  No rashes or lesions.  Neurologic:  Neurovascularly intact without any focal sensory/motor deficits. Cranial nerves: II-XII intact.  Psychiatric: Alert and oriented, has insight.  Anxious, impulsive, frustrated.       Presenting Admission History:  The patient is a pleasant 59 Y M with a h/o COPD, ongoing smoking of both cigarettes and marijuana, former alcoholism (quit 6 months ago) with pancreatitis and fatty liver disease, HTN, HLD, CAD s/p stents, former obesity with unintentional 80 lb weight loss attributed to dysphagia.  He also carries diagnoses of depression, anxiety, and bipolar disorder.  He says that as a teenager he heard voices other people couldn't hear and he has been on some form of antipsychotic ever since, but he claims to no longer have any psychoses.  He has not been often admitted until recently.    He presented to [facility] on 10/19 with dyspnea, was treated for AECOPD and hyponatremia.  Regarding his dysphagia, he refused EGD and esophagram and was anxious for discharge so they sent him home on 10/21.  It looks like he eventually had an outpatient EGD on 10/31 which showed candidal esophagitis and he was treated with fluconazole.  Then he presented to [facility] on 11/4 with suicidal ideation after \"running out of his lorazepam.\"  Psychiatry restarted his lorazepam, determined that he didn't need inpatient psychiatric 
  Hospital Medicine Progress Note      Subjective:  Updated him on MRI results.  He has questions about whether/when he'll need surgery.      General appearance: No apparent distress  HEENT: Pupils equal, round.  Conjunctivae/corneas clear.  Neck: No jugular venous distention.   Respiratory:  Normal respiratory effort.  Bilaterally without Rales/Wheezes/Rhonchi.  Cardiovascular: Normal rate and regular rhythm with normal S1/S2 without murmurs, rubs or gallops.  Abdomen: Soft, non-tender, non-distended with normal bowel sounds.  Musculoskeletal: No edema.  L hip pain with any movement.  Skin: No jaundice.  No rashes or lesions.  Neurologic:  Neurovascularly intact without any focal sensory/motor deficits. Cranial nerves: II-XII intact.  Tremor at baseline.   Psychiatric: Alert and oriented, has insight.  Anxious, impulsive, frustrated.       Presenting Admission History:  The patient is a pleasant 59 Y M with a h/o COPD, ongoing smoking of both cigarettes and marijuana, former alcoholism (quit 6 months ago) with pancreatitis and fatty liver disease, HTN, HLD, CAD s/p stents, former obesity with unintentional 80 lb weight loss attributed to dysphagia.  He also carries diagnoses of depression, anxiety, and bipolar disorder.  He says that as a teenager he heard voices other people couldn't hear and he has been on some form of antipsychotic ever since, but he claims to no longer have any psychoses.  He has not been often admitted until recently.    He presented to [facility] on 10/19 with dyspnea, was treated for AECOPD and hyponatremia.  Regarding his dysphagia, he refused EGD and esophagram and was anxious for discharge so they sent him home on 10/21.  It looks like he eventually had an outpatient EGD on 10/31 which showed candidal esophagitis and he was treated with fluconazole.  Then he presented to [facility] on 11/4 with suicidal ideation after \"running out of his lorazepam.\"  Psychiatry restarted his lorazepam, determined that 
  Physician Progress Note      PATIENT:               ALEX GABRIEL  CSN #:                  [id number]  :                       1965  ADMIT DATE:       2024 9:16 PM  DISCH DATE:  RESPONDING  PROVIDER #:        Pete Kim MD          QUERY TEXT:    Dear Dr. Kim,    Pt admitted with Unintentional weight loss and has Severe malnutrition   documented in Dietitian consult note dated  and manifested by, energy   intake less than needed, Weight loss, body fat loss. Please further specify   type of malnutrition with documentation in the medical record.    The medical record reflects the following:  Risk Factors: iliacus muscle abscess, stage 3 pressure injury to buttocks,   ETOH abuse  Clinical Indicators: energy intake 50% or less then needed, greater than 5%   weight loss over 1 month, mild body fat loss orbitals  Treatment: Dietitian consult, Ensure BID once diet advances, monitor   nutritional adequacy, pertinent labs, bowel habits, wt changes and clinical   progress    ASPEN Criteria:    https://aspenjournals.onlinelibrary.[place].com/doi/full/10.1177/[email or web address]  5    Thank you,  Brenna Ferrara RN BSN  Clinical   [email or web address]  Options provided:  -- Severe Malnutrition  -- Severe Protein calorie malnutrition  -- Other - I will add my own diagnosis  -- Disagree - Not applicable / Not valid  -- Disagree - Clinically unable to determine / Unknown  -- Refer to Clinical Documentation Reviewer    PROVIDER RESPONSE TEXT:    This patient has severe malnutrition.    Query created by: Brenna Ferrara on 2024 4:38 PM      Electronically signed by:  Pete Kim MD 2024 7:27 AM          
4 Eyes Skin Assessment     NAME:  Kam Nguyen  YOB: 1965  MEDICAL RECORD NUMBER:  [id number]    The patient is being assessed for  Other reassessment    I agree that at least one RN has performed a thorough Head to Toe Skin Assessment on the patient. ALL assessment sites listed below have been assessed.      Areas assessed by both nurses:    Head, Face, Ears, Shoulders, Back, Chest, Arms, Elbows, Hands, Sacrum. Buttock, Coccyx, Ischium, Legs. Feet and Heels, and Under Medical Devices         Does the Patient have a Wound? Yes wound(s) were present on assessment. LDA wound assessment was Initiated and completed by RN       Lux Prevention initiated by RN: Yes  Wound Care Orders initiated by RN: Yes    Pressure Injury (Stage 3,4, Unstageable, DTI, NWPT, and Complex wounds) if present, place Wound referral order by RN under : No    New Ostomies, if present place, Ostomy referral order under : No     Nurse 1 eSignature: Electronically signed by Mere Holloway RN on 11/29/24 at 10:31 AM EST    **SHARE this note so that the co-signing nurse can place an eSignature**    Nurse 2 eSignature: Electronically signed by Daly Burton RN on 11/29/24 at 6:09 PM EST    
4 Eyes Skin Assessment     NAME:  Kam Nguyen  YOB: 1965  MEDICAL RECORD NUMBER:  [id number]    The patient is being assessed for  Other shift    I agree that at least one RN has performed a thorough Head to Toe Skin Assessment on the patient. ALL assessment sites listed below have been assessed.      Areas assessed by both nurses:    Head, Face, Ears, Shoulders, Back, Chest, Arms, Elbows, Hands, Sacrum. Buttock, Coccyx, Ischium, Legs. Feet and Heels, and Under Medical Devices         Does the Patient have a Wound? Yes wound(s) were present on assessment. LDA wound assessment was Initiated and completed by RN       Lux Prevention initiated by RN: Yes  Wound Care Orders initiated by RN: Yes    Pressure Injury (Stage 3,4, Unstageable, DTI, NWPT, and Complex wounds) if present, place Wound referral order by RN under : No    New Ostomies, if present place, Ostomy referral order under : No     Nurse 1 eSignature: Electronically signed by Victoria Oneill RN on 11/27/24 at 1:16 PM EST    **SHARE this note so that the co-signing nurse can place an eSignature**    Nurse 2 eSignature: Electronically signed by Marivel Johansen RN on 11/27/24 at 1:59 PM EST   
4 Eyes Skin Assessment     NAME:  Kam Nguyen  YOB: 1965  MEDICAL RECORD NUMBER:  [id number]    The patient is being assessed for  Admission    I agree that at least one RN has performed a thorough Head to Toe Skin Assessment on the patient. ALL assessment sites listed below have been assessed.      Areas assessed by both nurses:    Head, Face, Ears, Shoulders, Back, Chest, Arms, Elbows, Hands, Sacrum. Buttock, Coccyx, Ischium, Legs. Feet and Heels, and Under Medical Devices         Does the Patient have a Wound? No noted wound(s)       Lux Prevention initiated by RN: Yes  Wound Care Orders initiated by RN: Yes    Pressure Injury (Stage 3,4, Unstageable, DTI, NWPT, and Complex wounds) if present, place Wound referral order by RN under : Yes    New Ostomies, if present place, Ostomy referral order under : No     Nurse 1 eSignature: Electronically signed by Candelaria Huffman RN on 11/26/24 at 6:26 AM EST    **SHARE this note so that the co-signing nurse can place an eSignature**    Nurse 2 eSignature: Electronically signed by Victoria Oneill RN on 11/27/24 at 1:17 PM EST   
450mL of surgiphor used to irrigate surgical site  
Called cardiology for surgical clearance. Left a message.   
Cath lab nurse notified that the patient had a small amount of applesauce and water with meds today at 0944 and has been strict NPO since.   
Comprehensive Nutrition Assessment    Type and Reason for Visit:  Initial, Wound    Nutrition Recommendations/Plan:   Advance diet when medically feasible.   Order Ensure BID once diet advances (prefers chocolate).   Monitor nutrition adequacy, pertinent labs, bowel habits, wt changes, and clinical progress      Malnutrition Assessment:  Malnutrition Status:  Severe malnutrition (11/26/24 1257)    Context:  Acute Illness     Findings of the 6 clinical characteristics of malnutrition:  Energy Intake:  50% or less of estimated energy requirements for 5 or more days  Weight Loss:  Greater than 5% over 1 month     Body Fat Loss:  Mild body fat loss Orbital   Muscle Mass Loss:  Unable to assess    Fluid Accumulation:  No fluid accumulation     Strength:  Not Performed    Nutrition Assessment:    Pt visited for initial + wound (stage III pressure injury to buttocks). He is a direct admit from [facility] ([place]) where he initially presented w/ chest pain but was found to have a 2.4 cm abscess of the iliacus muscle and MSSA bacteremia. Pt transferred here for surgical evaluation. PMH of CAD s/p PCI, COPD, GERD with recent finding of candidal esophagitis on EGD 10/2024, fatty liver, pancreatitis, HTN, and HLD. Pt currently NPO d/t surgery eval and plan. Pt reports that his appetite is \"normal\" but that he was eating 1 meal/day d/t his oral thrush. Per EMR, PO intake was variable (1-100% but most frequently 1-25%). Wts difficult to assess d/t variable collection methods and unlikely fluctuations (12 lb loss in 1 day) but appear to be trending down. Reports losing 80 lbs in 2 months (UBW unknown). Denies N/V/D/C. Agreeable to ONS BID. Will continue to monitor.    Nutrition Related Findings:    Last BM 11/25 per pt report. Active BS. Sodium: 129 mmol/L. BUN: 4 mg/dL and creatinine: 0.3 mg/dL. Wound Type: Pressure Injury, Stage III (buttocks)       Current Nutrition Intake & Therapies:    Average Meal Intake: 1-25%, 
Occupational Therapy    Pt off floor at MRI at time of OT arrival. Will follow up later this date once returning to unit, schedule permitting. Thank you.     Sarthak Rand OTR/L   
Occupational Therapy  Facility/Department: [place] [id number] - MED SURG/ORTHO  Occupational Therapy Initial Assessment and Treatment    Name: Kam Nguyen  : 1965  MRN: [id number]  Date of Service: 2024    Discharge Recommendations:  Subacute/Skilled Nursing Facility  OT Equipment Recommendations  Equipment Needed: No  Other: Defer     Therapy discharge recommendations are subject to collaboration from the patient’s interdisciplinary healthcare team, including MD and case management recommendations.    Barriers to Home Discharge:   [x] Steps to access home entry or bed/bath:   [x] Unable to transfer, ambulate, or propel wheelchair household distances without assist   [x] Limited available assist at home upon discharge    [x] Patient or family requests d/c to post-acute facility    [] Poor cognition/safety awareness for d/c to home alone    [] Unable to maintain ordered weight bearing status    [] Patient with salient signs of long-standing immobility   [x] Decreased independence with ADLs   [x] Increased risk for falls   [] Other:    If pt is unable to be seen after this session, please let this note serve as discharge summary.  Please see case management note for discharge disposition.  Thank you.    Patient Diagnosis(es): The primary encounter diagnosis was Bacteremia. A diagnosis of Endocarditis was also pertinent to this visit.  Past Medical History:  has a past medical history of Affective bipolar disorder (HCC), Anxiety, Bipolar affective (HCC), CAD (coronary artery disease), Chronic back pain, Class 2 obesity due to excess calories with body mass index (BMI) of 39.0 to 39.9 in adult, COPD (chronic obstructive pulmonary disease) (HCC), DDD (degenerative disc disease), cervical, Depression, Fatty liver, GERD (gastroesophageal reflux disease), Hyperlipidemia, Hypertension, MI (myocardial infarction) (HCC), Pancreatitis, Recovering alcoholic (HCC), and Tobacco abuse.  Past Surgical History:  has a past 
Occupational Therapy  Pt off floor for surgery, will require new orders when appropriate post op. Thank you,  IAN Guadalupe/GERARDO  
Patient has returned to the floor  
Patient leaving floor for cath lab  
Patients heart rate jumped up to 200 after finding out his best friend is dying. Patient was asymptomatic. Strip in chart. Vitals obtained and currently stable with heart rate at 80. Dr. Kim notified via pefect serve.         Addendum to RN note: this was artifact.  Electronically signed by JOSEPH KIM MD on 11/27/2024 at 2:29 PM    
Physical Therapy  Facility/Department: [place] [address] - MED SURG/ORTHO  Physical Therapy Re-evaluation/treatment    Name: Kam Nguyen  : 1965  MRN: [id number]  Date of Service: 2024    Discharge Recommendations:  Subacute/Skilled Nursing Facility     Therapy discharge recommendations take into account each patient's current medical complexities and are subject to input/oversight from the patient's healthcare team.   Barriers to Home Discharge:   [x] Steps to access home entry or bed/bath:   [x] Unable to transfer, ambulate, or propel wheelchair household distances without assist   [x] Unable to perform ADLs without assist   [x] Limited available assist at home upon discharge    [x] Patient or family requests d/c to post-acute facility    [] Poor cognition/safety awareness for d/c to home alone    [] Unable to maintain ordered weight bearing status    [] Patient with salient signs of long-standing immobility   [x] Pt at increased risk for falls  If pt is unable to be seen after this session, please let this note serve as discharge summary.  Please see case management note for discharge disposition.  Thank you.    Patient Diagnosis(es): The primary encounter diagnosis was Bacteremia. Diagnoses of Endocarditis, Hip osteomyelitis, left, Staphylococcal arthritis of left hip, and MSSA bacteremia were also pertinent to this visit.  Past Medical History:  has a past medical history of Affective bipolar disorder (HCC), Anxiety, Bipolar affective (HCC), CAD (coronary artery disease), Chronic back pain, Class 2 obesity due to excess calories with body mass index (BMI) of 39.0 to 39.9 in adult, COPD (chronic obstructive pulmonary disease) (HCC), DDD (degenerative disc disease), cervical, Depression, Fatty liver, GERD (gastroesophageal reflux disease), Hyperlipidemia, Hypertension, MI (myocardial infarction) (HCC), Pancreatitis, Recovering alcoholic (HCC), and Tobacco abuse.  Past Surgical History:  has a past surgical 
Placed cardiology consult. Spoke to Dr Garcia. Dr Garcia's partners will be in around 7:30 AM, informed Dr Haq clearance not guaranteed by 8 am surgery time.  
Pt alert and orientated. AVS faxed to facility. Paper prescriptions sent with transport. All belonging sent with pt. Informed pt and receiving nurse event monitor will be sent to pt home. All verbalized understanding.  
Pt brought to PACU. Report obtained from OR RN and anesthesia. Pt placed on monitor and O2 at  2L   
Pt laying in bed with girlfriend at bedside. VSS. Alert and oriented. Tolerates a regular diet and swallows pills crushed in applesauce. Right forearm piv intact and infusing intermittent antibiotics. On room air. Brief on due to incontinent. Mepilex and cream on buttock. Instructed to call out for needs. Assessment completed. Call light in reach, will continue to monitor.   
Report given to Saundra TUBBS   
-- 92 (!) 0 94 %   12/01/24 1120 -- -- -- 92 (!) 6 94 %   12/01/24 1119 118/83 -- -- 90 19 97 %   12/01/24 1118 -- -- -- 94 (!) 2 95 %   12/01/24 1117 -- -- -- 88 (!) 6 94 %   12/01/24 1116 -- -- -- 90 (!) 7 95 %   12/01/24 1115 -- -- -- 91 15 94 %   12/01/24 1114 109/76 -- -- 90 15 96 %   12/01/24 1113 -- 97.9 °F (36.6 °C) Temporal 90 (!) 5 95 %   12/01/24 1112 -- -- -- 89 (!) 6 94 %   12/01/24 1111 -- -- -- 90 14 96 %   12/01/24 1110 -- -- -- 89 11 93 %   12/01/24 1109 115/86 -- -- 87 (!) 0 93 %   12/01/24 1108 -- -- -- 84 (!) 0 94 %   12/01/24 1107 -- -- -- 86 (!) 0 96 %   12/01/24 1106 -- -- -- 87 (!) 0 95 %   12/01/24 1105 -- -- -- 83 (!) 0 96 %   12/01/24 1104 105/78 -- -- 84 (!) 0 96 %   12/01/24 1103 -- -- -- 81 (!) 0 98 %   12/01/24 1102 -- -- -- 83 (!) 0 99 %   12/01/24 1101 -- -- -- 82 19 99 %   12/01/24 1100 -- -- -- 80 (!) 0 100 %   12/01/24 1059 115/77 97.8 °F (36.6 °C) Temporal 80 14 100 %       General: alert, appears stated age, cooperative, and no distress   Wound: No Erythema, Positive for Edema, and Wound drainage   Motion: Painful range of Motion in affected extremity   DVT Exam: No evidence of DVT seen on physical exam.  No cords or calf tenderness.  No significant calf/ankle edema.     Additional exam: Patient seen laying in bed at time of interview  Leg lengths equal, rotational alignment neutral  Thigh swelling as expected, compartments compressible  Bloody drainage to the proximal 1/3 of mepilex dressing  EHL, FHL, gastroc, and anterior tibialis motor intact  Sensation intact to light touch  DP and PT pulses 2+      Data Review  CBC:   Lab Results   Component Value Date/Time    WBC 12.9 12/02/2024 05:53 AM    RBC 3.14 12/02/2024 05:53 AM    HGB 8.4 12/02/2024 05:53 AM    HCT 25.7 12/02/2024 05:53 AM     12/02/2024 05:53 AM       Renal:   Lab Results   Component Value Date/Time     12/02/2024 05:54 AM    K 3.7 12/02/2024 05:54 AM    K 3.5 11/28/2024 05:46 AM    CL 94 12/02/2024 
/73   Pulse 79   Temp 97.7 °F (36.5 °C) (Oral)   Resp 18   Ht 1.829 m (6' 0.01\")   Wt 76.3 kg (168 lb 4.8 oz)   SpO2 97%   BMI 22.82 kg/m²   Gen: NAD  Skin: Unremarkable  Respiratory:  Normal effort, no distress   Abdomen:  soft, nt, nd,   Extremities: no lower extremity edema      Data/  Recent Labs     11/25/24 2140 11/26/24 0546 11/28/24  0546   WBC 8.7 8.0 6.8   HGB 9.6* 9.6* 9.5*   HCT 28.8* 29.2* 28.6*   MCV 81.9 81.8 81.8    261 282     Recent Labs     11/25/24 2140 11/26/24 0546 11/28/24  0546   * 129* 131*   K 4.2 3.9 3.5  3.5   CL 94* 95* 98*   CO2 26 26 24   GLUCOSE 96 93 87   PHOS 3.1  --  3.3   MG 1.74*  --  1.83   BUN 4* 4* 4*   CREATININE 0.3* 0.3* 0.3*   LABGLOM >90 >90 >90     Echocardiogram    Left Ventricle: Hyperdynamic left ventricular systolic function with a   visually estimated EF of 65 - 70%. Left ventricle size is normal. Mildly   increased wall thickness. Findings consistent with mild concentric   hypertrophy. Normal wall motion. Normal diastolic function.     Mitral Valve: Mildly thickened, at the anterior leaflet.     Tricuspid Valve: Unable to assess RVSP due to inadequate or   insignificant tricuspid regurgitation.     Image quality is adequate.     Urinalysis unremarkable  Urine sodium less than 20  Uric acid 1.6  TSH 1.09  Urine osmolality on 11/21 was 93; urine osmolality 387 on 11/20    Assessment  -Acute on chronic hyponatremia [baseline sodium of 131-134] in the setting of hypovolemia, decreased solute intake along with more than 160 ounces of water per day. patient is also on trazodone at home   Sodium at 11 AM on 11/19/2024 was 115   Remains in the 129 - 131 range over the last few days / stable.  He was 131 back in 2021 so seems to also have some component of a reset omsostat     -Leukocytosis:   noted to have MSSA bacteremia.  Has soft tissue abscess in the left iliacus muscle.  ID and surgery following    Left hip joint destruction, orthopedic 
98.1 °F (36.7 °C) (Oral)   Resp 17   Ht 1.829 m (6' 0.01\")   Wt 76.3 kg (168 lb 4.8 oz)   SpO2 96%   BMI 22.82 kg/m²   Gen: NAD  Skin: Unremarkable  Respiratory:  Normal effort, no distress   Abdomen:  soft, nt, nd,   Extremities: no lower extremity edema      Data/  Recent Labs     11/25/24 0455 11/25/24 2140 11/26/24  0546   WBC 8.7 8.7 8.0   HGB 9.4* 9.6* 9.6*   HCT 27.8* 28.8* 29.2*   MCV 81.1 81.9 81.8    280 261     Recent Labs     11/24/24 0438 11/25/24 0455 11/25/24 2140 11/26/24  0546   * 129* 129* 129*   K 3.3* 3.3* 4.2 3.9   CL 97* 95* 94* 95*   CO2 25 24 26 26   GLUCOSE 94 95 96 93   PHOS 2.9 3.0 3.1  --    MG  --   --  1.74*  --    BUN 4* 3* 4* 4*   CREATININE 0.3* 0.3* 0.3* 0.3*   LABGLOM >90 >90 >90 >90     Echocardiogram    Left Ventricle: Hyperdynamic left ventricular systolic function with a   visually estimated EF of 65 - 70%. Left ventricle size is normal. Mildly   increased wall thickness. Findings consistent with mild concentric   hypertrophy. Normal wall motion. Normal diastolic function.     Mitral Valve: Mildly thickened, at the anterior leaflet.     Tricuspid Valve: Unable to assess RVSP due to inadequate or   insignificant tricuspid regurgitation.     Image quality is adequate.     Urinalysis unremarkable  Urine sodium less than 20  Uric acid 1.6  TSH 1.09  Urine osmolality on 11/21 was 93; urine osmolality 387 on 11/20    Assessment  -Acute on chronic hyponatremia [baseline sodium of 131-134] in the setting of hypovolemia, decreased solute intake along with more than 160 ounces of water per day. patient is also on trazodone at home   Sodium at 11 AM on 11/19/2024 was 115   Remains in the 129 - 131 range / stable.  He was 131 back in 2021 so seems to also have some component of a reset omsostat     -Leukocytosis:   noted to have MSSA bacteremia.  Has soft tissue abscess in the left iliacus muscle.  ID and surgery following     -Pituitary mass/adenoma   Noted on MRI 
Adjustment  BMI Categories: Normal Weight (BMI 18.5-24.9)    Estimated Daily Nutrient Needs:  Energy Requirements Based On: Kcal/kg  Weight Used for Energy Requirements: Current  Energy (kcal/day): 1,983-2,365 (26-31 kcal/kg CBW)  Weight Used for Protein Requirements: Current  Protein (g/day):  (1.2-1.4 g/kg CBW)  Method Used for Fluid Requirements: 1 ml/kcal  Fluid (ml/day): 1,908-2,289    Nutrition Diagnosis:   Severe malnutrition (in context of acute illness or injury) related to other (oral thrush) as evidenced by poor intake prior to admission, intake 0-25%    Nutrition Interventions:   Food and/or Nutrient Delivery: Continue Current Diet, Continue Oral Nutrition Supplement  Nutrition Education/Counseling: No recommendation at this time  Coordination of Nutrition Care: Continue to monitor while inpatient     Goals:  Goals: PO intake 50% or greater, by next RD assessment  Type of Goal: New goal  Previous Goal Met: Goal(s) Achieved    Nutrition Monitoring and Evaluation:   Behavioral-Environmental Outcomes: None Identified  Food/Nutrient Intake Outcomes: Food and Nutrient Intake, Supplement Intake  Physical Signs/Symptoms Outcomes: Weight, Biochemical Data, Meal Time Behavior    Discharge Planning:    Continue current diet     Lavonne Mcgrath RD  Contact: [phone]    
ounces of water every day  He reports that he has stopped drinking alcohol for last 5 to 6 months.    Physical exam:   Constitutional:  VITALS:  /72   Pulse 82   Temp 97.8 °F (36.6 °C) (Oral)   Resp 18   Ht 1.829 m (6' 0.01\")   Wt 76.3 kg (168 lb 4.8 oz)   SpO2 98%   BMI 22.82 kg/m²   Gen: NAD  Skin: Unremarkable  Respiratory:  Normal effort, no distress   Abdomen:  soft, nt, nd,   Extremities: no lower extremity edema      Data/  Recent Labs     11/25/24 0455 11/25/24 2140 11/26/24  0546   WBC 8.7 8.7 8.0   HGB 9.4* 9.6* 9.6*   HCT 27.8* 28.8* 29.2*   MCV 81.1 81.9 81.8    280 261     Recent Labs     11/25/24 0455 11/25/24 2140 11/26/24  0546   * 129* 129*   K 3.3* 4.2 3.9   CL 95* 94* 95*   CO2 24 26 26   GLUCOSE 95 96 93   PHOS 3.0 3.1  --    MG  --  1.74*  --    BUN 3* 4* 4*   CREATININE 0.3* 0.3* 0.3*   LABGLOM >90 >90 >90     Echocardiogram    Left Ventricle: Hyperdynamic left ventricular systolic function with a   visually estimated EF of 65 - 70%. Left ventricle size is normal. Mildly   increased wall thickness. Findings consistent with mild concentric   hypertrophy. Normal wall motion. Normal diastolic function.     Mitral Valve: Mildly thickened, at the anterior leaflet.     Tricuspid Valve: Unable to assess RVSP due to inadequate or   insignificant tricuspid regurgitation.     Image quality is adequate.     Urinalysis unremarkable  Urine sodium less than 20  Uric acid 1.6  TSH 1.09  Urine osmolality on 11/21 was 93; urine osmolality 387 on 11/20    Assessment  -Acute on chronic hyponatremia [baseline sodium of 131-134] in the setting of hypovolemia, decreased solute intake along with more than 160 ounces of water per day. patient is also on trazodone at home   Sodium at 11 AM on 11/19/2024 was 115   Remains in the 129 - 131 range over the last few days / stable.  He was 131 back in 2021 so seems to also have some component of a reset omsostat     -Leukocytosis:   noted to 
Devices: All fall risk precautions in place, Bed alarm in place, Call light within reach, Nurse notified, Left in bed, Patient at risk for falls  Restraints  Restraints Initially in Place: No    Restrictions  Position Activity Restriction  Other position/activity restrictions: KWADWO Álvarez, tele, per RN bed level therapy at this time     Subjective  Pain: 5/10 L hip pain radiating down leg  General  Chart Reviewed: Yes  Patient assessed for rehabilitation services?: Yes  Referring Practitioner: Jackson Kim MD  Referral Date : 11/27/24  Diagnosis: MSSA bacteremia  Follows Commands: Within Functional Limits  Subjective  Subjective: Pt in bed upon arrival, agreeable to PT session         Social/Functional History  Social/Functional History  Lives With: Alone (girlfriend Lilian assists often (works full time))  Type of Home: Apartment  Home Layout: One level  Home Access: Stairs to enter without rails, Stairs to enter with rails  Entrance Stairs - Number of Steps: 2+1  Entrance Stairs - Rails: Right  Bathroom Shower/Tub: Tub/Shower unit (states he uses his friend's shower as his is too small)  Bathroom Toilet: Standard  Bathroom Equipment: Grab bars in shower  Home Equipment: Rollator, Cane, Oxygen (sleeps on couch; 2.5L while sleeping)  Has the patient had two or more falls in the past year or any fall with injury in the past year?: Yes (~15 falls backward when attempting to stand from the couch, states his LLE gives out)  Receives Help From: Friend(s)  ADL Assistance: Independent  Homemaking Assistance: Independent  Ambulation Assistance: Independent (rollator)  Transfer Assistance: Independent  Active : No  Patient's  Info: friend drives  Occupation: Retired  Type of Occupation:   Leisure & Hobbies: collect 60s-70s memorabilia  Vision/Hearing  Vision  Vision: Impaired  Vision Exceptions: Wears glasses for reading  Hearing  Hearing: Within functional limits    Cognition   Orientation  Overall 
Strengthening, ROM, Balance training, Functional mobility training, Endurance training, Gait training, Pain management, Safety education & training, Patient/Caregiver education & training, Equipment evaluation, education, & procurement, Positioning, Self-Care / ADL, Home management training, Coordination training, Co-Treatment    Restrictions  Restrictions/Precautions  Restrictions/Precautions: Fall Risk, Up as Tolerated, Weight Bearing, Surgical protocol  Lower Extremity Weight Bearing Restrictions  Left Lower Extremity Weight Bearing: Weight Bearing As Tolerated  Position Activity Restriction  Hip Precautions: Posterior hip precautions, No hip flexion > 90 degrees, No ADduction, No hip internal rotation  Other position/activity restrictions: Purewick, IV, tele    Subjective  General  Chart Reviewed: Yes, Orders, Progress Notes, History and Physical, Imaging, Labs, Operative Notes  Patient assessed for rehabilitation services?: Yes  Family / Caregiver Present: No  Referring Practitioner: Jackson Kim MD  Diagnosis: MSSA bacteremia, s/p L hip bone biopsy an antibiotic spacer placement 12/01  Subjective  Subjective: Pt in bed upon OT/PT arrival, initially stating he would be unable to participate in therapy, however was agreeable to sitting EOB w/ encouragement  General Comment  Comments: RN cleared pt for OT; pt awake agreeable to therapy & sit EOB     Social/Functional History  Social/Functional History  Lives With: Alone (girlfriend Lilian assists often (works full time))  Type of Home: Apartment  Home Layout: One level  Home Access: Stairs to enter without rails, Stairs to enter with rails  Entrance Stairs - Number of Steps: 2+1  Entrance Stairs - Rails: Right  Bathroom Shower/Tub: Tub/Shower unit (states he uses his friend's shower as his is too small)  Bathroom Toilet: Standard  Bathroom Equipment: Grab bars in shower  Home Equipment: Rollator, Cane, Oxygen (sleeps on couch; 2.5L while sleeping)  Has 
   Stage 3 severe COPD by GOLD classification (HCC) 11/19/2024    Suicidal ideation 11/05/2024    Hypomagnesemia 11/05/2024    Anxiety 11/05/2024    Dysphagia 11/05/2024    Class 2 obesity due to excess calories with body mass index (BMI) of 39.0 to 39.9 in adult 02/02/2022    Chronic stable angina (HCC) 02/02/2022    Depression with anxiety 11/29/2021    COPD, severe (HCC) 11/29/2021    Pneumonia 07/26/2021    COPD exacerbation (HCC)     Acute respiratory failure with hypoxia     Hyponatremia     Fatty liver 02/02/2021    Chest pain 12/17/2018    Cardiac microvascular disease 01/29/2018    Thumb sprain 03/08/2017    Mass of hand 03/08/2017    Current smoker 12/16/2016    Coronary artery disease involving native coronary artery of native heart     Claudication (HCC) 09/22/2011    HTN (hypertension) 09/22/2011    Hyperlipidemia 09/22/2011    Affective bipolar disorder (HCC) 11/18/2010    GERD (gastroesophageal reflux disease) 11/18/2010       History of HTN, CAD, HLD, COPD, FLD, anxiety      Admitted 11/19/24 with chest pain, low grade fever, leukocytosis      Deep seeded MSSA infection with infection of native L hip, septic arthritis and OM suspected, with large iliacus/iliopsoas abscess and bacteremia  Repeat BC collected on HD #4 are negative   CUCA was negative for vegetation   Ortho involved re surgical management of L hip infection      History of syncope ascribed to pcn      Plan:     Continue ancef  MRI reviewed, await Ortho input    Cannot rule out spinal infection based on available data but no need to pursue specific diagnosis with MRI L spine at this point  Will monitor pain for now    Anticipate continued IV abx after DC in supervised setting, final recs TBD         Discussed with patient/family, all questions answered        Demetrice Shaw MD  Phone: [phone]   Fax : [phone]    
01/29/2018    Thumb sprain 03/08/2017    Mass of hand 03/08/2017    Tobacco abuse 12/16/2016    Coronary artery disease involving native coronary artery of native heart     Claudication (HCC) 09/22/2011    HTN (hypertension) 09/22/2011    Hyperlipidemia 09/22/2011    Affective bipolar disorder (HCC) 11/18/2010    GERD (gastroesophageal reflux disease) 11/18/2010       History of HTN, CAD, HLD, COPD, FLD, anxiety      Admitted 11/19/24 with chest pain, low grade fever, leukocytosis      Deep seeded MSSA infection with infection of native L hip, sptic arthritis and OM suspected with large iliacus/iliopsoas abscess and bacteremia  Repeat BC collected on HD #4 are negative   CUCA is planned   Ortho has been consulted      History of syncope ascribed to pcn      Plan:     Continue ancef  Will look for CUCA report and Ortho input   Anticipate need of I&D hip     Likely 6 weeks IV abx after DC in supervised setting       Discussed with patient/family, all questions answered        Demetrice Shaw MD  Phone: [phone]   Fax : [phone]    
32.03  ADL Inpatient CMS 0-100% Score: 63.03  ADL Inpatient CMS G-Code Modifier : CL    Tinneti Score       Goals  Short Term Goals  Time Frame for Short Term Goals: Short term goals to be met b 12/04 unless otherwise specified; 11/30 goals ongoing  Short Term Goal 1: Pt will perform LB dressing with mod A and LRAD by 12/02; 11/30 dependent with donning socks  Short Term Goal 2: Pt will perform seated grooming tasks at EOB w/ SPV; 11/30 set up EOB to perform 1 light grooming task  Short Term Goal 3: Pt will tolerate 1 min stance at RW in prep for ADL participation  Short Term Goal 4: Pt will tolerate x20 BUE ther ex; 11/30 tolerated 10 reps BUE light strengthening exercises;  Patient Goals   Patient goals : \"I want to go somewhere to get stronger and then go home\"      Therapy Time   Individual Concurrent Group Co-treatment   Time In 1135         Time Out 1208         Minutes 33                 Nida Lemon, OT     
Inpatient CMS G-Code Modifier : CM    Therapy Time   Individual Concurrent Group Co-treatment   Time In       1143   Time Out       1207   Minutes       24   Timed Code Treatment Minutes: 24 Minutes       Gael Fuentes, PT, DPT  If pt is unable to be seen after this session, please let this note serve as discharge summary.  Please see case management note for discharge disposition.  Thank you.             
    PRN Meds: oxyCODONE, sodium chloride flush, sodium chloride, ipratropium 0.5 mg-albuterol 2.5 mg, sodium chloride flush, sodium chloride, polyethylene glycol, prochlorperazine    /70   Pulse 73   Temp 97.7 °F (36.5 °C) (Oral)   Resp 18   Ht 1.829 m (6' 0.01\")   Wt 76.3 kg (168 lb 4.8 oz)   SpO2 96%   BMI 22.82 kg/m²     Telemetry:      Personally reviewed and interpreted telemetry (Rhythm Strip) on 11/30/2024 with the following findings:     Diet: ADULT DIET; Regular; 1500 ml  ADULT ORAL NUTRITION SUPPLEMENT; Lunch, Dinner, Breakfast; Standard 4 oz Oral Supplement  Diet NPO Exceptions are: Sips of Water with Meds    DVT Prophylaxis: []PPx LMWH  []SQ Heparin  []IPC/SCDs  []Eliquis  []Xarelto  []Coumadin  [] Heparin Drip  []Other -      Code status: Full Code    PT/OT Eval Status:   []NOT yet ordered  []Ordered and Pending   []Seen with Recommendations for:   []Home independently  []Home w/ assist  []HHC  []SNF  []Acute Rehab    Multi-Disciplinary Rounds with Case Management completed on 11/30/2024 with the following recommendations:  Anticipated Discharge Location: []Home w/ []HHC vs []SNF  []Acute Rehab  []LTAC  []Hospice  []Other -    Anticipated Discharge Day/Date:    Barriers to Discharge:   --------------------------------------------------    MDM (any 2 required for High level billing)    A. Problems (any 1)  [] Acute/Chronic Illness/injury posing ongoing threat to life and/or bodily function without ongoing treatment    [] Severe exacerbation of chronic illness    --------------------------------------------------  B. Risk of Treatment (any 1)    [] Drugs/treatments that require intensive monitoring for toxicity    [] IV ABX (Vancomycin, Aminoglycosides, etc)     [] Post-Cath/Contrast study requiring serial monitoring    [] IV Narcotic analgesia    [] Aggressive IV diuresis    [] Hypertonic Saline    [] Critical electrolyte abnormalities requiring IV replacement    [] Insulin - Scheduled/SSI 
Other Brother     Learning Disabilities Brother     Early Death Sister     Cancer Sister     Substance Abuse Sister     Cancer Sister     Diabetes Brother     High Blood Pressure Brother        REVIEW OF SYSTEMS:   CONSTITUTIONAL: Denies unexplained weight loss, fevers, chills or fatigue  NEUROLOGICAL: Denies unsteady gait or progressive weakness    PSYCHOLOGICAL: Denies anxiety, depression   SKIN: Denies skin changes, delayed healing, rash, itching   HEMATOLOGIC: Denies easy bleeding or bruising  ENDOCRINE: Denies excessive thirst, urination, heat/cold  RESPIRATORY: Denies current dyspnea, cough  CARDIOVASCULAR: Negative for chest pain at this time.  EYES: Negative for photophobia and visual disturbance.   ENT:  Negative for rhinorrhea, epistaxis, sore throat, or hearing loss.  GI: Denies nausea, vomiting, diarrhea   : Denies bowel or bladder issues   MUSCULOSKELETAL: Left hip pain.  All other ROS reviewed in chart or with patient or family and are grossly negative.       PHYSICAL EXAMINATION:  Mr. Nguyen is a very pleasant 59 y.o. male who seen today in no acute distress, awake, alert, and oriented.  He is well nourished and groomed.  Patient with normal affect. Body mass index is 22.82 kg/m².. Skin warm and dry. Resting respiratory rate is 16.  Resp deep and easy. Pulse is with regular rate and rhythm    /72   Pulse 77   Temp 98.1 °F (36.7 °C) (Oral)   Resp 18   Ht 1.829 m (6' 0.01\")   Wt 76.3 kg (168 lb 4.8 oz)   SpO2 98%   BMI 22.82 kg/m²        Airway is intact  Chest: chest clear, no wheezing, rales, normal symmetric air entry, no tachypnea, retractions or cyanosis  Heart: regular rate and rhythm ; heart sounds normal   Hearing intact, pupil equal and reactive bilateral  Lymphatics; No groin or axillary enlarged lymph nodes.  Neck; No swelling  Abdomen; soft, non distended.     MUSCULOSKELETAL:   Left leg in external rotation with pain with ROM, Examination of both lower extrimiteis showing a 
NEPHROLOGY  IP CONSULT TO GENERAL SURGERY  IP CONSULT TO INFECTIOUS DISEASES  IP CONSULT TO ORTHOPEDIC SURGERY  IP CONSULT TO CARDIOLOGY

## 2024-12-02 NOTE — PLAN OF CARE
Problem: Discharge Planning  Goal: Discharge to home or other facility with appropriate resources  12/2/2024 1409 by Saundra Chou RN  Outcome: Adequate for Discharge  12/2/2024 0921 by Saundra Chou RN  Outcome: Progressing  Flowsheets (Taken 12/2/2024 0800)  Discharge to home or other facility with appropriate resources: Identify barriers to discharge with patient and caregiver     Problem: Safety - Adult  Goal: Free from fall injury  12/2/2024 1409 by Saundra Chou RN  Outcome: Adequate for Discharge  12/2/2024 0921 by Saundra Chou RN  Outcome: Progressing     Problem: Skin/Tissue Integrity  Goal: Absence of new skin breakdown  Description: 1.  Monitor for areas of redness and/or skin breakdown  2.  Assess vascular access sites hourly  3.  Every 4-6 hours minimum:  Change oxygen saturation probe site  4.  Every 4-6 hours:  If on nasal continuous positive airway pressure, respiratory therapy assess nares and determine need for appliance change or resting period.  Outcome: Adequate for Discharge     Problem: ABCDS Injury Assessment  Goal: Absence of physical injury  Outcome: Adequate for Discharge     Problem: Pain  Goal: Verbalizes/displays adequate comfort level or baseline comfort level  Outcome: Adequate for Discharge     Problem: Nutrition Deficit:  Goal: Optimize nutritional status  Outcome: Adequate for Discharge

## 2024-12-02 NOTE — PLAN OF CARE
Problem: Discharge Planning  Goal: Discharge to home or other facility with appropriate resources  12/1/2024 2333 by Elisha Serna RN  Outcome: Progressing     Problem: Safety - Adult  Goal: Free from fall injury  12/1/2024 2333 by Elisha Serna RN  Outcome: Progressing  Bed in lowest position, wheels locked, 2/4 side rails up, nonskid footwear on. Bed/ chair check alarm in place, call light within reach. Pt instructed to call out when needing assistance. Pt stated understanding.     Problem: Skin/Tissue Integrity  Goal: Absence of new skin breakdown  Description: 1.  Monitor for areas of redness and/or skin breakdown  2.  Assess vascular access sites hourly  3.  Every 4-6 hours minimum:  Change oxygen saturation probe site  4.  Every 4-6 hours:  If on nasal continuous positive airway pressure, respiratory therapy assess nares and determine need for appliance change or resting period.  12/1/2024 2333 by Elisha Serna RN  Outcome: Progressing     Problem: ABCDS Injury Assessment  Goal: Absence of physical injury  Outcome: Progressing     Problem: Pain  Goal: Verbalizes/displays adequate comfort level or baseline comfort level  Outcome: Progressing  Pt scoring pain on 0-10 scale. Pain medications given per MAR. Pt instructed to call out when pain level increasing. Call light within reach.

## 2024-12-02 NOTE — CARE COORDINATION
CASE MANAGEMENT DISCHARGE SUMMARY      Discharge to: [facility];SNF     Precertification completed: yes  Hospital Exemption Notification (HENS) completed: yes    Transportation:    Medical Transport explained to pt/family. Pt/family voice no agency preference.        Confirmed discharge plan with:     Patient: yes     Family:  Melania [phone]     Facility/Agency, name:  Confirmed w/Александр at [facility]    Phone number for report to facility: [phone]      RN, name: Saundra     Note: Discharging nurse to complete LETTY, reconcile AVS, and place final copy with patient's discharge packet. RN to ensure that written prescriptions for  Level II medications are sent with patient to the facility as per protocol.        
Chart reviewed day 2. Pt is followed by IM, Gen Sx, ID, Ortho, Cards and Nephro. CUCA w/anesthesia today. Receiving IV abx and new Ortho consult pending. [facility] has accepted and new cert will need started d/t multiple changes over the past week. Per Bety at [facility] is already completed. Will follow for needs as they arise. Electronically signed by UDAY EVANS RN on 11/27/2024 at 12:48 PM    
Chart reviewed day 4. Pt is followed by IM, Ortho, Nephro and ID. Awaiting MRI of L Hip then hip aspiration. Per Ortho possible I&D. Cert placed on 11/27 to [facility] and is pending. Will follow for needs as they arise. Electronically signed by UDAY EVANS RN on 11/29/2024 at 12:34 PM    
VM left for Bety at [facility] that PT/OT has seen pt. Requested Bety start cert once notes are in. Per MD may be ready for D/C by Monday. Electronically signed by UDAY EVANS RN on 11/27/2024 at 1:44 PM    
list with basic dialogue that supports the patient's individualized plan of care/goals and shares the quality data associated with the providers was provided to:     Patient Representative Name:       The Patient and/or Patient Representative Agree with the Discharge Plan?      UDAY EVANS RN  Case Management Department

## 2024-12-02 NOTE — DISCHARGE INSTR - DIET

## 2024-12-02 NOTE — TELEPHONE ENCOUNTER
Monitor company [employer]  Length of monitor 14 days  Monitor ordered by Marko Tavarez MD   Monitor will be sent to the patient's home due to the office does not currently have any monitors.  It will be overnighted and will arrive in 2-5 days.  Patient's nurse Saundra Chou RN notified.  Thank you.

## 2024-12-02 NOTE — DISCHARGE INSTRUCTIONS
reaching the appropriate person.  Precautions  Per Physical Therapy handout  No hip flexion greater than 90 degrees, no hip adduction past midline, no internal rotation past neutral. No sitting in low chairs or toilets, no crossing the legs at the knee, no turning the toes inward  Pain Medications  You were given oxycodone (Oxycontin, Oxyir)  Wean off pain medications as you deem appropriate as long as pain is under control  Be sure to drink plenty of fluids (recommend water) while taking narcotic pain medications to prevent constipation  You may take an over the counter laxative or stool softener as needed to prevent/treat constipation as well, we recommend miralax/glycolax.  We recommend that you consider taking these medications the entire time you are taking pain medication.    Cold packs/Ice packs/Machine  May be used as much as necessary to reduce swelling/inflammation/soreness  Be sure to have a barrier (cloth, clothing, towel) between your skin/incision and the ice pack to prevent frostbite  Contact [facility] office or your Nurse Navigator if:  Increased redness, swelling, drainage of any kind, and/or pain to surgery site.  As well as new onset fevers and or chills.  These could signify an infection.  Calf or thigh tenderness to touch as well as increased swelling or redness.  This could signify a clot formation.  Numbness or tingling to an area around the incision site or below the incision site (toes).  Any rash appears, increased  or new onset nausea/vomiting occur.  This may indicate a reaction to a medication.   Phone # [phone] - [facility].  Follow up with Surgeon at scheduled appointment time.   I acknowledge that I have received regulo hose and understand the instructions on how and when to wear them   __________________________________  Discharging RN who has gone over instructions and acknowledges regulo hose have been received

## 2024-12-02 NOTE — PLAN OF CARE
Problem: Discharge Planning  Goal: Discharge to home or other facility with appropriate resources  12/2/2024 0921 by Saundra Chou, RN  Outcome: Progressing  Flowsheets (Taken 12/2/2024 0800)  Discharge to home or other facility with appropriate resources: Identify barriers to discharge with patient and caregiver       Problem: Safety - Adult  Goal: Free from fall injury  12/2/2024 0921 by Saundra Chou, RN  Outcome: Progressing

## 2024-12-02 NOTE — DISCHARGE SUMMARY
Component Value Date/Time    NITRU Negative 11/19/2024 11:57 AM    COLORU Yellow 11/19/2024 11:57 AM    PHUR 7.0 11/19/2024 11:57 AM    PHUR 7.0 05/28/2019 06:45 PM    CLARITYU Clear 11/19/2024 11:57 AM    LEUKOCYTESUR Negative 11/19/2024 11:57 AM    UROBILINOGEN 1.0 11/19/2024 11:57 AM    BILIRUBINUR Negative 11/19/2024 11:57 AM    BLOODU Negative 11/19/2024 11:57 AM    GLUCOSEU Negative 11/19/2024 11:57 AM    KETUA 40 11/19/2024 11:57 AM     Urine Cultures: No results found for: \"LABURIN\"  Blood Cultures:   Lab Results   Component Value Date/Time    BC No Growth after 4 days of incubation. 11/22/2024 04:24 AM     Lab Results   Component Value Date/Time    BLOODCULT2 No Growth after 4 days of incubation. 11/22/2024 04:30 AM     Organism:   Lab Results   Component Value Date/Time    ORG Staphylococcus aureus 12/01/2024 09:25 AM         The patient expressed appropriate understanding of, and agreement with the discharge recommendations, medications, and plan.     Full Code    Discharge condition: stable    Consults this admission:  IP CONSULT TO GENERAL SURGERY  IP CONSULT TO CARDIOLOGY  IP CONSULT TO NEPHROLOGY  IP CONSULT TO GENERAL SURGERY  IP CONSULT TO INFECTIOUS DISEASES  IP CONSULT TO ORTHOPEDIC SURGERY  IP CONSULT TO CARDIOLOGY  IP CONSULT TO VASCULAR ACCESS TEAM    Time Spent Discharging patient 33 minutes    Electronically signed by JOSEPH YEPEZ MD on 12/2/2024 at 12:34 PM

## 2024-12-02 NOTE — DISCHARGE INSTR - COC
Continuity of Care Form    Patient Name: Kam Nguyen   :  1965  MRN:  [id number]    Admit date:  2024  Discharge date:  2024    Code Status Order: Full Code   Advance Directives:   Advance Care Flowsheet Documentation             Admitting Physician:  Jackson Kim MD  PCP: Brianda Rain FNP    Discharging Nurse: aSundra Chou RN  Discharging Hospital Unit/Room#: 0524/0524-01  Discharging Unit Phone Number: [phone]    Emergency Contact:   Extended Emergency Contact Information  Primary Emergency Contact: Melania [place], [place]  Home Phone: [phone]  Mobile Phone: [phone]  Relation: Domestic Partner  Secondary Emergency Contact: Annmarie [place], [place]  Home Phone: [phone]  Mobile Phone: [phone]  Relation: Brother/Sister    Past Surgical History:  Past Surgical History:   Procedure Laterality Date    CORONARY ANGIOPLASTY WITH STENT PLACEMENT  , ,     [facility]       Immunization History:   Immunization History   Administered Date(s) Administered    COVID-19, PFIZER PURPLE top, DILUTE for use, (age 12 y+), 30mcg/0.3mL 2021, 2022    Influenza Vaccine, unspecified formulation 2018    Influenza Virus Vaccine 2017, 2019    Influenza, AFLURIA (age 3 y+), FLUZONE, (age 6 mo+), Quadv MDV, 0.5mL 2016    Influenza, FLUCELVAX, (age 6 mo+), MDCK, Quadv MDV, 0.5mL 10/11/2021    Influenza, FLUCELVAX, (age 6 mo+), MDCK, Quadv PF, 0.5mL 2023    Pneumococcal, PCV20, PREVNAR 20, (age 6w+), IM, 0.5mL 2024       Active Problems:  Patient Active Problem List   Diagnosis Code    Claudication (HCC) I73.9    HTN (hypertension) I10    Hyperlipidemia E78.5    Acute chest pain R07.9    Coronary artery disease involving native coronary artery of native heart I25.10    Current smoker F17.200    Thumb sprain S63.609A    Mass of hand R22.30    Cardiac microvascular disease I25.85    Chest pain

## 2024-12-03 ENCOUNTER — CLINICAL DOCUMENTATION (OUTPATIENT)
Dept: INFECTIOUS DISEASES | Age: 59
End: 2024-12-03

## 2024-12-03 ENCOUNTER — TELEPHONE (OUTPATIENT)
Dept: INFECTIOUS DISEASES | Age: 59
End: 2024-12-03

## 2024-12-03 DIAGNOSIS — M86.9 HIP OSTEOMYELITIS, LEFT: Primary | ICD-10-CM

## 2024-12-03 NOTE — PROGRESS NOTES
Dr. Shaw has placed a referral order for pharmacist to manage Outpatient Parental Antimicrobial Therapy (OPAT) pursuant the ID Collaborative Practice Agreement.     Pertinent PMH and HPI:  PMH htn, hld, CAD, fatty liver, GERD, COPD, bipolar disorder, anxiety, suicidal ideation, chronic vertebral pain, OA of L hip     Recent ED visit for dysphagia and weakness; had EGD 10/31/24- diagnosed with candidal esophagitis and rx with fluconazole    Recent admission for SI 2/2 not being able to swallow ativan 2/2 to above    ED visit 11/12 with acute L knee pain- given norco and sent home    Presents 11/19 with chest pain and tremors- found to have severe hypoNa- also found to be in Afib     Pertinent Objective Data:    Wt Readings from Last 1 Encounters:   11/26/24 76.3 kg (168 lb 4.8 oz)      BMI Readings from Last 1 Encounters:   11/29/24 22.82 kg/m²      SCr= 0.4    Lab Results   Component Value Date    CRP 66.2 (H) 11/27/2024       Lab Results   Component Value Date    SEDRATE 81 (H) 11/22/2024       Imaging:   Early November CXRAY, L knee and L hip XRAYs all unremarkable    11/21 MRI brain:  IMPRESSION:  Sellar mass demonstrating suprasellar extension, most suggestive of a  pituitary macroadenoma.  Postcontrast imaging following the pituitary  protocol could be performed for further characterization.   11/22 lumbar spine XRAY:  IMPRESSION:  Worsening anterolisthesis of L4 on L5.   11/22 cardiac stress test:    Stress Combined Conclusion: Mildly diminished activity inferior wall at stress with improvement at rest. Myocardial perfusion normal in remaining segments. LV function is hyperdynamic EF > 65% with uniform wall motion. Note patient refused obtaining attenuation correct images. Mild ischemia cannot be ruled out but given normal inferior wall motion findings may represent diaphragm attenuation artifact. Note stress EKG portion is abnormal. Lower risk abnormal study. Clinical correlation recommended.    Stress

## 2024-12-03 NOTE — TELEPHONE ENCOUNTER
Called [facility] and spoke with nurse Andersen.  She took a message for patient's nurse to return my call.     Will update once call returned or another call is made.

## 2024-12-04 ENCOUNTER — TELEPHONE (OUTPATIENT)
Dept: INFECTIOUS DISEASES | Age: 59
End: 2024-12-04

## 2024-12-04 PROCEDURE — 93270 REMOTE 30 DAY ECG REV/REPORT: CPT | Performed by: INTERNAL MEDICINE

## 2024-12-04 NOTE — TELEPHONE ENCOUNTER
Called [facility] and spoke with Yanelis. Verified IV abx and weekly lab orders as follows.     Cefazolin 2g IV q8 through 1/3/25  Labs: CBC w diff, BUN, creatinine, LFTs, CRP every Mon    Yanelis verified orders and verbalized orders.

## 2024-12-06 LAB
BACTERIA SNV CULT: ABNORMAL
BACTERIA SNV CULT: ABNORMAL
BACTERIA SPEC AEROBE CULT: ABNORMAL
BACTERIA SPEC AEROBE CULT: ABNORMAL
BACTERIA SPEC ANAEROBE CULT: ABNORMAL
GRAM STN SPEC: ABNORMAL
ORGANISM: ABNORMAL

## 2024-12-09 LAB
A/G RATIO: 1.1
ALBUMIN: 2.7 G/DL
ALP BLD-CCNC: 70 U/L
ALT SERPL-CCNC: 3 U/L
AST SERPL-CCNC: 10 U/L
BASOPHILS ABSOLUTE: 0.1 /ΜL
BASOPHILS RELATIVE PERCENT: 0.6 %
BILIRUB SERPL-MCNC: 0.5 MG/DL (ref 0.1–1.4)
BILIRUBIN DIRECT: 0.1 MG/DL
BILIRUBIN, INDIRECT: ABNORMAL
BUN / CREAT RATIO: NORMAL
BUN BLDV-MCNC: 5 MG/DL
C-REACTIVE PROTEIN: 20.1
CREAT SERPL-MCNC: 0.6 MG/DL
EOSINOPHILS ABSOLUTE: 0.2 /ΜL
EOSINOPHILS RELATIVE PERCENT: 18.8 %
GLOBULIN: ABNORMAL
HCT VFR BLD CALC: 24.6 % (ref 41–53)
HEMOGLOBIN: 8.2 G/DL (ref 13.5–17.5)
LYMPHOCYTES ABSOLUTE: 1.7 /ΜL
LYMPHOCYTES RELATIVE PERCENT: 18.7 %
MCH RBC QN AUTO: 27.9 PG
MCHC RBC AUTO-ENTMCNC: 33.4 G/DL
MCV RBC AUTO: 83.4 FL
MONOCYTES ABSOLUTE: 0.6 /ΜL
MONOCYTES RELATIVE PERCENT: 6.5 %
NEUTROPHILS ABSOLUTE: 6.5 /ΜL
NEUTROPHILS RELATIVE PERCENT: 72.4 %
PDW BLD-RTO: 18.3 %
PLATELET # BLD: 288 K/ΜL
PMV BLD AUTO: 7.8 FL
RBC # BLD: 2.95 10^6/ΜL
TOTAL PROTEIN: 5.1 G/DL (ref 6.4–8.2)
WBC # BLD: 9 10^3/ML

## 2024-12-11 ENCOUNTER — TELEPHONE (OUTPATIENT)
Dept: INFECTIOUS DISEASES | Age: 59
End: 2024-12-11

## 2024-12-11 DIAGNOSIS — M86.9 HIP OSTEOMYELITIS, LEFT: ICD-10-CM

## 2024-12-11 NOTE — TELEPHONE ENCOUNTER
OPAT Nurse Coordinator Weekly Update Note    Current OPAT plan: Cefazolin 2g IV q8 through 1/3/25      Diagnosis: MSSA bacteremia and infection of native hip with septic arthritis and OM s/p surgical debridement       Assessment: Called [facility] and spoke with nurse Muna. Muna reports that patient is doing well with no concerns noted. She states that patient has been afebrile and has no s/s of infection.       Follow up appts: Dr Green 12/20. 1/16 Dr Caruso.

## 2024-12-20 ENCOUNTER — OFFICE VISIT (OUTPATIENT)
Dept: ORTHOPEDIC SURGERY | Age: 59
End: 2024-12-20

## 2024-12-20 VITALS — BODY MASS INDEX: 22.75 KG/M2 | HEIGHT: 72 IN | WEIGHT: 168 LBS

## 2024-12-20 DIAGNOSIS — M86.9 HIP OSTEOMYELITIS, LEFT: Primary | ICD-10-CM

## 2024-12-20 PROCEDURE — 99024 POSTOP FOLLOW-UP VISIT: CPT | Performed by: STUDENT IN AN ORGANIZED HEALTH CARE EDUCATION/TRAINING PROGRAM

## 2024-12-23 NOTE — PROGRESS NOTES
History: Mr. Nguyen presents for follow-up.  He is known to me for treatment of a subacute septic left hip with osteomyelitis with bone resection and placement of a antibiotic cement covered hemiarthroplasty.  Plan for two-stage procedure pending his recovery and symptoms.  He has been at a nursing facility and plans to remain here for duration of his IV antibiotics.  He has been working with physical therapy and been ambulatory with a walker up and down the hallway.  Endorsing much improved hip pain.  No issues with the incision.    Exam: Incision indeed healing up well without erythema or drainage.  He does have a 1 to 2 cm leg length discrepancy.  No neurovascular compromise distally.    Imaging: Postoperative imaging reviewed with the patient.  There is superior migration of the hemiarthroplasty due to loss of the acetabular bone from infection present at the time of surgery.    Assessment: 59-year-old male doing well after insertion of functional antibiotic spacer to the left hip for left hip osteomyelitis    Plan: Continue current treatment.  We provided him with a shoe lift today to help out with the leg length discrepancy and continue his physical therapy.  Will follow-up again in about a month and see how he is doing with mobility etc. after completing IV antibiotics.  Consider second stage reimplant which will likely require augmented acetabular component.  May otherwise elect to maintain his spacer if he is doing well.     J Carlos Almonte MD

## 2024-12-24 LAB — ECHO BSA: 1.97 M2

## 2024-12-24 PROCEDURE — 93272 ECG/REVIEW INTERPRET ONLY: CPT | Performed by: INTERNAL MEDICINE

## 2024-12-26 ENCOUNTER — TELEPHONE (OUTPATIENT)
Dept: INFECTIOUS DISEASES | Age: 59
End: 2024-12-26

## 2024-12-26 LAB
A/G RATIO: 1.3
ALBUMIN: 3 G/DL
ALP BLD-CCNC: 98 U/L
ALT SERPL-CCNC: 3 U/L
AST SERPL-CCNC: 11 U/L
BASOPHILS ABSOLUTE: ABNORMAL
BASOPHILS RELATIVE PERCENT: 0.8 %
BILIRUB SERPL-MCNC: 0.3 MG/DL (ref 0.1–1.4)
BILIRUBIN DIRECT: 0.1 MG/DL
BILIRUBIN, INDIRECT: ABNORMAL
BUN / CREAT RATIO: ABNORMAL
BUN BLDV-MCNC: 4 MG/DL
C-REACTIVE PROTEIN: 13
CREAT SERPL-MCNC: 0.4 MG/DL
EOSINOPHILS ABSOLUTE: 0.2 /ΜL
EOSINOPHILS RELATIVE PERCENT: 4.4 %
GLOBULIN: ABNORMAL
HCT VFR BLD CALC: 31.6 % (ref 41–53)
HEMOGLOBIN: 10.2 G/DL (ref 13.5–17.5)
LYMPHOCYTES ABSOLUTE: 1 /ΜL
LYMPHOCYTES RELATIVE PERCENT: 28.9 %
MCH RBC QN AUTO: 27.7 PG
MCHC RBC AUTO-ENTMCNC: 32.5 G/DL
MCV RBC AUTO: 85.2 FL
MONOCYTES ABSOLUTE: 0.4 /ΜL
MONOCYTES RELATIVE PERCENT: 11.1 %
NEUTROPHILS ABSOLUTE: 2.1 /ΜL
NEUTROPHILS RELATIVE PERCENT: 56.8 %
PDW BLD-RTO: 19.5 %
PLATELET # BLD: 184 K/ΜL
PMV BLD AUTO: 8.1 FL
RBC # BLD: 3.71 10^6/ΜL
TOTAL PROTEIN: 5.4 G/DL (ref 6.4–8.2)
WBC # BLD: 3.7 10^3/ML

## 2024-12-26 NOTE — TELEPHONE ENCOUNTER
OPAT Nurse Coordinator Weekly Update Note    Current OPAT plan:  Cefazolin 2g IV q8 through 1/3/25    Diagnosis:   MSSA bacteremia and infection of native hip with septic arthritis and OM s/p surgical debridement     Assessment:  Spoke with patient's nurse Yanelis who states labs were not drawn yet for this week. Drawn this AM and she will fax results tomorrow. Patient is doing well, afebrile and no s/s of infection to left hip. She states incision is completely healed and patient is up ambulating without difficulty. V/u of office contact info for needs/updates    Follow up appts:  none

## 2024-12-27 ENCOUNTER — TELEPHONE (OUTPATIENT)
Dept: INFECTIOUS DISEASES | Age: 59
End: 2024-12-27

## 2024-12-27 DIAGNOSIS — M86.9 HIP OSTEOMYELITIS, LEFT: ICD-10-CM

## 2024-12-27 NOTE — TELEPHONE ENCOUNTER
Spoke with nurse Hilario to clarify illegible labs, she will start to fax a different copy so the irregular results can be seen

## 2024-12-30 PROBLEM — Z01.810 PREOPERATIVE CARDIOVASCULAR EXAMINATION: Status: RESOLVED | Noted: 2024-11-30 | Resolved: 2024-12-30

## 2024-12-30 LAB
A/G RATIO: 1.2
ALBUMIN: 2.7 G/DL
ALP BLD-CCNC: 93 U/L
ALT SERPL-CCNC: 3 U/L
AST SERPL-CCNC: 6 U/L
BASOPHILS ABSOLUTE: ABNORMAL
BASOPHILS RELATIVE PERCENT: 0.8 %
BILIRUB SERPL-MCNC: 0.3 MG/DL (ref 0.1–1.4)
BILIRUBIN DIRECT: 0.1 MG/DL
BILIRUBIN, INDIRECT: ABNORMAL
BUN / CREAT RATIO: ABNORMAL
BUN BLDV-MCNC: 3 MG/DL
C-REACTIVE PROTEIN: 3.7
CREAT SERPL-MCNC: 0.4 MG/DL
EOSINOPHILS ABSOLUTE: 0.2 /ΜL
EOSINOPHILS RELATIVE PERCENT: 3.4 %
GLOBULIN: ABNORMAL
HCT VFR BLD CALC: 30.2 % (ref 41–53)
HEMOGLOBIN: 10 G/DL (ref 13.5–17.5)
LYMPHOCYTES ABSOLUTE: 1.4 /ΜL
LYMPHOCYTES RELATIVE PERCENT: 27.8 %
MCH RBC QN AUTO: 28.2 PG
MCHC RBC AUTO-ENTMCNC: 33.1 G/DL
MCV RBC AUTO: 85.2 FL
MONOCYTES ABSOLUTE: 0.2 /ΜL
MONOCYTES RELATIVE PERCENT: 3.2 %
NEUTROPHILS ABSOLUTE: 3.3 /ΜL
NEUTROPHILS RELATIVE PERCENT: 64.8 %
PDW BLD-RTO: 19.1 %
PLATELET # BLD: 185 K/ΜL
PMV BLD AUTO: 8.1 FL
RBC # BLD: 3.54 10^6/ΜL
TOTAL PROTEIN: 5 G/DL (ref 6.4–8.2)
WBC # BLD: 5.1 10^3/ML

## 2024-12-31 ENCOUNTER — TELEPHONE (OUTPATIENT)
Dept: INFECTIOUS DISEASES | Age: 59
End: 2024-12-31

## 2024-12-31 NOTE — TELEPHONE ENCOUNTER
OPAT Nurse Coordinator Weekly Update Note    Current OPAT plan: Cefazolin 2g IV q8 through 1/3/25      Diagnosis: MSSA bacteremia and infection of native hip with septic arthritis and OM s/p surgical debridement       Assessment: Called [facility] and spoke with Yanelis.  Yanelis states that patient is doing well and incision looks good. She denies any fevers or s/s of infection.  She reports that patient is eager to go home after IV abx end.       Follow up appts: 1/16 Dr Caruso- cardiology.

## 2025-01-02 ENCOUNTER — TELEPHONE (OUTPATIENT)
Dept: ORTHOPEDIC SURGERY | Age: 60
End: 2025-01-02

## 2025-01-02 DIAGNOSIS — M86.9 HIP OSTEOMYELITIS, LEFT: ICD-10-CM

## 2025-01-02 NOTE — TELEPHONE ENCOUNTER
Called and left message with  -     He was offered shoe lift at last visit but patient wanted to wait due to out of pocket payment - he stated he will call back when he's ready to get it and can pick it up.   Patient just needs to come in Friday morning or we can set specific date and time for him to get fitted at one of the offices and pay.   Also gave him the option of looking on [employer] for a shoe lift as well.

## 2025-01-02 NOTE — TELEPHONE ENCOUNTER
Called [facility] and spoke with Alison. Informed her of ending IV abx as planned on 1/3 and gave order to remove PICC after last dose.  She verified orders and verbalized understanding.     Will verify line removal on 1/6.

## 2025-01-02 NOTE — TELEPHONE ENCOUNTER
Medical Facility Question     Facility Name: [facility]   Contact Name: Flako   Contact Number: [phone]   Request or Information: FRANCESCA IS CALLING TO SEE WHAT SHE NEED TO DO ABOUT GETTING A SHOE LIFT FOR THIS PATIENT WHEN HE CAME TO HIS APPOINTMENT HE WAS TOLD THAT HE WOULD HAVE TO PAY $15.00 ON THAT DAY WHICH WAS ON 12/20/24  THEY JUST NEED A CALL BACK TO SEE WHAT THEY NEED TO DO FOR HIM TO GET THAT SHOE . PLEASE ADVISE.

## 2025-01-10 DIAGNOSIS — J43.2 CENTRILOBULAR EMPHYSEMA (HCC): ICD-10-CM

## 2025-01-10 DIAGNOSIS — J44.9 COPD, SEVERE (HCC): ICD-10-CM

## 2025-01-10 RX ORDER — ALBUTEROL SULFATE 90 UG/1
INHALANT RESPIRATORY (INHALATION)
Qty: 6.7 G | Refills: 3 | Status: SHIPPED | OUTPATIENT
Start: 2025-01-10

## 2025-01-16 ENCOUNTER — OFFICE VISIT (OUTPATIENT)
Dept: CARDIOLOGY CLINIC | Age: 60
End: 2025-01-16

## 2025-01-16 VITALS
DIASTOLIC BLOOD PRESSURE: 68 MMHG | OXYGEN SATURATION: 97 % | HEIGHT: 72 IN | WEIGHT: 174.5 LBS | SYSTOLIC BLOOD PRESSURE: 134 MMHG | BODY MASS INDEX: 23.63 KG/M2 | HEART RATE: 83 BPM

## 2025-01-16 DIAGNOSIS — I25.10 CORONARY ARTERY DISEASE INVOLVING NATIVE CORONARY ARTERY OF NATIVE HEART, UNSPECIFIED WHETHER ANGINA PRESENT: Primary | ICD-10-CM

## 2025-01-16 DIAGNOSIS — I25.10 CORONARY ARTERY DISEASE INVOLVING NATIVE CORONARY ARTERY OF NATIVE HEART WITHOUT ANGINA PECTORIS: ICD-10-CM

## 2025-01-16 DIAGNOSIS — E78.2 MIXED HYPERLIPIDEMIA: ICD-10-CM

## 2025-01-16 DIAGNOSIS — Z95.5 HISTORY OF CORONARY ARTERY STENT PLACEMENT: ICD-10-CM

## 2025-01-16 DIAGNOSIS — E78.00 PURE HYPERCHOLESTEROLEMIA: ICD-10-CM

## 2025-01-16 RX ORDER — CLOPIDOGREL BISULFATE 75 MG/1
TABLET ORAL
Qty: 90 TABLET | Refills: 3 | Status: SHIPPED | OUTPATIENT
Start: 2025-01-16

## 2025-01-16 RX ORDER — METOPROLOL SUCCINATE 25 MG/1
25 TABLET, EXTENDED RELEASE ORAL DAILY
COMMUNITY
End: 2025-01-16 | Stop reason: SDUPTHER

## 2025-01-16 RX ORDER — METOPROLOL SUCCINATE 25 MG/1
25 TABLET, EXTENDED RELEASE ORAL DAILY
Qty: 90 TABLET | Refills: 3 | Status: SHIPPED | OUTPATIENT
Start: 2025-01-16

## 2025-01-16 RX ORDER — RANOLAZINE 1000 MG/1
1000 TABLET, EXTENDED RELEASE ORAL 2 TIMES DAILY
Qty: 180 TABLET | Refills: 3 | Status: SHIPPED | OUTPATIENT
Start: 2025-01-16

## 2025-01-16 RX ORDER — ATORVASTATIN CALCIUM 40 MG/1
40 TABLET, FILM COATED ORAL DAILY
Qty: 90 TABLET | Refills: 3 | Status: SHIPPED | OUTPATIENT
Start: 2025-01-16

## 2025-01-16 NOTE — PROGRESS NOTES
[facility]   Cardiovascular Evaluation    PATIENT: Kam Ngyuen  DATE: 2025  MRN: [id number]  CSN: [id number]  : 1965    Primary Care Doctor: Brianda Rain FNP    Reason for evaluation:   Follow-up, Coronary Artery Disease, Hyperlipidemia, and Hypertension      Subjective:    History of present illness on initial date of evaluation:   Kam Nguyen is a 59 y.o. male  patient who presents for follow up. He has a past medical history including CAD, HTN, HLD, and COPD. He states he had PCI in ,  and . Echo 2017 showed EF 55%. LHC 2018 showed mild to moderate diffuse CAD with mild in stent stenosis of distal RCA. Lexiscan stress test 2020 no ischemia or scar.    Since last office visit the patient was admitted to hospital -2024 presenting with septic arthritis of left hip. He was noted to have new onset atrial fibrillation with RVR. Cardiology recommended apixaban, started postoperatively on 2024. Lexiscan stress Myoview mild ischemia cannot be ruled out but given normal inferior wall motion findings may represent diaphragm attenuation artifact, stress EKG portion is abnormal. On 2024 CUCA 2024 EF 55-60%, no CLINT mass noted, no vegetation identified. He wore a 2 week monitor upon discharge per cardiology. ANNETTA -2024 SR, average HR 60 BPM, rare PAC's, rare PVC's, two episodes of NSVT, and 15 episodes of PSVT. No atrial fibrillation seen. 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 Patient to follow up with neurosurgery as outpatient.    Today he reports he is doing well. Patient currently denies any weight gain, edema, palpitations, chest pain, shortness of breath, dizziness, and syncope. He is taking medications as prescribed, tolerating well. Denies recent issues with bleeding or bruising.        Patient Active Problem List   Diagnosis    Claudication (HCC)    HTN (hypertension)

## 2025-01-16 NOTE — PATIENT INSTRUCTIONS
Plan:  Smoking Cessation ~ tobacco cessation encouraged   Advised patient to quit and offered support.  Educational material provided to patient.   I recommend that the patient continue their currently prescribed medications. Their drug modifiable risk factors appear to be well controlled. I will continue to address the need/dosing of medications in future visits.   Cardiac testing reviewed  Follow up with me in 1 year

## 2025-01-31 ENCOUNTER — OFFICE VISIT (OUTPATIENT)
Dept: ORTHOPEDIC SURGERY | Age: 60
End: 2025-01-31

## 2025-01-31 DIAGNOSIS — M86.9 HIP OSTEOMYELITIS, LEFT: ICD-10-CM

## 2025-01-31 DIAGNOSIS — Z09 POSTOP CHECK: Primary | ICD-10-CM

## 2025-01-31 PROCEDURE — 99024 POSTOP FOLLOW-UP VISIT: CPT | Performed by: PHYSICIAN ASSISTANT

## 2025-01-31 PROCEDURE — MISCD282 ADJUSTA LIFT: Performed by: PHYSICIAN ASSISTANT

## 2025-02-04 NOTE — PROGRESS NOTES
Date:  2025    Name:  Kam Nguyen  Address:  [address] [place] [id number]    :  1965      Age:   59 y.o.        Chief Complaint    Hip Pain (Ck l hip)      History of Present Illness:  Kam Nguyen is a 59 y.o. male who presents for a post-operative check.  This patient is approximately 8 weeks status post a He is known to me for treatment of a subacute septic left hip with osteomyelitis with bone resection and placement of a antibiotic cement covered hemiarthroplasty by Dr. J Carlos Almonte MD on 24.  Patient has been adhering to our postoperative protocol and conducting a home exercise program. Over treatment includes; rest, ice, elevation, physical therapy, home exercises, and activity modification. Pain is well-controlled on p.o. pain medication.  Still ambulating with an assistive device.  Has time the patient feels that his pain is minimal rating it 3/10 describes as dull and achy.  At this time he does not desire any further surgery.  He deny any signs or symptoms of infection. The patient denies any new injury.  The patient denies any catching, giving way, and joint locking.            General Exam:    General: Kam Nguyen is a 59 y.o. year old male who is sitting comfortably in our office in no acute distress.   Neuro: Alert & Oriented x 3,  normal,  no focal deficits noted. Normal mood & affect.  Eyes: Sclera clear  Ears: Normal external ear  Pulm: Respirations unlabored and regular   Skin: Warm, well perfused      Hip Examination: Left    Inspection: Well-healed surgical incision.  No effusion, ecchymosis, discoloration, erythema, excessive warmth. no gross deformities, no signs of infection.     Palpation: No other osseous or soft tissue tenderness.  Negative calf tenderness    Range of Motion:  Grossly intact    Strength: 4-/5 hip flexion, 4-/5 extension, 4-/5 adduction, 4-/5 abduction    Gait: mildly antalgic, with with utilization of a

## 2025-02-28 ENCOUNTER — TRANSCRIBE ORDERS (OUTPATIENT)
Dept: ADMINISTRATIVE | Age: 60
End: 2025-02-28

## 2025-02-28 DIAGNOSIS — D49.7 PITUITARY TUMOR: Primary | ICD-10-CM

## 2025-03-27 ENCOUNTER — HOSPITAL ENCOUNTER (OUTPATIENT)
Dept: CT IMAGING | Age: 60
Discharge: HOME | End: 2025-03-27
Payer: COMMERCIAL

## 2025-03-27 DIAGNOSIS — Z87.891 PERSONAL HISTORY OF TOBACCO USE: ICD-10-CM

## 2025-03-27 PROCEDURE — 71271 CT THORAX LUNG CANCER SCR C-: CPT

## 2025-03-31 ENCOUNTER — OFFICE VISIT (OUTPATIENT)
Dept: PULMONOLOGY | Age: 60
End: 2025-03-31
Payer: COMMERCIAL

## 2025-03-31 VITALS
SYSTOLIC BLOOD PRESSURE: 147 MMHG | DIASTOLIC BLOOD PRESSURE: 87 MMHG | HEART RATE: 87 BPM | WEIGHT: 209 LBS | OXYGEN SATURATION: 93 % | RESPIRATION RATE: 16 BRPM | HEIGHT: 71 IN | BODY MASS INDEX: 29.26 KG/M2 | TEMPERATURE: 98.7 F

## 2025-03-31 DIAGNOSIS — E66.9 OBESITY (BMI 35.0-39.9 WITHOUT COMORBIDITY): ICD-10-CM

## 2025-03-31 DIAGNOSIS — J43.2 CENTRILOBULAR EMPHYSEMA (HCC): ICD-10-CM

## 2025-03-31 DIAGNOSIS — R91.8 MULTIPLE LUNG NODULES: ICD-10-CM

## 2025-03-31 DIAGNOSIS — J96.11 CHRONIC RESPIRATORY FAILURE WITH HYPOXIA: ICD-10-CM

## 2025-03-31 DIAGNOSIS — J44.9 COPD, SEVERE (HCC): Primary | ICD-10-CM

## 2025-03-31 DIAGNOSIS — F17.200 TOBACCO DEPENDENCE: ICD-10-CM

## 2025-03-31 DIAGNOSIS — Z87.891 PERSONAL HISTORY OF TOBACCO USE: ICD-10-CM

## 2025-03-31 PROCEDURE — 99213 OFFICE O/P EST LOW 20 MIN: CPT | Performed by: STUDENT IN AN ORGANIZED HEALTH CARE EDUCATION/TRAINING PROGRAM

## 2025-03-31 PROCEDURE — G8427 DOCREV CUR MEDS BY ELIG CLIN: HCPCS | Performed by: STUDENT IN AN ORGANIZED HEALTH CARE EDUCATION/TRAINING PROGRAM

## 2025-03-31 PROCEDURE — 4004F PT TOBACCO SCREEN RCVD TLK: CPT | Performed by: STUDENT IN AN ORGANIZED HEALTH CARE EDUCATION/TRAINING PROGRAM

## 2025-03-31 PROCEDURE — 3023F SPIROM DOC REV: CPT | Performed by: STUDENT IN AN ORGANIZED HEALTH CARE EDUCATION/TRAINING PROGRAM

## 2025-03-31 PROCEDURE — 3079F DIAST BP 80-89 MM HG: CPT | Performed by: STUDENT IN AN ORGANIZED HEALTH CARE EDUCATION/TRAINING PROGRAM

## 2025-03-31 PROCEDURE — G2211 COMPLEX E/M VISIT ADD ON: HCPCS | Performed by: STUDENT IN AN ORGANIZED HEALTH CARE EDUCATION/TRAINING PROGRAM

## 2025-03-31 PROCEDURE — G8417 CALC BMI ABV UP PARAM F/U: HCPCS | Performed by: STUDENT IN AN ORGANIZED HEALTH CARE EDUCATION/TRAINING PROGRAM

## 2025-03-31 PROCEDURE — 3077F SYST BP >= 140 MM HG: CPT | Performed by: STUDENT IN AN ORGANIZED HEALTH CARE EDUCATION/TRAINING PROGRAM

## 2025-03-31 PROCEDURE — G0296 VISIT TO DETERM LDCT ELIG: HCPCS | Performed by: STUDENT IN AN ORGANIZED HEALTH CARE EDUCATION/TRAINING PROGRAM

## 2025-03-31 PROCEDURE — 3017F COLORECTAL CA SCREEN DOC REV: CPT | Performed by: STUDENT IN AN ORGANIZED HEALTH CARE EDUCATION/TRAINING PROGRAM

## 2025-03-31 RX ORDER — BUSPIRONE HYDROCHLORIDE 15 MG/1
TABLET ORAL
COMMUNITY
Start: 2025-02-28

## 2025-03-31 RX ORDER — OMEPRAZOLE 40 MG/1
CAPSULE, DELAYED RELEASE ORAL
COMMUNITY

## 2025-03-31 RX ORDER — HYDROXYZINE HYDROCHLORIDE 25 MG/1
TABLET, FILM COATED ORAL
COMMUNITY
Start: 2025-02-28

## 2025-03-31 RX ORDER — VARENICLINE TARTRATE 1 MG/1
1 TABLET, FILM COATED ORAL 2 TIMES DAILY
Qty: 60 TABLET | Refills: 5 | Status: SHIPPED | OUTPATIENT
Start: 2025-03-31

## 2025-03-31 RX ORDER — ERGOCALCIFEROL 1.25 MG/1
CAPSULE, LIQUID FILLED ORAL
COMMUNITY
Start: 2024-12-31

## 2025-03-31 ASSESSMENT — ENCOUNTER SYMPTOMS
ABDOMINAL DISTENTION: 0
VOMITING: 0
TROUBLE SWALLOWING: 0
EYE REDNESS: 0
COUGH: 0
SHORTNESS OF BREATH: 0
EYE PAIN: 0
DIARRHEA: 0
NAUSEA: 0
BACK PAIN: 0
WHEEZING: 0
EYE ITCHING: 0
COLOR CHANGE: 0
SORE THROAT: 0
STRIDOR: 0
ABDOMINAL PAIN: 0
EYE DISCHARGE: 0
CONSTIPATION: 0

## 2025-03-31 NOTE — PROGRESS NOTES
MA Communication:  The following orders are received by verbal communication from   Brian Gurrola MD    Orders include:  CTLS 1 yr       FU 1 yr

## 2025-03-31 NOTE — PATIENT INSTRUCTIONS
Med Rec complete per pt  Allergies Reviewed       Remember to bring a list of pulmonary medications and any CPAP or BiPAP machines to your next appointment with the office.     Please keep all of your future appointments scheduled by [facility] [place] [facility] office. Out of respect for other patients and providers, you may be asked to reschedule your appointment if you arrive later than your scheduled appointment time. Appointments cancelled less than 24hrs in advance will be considered a no show. Patients with three missed appointments within 1 year or four missed appointments within 2 years can be dismissed from the practice.     Please be aware that our physicians are required to work in the Intensive Care Unit at [facility].  Your appointment may need to be rescheduled if they are designated to work during your appointment time.      You may receive a survey regarding the care you received during your visit.  Your input is valuable to us.  We encourage you to complete and return your survey.  We hope you will choose us in the future for your healthcare needs.     Pt instructed of all future appointment dates & times, including radiology, labs, procedures & referrals. If procedures were scheduled preparation instructions provided. Instructions on future appointments with [facility] were given.     In the next few weeks, you will be receiving a survey from [facility] regarding your visit today.  We would greatly appreciate it if you would take just a few minutes to fill that out.  It is very important to us that our patients receive top notch care and our surveys help keep us accountable. However, if your experience was not a good one, we want to hear about that as well. This is a key way we can keep track of problems and strive to correct any for future visits.    Again, we appreciate your time and thank you for choosing [facility]!    Britany SOUSA       Learning About Lung Cancer Screening  What is screening for

## 2025-03-31 NOTE — PROGRESS NOTES
[facility] Follow-up  [address] [place] [address]  [phone]        Kam Nguyen (: 1965 ) is a 59 y.o. male here for an evaluation of   Chief Complaint   Patient presents with    Follow-up    COPD    Results     Ctls          SUBJECTIVE/OBJECTIVE:    Patient is a 59-year-old male with significant past medical history of severe COPD, lung nodules, chronic hypoxic respiratory failure, emphysema, tobacco dependence that presents to [facility] pulmonary clinic for follow-up visit.  Patient has no complaints.  He is still smoking up to a pack per day.  He tried Chantix and said that his smoking decreased.  He denies any fever, chills, shortness of breath, cough, nausea, vomit, abdominal pain.  He is here for follow-up low-dose CT scan      Review of Systems   Constitutional:  Negative for activity change, appetite change, chills, diaphoresis and fatigue.   HENT:  Negative for congestion, sore throat and trouble swallowing.    Eyes:  Negative for pain, discharge, redness and itching.   Respiratory:  Negative for cough, shortness of breath, wheezing and stridor.    Cardiovascular:  Negative for chest pain, palpitations and leg swelling.   Gastrointestinal:  Negative for abdominal distention, abdominal pain, constipation, diarrhea, nausea and vomiting.   Endocrine: Negative for polydipsia, polyphagia and polyuria.   Genitourinary:  Negative for difficulty urinating.   Musculoskeletal:  Negative for back pain, myalgias and neck pain.   Skin:  Negative for color change.   Neurological:  Negative for dizziness, weakness and light-headedness.   Psychiatric/Behavioral:  Negative for agitation and behavioral problems.          Vitals:    25 1303   BP: (!) 147/87   BP Site: Right Upper Arm   Patient Position: Sitting   BP Cuff Size: Medium Adult   Pulse: 87   Resp: 16   Temp: 98.7 °F (37.1 °C)   TempSrc: Temporal   SpO2: 93%   Weight: 94.8 kg (209 lb)   Height: 1.803 m (5' 11\")

## 2025-04-03 ENCOUNTER — TELEPHONE (OUTPATIENT)
Dept: CARDIOLOGY CLINIC | Age: 60
End: 2025-04-03

## 2025-04-03 NOTE — TELEPHONE ENCOUNTER
Pt stated that he was thinking about starting Chantix to assist with helping to stop smoking but he wanted vsp approval prior to starting. Please advise.

## 2025-04-08 ENCOUNTER — TRANSCRIBE ORDERS (OUTPATIENT)
Dept: ADMINISTRATIVE | Age: 60
End: 2025-04-08

## 2025-04-08 DIAGNOSIS — D49.7 PITUITARY TUMOR: Primary | ICD-10-CM

## 2025-04-17 ENCOUNTER — TELEPHONE (OUTPATIENT)
Dept: CARDIOLOGY CLINIC | Age: 60
End: 2025-04-17

## 2025-04-28 ENCOUNTER — RESULTS FOLLOW-UP (OUTPATIENT)
Dept: EMERGENCY DEPT | Age: 60
End: 2025-04-28

## 2025-04-28 ENCOUNTER — HOSPITAL ENCOUNTER (EMERGENCY)
Age: 60
Discharge: HOME | End: 2025-04-28
Attending: STUDENT IN AN ORGANIZED HEALTH CARE EDUCATION/TRAINING PROGRAM
Payer: COMMERCIAL

## 2025-04-28 ENCOUNTER — APPOINTMENT (OUTPATIENT)
Dept: CT IMAGING | Age: 60
End: 2025-04-28
Payer: COMMERCIAL

## 2025-04-28 ENCOUNTER — APPOINTMENT (OUTPATIENT)
Dept: GENERAL RADIOLOGY | Age: 60
End: 2025-04-28
Payer: COMMERCIAL

## 2025-04-28 VITALS
DIASTOLIC BLOOD PRESSURE: 77 MMHG | RESPIRATION RATE: 17 BRPM | TEMPERATURE: 98.7 F | SYSTOLIC BLOOD PRESSURE: 111 MMHG | HEART RATE: 90 BPM | OXYGEN SATURATION: 93 %

## 2025-04-28 DIAGNOSIS — R45.851 SUICIDAL IDEATION: ICD-10-CM

## 2025-04-28 DIAGNOSIS — F10.929 ACUTE ALCOHOLIC INTOXICATION WITH COMPLICATION: Primary | ICD-10-CM

## 2025-04-28 LAB
25(OH)D3 SERPL-MCNC: 39.7 NG/ML
ALBUMIN SERPL-MCNC: 3.6 G/DL (ref 3.4–5)
ALBUMIN/GLOB SERPL: 1.6 {RATIO} (ref 1.1–2.2)
ALP SERPL-CCNC: 76 U/L (ref 40–129)
ALT SERPL-CCNC: 21 U/L (ref 10–40)
AMMONIA PLAS-SCNC: 23 UMOL/L (ref 16–60)
AMPHETAMINES UR QL SCN>1000 NG/ML: ABNORMAL
ANION GAP SERPL CALCULATED.3IONS-SCNC: 11 MMOL/L (ref 3–16)
APAP SERPL-MCNC: <5 UG/ML (ref 10–30)
AST SERPL-CCNC: 20 U/L (ref 15–37)
BARBITURATES UR QL SCN>200 NG/ML: ABNORMAL
BASOPHILS # BLD: 0.1 K/UL (ref 0–0.2)
BASOPHILS NFR BLD: 1.1 %
BENZODIAZ UR QL SCN>200 NG/ML: ABNORMAL
BILIRUB SERPL-MCNC: <0.2 MG/DL (ref 0–1)
BILIRUB UR QL STRIP.AUTO: NEGATIVE
BUN SERPL-MCNC: 3 MG/DL (ref 7–20)
CALCIUM SERPL-MCNC: 8 MG/DL (ref 8.3–10.6)
CANNABINOIDS UR QL SCN>50 NG/ML: POSITIVE
CHLORIDE SERPL-SCNC: 99 MMOL/L (ref 99–110)
CHP ED QC CHECK: 93
CK SERPL-CCNC: 235 U/L (ref 39–308)
CLARITY UR: CLEAR
CO2 SERPL-SCNC: 25 MMOL/L (ref 21–32)
COCAINE UR QL SCN: ABNORMAL
COLOR UR: YELLOW
CREAT SERPL-MCNC: 0.7 MG/DL (ref 0.9–1.3)
DEPRECATED RDW RBC AUTO: 15.3 % (ref 12.4–15.4)
DRUG SCREEN COMMENT UR-IMP: ABNORMAL
EKG ATRIAL RATE: 81 BPM
EKG DIAGNOSIS: NORMAL
EKG P AXIS: 83 DEGREES
EKG P-R INTERVAL: 182 MS
EKG Q-T INTERVAL: 406 MS
EKG QRS DURATION: 88 MS
EKG QTC CALCULATION (BAZETT): 471 MS
EKG R AXIS: 23 DEGREES
EKG T AXIS: 62 DEGREES
EKG VENTRICULAR RATE: 81 BPM
EOSINOPHIL # BLD: 0.3 K/UL (ref 0–0.6)
EOSINOPHIL NFR BLD: 3.5 %
ETHANOLAMINE SERPL-MCNC: 112 MG/DL (ref 0–0.08)
ETHANOLAMINE SERPL-MCNC: 170 MG/DL (ref 0–0.08)
ETHANOLAMINE SERPL-MCNC: 287 MG/DL (ref 0–0.08)
ETHANOLAMINE SERPL-MCNC: 74 MG/DL (ref 0–0.08)
FENTANYL SCREEN, URINE: ABNORMAL
FLUAV RNA RESP QL NAA+PROBE: NOT DETECTED
FLUBV RNA RESP QL NAA+PROBE: NOT DETECTED
GFR SERPLBLD CREATININE-BSD FMLA CKD-EPI: >90 ML/MIN/{1.73_M2}
GLUCOSE BLD-MCNC: 93 MG/DL (ref 70–99)
GLUCOSE SERPL-MCNC: 88 MG/DL (ref 70–99)
GLUCOSE UR STRIP.AUTO-MCNC: NEGATIVE MG/DL
HCT VFR BLD AUTO: 42.2 % (ref 40.5–52.5)
HGB BLD-MCNC: 14.1 G/DL (ref 13.5–17.5)
HGB UR QL STRIP.AUTO: NEGATIVE
INR PPP: 0.95 (ref 0.85–1.15)
KETONES UR STRIP.AUTO-MCNC: NEGATIVE MG/DL
LACTATE BLDV-SCNC: 2 MMOL/L (ref 0.4–2)
LACTATE BLDV-SCNC: 2.7 MMOL/L (ref 0.4–2)
LEUKOCYTE ESTERASE UR QL STRIP.AUTO: NEGATIVE
LYMPHOCYTES # BLD: 3 K/UL (ref 1–5.1)
LYMPHOCYTES NFR BLD: 32 %
MAGNESIUM SERPL-MCNC: 2.08 MG/DL (ref 1.8–2.4)
MCH RBC QN AUTO: 29 PG (ref 26–34)
MCHC RBC AUTO-ENTMCNC: 33.5 G/DL (ref 31–36)
MCV RBC AUTO: 86.6 FL (ref 80–100)
METHADONE UR QL SCN>300 NG/ML: ABNORMAL
MONOCYTES # BLD: 0.7 K/UL (ref 0–1.3)
MONOCYTES NFR BLD: 7.2 %
NEUTROPHILS # BLD: 5.3 K/UL (ref 1.7–7.7)
NEUTROPHILS NFR BLD: 56.2 %
NITRITE UR QL STRIP.AUTO: NEGATIVE
OPIATES UR QL SCN>300 NG/ML: ABNORMAL
OXYCODONE UR QL SCN: ABNORMAL
PCP UR QL SCN>25 NG/ML: ABNORMAL
PERFORMED ON: NORMAL
PH UR STRIP.AUTO: 6 [PH] (ref 5–8)
PH UR STRIP: 6 [PH]
PLATELET # BLD AUTO: 174 K/UL (ref 135–450)
PMV BLD AUTO: 8.6 FL (ref 5–10.5)
POTASSIUM SERPL-SCNC: 3.4 MMOL/L (ref 3.5–5.1)
PROT SERPL-MCNC: 5.9 G/DL (ref 6.4–8.2)
PROT UR STRIP.AUTO-MCNC: NEGATIVE MG/DL
PROTHROMBIN TIME: 12.8 SEC (ref 11.9–14.9)
RBC # BLD AUTO: 4.87 M/UL (ref 4.2–5.9)
RBC #/AREA URNS HPF: NORMAL /HPF (ref 0–4)
REASON FOR REJECTION: NORMAL
REJECTED TEST: NORMAL
SALICYLATES SERPL-MCNC: <0.5 MG/DL (ref 15–30)
SARS-COV-2 RNA RESP QL NAA+PROBE: NOT DETECTED
SODIUM SERPL-SCNC: 135 MMOL/L (ref 136–145)
SP GR UR STRIP.AUTO: <=1.005 (ref 1–1.03)
TROPONIN, HIGH SENSITIVITY: 12 NG/L (ref 0–22)
UA DIPSTICK W REFLEX MICRO PNL UR: NORMAL
URN SPEC COLLECT METH UR: NORMAL
UROBILINOGEN UR STRIP-ACNC: 0.2 E.U./DL
WBC # BLD AUTO: 9.4 K/UL (ref 4–11)
WBC #/AREA URNS HPF: NORMAL /HPF (ref 0–5)

## 2025-04-28 PROCEDURE — 6370000000 HC RX 637 (ALT 250 FOR IP): Performed by: STUDENT IN AN ORGANIZED HEALTH CARE EDUCATION/TRAINING PROGRAM

## 2025-04-28 PROCEDURE — 82306 VITAMIN D 25 HYDROXY: CPT

## 2025-04-28 PROCEDURE — 80053 COMPREHEN METABOLIC PANEL: CPT

## 2025-04-28 PROCEDURE — 93005 ELECTROCARDIOGRAM TRACING: CPT | Performed by: STUDENT IN AN ORGANIZED HEALTH CARE EDUCATION/TRAINING PROGRAM

## 2025-04-28 PROCEDURE — 71045 X-RAY EXAM CHEST 1 VIEW: CPT

## 2025-04-28 PROCEDURE — 36415 COLL VENOUS BLD VENIPUNCTURE: CPT

## 2025-04-28 PROCEDURE — 2580000003 HC RX 258: Performed by: STUDENT IN AN ORGANIZED HEALTH CARE EDUCATION/TRAINING PROGRAM

## 2025-04-28 PROCEDURE — 83605 ASSAY OF LACTIC ACID: CPT

## 2025-04-28 PROCEDURE — 93010 ELECTROCARDIOGRAM REPORT: CPT | Performed by: INTERNAL MEDICINE

## 2025-04-28 PROCEDURE — 82140 ASSAY OF AMMONIA: CPT

## 2025-04-28 PROCEDURE — 81001 URINALYSIS AUTO W/SCOPE: CPT

## 2025-04-28 PROCEDURE — 96372 THER/PROPH/DIAG INJ SC/IM: CPT

## 2025-04-28 PROCEDURE — 84484 ASSAY OF TROPONIN QUANT: CPT

## 2025-04-28 PROCEDURE — 80143 DRUG ASSAY ACETAMINOPHEN: CPT

## 2025-04-28 PROCEDURE — 80307 DRUG TEST PRSMV CHEM ANLYZR: CPT

## 2025-04-28 PROCEDURE — 87636 SARSCOV2 & INF A&B AMP PRB: CPT

## 2025-04-28 PROCEDURE — 96375 TX/PRO/DX INJ NEW DRUG ADDON: CPT

## 2025-04-28 PROCEDURE — 83735 ASSAY OF MAGNESIUM: CPT

## 2025-04-28 PROCEDURE — 6360000002 HC RX W HCPCS: Performed by: STUDENT IN AN ORGANIZED HEALTH CARE EDUCATION/TRAINING PROGRAM

## 2025-04-28 PROCEDURE — 85025 COMPLETE CBC W/AUTO DIFF WBC: CPT

## 2025-04-28 PROCEDURE — 82077 ASSAY SPEC XCP UR&BREATH IA: CPT

## 2025-04-28 PROCEDURE — 2500000003 HC RX 250 WO HCPCS: Performed by: STUDENT IN AN ORGANIZED HEALTH CARE EDUCATION/TRAINING PROGRAM

## 2025-04-28 PROCEDURE — 99285 EMERGENCY DEPT VISIT HI MDM: CPT

## 2025-04-28 PROCEDURE — 82550 ASSAY OF CK (CPK): CPT

## 2025-04-28 PROCEDURE — 96374 THER/PROPH/DIAG INJ IV PUSH: CPT

## 2025-04-28 PROCEDURE — 80179 DRUG ASSAY SALICYLATE: CPT

## 2025-04-28 PROCEDURE — 85610 PROTHROMBIN TIME: CPT

## 2025-04-28 PROCEDURE — 6360000004 HC RX CONTRAST MEDICATION: Performed by: STUDENT IN AN ORGANIZED HEALTH CARE EDUCATION/TRAINING PROGRAM

## 2025-04-28 PROCEDURE — 70450 CT HEAD/BRAIN W/O DYE: CPT

## 2025-04-28 PROCEDURE — 70498 CT ANGIOGRAPHY NECK: CPT

## 2025-04-28 RX ORDER — POLYETHYLENE GLYCOL 3350 17 G
2 POWDER IN PACKET (EA) ORAL
Status: DISCONTINUED | OUTPATIENT
Start: 2025-04-28 | End: 2025-04-28 | Stop reason: HOSPADM

## 2025-04-28 RX ORDER — PANTOPRAZOLE SODIUM 40 MG/1
40 TABLET, DELAYED RELEASE ORAL
Status: COMPLETED | OUTPATIENT
Start: 2025-04-28 | End: 2025-04-28

## 2025-04-28 RX ORDER — SODIUM CHLORIDE, SODIUM LACTATE, POTASSIUM CHLORIDE, AND CALCIUM CHLORIDE .6; .31; .03; .02 G/100ML; G/100ML; G/100ML; G/100ML
1000 INJECTION, SOLUTION INTRAVENOUS ONCE
Status: COMPLETED | OUTPATIENT
Start: 2025-04-28 | End: 2025-04-28

## 2025-04-28 RX ORDER — LORAZEPAM 2 MG/1
2 TABLET ORAL
Status: DISCONTINUED | OUTPATIENT
Start: 2025-04-28 | End: 2025-04-28 | Stop reason: HOSPADM

## 2025-04-28 RX ORDER — LORAZEPAM 2 MG/ML
3 INJECTION INTRAMUSCULAR
Status: DISCONTINUED | OUTPATIENT
Start: 2025-04-28 | End: 2025-04-28 | Stop reason: HOSPADM

## 2025-04-28 RX ORDER — ATORVASTATIN CALCIUM 40 MG/1
40 TABLET, FILM COATED ORAL DAILY
Status: COMPLETED | OUTPATIENT
Start: 2025-04-28 | End: 2025-04-28

## 2025-04-28 RX ORDER — LORAZEPAM 1 MG/1
1 TABLET ORAL
Status: DISCONTINUED | OUTPATIENT
Start: 2025-04-28 | End: 2025-04-28 | Stop reason: HOSPADM

## 2025-04-28 RX ORDER — FERROUS SULFATE 325(65) MG
325 TABLET ORAL 2 TIMES DAILY WITH MEALS
Status: DISCONTINUED | OUTPATIENT
Start: 2025-04-28 | End: 2025-04-28 | Stop reason: HOSPADM

## 2025-04-28 RX ORDER — SODIUM CHLORIDE 0.9 % (FLUSH) 0.9 %
5-40 SYRINGE (ML) INJECTION PRN
Status: DISCONTINUED | OUTPATIENT
Start: 2025-04-28 | End: 2025-04-28 | Stop reason: HOSPADM

## 2025-04-28 RX ORDER — LORAZEPAM 2 MG/1
4 TABLET ORAL
Status: DISCONTINUED | OUTPATIENT
Start: 2025-04-28 | End: 2025-04-28 | Stop reason: HOSPADM

## 2025-04-28 RX ORDER — LORAZEPAM 2 MG/ML
2 INJECTION INTRAMUSCULAR ONCE
Status: COMPLETED | OUTPATIENT
Start: 2025-04-28 | End: 2025-04-28

## 2025-04-28 RX ORDER — CLOPIDOGREL BISULFATE 75 MG/1
75 TABLET ORAL ONCE
Status: COMPLETED | OUTPATIENT
Start: 2025-04-28 | End: 2025-04-28

## 2025-04-28 RX ORDER — METOPROLOL SUCCINATE 25 MG/1
25 TABLET, EXTENDED RELEASE ORAL ONCE
Status: COMPLETED | OUTPATIENT
Start: 2025-04-28 | End: 2025-04-28

## 2025-04-28 RX ORDER — DIPHENHYDRAMINE HYDROCHLORIDE 50 MG/ML
50 INJECTION, SOLUTION INTRAMUSCULAR; INTRAVENOUS ONCE
Status: COMPLETED | OUTPATIENT
Start: 2025-04-28 | End: 2025-04-28

## 2025-04-28 RX ORDER — RANOLAZINE 500 MG/1
1000 TABLET, EXTENDED RELEASE ORAL 2 TIMES DAILY
Status: DISCONTINUED | OUTPATIENT
Start: 2025-04-28 | End: 2025-04-28 | Stop reason: SDUPTHER

## 2025-04-28 RX ORDER — RANOLAZINE 500 MG/1
1000 TABLET, EXTENDED RELEASE ORAL ONCE
Status: DISCONTINUED | OUTPATIENT
Start: 2025-04-28 | End: 2025-04-28 | Stop reason: HOSPADM

## 2025-04-28 RX ORDER — IOPAMIDOL 755 MG/ML
80 INJECTION, SOLUTION INTRAVASCULAR
Status: COMPLETED | OUTPATIENT
Start: 2025-04-28 | End: 2025-04-28

## 2025-04-28 RX ORDER — LORAZEPAM 2 MG/ML
4 INJECTION INTRAMUSCULAR
Status: DISCONTINUED | OUTPATIENT
Start: 2025-04-28 | End: 2025-04-28 | Stop reason: HOSPADM

## 2025-04-28 RX ORDER — LORAZEPAM 2 MG/ML
1 INJECTION INTRAMUSCULAR
Status: DISCONTINUED | OUTPATIENT
Start: 2025-04-28 | End: 2025-04-28 | Stop reason: HOSPADM

## 2025-04-28 RX ORDER — LANOLIN ALCOHOL/MO/W.PET/CERES
100 CREAM (GRAM) TOPICAL DAILY
Status: DISCONTINUED | OUTPATIENT
Start: 2025-04-28 | End: 2025-04-28 | Stop reason: HOSPADM

## 2025-04-28 RX ORDER — HALOPERIDOL 5 MG/ML
5 INJECTION INTRAMUSCULAR ONCE
Status: COMPLETED | OUTPATIENT
Start: 2025-04-28 | End: 2025-04-28

## 2025-04-28 RX ORDER — SODIUM CHLORIDE 9 MG/ML
INJECTION, SOLUTION INTRAVENOUS PRN
Status: DISCONTINUED | OUTPATIENT
Start: 2025-04-28 | End: 2025-04-28 | Stop reason: HOSPADM

## 2025-04-28 RX ORDER — LORAZEPAM 2 MG/ML
2 INJECTION INTRAMUSCULAR
Status: DISCONTINUED | OUTPATIENT
Start: 2025-04-28 | End: 2025-04-28 | Stop reason: HOSPADM

## 2025-04-28 RX ORDER — SODIUM CHLORIDE 0.9 % (FLUSH) 0.9 %
5-40 SYRINGE (ML) INJECTION EVERY 12 HOURS SCHEDULED
Status: DISCONTINUED | OUTPATIENT
Start: 2025-04-28 | End: 2025-04-28 | Stop reason: HOSPADM

## 2025-04-28 RX ORDER — FOLIC ACID 1 MG/1
1 TABLET ORAL DAILY
Status: DISCONTINUED | OUTPATIENT
Start: 2025-04-28 | End: 2025-04-28 | Stop reason: HOSPADM

## 2025-04-28 RX ADMIN — NICOTINE POLACRILEX 2 MG: 2 LOZENGE ORAL at 12:20

## 2025-04-28 RX ADMIN — Medication 10 ML: at 11:30

## 2025-04-28 RX ADMIN — METOPROLOL SUCCINATE 25 MG: 25 TABLET, EXTENDED RELEASE ORAL at 12:20

## 2025-04-28 RX ADMIN — DIPHENHYDRAMINE HYDROCHLORIDE 50 MG: 50 INJECTION INTRAMUSCULAR; INTRAVENOUS at 01:39

## 2025-04-28 RX ADMIN — HALOPERIDOL LACTATE 5 MG: 5 INJECTION, SOLUTION INTRAMUSCULAR at 01:41

## 2025-04-28 RX ADMIN — LORAZEPAM 2 MG: 2 INJECTION INTRAMUSCULAR; INTRAVENOUS at 02:08

## 2025-04-28 RX ADMIN — Medication 100 MG: at 11:29

## 2025-04-28 RX ADMIN — APIXABAN 5 MG: 5 TABLET, FILM COATED ORAL at 12:20

## 2025-04-28 RX ADMIN — ATORVASTATIN CALCIUM 40 MG: 40 TABLET, FILM COATED ORAL at 12:20

## 2025-04-28 RX ADMIN — CLOPIDOGREL BISULFATE 75 MG: 75 TABLET ORAL at 12:20

## 2025-04-28 RX ADMIN — PANTOPRAZOLE SODIUM 40 MG: 40 TABLET, DELAYED RELEASE ORAL at 12:20

## 2025-04-28 RX ADMIN — IOPAMIDOL 80 ML: 755 INJECTION, SOLUTION INTRAVENOUS at 00:57

## 2025-04-28 RX ADMIN — SODIUM CHLORIDE, SODIUM LACTATE, POTASSIUM CHLORIDE, AND CALCIUM CHLORIDE 1000 ML: .6; .31; .03; .02 INJECTION, SOLUTION INTRAVENOUS at 01:43

## 2025-04-28 RX ADMIN — FOLIC ACID 1 MG: 1 TABLET ORAL at 11:29

## 2025-04-28 NOTE — ED NOTES
Pt awake, eating breakfast.  Wanting to go home.  Process explained to patient.  States he will wait to be evaluated, but after that he is going home, he does not care what the doctor says.

## 2025-04-28 NOTE — ED NOTES
Pt asking the same questions over and over again.  Wanting to know why he is here and why he can't leave.  Writer reminded him that we had already been over this numerous times.  Pt states he does have terrible short term memory, \"My short term memory is shot, I honestly do not remember talking with you about this.\"  Writer explained the process to the patient again and he stated he understood.  Pt resting in bed.

## 2025-04-28 NOTE — DISCHARGE INSTRUCTIONS
the number listed above.     [facility]  Mental Health, Substance Abuse, Psychiatric Services   Location: [place]  Phone: [phone]  Intake: Walk-in, Monday-Thursday 12:30p-2:30p, bring proof of insurance    [facility]  Mental Health, Housing, Case Management,   Location:[address], [place], [place]   Phone: [phone]  They will do a brief phone screen to determine which local agency/office would be most helpful for you.     [facility]: [address] [place], [place] [address]  Phone: [phone]  Virtual and in-person appointments  *Can use online schedule at [employer] or call the number listed above  *most major insurances, medicaid, medicare and can arrange private pay.    [facility]  Location: [address] [place] [address]   Phone: [phone]  (in [facility])    [employer]  Get registered [phone] Monday through Friday, 7 AM to 6 PM  or make an appointment on-line.   [email or web address]  Insurance: Aetna, Laya, Caresokrystlee, Stevenna, GEHA, Humana, Medical [place], Medicare, United Healthcare  Intake: be seen within two weeks, in person or virtual.       Accepts Medicare:     [facility] [employer]  Location: [address]  [place], [place] [address]  Phone: [phone]    [facility]  Location: [address], [address], [place], [place] [address]  Phone: [phone]    [facility]   Location: [address] [place], [place], [address]  Phone: [phone]          Mental Health Therapy Only, No Psychiatry:     [facility]  Location: [address] [place] [address]  Phone: [phone]    [facility]  Location: [address], [place] [address]  Phone: [phone]        Virtual Services  [email or web address]  [facility]- [phone]  [place], [place]   Periodically offer $10.00 [employer] gift cards for food only.  [facility]- [phone]   [address] [address]  Meet @ [employer] on Wednesday evenings 6:45pm. Ask for a deacon, talk with them briefly, go to the service and meet with deacon after the service. Must provide rent or utility information so they can pay directly.                                              Some gift cards available.  [facility]- [phone] ext.[phone]  [place]. Frequently a wide selection in their food pantry. They have given out vouchers in the past for clothing and furniture.   [facility]: [phone]   [facility]. Mostly food and clothing. They also help with back to school and Richard assistance. Sign up in July and September. They know where the mobile food pantries go.  [facility]- [phone]  [facility] Area and school district. Leave message. They mostly help with food and clothing. Call for specific times they are open.  [facility]- [phone]   [facility] only. Leave message with name, address and phone number where you can be reached in 1-2 business days.  [facility]- [phone] ext. [phone]   [place].   [facility]- [phone] Ext. [phone]  [place], [place]. Financial assistance is very limited. Mostly food help. They sometimes deliver food, especially for shut ins. Leave name, address, and phone number.  [facility]- [phone] Ext. 2  [place], [place]. Leave name, number and a brief message. Able to help with rent utilities food and clothing.  [facility]- [phone] ext. [phone]  [place]. Leave name, number, address  [facility]- [phone] ext. [phone]   [place], [place]. Food pantry, limited clothing, furniture vouchers  [facility]- [phone] Ext. 2

## 2025-04-28 NOTE — ED NOTES
Level of Care Disposition: discharge     Patient was seen by ED provider and Nursing/SW staff.  The case was presented to psychiatric provider on-call .  Based on the ED evaluation and information presented to the provider by this writer , it is the recommendation of the on call psychiatric provider that the patient be discharged from a psychiatric standpoint with the following referrals: to follow up with his established care at [facility] and substance abuse referrals and out pt mental health treatment referrals. Safety Plan filled out by pt.         Minnie Begum MSW,LSW

## 2025-04-28 NOTE — ED NOTES
Kipr attempted to call pt's counselor Alejandrina at [phone] for collateral information. She did not answer. Kipr left a return phone number.       Minnie Begum MSW,LSW

## 2025-04-28 NOTE — ED NOTES
Patient states \"if you make me mad just wait till you see what I do to myself when I get home.\"

## 2025-04-28 NOTE — ED PROVIDER NOTES
Emergency Department Encounter    Patient: Kam Nguyen  MRN: [id number]  : 1965  Date of Evaluation: 2025  ED Provider:  Krishna Dunn MD    Triage Chief Complaint:   Altered Mental Status    [place]:  Kam Nguyen is a 59 y.o. male with history seen below presenting with concern for stroke.  Per report patient has been a missing person since Wednesday.  Police reportedly found him around 1030 and he was not slurring his speech but then just prior to presentation they state patient began slurring his speech and complaining of dizziness.  EMS called in with concern for stroke.  On presentation patient denies headache, neck or back pain, chest pain or shortness of breath, abdominal pain.  When asking about why he was a missing person he states I do not know.  He denies drug use.    ROS - see HPI, below listed is current ROS at time of my eval:  At least 14 systems reviewed, negative other than HPI    Past Medical History:   Diagnosis Date    Affective bipolar disorder (HCC) 2010    Anxiety     Bipolar affective (HCC)     CAD (coronary artery disease)     Chronic back pain     Class 2 obesity due to excess calories with body mass index (BMI) of 39.0 to 39.9 in adult 2022    COPD (chronic obstructive pulmonary disease) (HCC)     DDD (degenerative disc disease), cervical     Depression     Fatty liver 2021    GERD (gastroesophageal reflux disease)     Hyperlipidemia     Hypertension     MI (myocardial infarction) (HCC)     Pancreatitis     Recovering alcoholic (HCC)     Tobacco abuse 2016     Past Surgical History:   Procedure Laterality Date    CORONARY ANGIOPLASTY WITH STENT PLACEMENT  , ,     [facility]    HIP SURGERY Left 2024    HIP BONE BIOPSY/EXCISION WITH ANTIBIOTIC SPACER PLACEMENT performed by J Carlos Almonte MD at [facility] OR     Family History   Problem Relation Age of Onset    Other Mother     Arthritis Mother     Mental Illness Mother      Depression Mother     Diabetes Father     Heart Disease Father     Substance Abuse Father     Heart Disease Sister     High Blood Pressure Sister     Mental Retardation Brother     Other Brother     Learning Disabilities Brother     Early Death Sister     Cancer Sister     Substance Abuse Sister     Cancer Sister     Diabetes Brother     High Blood Pressure Brother      Social History     Socioeconomic History    Marital status: Single     Spouse name: Not on file    Number of children: Not on file    Years of education: Not on file    Highest education level: Not on file   Occupational History    Not on file   Tobacco Use    Smoking status: Every Day     Current packs/day: 1.00     Average packs/day: 1 pack/day for 47.7 years (47.7 ttl pk-yrs)     Types: Cigarettes     Start date: 8/17/1977    Smokeless tobacco: Never    Tobacco comments:     1/2 pack day    Vaping Use    Vaping status: Never Used   Substance and Sexual Activity    Alcohol use: Not Currently     Comment: states \"a few beers monthly\"    Drug use: Not Currently     Types: Marijuana (Weed)     Comment: took friend's suboxone     Sexual activity: Never   Other Topics Concern    Not on file   Social History Narrative    Not on file     Social Drivers of Health     Financial Resource Strain: Not on file   Food Insecurity: No Food Insecurity (11/26/2024)    Hunger Vital Sign     Worried About Running Out of Food in the Last Year: Never true     Ran Out of Food in the Last Year: Never true   Transportation Needs: No Transportation Needs (11/26/2024)    PRAPARE - Transportation     Lack of Transportation (Medical): No     Lack of Transportation (Non-Medical): No   Physical Activity: Not on file   Stress: Not on file   Social Connections: Not on file   Intimate Partner Violence: Not At Risk (10/21/2024)    Received from [facility]    Humiliation, Afraid, Rape, and Kick questionnaire     Fear of Current or Ex-Partner: No     Emotionally Abused: No

## 2025-04-28 NOTE — ED TRIAGE NOTES
Was put on hold by the Police that brought him  in.Pt was agitated and stated that he wants to go home, tried to get out of bed and made comments that he promised somebody that he's not gonna put a gun on his head and pull the trigger. Given injection that will make him calm down,. Security called and came for precautionary measure.

## 2025-04-28 NOTE — ED NOTES
Presenting Problem: Patient presents to ED on a SOB after pt made suicidal statements and states he would shoot himself. Pt was intoxicated when he first arrived to the ED. Pt's ETOH was 287. Pt's ETOH at the time of assessment was 74. Pt denies suicidal ideations, plan, and intent. Pt denies homicidal ideations. Pt denies audio/visual hallucinations.      Appearance/Hygiene:  Unkempt, hospital attire, fair grooming, and fair hygiene   Motor Behavior: WNL   Attitude: cooperative  Affect: normal affect   Speech: normal pitch and normal volume  Mood: irritable and within normal limits   Thought Processes: [place]  Perceptions: Absent   Thought content: pt states he takes it day by day    Orientation: A&Ox4   Memory: pt reports he has bad short term memory   Concentration: Good    Insight/ judgement: impaired judgment and insight       Psychosocial and contextual factors: Pt reports he is currently staying in a motel to get out of a bad relationship. He reports his  from [facility] has him staying there until they can get him into an apartment. Pt reports his appetite has been alright. He reports his sleep is poor and states it is up and down. Pt reports he does not have a support system. Pt reports he has had his hipped replaced.     C-SSRS flowsheet is  Complete.    Psychiatric History (including current outpatient provider and past inpatient admissions): Pt reports he is connected with [facility]. Pt reports he has been admitted to [facility] in the past.     Access to Firearms: none     ASSESSMENT FOR IMMINENT FUTURE DANGER:    RISK FACTORS:    [x]  Age <25 or >55   [x]  Male gender   []  Depressed mood   []  Active suicidal ideation   []  Suicide plan   []  Suicide attempt   []  Access to lethal means   [x]  Prior suicide attempt   [x]  Active substance abuse ( pt tested positive for marijuana on urine drug screen)    []  Highly impulsive behaviors   []  Not attending to  self-care/ADLs    []  Recent significant loss   []  Chronic pain or medical illness   []  Social isolation   []  History of violence    []  Active psychosis   []  Cognitive impairment    []  No outpatient services in place   []  Medication noncompliance   []  No collateral information to support safety  [] Self- injurious/ Self-harm behavior    PROTECTIVE FACTORS:  [] Age >25 and <55  [] Female gender   [] Denies depression  [x] Denies suicidal ideation  [x] Does not have lethal plan   [x] Does not have access to guns   [] No prior suicide attempts  [] No active substance abuse  [] Patient has social or family support  [x] No active psychosis or cognitive dysfunction  [x] Physically healthy  [x] Has outpatient services in place  [x] Compliant with recommended medications  [] Collateral information from  supports patient safety   [] Patient is future oriented with plans to        Minnie Begum MSW,LSW

## 2025-04-28 NOTE — PROGRESS NOTES
Pt asking to see someone in administration.  I introduced myself and pt states he wants to sign himself out of the hospital.  States he was leaving a bad relationship and his counselor (pepe) helped him get put up in a hotel room and that is why he was missing for a few days. States he did not say he would harm himself and wants to sign out.  Procedure explained to pt-when blood alcohol level is legal he can get his evaluation and then maybe be released but unable to sign self out due to being on a hold.  Pt states he will be as nice as he can.  I explained that the staff will also be nice and easy to get along with as long as he is.  Pt at this time agrees to process despite disagreeing with it.  Luz Maria CLIFTONN, RN

## 2025-04-28 NOTE — ED NOTES
Called received from Lilian duane's colleen, information was collected no information was given out.       Collateral Contact:  Name:Lilian Clay  Phone:[phone]  Relation to Patient: colleen   Last Contact with Patient: last Wednesday   Concerns: She reports pt disappeared on Wednesday and states she does not know where he went. She reports his belongings are still at the house. She reports pt is an alcoholic. She reports in October when pt was released from the hospital he stopped drinking. She reports over Easter weekend they went on vacation and reports he had some drinks. She reports when he drinks he has nightmares. She reports he will have nightmares of family members who have passed away. She reports these nightmares really effect him. She reports pt was talking about checking himself into a hospital for help. She reports pt has not been himself and states he has a different personality. She reports pt had been making suicidal statements. She reports pt has been more sad. She reports pt just left their house. She reports she is concerned about pt.         Minnie Begum MSW,LSW

## 2025-04-28 NOTE — ED NOTES
Unable to ask questions Pt is a bit drowsy from the sedation that was given to the pt when he was agitated., Handed over to Lizette TUBBS

## 2025-04-28 NOTE — CARE COORDINATION
10:15 AM  FABIAN received call from main ED about consult for substance abuse resources.     FABIAN chart reviewed pt. Due to pt's mentation, FABIAN included substance abuse/mental health resources into pt's ED AVS instructions.   Consult complete.

## 2025-04-28 NOTE — ED NOTES
Patient presented to ED via Police WITH statement of belief signed by officer for evaluation. Explained process of securing patient belongings for patient/staff safety, patient verbalized understanding. Security at bedside, metal detector wanding completed. Patient changed into safety gown. All belongings itemized by  Jerry, placed in labeled bag, removed from the patient care area, and taken to the security locker. Itemized list placed with belongings, in patient record, and a copy given to the patient.

## 2025-05-06 ENCOUNTER — HOSPITAL ENCOUNTER (OUTPATIENT)
Age: 60
Discharge: HOME | End: 2025-05-06
Payer: COMMERCIAL

## 2025-05-06 ENCOUNTER — HOSPITAL ENCOUNTER (OUTPATIENT)
Dept: GENERAL RADIOLOGY | Age: 60
Discharge: HOME | End: 2025-05-06
Payer: COMMERCIAL

## 2025-05-06 DIAGNOSIS — J44.9 COPD (CHRONIC OBSTRUCTIVE PULMONARY DISEASE) CASE MANAGEMENT PATIENT (HCC): ICD-10-CM

## 2025-05-06 PROCEDURE — 71046 X-RAY EXAM CHEST 2 VIEWS: CPT

## 2025-05-24 ENCOUNTER — HOSPITAL ENCOUNTER (OUTPATIENT)
Dept: MRI IMAGING | Age: 60
Discharge: HOME | End: 2025-05-24

## 2025-05-24 DIAGNOSIS — D49.7 PITUITARY TUMOR: ICD-10-CM

## 2025-05-29 ENCOUNTER — TRANSCRIBE ORDERS (OUTPATIENT)
Dept: ADMINISTRATIVE | Age: 60
End: 2025-05-29

## 2025-05-29 DIAGNOSIS — D49.7 PITUITARY TUMOR: Primary | ICD-10-CM

## 2025-06-09 NOTE — ED PROVIDER NOTES
EMERGENCY MEDICINE PROVIDER NOTE    Patient Identification  Pt Name: Kam Nguyen  MRN: [id number]  Birthdate 1965  Date of evaluation: 11/12/2024  Provider: Rory Ruiz MD  PCP: Candelaria Dover APRN - NP    Chief Complaint  Knee Pain (Left knee pain, felt a pop today )      HPI  (History provided by patient)  This is a 59 y.o. male PMH including CAD, degenerative disc disease, chronic back pain, HTN, HLD, COPD, bipolar disorder who was brought in by EMS transportation for left knee pain.  Patient states he felt a \"pop\" while standing today and had acute onset of anterior left knee pain.  Pain is worsened with movement, worsened with weightbearing.  States he is not able to bear weight on the left leg due to the pain in the knee.  Intermittent radiation to the left hip.  Denies other injuries.  Denies history of surgeries to the left knee.  Denies numbness or weakness of affected extremity.  Denies other symptoms currently including cough, fevers, chest pain, shortness of breath.  Home 2.5 L nasal cannula oxygen requirement at night with history of COPD.  Taking ASA 81 daily.  States he is not taking Plavix currently as it has been held.  On chart review, patient did have an x-ray of the left hip and knee 11/5/2024 showing advanced osteoarthritis of the left hip, no other acute findings.    I have reviewed the following nursing documentation:  Allergies: Pcn [penicillins]    Past medical history:   Past Medical History:   Diagnosis Date    Affective bipolar disorder (HCC) 11/18/2010    Anxiety     Bipolar affective (HCC)     CAD (coronary artery disease)     Chronic back pain     Class 2 obesity due to excess calories with body mass index (BMI) of 39.0 to 39.9 in adult 2/2/2022    COPD (chronic obstructive pulmonary disease) (HCC)     DDD (degenerative disc disease), cervical     Depression     Fatty liver 2/2/2021    GERD (gastroesophageal reflux disease)     Hyperlipidemia     Hypertension     MI  Continue CPAP nightly use  Will request CPAP compliance report  Need to use CPAP even when traveling  Continue exercise, weight loss recommended

## 2025-07-29 RX ORDER — NITROGLYCERIN 0.4 MG/1
TABLET SUBLINGUAL
Qty: 25 TABLET | Refills: 3 | Status: SHIPPED | OUTPATIENT
Start: 2025-07-29

## 2025-07-29 NOTE — TELEPHONE ENCOUNTER
Last Office Visit: 1/16/2025 Provider: MICHELET  **Is provider OOT? No    Next Office Visit: Visit date not found Provider:     F/U 1 year

## 2025-08-29 ENCOUNTER — OFFICE VISIT (OUTPATIENT)
Dept: ORTHOPEDIC SURGERY | Age: 60
End: 2025-08-29
Payer: COMMERCIAL

## 2025-08-29 DIAGNOSIS — M43.10 DEGENERATIVE SPONDYLOLISTHESIS: Primary | ICD-10-CM

## 2025-08-29 DIAGNOSIS — M70.62 GREATER TROCHANTERIC BURSITIS, LEFT: ICD-10-CM

## 2025-08-29 DIAGNOSIS — M54.16 LUMBAR RADICULAR PAIN: ICD-10-CM

## 2025-08-29 DIAGNOSIS — M51.360 DEGENERATION OF INTERVERTEBRAL DISC OF LUMBAR REGION WITH DISCOGENIC BACK PAIN: ICD-10-CM

## 2025-08-29 DIAGNOSIS — Z96.642 HISTORY OF TOTAL HIP REPLACEMENT, LEFT: ICD-10-CM

## 2025-08-29 PROCEDURE — G8417 CALC BMI ABV UP PARAM F/U: HCPCS | Performed by: PHYSICIAN ASSISTANT

## 2025-08-29 PROCEDURE — 3017F COLORECTAL CA SCREEN DOC REV: CPT | Performed by: PHYSICIAN ASSISTANT

## 2025-08-29 PROCEDURE — 99214 OFFICE O/P EST MOD 30 MIN: CPT | Performed by: PHYSICIAN ASSISTANT

## 2025-08-29 PROCEDURE — 4004F PT TOBACCO SCREEN RCVD TLK: CPT | Performed by: PHYSICIAN ASSISTANT

## 2025-08-29 PROCEDURE — G8427 DOCREV CUR MEDS BY ELIG CLIN: HCPCS | Performed by: PHYSICIAN ASSISTANT

## 2025-08-29 RX ORDER — METHYLPREDNISOLONE 4 MG/1
TABLET ORAL
Qty: 1 KIT | Refills: 0 | Status: SHIPPED | OUTPATIENT
Start: 2025-08-29

## (undated) DEVICE — SUTURE ABSORBABLE MONOFILAMENT 1 CTX 45 CM 48 MM DA VIO PDS+

## (undated) DEVICE — SUTURE MONOCRYL STRATAFIX SPRL SZ 3-0 L12IN ABSRB UD FS-1 L30X30CM SXMP2B410

## (undated) DEVICE — IMPLANTABLE DEVICE: Type: IMPLANTABLE DEVICE | Site: HIP | Status: NON-FUNCTIONAL

## (undated) DEVICE — BIT DRL L30MM DIA3.2MM DISP FOR G7 2 MOBILITY CONSTRUCT

## (undated) DEVICE — SUTURE ETHIBOND EXCEL SZ 2 L30IN NONABSORBABLE GRN L40MM V-37 MX69G

## (undated) DEVICE — GLOVE ORTHO 7 1/2   MSG9475

## (undated) DEVICE — BIOLOX® DELTA, CERAMIC FEMORAL HEAD, S, Ø 40/-3.5, TAPER 12/14
Type: IMPLANTABLE DEVICE | Site: HIP | Status: NON-FUNCTIONAL
Brand: BIOLOX® DELTA

## (undated) DEVICE — DRESSING CELLERATE RX 5 GM SURG PWD HYDROLYZED CLLGN

## (undated) DEVICE — STERILE PVP: Brand: MEDLINE INDUSTRIES, INC.

## (undated) DEVICE — IMPLANTABLE DEVICE
Type: IMPLANTABLE DEVICE | Site: HIP | Status: NON-FUNCTIONAL
Brand: G7® VIVACIT-E®

## (undated) DEVICE — HOOD: Brand: FLYTE

## (undated) DEVICE — SOLUTION WND IRRIGATION 450 ML 0.5 PVP-I 0.9 NACL

## (undated) DEVICE — HOOD, PEEL-AWAY: Brand: FLYTE

## (undated) DEVICE — OPTIFOAM GENTLE SA, POSTOP, 4X12: Brand: MEDLINE

## (undated) DEVICE — BIPOLAR SEALER 23-112-1 AQM 6.0: Brand: AQUAMANTYS ®

## (undated) DEVICE — SUTURE STRATAFIX SPRL SZ 2 0 L14IN ABSRB UD MH L36MM 1 2 CIR SXMD2B401

## (undated) DEVICE — GOWN,REINF,POLY,AURORA,XLNG/XXL,STRL: Brand: MEDLINE

## (undated) DEVICE — HANDPIECE SET WITH HIGH FLOW TIP AND SUCTION TUBE: Brand: INTERPULSE

## (undated) DEVICE — GLOVE SURG SZ 75 L12IN FNGR THK79MIL GRN LTX FREE

## (undated) DEVICE — BOWL MED L 32OZ PLAS W/ MOLD GRAD EZ OPN PEEL PCH

## (undated) DEVICE — SOLUTION IRRIG 1000ML STRL H2O USP PLAS POUR BTL

## (undated) DEVICE — CONTAINER,SPECIMEN,OR STERILE,4OZ: Brand: MEDLINE

## (undated) DEVICE — NEEDLE,20GX1.5",BD,YALE,DISP,RG: Brand: MEDLINE